# Patient Record
Sex: MALE | URBAN - NONMETROPOLITAN AREA
[De-identification: names, ages, dates, MRNs, and addresses within clinical notes are randomized per-mention and may not be internally consistent; named-entity substitution may affect disease eponyms.]

---

## 2023-06-02 LAB
ALBUMIN SERPL-MCNC: 3.9 G/DL
ALP BLD-CCNC: 98 U/L
ALT SERPL-CCNC: 9 U/L
ANION GAP SERPL CALCULATED.3IONS-SCNC: 15 MMOL/L
AST SERPL-CCNC: 10 U/L
BASOPHILS ABSOLUTE: ABNORMAL
BASOPHILS RELATIVE PERCENT: ABNORMAL
BILIRUB SERPL-MCNC: 0.3 MG/DL (ref 0.1–1.4)
BUN BLDV-MCNC: 15 MG/DL
CALCIUM SERPL-MCNC: 8.9 MG/DL
CHLORIDE BLD-SCNC: 101 MMOL/L
CHOLESTEROL, TOTAL: 221 MG/DL
CHOLESTEROL/HDL RATIO: 6.7
CO2: 25 MMOL/L
CREAT SERPL-MCNC: 1.61 MG/DL
EGFR: 42.7
EOSINOPHILS ABSOLUTE: ABNORMAL
EOSINOPHILS RELATIVE PERCENT: ABNORMAL
GLUCOSE BLD-MCNC: 273 MG/DL
HCT VFR BLD CALC: 40.6 % (ref 41–53)
HDLC SERPL-MCNC: 33 MG/DL (ref 35–70)
HEMOGLOBIN: 12.9 G/DL (ref 13.5–17.5)
LDL CHOLESTEROL CALCULATED: 122 MG/DL (ref 0–160)
LYMPHOCYTES ABSOLUTE: ABNORMAL
LYMPHOCYTES RELATIVE PERCENT: ABNORMAL
MCH RBC QN AUTO: ABNORMAL PG
MCHC RBC AUTO-ENTMCNC: ABNORMAL G/DL
MCV RBC AUTO: ABNORMAL FL
MONOCYTES ABSOLUTE: ABNORMAL
MONOCYTES RELATIVE PERCENT: ABNORMAL
NEUTROPHILS ABSOLUTE: ABNORMAL
NEUTROPHILS RELATIVE PERCENT: ABNORMAL
NONHDLC SERPL-MCNC: ABNORMAL MG/DL
PLATELET # BLD: 187 K/ΜL
PMV BLD AUTO: ABNORMAL FL
POTASSIUM SERPL-SCNC: 4.3 MMOL/L
RBC # BLD: 4.41 10^6/ΜL
SODIUM BLD-SCNC: 137 MMOL/L
TOTAL PROTEIN: 6.8
TRIGL SERPL-MCNC: 329 MG/DL
VLDLC SERPL CALC-MCNC: 66 MG/DL
WBC # BLD: 7.96 10^3/ML

## 2023-06-26 PROBLEM — R60.9 EDEMA: Status: ACTIVE | Noted: 2023-05-12

## 2023-06-26 PROBLEM — N18.9 CHRONIC KIDNEY DISEASE: Status: ACTIVE | Noted: 2023-06-21

## 2023-06-26 PROBLEM — E55.9 VITAMIN D DEFICIENCY: Status: ACTIVE | Noted: 2023-06-21

## 2023-06-26 PROBLEM — F03.90 DEMENTIA (HCC): Status: ACTIVE | Noted: 2023-05-10

## 2023-06-26 PROBLEM — F33.9 RECURRENT MAJOR DEPRESSIVE DISORDER (HCC): Status: ACTIVE | Noted: 2023-05-12

## 2023-06-26 PROBLEM — R32 URINARY INCONTINENCE: Status: ACTIVE | Noted: 2023-04-24

## 2023-06-26 PROBLEM — E03.9 HYPOTHYROIDISM: Status: ACTIVE | Noted: 2023-05-12

## 2023-06-26 PROBLEM — F41.9 ANXIETY DISORDER: Status: ACTIVE | Noted: 2023-05-10

## 2023-06-26 PROBLEM — R35.0 FREQUENCY OF URINATION: Status: ACTIVE | Noted: 2023-04-24

## 2023-06-26 PROBLEM — E11.9 TYPE 2 DIABETES MELLITUS (HCC): Status: ACTIVE | Noted: 2023-05-12

## 2023-06-26 PROBLEM — E78.2 MIXED HYPERLIPIDEMIA: Status: ACTIVE | Noted: 2023-06-21

## 2023-06-26 PROBLEM — J18.9 PNEUMONIA: Status: ACTIVE | Noted: 2023-06-21

## 2023-06-26 RX ORDER — OXYBUTYNIN CHLORIDE 5 MG/1
5 TABLET ORAL DAILY
COMMUNITY

## 2023-06-26 RX ORDER — EZETIMIBE 10 MG/1
10 TABLET ORAL DAILY
COMMUNITY
Start: 2023-03-03

## 2023-06-26 RX ORDER — TORSEMIDE 20 MG/1
20 TABLET ORAL 2 TIMES DAILY
COMMUNITY

## 2023-06-26 RX ORDER — PRAVASTATIN SODIUM 40 MG
80 TABLET ORAL NIGHTLY
COMMUNITY

## 2023-06-26 RX ORDER — ERGOCALCIFEROL 1.25 MG/1
50000 CAPSULE ORAL WEEKLY
COMMUNITY

## 2023-06-26 RX ORDER — DONEPEZIL HYDROCHLORIDE 10 MG/1
10 TABLET, FILM COATED ORAL NIGHTLY
COMMUNITY

## 2023-06-26 RX ORDER — CITALOPRAM 40 MG/1
40 TABLET ORAL DAILY
COMMUNITY

## 2023-06-26 RX ORDER — GLIPIZIDE 10 MG/1
10 TABLET, FILM COATED, EXTENDED RELEASE ORAL DAILY
COMMUNITY

## 2023-06-26 RX ORDER — INSULIN GLARGINE 100 [IU]/ML
30 INJECTION, SOLUTION SUBCUTANEOUS NIGHTLY
COMMUNITY

## 2023-06-26 RX ORDER — LISINOPRIL 10 MG/1
10 TABLET ORAL DAILY
COMMUNITY

## 2023-06-27 ENCOUNTER — OFFICE VISIT (OUTPATIENT)
Dept: NEPHROLOGY | Age: 83
End: 2023-06-27
Payer: MEDICARE

## 2023-06-27 VITALS
HEART RATE: 74 BPM | DIASTOLIC BLOOD PRESSURE: 60 MMHG | SYSTOLIC BLOOD PRESSURE: 130 MMHG | WEIGHT: 247 LBS | OXYGEN SATURATION: 95 %

## 2023-06-27 DIAGNOSIS — I10 ESSENTIAL HYPERTENSION: ICD-10-CM

## 2023-06-27 DIAGNOSIS — N18.32 STAGE 3B CHRONIC KIDNEY DISEASE (HCC): Primary | ICD-10-CM

## 2023-06-27 PROCEDURE — 1123F ACP DISCUSS/DSCN MKR DOCD: CPT | Performed by: INTERNAL MEDICINE

## 2023-06-27 PROCEDURE — 99204 OFFICE O/P NEW MOD 45 MIN: CPT | Performed by: INTERNAL MEDICINE

## 2023-06-27 PROCEDURE — 3075F SYST BP GE 130 - 139MM HG: CPT | Performed by: INTERNAL MEDICINE

## 2023-06-27 PROCEDURE — 3078F DIAST BP <80 MM HG: CPT | Performed by: INTERNAL MEDICINE

## 2023-07-03 ENCOUNTER — HOSPITAL ENCOUNTER (OUTPATIENT)
Dept: ULTRASOUND IMAGING | Age: 83
Discharge: HOME | End: 2023-07-03
Attending: INTERNAL MEDICINE
Payer: MEDICARE

## 2023-07-03 DIAGNOSIS — N18.32 STAGE 3B CHRONIC KIDNEY DISEASE (HCC): ICD-10-CM

## 2023-07-03 DIAGNOSIS — I10 ESSENTIAL HYPERTENSION: ICD-10-CM

## 2023-07-03 PROCEDURE — 76770 US EXAM ABDO BACK WALL COMP: CPT

## 2023-08-01 ENCOUNTER — TELEPHONE (OUTPATIENT)
Dept: NEPHROLOGY | Age: 83
End: 2023-08-01

## 2023-08-01 ENCOUNTER — OFFICE VISIT (OUTPATIENT)
Dept: NEPHROLOGY | Age: 83
End: 2023-08-01
Payer: MEDICARE

## 2023-08-01 ENCOUNTER — NURSE ONLY (OUTPATIENT)
Dept: LAB | Age: 83
End: 2023-08-01

## 2023-08-01 VITALS
DIASTOLIC BLOOD PRESSURE: 71 MMHG | SYSTOLIC BLOOD PRESSURE: 137 MMHG | WEIGHT: 245 LBS | HEART RATE: 97 BPM | OXYGEN SATURATION: 94 %

## 2023-08-01 DIAGNOSIS — I10 ESSENTIAL HYPERTENSION: ICD-10-CM

## 2023-08-01 DIAGNOSIS — N18.32 STAGE 3B CHRONIC KIDNEY DISEASE (HCC): Primary | ICD-10-CM

## 2023-08-01 DIAGNOSIS — N18.32 STAGE 3B CHRONIC KIDNEY DISEASE (HCC): ICD-10-CM

## 2023-08-01 LAB
ALBUMIN SERPL BCG-MCNC: 3.6 G/DL (ref 3.5–5.1)
ALP SERPL-CCNC: 112 U/L (ref 38–126)
ALT SERPL W/O P-5'-P-CCNC: 13 U/L (ref 11–66)
ANION GAP SERPL CALC-SCNC: 15 MEQ/L (ref 8–16)
AST SERPL-CCNC: 14 U/L (ref 5–40)
BACTERIA: ABNORMAL
BILIRUB SERPL-MCNC: 0.7 MG/DL (ref 0.3–1.2)
BILIRUB UR QL STRIP: NEGATIVE
BUN SERPL-MCNC: 18 MG/DL (ref 7–22)
CALCIUM SERPL-MCNC: 8.9 MG/DL (ref 8.5–10.5)
CASTS #/AREA URNS LPF: ABNORMAL /LPF
CASTS #/AREA URNS LPF: ABNORMAL /LPF
CHARACTER UR: CLEAR
CHARCOAL URNS QL MICRO: ABNORMAL
CHLORIDE SERPL-SCNC: 103 MEQ/L (ref 98–111)
CO2 SERPL-SCNC: 24 MEQ/L (ref 23–33)
COLOR UR: YELLOW
CREAT SERPL-MCNC: 0.9 MG/DL (ref 0.4–1.2)
CREAT UR-MCNC: 125.8 MG/DL
CREAT UR-MCNC: 125.8 MG/DL
CRYSTALS URNS QL MICRO: ABNORMAL
DEPRECATED RDW RBC AUTO: 50.6 FL (ref 35–45)
EPITHELIAL CELLS, UA: ABNORMAL /HPF
ERYTHROCYTE [DISTWIDTH] IN BLOOD BY AUTOMATED COUNT: 14.3 % (ref 11.5–14.5)
GFR SERPL CREATININE-BSD FRML MDRD: > 60 ML/MIN/1.73M2
GLUCOSE SERPL-MCNC: 204 MG/DL (ref 70–108)
GLUCOSE UR QL STRIP.AUTO: NEGATIVE MG/DL
HCT VFR BLD AUTO: 44 % (ref 42–52)
HGB BLD-MCNC: 13.6 GM/DL (ref 14–18)
HGB UR QL STRIP.AUTO: NEGATIVE
KETONES UR QL STRIP.AUTO: ABNORMAL
LEUKOCYTE ESTERASE UR QL STRIP.AUTO: NEGATIVE
MCH RBC QN AUTO: 29.8 PG (ref 26–33)
MCHC RBC AUTO-ENTMCNC: 30.9 GM/DL (ref 32.2–35.5)
MCV RBC AUTO: 96.5 FL (ref 80–94)
MICROALBUMIN UR-MCNC: 1.37 MG/DL
MICROALBUMIN/CREAT RATIO PNL UR: 11 MG/G (ref 0–30)
NITRITE UR QL STRIP.AUTO: NEGATIVE
PH UR STRIP.AUTO: 5.5 [PH] (ref 5–9)
PLATELET # BLD AUTO: 202 THOU/MM3 (ref 130–400)
PMV BLD AUTO: 13 FL (ref 9.4–12.4)
POTASSIUM SERPL-SCNC: 4.8 MEQ/L (ref 3.5–5.2)
PROT SERPL-MCNC: 7 G/DL (ref 6.1–8)
PROT UR STRIP.AUTO-MCNC: NEGATIVE MG/DL
PROT UR-MCNC: 10.3 MG/DL
PROT/CREAT 24H UR: 0.08 MG/G{CREAT}
RBC # BLD AUTO: 4.56 MILL/MM3 (ref 4.7–6.1)
RBC #/AREA URNS HPF: ABNORMAL /HPF
RENAL EPI CELLS #/AREA URNS HPF: ABNORMAL /[HPF]
SODIUM SERPL-SCNC: 142 MEQ/L (ref 135–145)
SPECIFIC GRAVITY UA: 1.02 (ref 1–1.03)
UROBILINOGEN, URINE: 0.2 EU/DL (ref 0–1)
WBC # BLD AUTO: 8.1 THOU/MM3 (ref 4.8–10.8)
WBC #/AREA URNS HPF: ABNORMAL /HPF
YEAST LIKE FUNGI URNS QL MICRO: ABNORMAL

## 2023-08-01 PROCEDURE — 99213 OFFICE O/P EST LOW 20 MIN: CPT | Performed by: INTERNAL MEDICINE

## 2023-08-01 PROCEDURE — 3078F DIAST BP <80 MM HG: CPT | Performed by: INTERNAL MEDICINE

## 2023-08-01 PROCEDURE — 3075F SYST BP GE 130 - 139MM HG: CPT | Performed by: INTERNAL MEDICINE

## 2023-08-01 PROCEDURE — 1123F ACP DISCUSS/DSCN MKR DOCD: CPT | Performed by: INTERNAL MEDICINE

## 2023-08-01 RX ORDER — ATORVASTATIN CALCIUM 20 MG/1
20 TABLET, FILM COATED ORAL DAILY
COMMUNITY

## 2023-08-01 NOTE — PROGRESS NOTES
[facility]        Outpatient Follow-Up note         8/1/2023 10:29 AM    Patient Name:   Corie Hannah  Birthdate:    [birth year]  Primary Care Physician:  RELL Lopez - CNP   [facility] [place] [address] AND HYPERTENSION  [address] [facility]  Dept: [phone]  Loc: [phone]     Chief Complaint / Reason for follow-up : Follow Up of CKD     Interval History :  Patient seen and examined by me. Feels well. No cp or SOB.   No hospitalizations and no med changes      Past History :  Past Medical History:   Diagnosis Date    Chronic kidney disease     Diabetes mellitus ([address])     Hyperlipidemia     Hypertension      Past Surgical History:   Procedure Laterality Date    SHOULDER ARTHROPLASTY Left         Medications :     Outpatient Medications Marked as Taking for the 8/1/23 encounter (Office Visit) with Mario Romero MD   Medication Sig Dispense Refill    atorvastatin (LIPITOR) 20 MG tablet Take 1 tablet by mouth daily      citalopram (CELEXA) 40 MG tablet Take 1 tablet by mouth daily      donepezil (ARICEPT) 10 MG tablet Take 1 tablet by mouth nightly      ezetimibe (ZETIA) 10 MG tablet Take 1 tablet by mouth daily      glipiZIDE (GLUCOTROL XL) 10 MG extended release tablet Take 1 tablet by mouth daily      SITagliptin (JANUVIA) 25 MG tablet Take 1 tablet by mouth daily      insulin glargine (LANTUS) 100 UNIT/ML injection vial Inject 30 Units into the skin nightly      lisinopril (PRINIVIL;ZESTRIL) 10 MG tablet Take 1 tablet by mouth daily      metFORMIN (GLUCOPHAGE) 500 MG tablet Take 2 tablets by mouth 2 times daily (with meals)      oxybutynin (DITROPAN) 5 MG tablet Take 1 tablet by mouth daily      pravastatin (PRAVACHOL) 40 MG tablet Take 2 tablets by mouth at bedtime      torsemide (DEMADEX) 20 MG tablet Take 1 tablet by mouth 2 times daily      ergocalciferol (ERGOCALCIFEROL) 1.25 MG (96098 UT)

## 2023-08-01 NOTE — TELEPHONE ENCOUNTER
----- Message from Loura Lundborg, MD sent at 8/1/2023  4:15 PM EDT -----  Let the patient know that the kidney numbers look much better kidney function is almost up to 60% may be due to decreasing the water pills

## 2023-09-28 ENCOUNTER — HOSPITAL ENCOUNTER (EMERGENCY)
Age: 83
Discharge: HOME | End: 2023-09-28
Payer: MEDICARE

## 2023-09-28 VITALS
WEIGHT: 240 LBS | DIASTOLIC BLOOD PRESSURE: 77 MMHG | BODY MASS INDEX: 38.57 KG/M2 | RESPIRATION RATE: 14 BRPM | HEART RATE: 88 BPM | SYSTOLIC BLOOD PRESSURE: 132 MMHG | HEIGHT: 66 IN | OXYGEN SATURATION: 96 % | TEMPERATURE: 96.6 F

## 2023-09-28 DIAGNOSIS — R05.1 ACUTE COUGH: ICD-10-CM

## 2023-09-28 DIAGNOSIS — J18.9 ATYPICAL PNEUMONIA: Primary | ICD-10-CM

## 2023-09-28 PROCEDURE — 99203 OFFICE O/P NEW LOW 30 MIN: CPT | Performed by: NURSE PRACTITIONER

## 2023-09-28 PROCEDURE — 99214 OFFICE O/P EST MOD 30 MIN: CPT

## 2023-09-28 RX ORDER — GUAIFENESIN AND DEXTROMETHORPHAN HYDROBROMIDE 1200; 60 MG/1; MG/1
1 TABLET, EXTENDED RELEASE ORAL EVERY 12 HOURS PRN
Qty: 20 TABLET | Refills: 0 | Status: SHIPPED | OUTPATIENT
Start: 2023-09-28

## 2023-09-28 RX ORDER — DOXYCYCLINE HYCLATE 100 MG
100 TABLET ORAL 2 TIMES DAILY
Qty: 14 TABLET | Refills: 0 | Status: SHIPPED | OUTPATIENT
Start: 2023-09-28 | End: 2023-10-05

## 2023-09-28 ASSESSMENT — ENCOUNTER SYMPTOMS
COUGH: 1
SINUS PRESSURE: 0
SORE THROAT: 0
NAUSEA: 0

## 2023-09-28 ASSESSMENT — PAIN DESCRIPTION - LOCATION: LOCATION: KNEE

## 2023-09-28 ASSESSMENT — PAIN SCALES - GENERAL: PAINLEVEL_OUTOF10: 5

## 2023-09-28 ASSESSMENT — PAIN DESCRIPTION - ORIENTATION: ORIENTATION: LEFT;RIGHT

## 2023-09-28 ASSESSMENT — PAIN - FUNCTIONAL ASSESSMENT: PAIN_FUNCTIONAL_ASSESSMENT: 0-10

## 2023-09-28 NOTE — ED PROVIDER NOTES
[address]  Urgent Care Encounter       CHIEF COMPLAINT       Chief Complaint   Patient presents with    Cough     Congestion decreased appetiete. Seen [facility] last wekfor same \" worse\"       Nurses Notes reviewed and I agree except as noted in the HPI. HISTORY OF PRESENT ILLNESS   Francie Brewer is a 81 y.o. male who presents with his wife and daughter for concerns of persistent cough, congestion, and decreased appetite. Daughter reports they were seen in [facility] last week for the same problem. He has tried intermittent uses of over-the-counter cough medication. The treatment has been ineffective. No reported fevers. Denies any shortness of breath. No known exposure to illness. REVIEW OF SYSTEMS     Review of Systems   Constitutional:  Positive for appetite change and fatigue. Negative for fever. HENT:  Positive for congestion. Negative for sinus pressure and sore throat. Respiratory:  Positive for cough. Cardiovascular:  Negative for chest pain. Gastrointestinal:  Negative for nausea. Musculoskeletal:  Negative for myalgias. Neurological:  Negative for headaches. PAST MEDICAL HISTORY         Diagnosis Date    Chronic kidney disease     Diabetes mellitus ([address])     Hyperlipidemia     Hypertension        SURGICALHISTORY     Patient  has a past surgical history that includes Shoulder Arthroplasty (Left).     CURRENT MEDICATIONS       Previous Medications    ATORVASTATIN (LIPITOR) 20 MG TABLET    Take 1 tablet by mouth daily    CITALOPRAM (CELEXA) 40 MG TABLET    Take 1 tablet by mouth daily    DONEPEZIL (ARICEPT) 10 MG TABLET    Take 1 tablet by mouth nightly    ERGOCALCIFEROL (ERGOCALCIFEROL) 1.25 MG (52915 UT) CAPSULE    Take 1 capsule by mouth once a week    EZETIMIBE (ZETIA) 10 MG TABLET    Take 1 tablet by mouth daily    GLIPIZIDE (GLUCOTROL XL) 10 MG EXTENDED RELEASE TABLET    Take 1 tablet by mouth daily    INSULIN GLARGINE (LANTUS) 100 UNIT/ML

## 2023-09-28 NOTE — ED TRIAGE NOTES
To room 5 with family c/o cough congestion decreased appetite \" we had to make him get up today\"  Pt was seen in [facility] care last week for same   negative covid there per daughter

## 2023-11-10 ENCOUNTER — HOSPITAL ENCOUNTER (INPATIENT)
Age: 83
LOS: 11 days | Discharge: HOME | DRG: 286 | End: 2023-11-21
Attending: STUDENT IN AN ORGANIZED HEALTH CARE EDUCATION/TRAINING PROGRAM | Admitting: STUDENT IN AN ORGANIZED HEALTH CARE EDUCATION/TRAINING PROGRAM
Payer: MEDICARE

## 2023-11-10 ENCOUNTER — APPOINTMENT (OUTPATIENT)
Dept: GENERAL RADIOLOGY | Age: 83
DRG: 286 | End: 2023-11-10
Payer: MEDICARE

## 2023-11-10 DIAGNOSIS — R07.89 ATYPICAL CHEST PAIN: ICD-10-CM

## 2023-11-10 DIAGNOSIS — R06.02 SHORTNESS OF BREATH: ICD-10-CM

## 2023-11-10 DIAGNOSIS — J81.0 ACUTE PULMONARY EDEMA (HCC): ICD-10-CM

## 2023-11-10 DIAGNOSIS — R94.39 ABNORMAL STRESS TEST: ICD-10-CM

## 2023-11-10 DIAGNOSIS — R07.9 CHEST PAIN, UNSPECIFIED TYPE: Primary | ICD-10-CM

## 2023-11-10 DIAGNOSIS — I20.89 ANGINA OF EFFORT: ICD-10-CM

## 2023-11-10 PROBLEM — R10.9 ABDOMINAL PAIN: Status: ACTIVE | Noted: 2023-11-10

## 2023-11-10 LAB
ANION GAP SERPL CALC-SCNC: 10 MEQ/L (ref 8–16)
BASOPHILS ABSOLUTE: 0 THOU/MM3 (ref 0–0.1)
BASOPHILS NFR BLD AUTO: 0.6 %
BUN SERPL-MCNC: 10 MG/DL (ref 7–22)
CALCIUM SERPL-MCNC: 8.3 MG/DL (ref 8.5–10.5)
CHLORIDE SERPL-SCNC: 96 MEQ/L (ref 98–111)
CO2 SERPL-SCNC: 26 MEQ/L (ref 23–33)
CREAT SERPL-MCNC: 0.9 MG/DL (ref 0.4–1.2)
DEPRECATED RDW RBC AUTO: 46.8 FL (ref 35–45)
EOSINOPHIL NFR BLD AUTO: 7.1 %
EOSINOPHILS ABSOLUTE: 0.4 THOU/MM3 (ref 0–0.4)
ERYTHROCYTE [DISTWIDTH] IN BLOOD BY AUTOMATED COUNT: 13.8 % (ref 11.5–14.5)
FLUAV RNA RESP QL NAA+PROBE: NOT DETECTED
FLUBV RNA RESP QL NAA+PROBE: NOT DETECTED
GFR SERPL CREATININE-BSD FRML MDRD: > 60 ML/MIN/1.73M2
GLUCOSE SERPL-MCNC: 220 MG/DL (ref 70–108)
HCT VFR BLD AUTO: 39.5 % (ref 42–52)
HGB BLD-MCNC: 12.4 GM/DL (ref 14–18)
IMM GRANULOCYTES # BLD AUTO: 0.02 THOU/MM3 (ref 0–0.07)
IMM GRANULOCYTES NFR BLD AUTO: 0.4 %
LYMPHOCYTES ABSOLUTE: 1.4 THOU/MM3 (ref 1–4.8)
LYMPHOCYTES NFR BLD AUTO: 27.6 %
MCH RBC QN AUTO: 29.5 PG (ref 26–33)
MCHC RBC AUTO-ENTMCNC: 31.4 GM/DL (ref 32.2–35.5)
MCV RBC AUTO: 94 FL (ref 80–94)
MONOCYTES ABSOLUTE: 0.5 THOU/MM3 (ref 0.4–1.3)
MONOCYTES NFR BLD AUTO: 9.8 %
NEUTROPHILS NFR BLD AUTO: 54.5 %
NRBC BLD AUTO-RTO: 0 /100 WBC
NT-PROBNP SERPL IA-MCNC: 1406 PG/ML (ref 0–449)
OSMOLALITY SERPL CALC.SUM OF ELEC: 270.3 MOSMOL/KG (ref 275–300)
PLATELET # BLD AUTO: 165 THOU/MM3 (ref 130–400)
PMV BLD AUTO: 11.9 FL (ref 9.4–12.4)
POTASSIUM SERPL-SCNC: 4.3 MEQ/L (ref 3.5–5.2)
RBC # BLD AUTO: 4.2 MILL/MM3 (ref 4.7–6.1)
SARS-COV-2 RNA RESP QL NAA+PROBE: NOT DETECTED
SEGMENTED NEUTROPHILS ABSOLUTE COUNT: 2.8 THOU/MM3 (ref 1.8–7.7)
SODIUM SERPL-SCNC: 132 MEQ/L (ref 135–145)
TROPONIN, HIGH SENSITIVITY: 37 NG/L (ref 0–12)
TROPONIN, HIGH SENSITIVITY: 40 NG/L (ref 0–12)
WBC # BLD AUTO: 5.2 THOU/MM3 (ref 4.8–10.8)

## 2023-11-10 PROCEDURE — 85025 COMPLETE CBC W/AUTO DIFF WBC: CPT

## 2023-11-10 PROCEDURE — 2140000000 HC CCU INTERMEDIATE R&B

## 2023-11-10 PROCEDURE — 93005 ELECTROCARDIOGRAM TRACING: CPT | Performed by: STUDENT IN AN ORGANIZED HEALTH CARE EDUCATION/TRAINING PROGRAM

## 2023-11-10 PROCEDURE — 87636 SARSCOV2 & INF A&B AMP PRB: CPT

## 2023-11-10 PROCEDURE — 99223 1ST HOSP IP/OBS HIGH 75: CPT | Performed by: STUDENT IN AN ORGANIZED HEALTH CARE EDUCATION/TRAINING PROGRAM

## 2023-11-10 PROCEDURE — 99285 EMERGENCY DEPT VISIT HI MDM: CPT

## 2023-11-10 PROCEDURE — 96374 THER/PROPH/DIAG INJ IV PUSH: CPT

## 2023-11-10 PROCEDURE — 36415 COLL VENOUS BLD VENIPUNCTURE: CPT

## 2023-11-10 PROCEDURE — 80048 BASIC METABOLIC PNL TOTAL CA: CPT

## 2023-11-10 PROCEDURE — 84484 ASSAY OF TROPONIN QUANT: CPT

## 2023-11-10 PROCEDURE — 71045 X-RAY EXAM CHEST 1 VIEW: CPT

## 2023-11-10 PROCEDURE — 83880 ASSAY OF NATRIURETIC PEPTIDE: CPT

## 2023-11-10 PROCEDURE — 6360000002 HC RX W HCPCS

## 2023-11-10 PROCEDURE — 6370000000 HC RX 637 (ALT 250 FOR IP)

## 2023-11-10 RX ORDER — ASPIRIN 81 MG/1
324 TABLET, CHEWABLE ORAL ONCE
Status: COMPLETED | OUTPATIENT
Start: 2023-11-10 | End: 2023-11-10

## 2023-11-10 RX ORDER — ENOXAPARIN SODIUM 100 MG/ML
30 INJECTION SUBCUTANEOUS 2 TIMES DAILY
Status: CANCELLED | OUTPATIENT
Start: 2023-11-11

## 2023-11-10 RX ORDER — FUROSEMIDE 10 MG/ML
40 INJECTION INTRAMUSCULAR; INTRAVENOUS ONCE
Status: COMPLETED | OUTPATIENT
Start: 2023-11-10 | End: 2023-11-10

## 2023-11-10 RX ADMIN — ASPIRIN 81 MG CHEWABLE TABLET 324 MG: 81 TABLET CHEWABLE at 20:38

## 2023-11-10 RX ADMIN — FUROSEMIDE 40 MG: 10 INJECTION, SOLUTION INTRAMUSCULAR; INTRAVENOUS at 20:39

## 2023-11-10 ASSESSMENT — HEART SCORE: ECG: 1

## 2023-11-10 ASSESSMENT — PAIN - FUNCTIONAL ASSESSMENT
PAIN_FUNCTIONAL_ASSESSMENT: 0-10
PAIN_FUNCTIONAL_ASSESSMENT: 0-10

## 2023-11-10 ASSESSMENT — PAIN SCALES - GENERAL
PAINLEVEL_OUTOF10: 10
PAINLEVEL_OUTOF10: 10

## 2023-11-10 ASSESSMENT — PAIN DESCRIPTION - LOCATION: LOCATION: CHEST

## 2023-11-11 ENCOUNTER — APPOINTMENT (OUTPATIENT)
Age: 83
DRG: 286 | End: 2023-11-11
Payer: MEDICARE

## 2023-11-11 ENCOUNTER — APPOINTMENT (OUTPATIENT)
Dept: GENERAL RADIOLOGY | Age: 83
DRG: 286 | End: 2023-11-11
Payer: MEDICARE

## 2023-11-11 ENCOUNTER — APPOINTMENT (OUTPATIENT)
Dept: ULTRASOUND IMAGING | Age: 83
DRG: 286 | End: 2023-11-11
Payer: MEDICARE

## 2023-11-11 ENCOUNTER — APPOINTMENT (OUTPATIENT)
Dept: CT IMAGING | Age: 83
DRG: 286 | End: 2023-11-11
Payer: MEDICARE

## 2023-11-11 PROBLEM — R79.89 ELEVATED TROPONIN: Status: ACTIVE | Noted: 2023-11-11

## 2023-11-11 PROBLEM — E78.5 HYPERLIPIDEMIA: Status: ACTIVE | Noted: 2023-06-21

## 2023-11-11 PROBLEM — D64.9 CHRONIC ANEMIA: Status: ACTIVE | Noted: 2023-11-11

## 2023-11-11 PROBLEM — R79.89 ELEVATED BRAIN NATRIURETIC PEPTIDE (BNP) LEVEL: Status: ACTIVE | Noted: 2023-11-11

## 2023-11-11 PROBLEM — R07.89 ATYPICAL CHEST PAIN: Status: ACTIVE | Noted: 2023-11-11

## 2023-11-11 PROBLEM — R10.10 UPPER ABDOMINAL PAIN: Status: ACTIVE | Noted: 2023-11-10

## 2023-11-11 PROBLEM — Z79.4 TYPE 2 DIABETES MELLITUS WITH INSULIN THERAPY (HCC): Status: ACTIVE | Noted: 2023-05-12

## 2023-11-11 PROBLEM — E87.1 HYPONATREMIA: Status: ACTIVE | Noted: 2023-11-11

## 2023-11-11 PROBLEM — I10 ESSENTIAL HYPERTENSION: Status: ACTIVE | Noted: 2023-11-11

## 2023-11-11 PROBLEM — R07.9 CHEST PAIN: Status: ACTIVE | Noted: 2023-11-11

## 2023-11-11 PROBLEM — R42 LIGHTHEADED: Status: ACTIVE | Noted: 2023-11-11

## 2023-11-11 PROBLEM — E66.01 MORBID OBESITY (HCC): Status: ACTIVE | Noted: 2023-11-11

## 2023-11-11 PROBLEM — J81.0 ACUTE PULMONARY EDEMA (HCC): Status: ACTIVE | Noted: 2023-11-11

## 2023-11-11 PROBLEM — R06.00 DYSPNEA: Status: ACTIVE | Noted: 2023-11-11

## 2023-11-11 LAB
ALBUMIN SERPL BCG-MCNC: 3.3 G/DL (ref 3.5–5.1)
ALP SERPL-CCNC: 104 U/L (ref 38–126)
ALT SERPL W/O P-5'-P-CCNC: 8 U/L (ref 11–66)
ANION GAP SERPL CALC-SCNC: 12 MEQ/L (ref 8–16)
AST SERPL-CCNC: 11 U/L (ref 5–40)
BILIRUB CONJ SERPL-MCNC: < 0.2 MG/DL (ref 0–0.3)
BILIRUB SERPL-MCNC: 0.4 MG/DL (ref 0.3–1.2)
BILIRUB UR QL STRIP.AUTO: NEGATIVE
BUN SERPL-MCNC: 10 MG/DL (ref 7–22)
CA-I BLD ISE-SCNC: 1.07 MMOL/L (ref 1.12–1.32)
CALCIUM SERPL-MCNC: 8.5 MG/DL (ref 8.5–10.5)
CHARACTER UR: CLEAR
CHLORIDE SERPL-SCNC: 99 MEQ/L (ref 98–111)
CO2 SERPL-SCNC: 24 MEQ/L (ref 23–33)
COLOR: YELLOW
CREAT SERPL-MCNC: 0.9 MG/DL (ref 0.4–1.2)
DEPRECATED RDW RBC AUTO: 47.2 FL (ref 35–45)
ECHO AV CUSP MM: 2.2 CM
ECHO AV PEAK GRADIENT: 5 MMHG
ECHO AV PEAK VELOCITY: 1.2 M/S
ECHO AV VELOCITY RATIO: 0.58
ECHO BSA: 2.3 M2
ECHO LA AREA 2C: 15.5 CM2
ECHO LA AREA 4C: 15 CM2
ECHO LA DIAMETER INDEX: 1.59 CM/M2
ECHO LA DIAMETER: 3.5 CM
ECHO LA MAJOR AXIS: 4.9 CM
ECHO LA MINOR AXIS: 5 CM
ECHO LA VOL BP: 38 ML (ref 18–58)
ECHO LA VOL MOD A2C: 38 ML (ref 18–58)
ECHO LA VOL MOD A4C: 37 ML (ref 18–58)
ECHO LA VOL/BSA BIPLANE: 17 ML/M2 (ref 16–34)
ECHO LA VOLUME INDEX MOD A2C: 17 ML/M2 (ref 16–34)
ECHO LA VOLUME INDEX MOD A4C: 17 ML/M2 (ref 16–34)
ECHO LV E' LATERAL VELOCITY: 6 CM/S
ECHO LV E' SEPTAL VELOCITY: 5 CM/S
ECHO LV FRACTIONAL SHORTENING: 28 % (ref 28–44)
ECHO LV INTERNAL DIMENSION DIASTOLE INDEX: 2.09 CM/M2
ECHO LV INTERNAL DIMENSION DIASTOLIC: 4.6 CM (ref 4.2–5.9)
ECHO LV INTERNAL DIMENSION SYSTOLIC INDEX: 1.5 CM/M2
ECHO LV INTERNAL DIMENSION SYSTOLIC: 3.3 CM
ECHO LV IVSD: 1.1 CM (ref 0.6–1)
ECHO LV MASS 2D: 181.2 G (ref 88–224)
ECHO LV MASS INDEX 2D: 82.4 G/M2 (ref 49–115)
ECHO LV POSTERIOR WALL DIASTOLIC: 1.1 CM (ref 0.6–1)
ECHO LV RELATIVE WALL THICKNESS RATIO: 0.48
ECHO LVOT PEAK GRADIENT: 2 MMHG
ECHO LVOT PEAK VELOCITY: 0.7 M/S
ECHO MV A VELOCITY: 0.74 M/S
ECHO MV E DECELERATION TIME (DT): 225 MS
ECHO MV E VELOCITY: 0.58 M/S
ECHO MV E/A RATIO: 0.78
ECHO MV E/E' LATERAL: 9.67
ECHO PV MAX VELOCITY: 0.8 M/S
ECHO PV PEAK GRADIENT: 3 MMHG
ECHO RV INTERNAL DIMENSION: 2.7 CM
ECHO TV E WAVE: 0.4 M/S
ERYTHROCYTE [DISTWIDTH] IN BLOOD BY AUTOMATED COUNT: 13.7 % (ref 11.5–14.5)
FERRITIN SERPL IA-MCNC: 85 NG/ML (ref 22–322)
FOLATE SERPL-MCNC: 7.1 NG/ML (ref 4.8–24.2)
GFR SERPL CREATININE-BSD FRML MDRD: > 60 ML/MIN/1.73M2
GLUCOSE BLD STRIP.AUTO-MCNC: 200 MG/DL (ref 70–108)
GLUCOSE BLD STRIP.AUTO-MCNC: 241 MG/DL (ref 70–108)
GLUCOSE BLD STRIP.AUTO-MCNC: 259 MG/DL (ref 70–108)
GLUCOSE BLD STRIP.AUTO-MCNC: 264 MG/DL (ref 70–108)
GLUCOSE BLD STRIP.AUTO-MCNC: 329 MG/DL (ref 70–108)
GLUCOSE SERPL-MCNC: 242 MG/DL (ref 70–108)
GLUCOSE UR QL STRIP.AUTO: NEGATIVE MG/DL
HCT VFR BLD AUTO: 39 % (ref 42–52)
HGB BLD-MCNC: 12.2 GM/DL (ref 14–18)
HGB UR QL STRIP.AUTO: NEGATIVE
IRON SATN MFR SERPL: 12 % (ref 20–50)
IRON SERPL-MCNC: 30 UG/DL (ref 65–195)
KETONES UR QL STRIP.AUTO: NEGATIVE
LACTATE SERPL-SCNC: 1.1 MMOL/L (ref 0.5–2)
LIPASE SERPL-CCNC: 42.1 U/L (ref 5.6–51.3)
MAGNESIUM SERPL-MCNC: 1.6 MG/DL (ref 1.6–2.4)
MCH RBC QN AUTO: 29.5 PG (ref 26–33)
MCHC RBC AUTO-ENTMCNC: 31.3 GM/DL (ref 32.2–35.5)
MCV RBC AUTO: 94.2 FL (ref 80–94)
NITRITE UR QL STRIP: NEGATIVE
PH UR STRIP.AUTO: 5 [PH] (ref 5–9)
PLATELET # BLD AUTO: 160 THOU/MM3 (ref 130–400)
PMV BLD AUTO: 12.6 FL (ref 9.4–12.4)
POTASSIUM SERPL-SCNC: 4.5 MEQ/L (ref 3.5–5.2)
PROT SERPL-MCNC: 6.4 G/DL (ref 6.1–8)
PROT UR STRIP.AUTO-MCNC: NEGATIVE MG/DL
RBC # BLD AUTO: 4.14 MILL/MM3 (ref 4.7–6.1)
SODIUM SERPL-SCNC: 135 MEQ/L (ref 135–145)
SP GR UR REFRACT.AUTO: 1.01 (ref 1–1.03)
T4 FREE SERPL-MCNC: 1.28 NG/DL (ref 0.93–1.76)
TIBC SERPL-MCNC: 244 UG/DL (ref 171–450)
TROPONIN, HIGH SENSITIVITY: 41 NG/L (ref 0–12)
TSH SERPL DL<=0.005 MIU/L-ACNC: 2.77 UIU/ML (ref 0.4–4.2)
UROBILINOGEN, URINE: 0.2 EU/DL (ref 0–1)
VIT B12 SERPL-MCNC: 389 PG/ML (ref 211–911)
WBC # BLD AUTO: 4.8 THOU/MM3 (ref 4.8–10.8)
WBC #/AREA URNS HPF: NEGATIVE /[HPF]

## 2023-11-11 PROCEDURE — 76775 US EXAM ABDO BACK WALL LIM: CPT

## 2023-11-11 PROCEDURE — 74018 RADEX ABDOMEN 1 VIEW: CPT

## 2023-11-11 PROCEDURE — 83540 ASSAY OF IRON: CPT

## 2023-11-11 PROCEDURE — 6360000002 HC RX W HCPCS

## 2023-11-11 PROCEDURE — 82746 ASSAY OF FOLIC ACID SERUM: CPT

## 2023-11-11 PROCEDURE — 83550 IRON BINDING TEST: CPT

## 2023-11-11 PROCEDURE — 83690 ASSAY OF LIPASE: CPT

## 2023-11-11 PROCEDURE — 74177 CT ABD & PELVIS W/CONTRAST: CPT

## 2023-11-11 PROCEDURE — 83605 ASSAY OF LACTIC ACID: CPT

## 2023-11-11 PROCEDURE — 81003 URINALYSIS AUTO W/O SCOPE: CPT

## 2023-11-11 PROCEDURE — 87040 BLOOD CULTURE FOR BACTERIA: CPT

## 2023-11-11 PROCEDURE — 84443 ASSAY THYROID STIM HORMONE: CPT

## 2023-11-11 PROCEDURE — 6370000000 HC RX 637 (ALT 250 FOR IP)

## 2023-11-11 PROCEDURE — 99233 SBSQ HOSP IP/OBS HIGH 50: CPT | Performed by: FAMILY MEDICINE

## 2023-11-11 PROCEDURE — 2580000003 HC RX 258

## 2023-11-11 PROCEDURE — 93306 TTE W/DOPPLER COMPLETE: CPT | Performed by: NUCLEAR MEDICINE

## 2023-11-11 PROCEDURE — 82248 BILIRUBIN DIRECT: CPT

## 2023-11-11 PROCEDURE — C9113 INJ PANTOPRAZOLE SODIUM, VIA: HCPCS

## 2023-11-11 PROCEDURE — 6360000004 HC RX CONTRAST MEDICATION: Performed by: FAMILY MEDICINE

## 2023-11-11 PROCEDURE — 80053 COMPREHEN METABOLIC PANEL: CPT

## 2023-11-11 PROCEDURE — 82607 VITAMIN B-12: CPT

## 2023-11-11 PROCEDURE — 93010 ELECTROCARDIOGRAM REPORT: CPT | Performed by: INTERNAL MEDICINE

## 2023-11-11 PROCEDURE — 85027 COMPLETE CBC AUTOMATED: CPT

## 2023-11-11 PROCEDURE — 51798 US URINE CAPACITY MEASURE: CPT

## 2023-11-11 PROCEDURE — 93306 TTE W/DOPPLER COMPLETE: CPT

## 2023-11-11 PROCEDURE — 2140000000 HC CCU INTERMEDIATE R&B

## 2023-11-11 PROCEDURE — 84484 ASSAY OF TROPONIN QUANT: CPT

## 2023-11-11 PROCEDURE — 94669 MECHANICAL CHEST WALL OSCILL: CPT

## 2023-11-11 PROCEDURE — 82948 REAGENT STRIP/BLOOD GLUCOSE: CPT

## 2023-11-11 PROCEDURE — 83735 ASSAY OF MAGNESIUM: CPT

## 2023-11-11 PROCEDURE — 84439 ASSAY OF FREE THYROXINE: CPT

## 2023-11-11 PROCEDURE — 82728 ASSAY OF FERRITIN: CPT

## 2023-11-11 PROCEDURE — 36415 COLL VENOUS BLD VENIPUNCTURE: CPT

## 2023-11-11 PROCEDURE — 82330 ASSAY OF CALCIUM: CPT

## 2023-11-11 RX ORDER — MAGNESIUM SULFATE IN WATER 40 MG/ML
2000 INJECTION, SOLUTION INTRAVENOUS PRN
Status: DISCONTINUED | OUTPATIENT
Start: 2023-11-11 | End: 2023-11-21 | Stop reason: HOSPADM

## 2023-11-11 RX ORDER — INSULIN GLARGINE 100 [IU]/ML
30 INJECTION, SOLUTION SUBCUTANEOUS NIGHTLY
Status: DISCONTINUED | OUTPATIENT
Start: 2023-11-11 | End: 2023-11-21 | Stop reason: HOSPADM

## 2023-11-11 RX ORDER — OXYBUTYNIN CHLORIDE 5 MG/1
5 TABLET, EXTENDED RELEASE ORAL DAILY
Status: DISCONTINUED | OUTPATIENT
Start: 2023-11-11 | End: 2023-11-21 | Stop reason: HOSPADM

## 2023-11-11 RX ORDER — ALBUTEROL SULFATE 2.5 MG/3ML
2.5 SOLUTION RESPIRATORY (INHALATION) EVERY 4 HOURS PRN
Status: DISCONTINUED | OUTPATIENT
Start: 2023-11-11 | End: 2023-11-12

## 2023-11-11 RX ORDER — ALBUTEROL SULFATE 2.5 MG/3ML
2.5 SOLUTION RESPIRATORY (INHALATION) EVERY 6 HOURS PRN
Status: DISCONTINUED | OUTPATIENT
Start: 2023-11-11 | End: 2023-11-11

## 2023-11-11 RX ORDER — TORSEMIDE 20 MG/1
20 TABLET ORAL 2 TIMES DAILY
Status: DISCONTINUED | OUTPATIENT
Start: 2023-11-11 | End: 2023-11-16

## 2023-11-11 RX ORDER — SODIUM CHLORIDE 0.9 % (FLUSH) 0.9 %
5-40 SYRINGE (ML) INJECTION PRN
Status: DISCONTINUED | OUTPATIENT
Start: 2023-11-11 | End: 2023-11-21 | Stop reason: HOSPADM

## 2023-11-11 RX ORDER — DONEPEZIL HYDROCHLORIDE 10 MG/1
10 TABLET, FILM COATED ORAL NIGHTLY
Status: DISCONTINUED | OUTPATIENT
Start: 2023-11-11 | End: 2023-11-21 | Stop reason: HOSPADM

## 2023-11-11 RX ORDER — ONDANSETRON 4 MG/1
4 TABLET, ORALLY DISINTEGRATING ORAL EVERY 8 HOURS PRN
Status: DISCONTINUED | OUTPATIENT
Start: 2023-11-11 | End: 2023-11-21 | Stop reason: HOSPADM

## 2023-11-11 RX ORDER — INSULIN LISPRO 100 [IU]/ML
0-16 INJECTION, SOLUTION INTRAVENOUS; SUBCUTANEOUS
Status: DISCONTINUED | OUTPATIENT
Start: 2023-11-11 | End: 2023-11-21 | Stop reason: HOSPADM

## 2023-11-11 RX ORDER — LEVOTHYROXINE SODIUM 0.15 MG/1
150 TABLET ORAL DAILY
COMMUNITY
Start: 2023-10-18

## 2023-11-11 RX ORDER — PANTOPRAZOLE SODIUM 40 MG/10ML
40 INJECTION, POWDER, LYOPHILIZED, FOR SOLUTION INTRAVENOUS DAILY
Status: DISCONTINUED | OUTPATIENT
Start: 2023-11-11 | End: 2023-11-14

## 2023-11-11 RX ORDER — METOCLOPRAMIDE 10 MG/1
10 TABLET ORAL
Status: DISCONTINUED | OUTPATIENT
Start: 2023-11-11 | End: 2023-11-14

## 2023-11-11 RX ORDER — POTASSIUM CHLORIDE 20 MEQ/1
40 TABLET, EXTENDED RELEASE ORAL PRN
Status: DISCONTINUED | OUTPATIENT
Start: 2023-11-11 | End: 2023-11-21 | Stop reason: HOSPADM

## 2023-11-11 RX ORDER — PRAVASTATIN SODIUM 80 MG/1
80 TABLET ORAL NIGHTLY
COMMUNITY
Start: 2023-10-18

## 2023-11-11 RX ORDER — SODIUM CHLORIDE 9 MG/ML
INJECTION, SOLUTION INTRAVENOUS PRN
Status: DISCONTINUED | OUTPATIENT
Start: 2023-11-11 | End: 2023-11-21 | Stop reason: HOSPADM

## 2023-11-11 RX ORDER — CITALOPRAM 40 MG/1
40 TABLET ORAL DAILY
Status: DISCONTINUED | OUTPATIENT
Start: 2023-11-11 | End: 2023-11-21 | Stop reason: HOSPADM

## 2023-11-11 RX ORDER — SODIUM CHLORIDE 0.9 % (FLUSH) 0.9 %
5-40 SYRINGE (ML) INJECTION EVERY 12 HOURS SCHEDULED
Status: DISCONTINUED | OUTPATIENT
Start: 2023-11-11 | End: 2023-11-21 | Stop reason: HOSPADM

## 2023-11-11 RX ORDER — IBUPROFEN 600 MG/1
1 TABLET ORAL PRN
Status: DISCONTINUED | OUTPATIENT
Start: 2023-11-11 | End: 2023-11-21 | Stop reason: HOSPADM

## 2023-11-11 RX ORDER — BUPROPION HYDROCHLORIDE 150 MG/1
150 TABLET ORAL DAILY
Status: DISCONTINUED | OUTPATIENT
Start: 2023-11-11 | End: 2023-11-21 | Stop reason: HOSPADM

## 2023-11-11 RX ORDER — FUROSEMIDE 10 MG/ML
40 INJECTION INTRAMUSCULAR; INTRAVENOUS DAILY
Status: DISCONTINUED | OUTPATIENT
Start: 2023-11-11 | End: 2023-11-12

## 2023-11-11 RX ORDER — BUPROPION HYDROCHLORIDE 150 MG/1
150 TABLET ORAL DAILY
COMMUNITY
Start: 2023-10-24

## 2023-11-11 RX ORDER — INSULIN LISPRO 100 [IU]/ML
0-4 INJECTION, SOLUTION INTRAVENOUS; SUBCUTANEOUS NIGHTLY
Status: DISCONTINUED | OUTPATIENT
Start: 2023-11-11 | End: 2023-11-11

## 2023-11-11 RX ORDER — MAGNESIUM SULFATE IN WATER 40 MG/ML
2000 INJECTION, SOLUTION INTRAVENOUS ONCE
Status: COMPLETED | OUTPATIENT
Start: 2023-11-11 | End: 2023-11-11

## 2023-11-11 RX ORDER — ACETAMINOPHEN 650 MG/1
650 SUPPOSITORY RECTAL EVERY 6 HOURS PRN
Status: DISCONTINUED | OUTPATIENT
Start: 2023-11-11 | End: 2023-11-21 | Stop reason: HOSPADM

## 2023-11-11 RX ORDER — ONDANSETRON 2 MG/ML
4 INJECTION INTRAMUSCULAR; INTRAVENOUS EVERY 6 HOURS PRN
Status: DISCONTINUED | OUTPATIENT
Start: 2023-11-11 | End: 2023-11-21 | Stop reason: HOSPADM

## 2023-11-11 RX ORDER — ENOXAPARIN SODIUM 100 MG/ML
30 INJECTION SUBCUTANEOUS 2 TIMES DAILY
Status: DISCONTINUED | OUTPATIENT
Start: 2023-11-11 | End: 2023-11-21 | Stop reason: HOSPADM

## 2023-11-11 RX ORDER — IPRATROPIUM BROMIDE AND ALBUTEROL SULFATE 2.5; .5 MG/3ML; MG/3ML
1 SOLUTION RESPIRATORY (INHALATION)
Status: DISCONTINUED | OUTPATIENT
Start: 2023-11-11 | End: 2023-11-11

## 2023-11-11 RX ORDER — DEXTROSE MONOHYDRATE 100 MG/ML
INJECTION, SOLUTION INTRAVENOUS CONTINUOUS PRN
Status: DISCONTINUED | OUTPATIENT
Start: 2023-11-11 | End: 2023-11-21 | Stop reason: HOSPADM

## 2023-11-11 RX ORDER — POTASSIUM CHLORIDE 7.45 MG/ML
10 INJECTION INTRAVENOUS PRN
Status: DISCONTINUED | OUTPATIENT
Start: 2023-11-11 | End: 2023-11-21 | Stop reason: HOSPADM

## 2023-11-11 RX ORDER — CALCIUM GLUCONATE 20 MG/ML
2000 INJECTION, SOLUTION INTRAVENOUS ONCE
Status: COMPLETED | OUTPATIENT
Start: 2023-11-11 | End: 2023-11-11

## 2023-11-11 RX ORDER — LISINOPRIL 10 MG/1
10 TABLET ORAL DAILY
Status: DISCONTINUED | OUTPATIENT
Start: 2023-11-11 | End: 2023-11-21 | Stop reason: HOSPADM

## 2023-11-11 RX ORDER — OXYBUTYNIN CHLORIDE 5 MG/1
5 TABLET, EXTENDED RELEASE ORAL DAILY
COMMUNITY
Start: 2023-09-22

## 2023-11-11 RX ORDER — LEVOTHYROXINE SODIUM 0.15 MG/1
150 TABLET ORAL DAILY
Status: DISCONTINUED | OUTPATIENT
Start: 2023-11-11 | End: 2023-11-21 | Stop reason: HOSPADM

## 2023-11-11 RX ORDER — ACETAMINOPHEN 325 MG/1
650 TABLET ORAL EVERY 6 HOURS PRN
Status: DISCONTINUED | OUTPATIENT
Start: 2023-11-11 | End: 2023-11-21 | Stop reason: HOSPADM

## 2023-11-11 RX ORDER — INSULIN LISPRO 100 [IU]/ML
0-4 INJECTION, SOLUTION INTRAVENOUS; SUBCUTANEOUS NIGHTLY
Status: DISCONTINUED | OUTPATIENT
Start: 2023-11-11 | End: 2023-11-21 | Stop reason: HOSPADM

## 2023-11-11 RX ORDER — PRAVASTATIN SODIUM 80 MG/1
80 TABLET ORAL NIGHTLY
Status: DISCONTINUED | OUTPATIENT
Start: 2023-11-11 | End: 2023-11-21 | Stop reason: HOSPADM

## 2023-11-11 RX ORDER — INSULIN LISPRO 100 [IU]/ML
0-4 INJECTION, SOLUTION INTRAVENOUS; SUBCUTANEOUS
Status: DISCONTINUED | OUTPATIENT
Start: 2023-11-11 | End: 2023-11-11

## 2023-11-11 RX ORDER — POLYETHYLENE GLYCOL 3350 17 G/17G
17 POWDER, FOR SOLUTION ORAL DAILY PRN
Status: DISCONTINUED | OUTPATIENT
Start: 2023-11-11 | End: 2023-11-21 | Stop reason: HOSPADM

## 2023-11-11 RX ADMIN — TORSEMIDE 20 MG: 20 TABLET ORAL at 03:56

## 2023-11-11 RX ADMIN — SODIUM CHLORIDE, PRESERVATIVE FREE 10 ML: 5 INJECTION INTRAVENOUS at 08:40

## 2023-11-11 RX ADMIN — INSULIN LISPRO 3 UNITS: 100 INJECTION, SOLUTION INTRAVENOUS; SUBCUTANEOUS at 12:35

## 2023-11-11 RX ADMIN — CITALOPRAM 40 MG: 40 TABLET, FILM COATED ORAL at 08:37

## 2023-11-11 RX ADMIN — METOPROLOL TARTRATE 25 MG: 25 TABLET, FILM COATED ORAL at 03:55

## 2023-11-11 RX ADMIN — ENOXAPARIN SODIUM 30 MG: 100 INJECTION SUBCUTANEOUS at 08:37

## 2023-11-11 RX ADMIN — IOPAMIDOL 80 ML: 755 INJECTION, SOLUTION INTRAVENOUS at 17:42

## 2023-11-11 RX ADMIN — METOPROLOL TARTRATE 25 MG: 25 TABLET, FILM COATED ORAL at 20:48

## 2023-11-11 RX ADMIN — INSULIN GLARGINE 30 UNITS: 100 INJECTION, SOLUTION SUBCUTANEOUS at 20:49

## 2023-11-11 RX ADMIN — DONEPEZIL HYDROCHLORIDE 10 MG: 10 TABLET, FILM COATED ORAL at 20:48

## 2023-11-11 RX ADMIN — ENOXAPARIN SODIUM 30 MG: 100 INJECTION SUBCUTANEOUS at 20:49

## 2023-11-11 RX ADMIN — DONEPEZIL HYDROCHLORIDE 10 MG: 10 TABLET, FILM COATED ORAL at 03:56

## 2023-11-11 RX ADMIN — PRAVASTATIN SODIUM 80 MG: 80 TABLET ORAL at 03:56

## 2023-11-11 RX ADMIN — MAGNESIUM SULFATE HEPTAHYDRATE 2000 MG: 40 INJECTION, SOLUTION INTRAVENOUS at 04:16

## 2023-11-11 RX ADMIN — METOPROLOL TARTRATE 25 MG: 25 TABLET, FILM COATED ORAL at 08:37

## 2023-11-11 RX ADMIN — PANTOPRAZOLE SODIUM 40 MG: 40 INJECTION, POWDER, FOR SOLUTION INTRAVENOUS at 08:37

## 2023-11-11 RX ADMIN — FUROSEMIDE 40 MG: 10 INJECTION, SOLUTION INTRAMUSCULAR; INTRAVENOUS at 08:37

## 2023-11-11 RX ADMIN — PRAVASTATIN SODIUM 80 MG: 80 TABLET ORAL at 20:48

## 2023-11-11 RX ADMIN — METOCLOPRAMIDE 10 MG: 10 TABLET ORAL at 20:48

## 2023-11-11 RX ADMIN — LISINOPRIL 10 MG: 10 TABLET ORAL at 08:37

## 2023-11-11 RX ADMIN — BUPROPION HYDROCHLORIDE 150 MG: 150 TABLET, FILM COATED, EXTENDED RELEASE ORAL at 08:37

## 2023-11-11 RX ADMIN — ACETAMINOPHEN 650 MG: 325 TABLET ORAL at 15:21

## 2023-11-11 RX ADMIN — CALCIUM GLUCONATE 2000 MG: 20 INJECTION, SOLUTION INTRAVENOUS at 06:57

## 2023-11-11 RX ADMIN — LEVOTHYROXINE SODIUM 150 MCG: 0.15 TABLET ORAL at 08:37

## 2023-11-11 RX ADMIN — SODIUM CHLORIDE, PRESERVATIVE FREE 10 ML: 5 INJECTION INTRAVENOUS at 20:48

## 2023-11-11 RX ADMIN — INSULIN LISPRO 2 UNITS: 100 INJECTION, SOLUTION INTRAVENOUS; SUBCUTANEOUS at 08:37

## 2023-11-11 ASSESSMENT — PAIN DESCRIPTION - LOCATION: LOCATION: HEAD

## 2023-11-11 ASSESSMENT — PAIN DESCRIPTION - DESCRIPTORS: DESCRIPTORS: ACHING

## 2023-11-11 ASSESSMENT — PAIN SCALES - GENERAL: PAINLEVEL_OUTOF10: 3

## 2023-11-11 ASSESSMENT — PAIN DESCRIPTION - ORIENTATION: ORIENTATION: RIGHT

## 2023-11-11 NOTE — H&P
Internal Medicine Resident History and Physical          Name: Nilda Bagley, male, : 1940, MRN: [id number]    PCP: Alpa Hobbs, RELL - CNP    Date of Admission: 11/10/2023  Date of Service: Pt seen/examined on 23  and Admitted to Inpatient with expected LOS greater than two midnights due to medical therapy. Assessment and Plan:  #Abdominal pain: Unclear etiology. Query whether there is some component of GERD vs nephrolithiasis vs UTI. Patient reports acute 1 day history of diffuse abdominal pain wrapping around. Accompanied by nausea. Some intermittent pressure mid-epigastric region. Reports soft BM on day of presentation. Denies recent constipation or diarrhea. Denies GERD sx. Does has mild TTP diffusely on exam. Denies recent NSAID use. Reports intermittent Tylenol use and pepto-bismol. Associated with 2-3 episodes lightheadedness. KUB for further evaluation  UA w/ reflex to evaluate for UTI as patient reporting symptoms of urinary retention. Denies dysuria. Noted to be on oxybutynin. Check Lipase   CLD  Anti-emetics PRN    #Dyspnea: Unclear etiology, COPD vs heart failure. Does not have previously diagnosed COPD or HF. Has history of smoking for 40-50 years, quit around age 73. Denies significant cardiac history, no cardiac stents. CXR read as no acute findings but does appear to have some pulmonary vascular congestion. Does have crackles and wheezing on exam. Reported improvement in mid-epigastric/substernal pressure with lasix dose in the ED. BNP 1406, no known baseline. Echo ordered for further evaluation. Daily weights. I&Os. Will also initiate duoneb treatments. #Substernal/Mid-epigastric pressure: Questionable whether this is cardiac in origin. Reports diffuse abdominal pain wrapping around upper quadrants of abdomen, has some associated mid-epigastric pressure. Denies any radiation of pain or pressure to left arm or jaw.  Denies heartburn symptoms but does describe

## 2023-11-11 NOTE — ED NOTES
Patient medicated per MAR at this time. Patient/family also provided an update on the plan of care at this time. Patient denies further questions. Patient VSS. Respirs are easy and no acute distress noted. Call light ramy steele.        Caryn Cunningham RN  11/10/23 2043
Pt resting in bed, respirations even and unlabored. Updated on POC. No needs expressed at this time. Call light within reach.  [place], [place]  11/10/23 1986
Report given to Rosa STANLEY, pt in stable condition at this time.       [facility], [address] [place] [address]  11/10/23 8018
Spoke to 3B staff to approve pt transport to  in stable condition.      Tameka Allen LPN  75/81/32 [id number]
Chronic kidney disease     Diabetes mellitus ([address] [facility])     Hyperlipidemia     Hypertension            Electronically signed by Lorena Espinal RN on 11/10/2023 at 10:55 PM      Lorena Espinal, [address] [place] [address]  11/10/23 7514

## 2023-11-11 NOTE — ED TRIAGE NOTES
Pt presents to the ER with c/o CP. Pt states he has had \"flu-like symptoms\" for the last week and the CP started toady. Pt denies cardiac hx. Pt is alert and oriented, respirations even and unlabored.  VSS

## 2023-11-11 NOTE — ED PROVIDER NOTES
113.4 kg (250 lb)       General: Lying in bed, no obvious distress  HEENT:  normocephalic and atraumatic. No scleral icterus. PERR. EOMI  Lungs:  Diffuse expiratory rales and faint wheezing appreciated, no retractions, no evidence of respiratory distress  Cardiac: RRR without MGR, Bilateral Radial and DP pulses 2+ no JVD  Abdomen: soft. Nontender. Nondistended, Bowel Sounds Present  Extremities:  No clubbing, cyanosis, or edema x 4. Cap Refill < 2 Seconds        FORMAL DIAGNOSTIC RESULTS     RADIOLOGY: Interpretation per the Radiologist below, if available at the time of this note (none if blank):    XR CHEST PORTABLE   Final Result   1. No acute findings.       This document has been electronically signed by: Yojana Estrada MD on    11/10/2023 09:54 PM          LABS: (none if blank)  Labs Reviewed   CBC WITH AUTO DIFFERENTIAL - Abnormal; Notable for the following components:       Result Value    RBC 4.20 (*)     Hemoglobin 12.4 (*)     Hematocrit 39.5 (*)     MCHC 31.4 (*)     RDW-SD 46.8 (*)     All other components within normal limits   BASIC METABOLIC PANEL - Abnormal; Notable for the following components:    Sodium 132 (*)     Chloride 96 (*)     Glucose 220 (*)     Calcium 8.3 (*)     All other components within normal limits   BRAIN NATRIURETIC PEPTIDE - Abnormal; Notable for the following components:    Pro-BNP 1406.0 (*)     All other components within normal limits   TROPONIN - Abnormal; Notable for the following components:    Troponin, High Sensitivity 37 (*)     All other components within normal limits   OSMOLALITY - Abnormal; Notable for the following components:    Osmolality Calc 270.3 (*)     All other components within normal limits   TROPONIN - Abnormal; Notable for the following components:    Troponin, High Sensitivity 40 (*)     All other components within normal limits   COVID-19 & INFLUENZA COMBO   ANION GAP   GLOMERULAR FILTRATION RATE, ESTIMATED       (Any cultures that may have been

## 2023-11-11 NOTE — PROCEDURES
Bladder scan completed and results given to [facility]     238 ml of urine in bladder at this time.

## 2023-11-12 ENCOUNTER — APPOINTMENT (OUTPATIENT)
Dept: GENERAL RADIOLOGY | Age: 83
DRG: 286 | End: 2023-11-12
Payer: MEDICARE

## 2023-11-12 PROBLEM — N17.9 AKI (ACUTE KIDNEY INJURY) (HCC): Status: ACTIVE | Noted: 2023-11-12

## 2023-11-12 PROBLEM — R50.9 FEVER: Status: ACTIVE | Noted: 2023-11-12

## 2023-11-12 PROBLEM — R06.2 WHEEZING: Status: ACTIVE | Noted: 2023-11-12

## 2023-11-12 PROBLEM — I95.9 HYPOTENSION: Status: ACTIVE | Noted: 2023-11-12

## 2023-11-12 LAB
ANION GAP SERPL CALC-SCNC: 10 MEQ/L (ref 8–16)
BACTERIA: ABNORMAL
BILIRUB UR QL STRIP: NEGATIVE
BUN SERPL-MCNC: 16 MG/DL (ref 7–22)
CALCIUM SERPL-MCNC: 8.5 MG/DL (ref 8.5–10.5)
CASTS #/AREA URNS LPF: ABNORMAL /LPF
CASTS #/AREA URNS LPF: ABNORMAL /LPF
CHARACTER UR: ABNORMAL
CHARCOAL URNS QL MICRO: ABNORMAL
CHLORIDE SERPL-SCNC: 96 MEQ/L (ref 98–111)
CO2 SERPL-SCNC: 27 MEQ/L (ref 23–33)
COLOR UR: YELLOW
CREAT SERPL-MCNC: 1.2 MG/DL (ref 0.4–1.2)
CRYSTALS URNS QL MICRO: ABNORMAL
DEPRECATED RDW RBC AUTO: 46.5 FL (ref 35–45)
EPITHELIAL CELLS, UA: ABNORMAL /HPF
ERYTHROCYTE [DISTWIDTH] IN BLOOD BY AUTOMATED COUNT: 13.7 % (ref 11.5–14.5)
FLUAV RNA RESP QL NAA+PROBE: NOT DETECTED
FLUBV RNA RESP QL NAA+PROBE: NOT DETECTED
GFR SERPL CREATININE-BSD FRML MDRD: 60 ML/MIN/1.73M2
GLUCOSE BLD STRIP.AUTO-MCNC: 164 MG/DL (ref 70–108)
GLUCOSE BLD STRIP.AUTO-MCNC: 195 MG/DL (ref 70–108)
GLUCOSE BLD STRIP.AUTO-MCNC: 209 MG/DL (ref 70–108)
GLUCOSE BLD STRIP.AUTO-MCNC: 245 MG/DL (ref 70–108)
GLUCOSE SERPL-MCNC: 211 MG/DL (ref 70–108)
GLUCOSE UR QL STRIP.AUTO: NEGATIVE MG/DL
HCT VFR BLD AUTO: 39.4 % (ref 42–52)
HGB BLD-MCNC: 12.4 GM/DL (ref 14–18)
HGB UR QL STRIP.AUTO: ABNORMAL
KETONES UR QL STRIP.AUTO: ABNORMAL
LACTATE SERPL-SCNC: 1.5 MMOL/L (ref 0.5–2)
LEUKOCYTE ESTERASE UR QL STRIP.AUTO: NEGATIVE
MCH RBC QN AUTO: 29.4 PG (ref 26–33)
MCHC RBC AUTO-ENTMCNC: 31.5 GM/DL (ref 32.2–35.5)
MCV RBC AUTO: 93.4 FL (ref 80–94)
NITRITE UR QL STRIP.AUTO: NEGATIVE
PH UR STRIP.AUTO: 5 [PH] (ref 5–9)
PLATELET # BLD AUTO: 155 THOU/MM3 (ref 130–400)
PMV BLD AUTO: 12.8 FL (ref 9.4–12.4)
POTASSIUM SERPL-SCNC: 4.6 MEQ/L (ref 3.5–5.2)
PROCALCITONIN SERPL IA-MCNC: 0.08 NG/ML (ref 0.01–0.09)
PROT UR STRIP.AUTO-MCNC: NEGATIVE MG/DL
RBC # BLD AUTO: 4.22 MILL/MM3 (ref 4.7–6.1)
RBC #/AREA URNS HPF: > 100 /HPF
RENAL EPI CELLS #/AREA URNS HPF: ABNORMAL /[HPF]
SARS-COV-2 RNA RESP QL NAA+PROBE: NOT DETECTED
SODIUM SERPL-SCNC: 133 MEQ/L (ref 135–145)
SPECIFIC GRAVITY UA: 1.03 (ref 1–1.03)
UROBILINOGEN, URINE: 1 EU/DL (ref 0–1)
WBC # BLD AUTO: 4.8 THOU/MM3 (ref 4.8–10.8)
WBC #/AREA URNS HPF: ABNORMAL /HPF
YEAST LIKE FUNGI URNS QL MICRO: ABNORMAL

## 2023-11-12 PROCEDURE — 36415 COLL VENOUS BLD VENIPUNCTURE: CPT

## 2023-11-12 PROCEDURE — 87086 URINE CULTURE/COLONY COUNT: CPT

## 2023-11-12 PROCEDURE — 87636 SARSCOV2 & INF A&B AMP PRB: CPT

## 2023-11-12 PROCEDURE — 2580000003 HC RX 258

## 2023-11-12 PROCEDURE — C9113 INJ PANTOPRAZOLE SODIUM, VIA: HCPCS

## 2023-11-12 PROCEDURE — 82948 REAGENT STRIP/BLOOD GLUCOSE: CPT

## 2023-11-12 PROCEDURE — 83605 ASSAY OF LACTIC ACID: CPT

## 2023-11-12 PROCEDURE — 2140000000 HC CCU INTERMEDIATE R&B

## 2023-11-12 PROCEDURE — 87040 BLOOD CULTURE FOR BACTERIA: CPT

## 2023-11-12 PROCEDURE — 6370000000 HC RX 637 (ALT 250 FOR IP)

## 2023-11-12 PROCEDURE — 84145 PROCALCITONIN (PCT): CPT

## 2023-11-12 PROCEDURE — 99233 SBSQ HOSP IP/OBS HIGH 50: CPT | Performed by: FAMILY MEDICINE

## 2023-11-12 PROCEDURE — 85027 COMPLETE CBC AUTOMATED: CPT

## 2023-11-12 PROCEDURE — 80048 BASIC METABOLIC PNL TOTAL CA: CPT

## 2023-11-12 PROCEDURE — 51701 INSERT BLADDER CATHETER: CPT

## 2023-11-12 PROCEDURE — 81001 URINALYSIS AUTO W/SCOPE: CPT

## 2023-11-12 PROCEDURE — 6360000002 HC RX W HCPCS

## 2023-11-12 PROCEDURE — 2700000000 HC OXYGEN THERAPY PER DAY

## 2023-11-12 PROCEDURE — 94640 AIRWAY INHALATION TREATMENT: CPT

## 2023-11-12 PROCEDURE — 94761 N-INVAS EAR/PLS OXIMETRY MLT: CPT

## 2023-11-12 PROCEDURE — 71046 X-RAY EXAM CHEST 2 VIEWS: CPT

## 2023-11-12 RX ORDER — ALBUTEROL SULFATE 2.5 MG/3ML
2.5 SOLUTION RESPIRATORY (INHALATION)
Status: DISCONTINUED | OUTPATIENT
Start: 2023-11-13 | End: 2023-11-13

## 2023-11-12 RX ORDER — 0.9 % SODIUM CHLORIDE 0.9 %
500 INTRAVENOUS SOLUTION INTRAVENOUS ONCE
Status: COMPLETED | OUTPATIENT
Start: 2023-11-12 | End: 2023-11-12

## 2023-11-12 RX ORDER — ALBUTEROL SULFATE 2.5 MG/3ML
2.5 SOLUTION RESPIRATORY (INHALATION) 4 TIMES DAILY
Status: DISCONTINUED | OUTPATIENT
Start: 2023-11-12 | End: 2023-11-12

## 2023-11-12 RX ORDER — 0.9 % SODIUM CHLORIDE 0.9 %
500 INTRAVENOUS SOLUTION INTRAVENOUS ONCE
Status: DISCONTINUED | OUTPATIENT
Start: 2023-11-12 | End: 2023-11-12

## 2023-11-12 RX ORDER — SENNOSIDES A AND B 8.6 MG/1
1 TABLET, FILM COATED ORAL NIGHTLY
Status: DISCONTINUED | OUTPATIENT
Start: 2023-11-12 | End: 2023-11-21 | Stop reason: HOSPADM

## 2023-11-12 RX ORDER — SODIUM CHLORIDE 9 MG/ML
INJECTION, SOLUTION INTRAVENOUS CONTINUOUS
Status: DISCONTINUED | OUTPATIENT
Start: 2023-11-12 | End: 2023-11-13

## 2023-11-12 RX ORDER — BISACODYL 10 MG
10 SUPPOSITORY, RECTAL RECTAL DAILY PRN
Status: DISCONTINUED | OUTPATIENT
Start: 2023-11-12 | End: 2023-11-21 | Stop reason: HOSPADM

## 2023-11-12 RX ORDER — ALBUTEROL SULFATE 2.5 MG/3ML
2.5 SOLUTION RESPIRATORY (INHALATION) EVERY 4 HOURS PRN
Status: DISCONTINUED | OUTPATIENT
Start: 2023-11-12 | End: 2023-11-21 | Stop reason: HOSPADM

## 2023-11-12 RX ADMIN — METOCLOPRAMIDE 10 MG: 10 TABLET ORAL at 20:12

## 2023-11-12 RX ADMIN — ALBUTEROL SULFATE 2.5 MG: 2.5 SOLUTION RESPIRATORY (INHALATION) at 13:47

## 2023-11-12 RX ADMIN — SODIUM CHLORIDE, PRESERVATIVE FREE 10 ML: 5 INJECTION INTRAVENOUS at 09:04

## 2023-11-12 RX ADMIN — LEVOTHYROXINE SODIUM 150 MCG: 0.15 TABLET ORAL at 09:05

## 2023-11-12 RX ADMIN — METOCLOPRAMIDE 10 MG: 10 TABLET ORAL at 11:04

## 2023-11-12 RX ADMIN — FUROSEMIDE 40 MG: 10 INJECTION, SOLUTION INTRAMUSCULAR; INTRAVENOUS at 09:05

## 2023-11-12 RX ADMIN — SODIUM CHLORIDE, PRESERVATIVE FREE 10 ML: 5 INJECTION INTRAVENOUS at 20:12

## 2023-11-12 RX ADMIN — LISINOPRIL 10 MG: 10 TABLET ORAL at 09:05

## 2023-11-12 RX ADMIN — CITALOPRAM 40 MG: 40 TABLET, FILM COATED ORAL at 09:05

## 2023-11-12 RX ADMIN — DONEPEZIL HYDROCHLORIDE 10 MG: 10 TABLET, FILM COATED ORAL at 20:12

## 2023-11-12 RX ADMIN — SODIUM CHLORIDE: 9 INJECTION, SOLUTION INTRAVENOUS at 18:19

## 2023-11-12 RX ADMIN — BISACODYL 10 MG: 10 SUPPOSITORY RECTAL at 12:23

## 2023-11-12 RX ADMIN — SENNOSIDES 8.6 MG: 8.6 TABLET, FILM COATED ORAL at 20:12

## 2023-11-12 RX ADMIN — METOPROLOL TARTRATE 25 MG: 25 TABLET, FILM COATED ORAL at 09:05

## 2023-11-12 RX ADMIN — ALBUTEROL SULFATE 2.5 MG: 2.5 SOLUTION RESPIRATORY (INHALATION) at 22:53

## 2023-11-12 RX ADMIN — ACETAMINOPHEN 650 MG: 325 TABLET ORAL at 12:23

## 2023-11-12 RX ADMIN — PANTOPRAZOLE SODIUM 40 MG: 40 INJECTION, POWDER, FOR SOLUTION INTRAVENOUS at 09:04

## 2023-11-12 RX ADMIN — INSULIN LISPRO 2 UNITS: 100 INJECTION, SOLUTION INTRAVENOUS; SUBCUTANEOUS at 09:04

## 2023-11-12 RX ADMIN — METOCLOPRAMIDE 10 MG: 10 TABLET ORAL at 05:43

## 2023-11-12 RX ADMIN — ENOXAPARIN SODIUM 30 MG: 100 INJECTION SUBCUTANEOUS at 09:04

## 2023-11-12 RX ADMIN — ENOXAPARIN SODIUM 30 MG: 100 INJECTION SUBCUTANEOUS at 20:59

## 2023-11-12 RX ADMIN — INSULIN GLARGINE 30 UNITS: 100 INJECTION, SOLUTION SUBCUTANEOUS at 20:59

## 2023-11-12 RX ADMIN — PRAVASTATIN SODIUM 80 MG: 80 TABLET ORAL at 20:12

## 2023-11-12 RX ADMIN — SODIUM CHLORIDE 500 ML: 9 INJECTION, SOLUTION INTRAVENOUS at 17:15

## 2023-11-12 RX ADMIN — SODIUM CHLORIDE 500 ML: 9 INJECTION, SOLUTION INTRAVENOUS at 16:13

## 2023-11-12 RX ADMIN — BUPROPION HYDROCHLORIDE 150 MG: 150 TABLET, FILM COATED, EXTENDED RELEASE ORAL at 09:05

## 2023-11-12 NOTE — PROCEDURES
Bladder scan completed and results given to [address] [facility] RN     255 ml of urine in bladder at this time.

## 2023-11-13 ENCOUNTER — APPOINTMENT (OUTPATIENT)
Dept: GENERAL RADIOLOGY | Age: 83
DRG: 286 | End: 2023-11-13
Payer: MEDICARE

## 2023-11-13 LAB
ALBUMIN SERPL BCG-MCNC: 2.9 G/DL (ref 3.5–5.1)
ALP SERPL-CCNC: 81 U/L (ref 38–126)
ALT SERPL W/O P-5'-P-CCNC: 6 U/L (ref 11–66)
ANION GAP SERPL CALC-SCNC: 10 MEQ/L (ref 8–16)
ANION GAP SERPL CALC-SCNC: 11 MEQ/L (ref 8–16)
ANION GAP SERPL CALC-SCNC: 6 MEQ/L (ref 8–16)
AST SERPL-CCNC: 11 U/L (ref 5–40)
BACTERIA UR CULT: ABNORMAL
BILIRUB SERPL-MCNC: 0.2 MG/DL (ref 0.3–1.2)
BUN SERPL-MCNC: 22 MG/DL (ref 7–22)
BUN SERPL-MCNC: 23 MG/DL (ref 7–22)
BUN SERPL-MCNC: 24 MG/DL (ref 7–22)
CA-I BLD ISE-SCNC: 0.92 MMOL/L (ref 1.12–1.32)
CALCIUM SERPL-MCNC: 7.7 MG/DL (ref 8.5–10.5)
CALCIUM SERPL-MCNC: 7.9 MG/DL (ref 8.5–10.5)
CALCIUM SERPL-MCNC: 8 MG/DL (ref 8.5–10.5)
CHLORIDE SERPL-SCNC: 96 MEQ/L (ref 98–111)
CHLORIDE SERPL-SCNC: 97 MEQ/L (ref 98–111)
CHLORIDE SERPL-SCNC: 97 MEQ/L (ref 98–111)
CO2 SERPL-SCNC: 25 MEQ/L (ref 23–33)
CO2 SERPL-SCNC: 26 MEQ/L (ref 23–33)
CO2 SERPL-SCNC: 30 MEQ/L (ref 23–33)
CREAT SERPL-MCNC: 1.4 MG/DL (ref 0.4–1.2)
CREAT SERPL-MCNC: 1.4 MG/DL (ref 0.4–1.2)
CREAT SERPL-MCNC: 1.5 MG/DL (ref 0.4–1.2)
CRP SERPL-MCNC: 8.89 MG/DL (ref 0–1)
D DIMER PPP IA.FEU-MCNC: 398 NG/ML FEU (ref 0–500)
DEPRECATED RDW RBC AUTO: 48.9 FL (ref 35–45)
ERYTHROCYTE [DISTWIDTH] IN BLOOD BY AUTOMATED COUNT: 13.7 % (ref 11.5–14.5)
GFR SERPL CREATININE-BSD FRML MDRD: 46 ML/MIN/1.73M2
GFR SERPL CREATININE-BSD FRML MDRD: 50 ML/MIN/1.73M2
GFR SERPL CREATININE-BSD FRML MDRD: 50 ML/MIN/1.73M2
GLUCOSE BLD STRIP.AUTO-MCNC: 134 MG/DL (ref 70–108)
GLUCOSE BLD STRIP.AUTO-MCNC: 142 MG/DL (ref 70–108)
GLUCOSE BLD STRIP.AUTO-MCNC: 205 MG/DL (ref 70–108)
GLUCOSE BLD STRIP.AUTO-MCNC: 274 MG/DL (ref 70–108)
GLUCOSE SERPL-MCNC: 129 MG/DL (ref 70–108)
GLUCOSE SERPL-MCNC: 132 MG/DL (ref 70–108)
GLUCOSE SERPL-MCNC: 192 MG/DL (ref 70–108)
HCT VFR BLD AUTO: 36.9 % (ref 42–52)
HGB BLD-MCNC: 11.4 GM/DL (ref 14–18)
LACTATE SERPL-SCNC: 0.8 MMOL/L (ref 0.5–2)
MAGNESIUM SERPL-MCNC: 1.8 MG/DL (ref 1.6–2.4)
MCH RBC QN AUTO: 29.8 PG (ref 26–33)
MCHC RBC AUTO-ENTMCNC: 30.9 GM/DL (ref 32.2–35.5)
MCV RBC AUTO: 96.3 FL (ref 80–94)
NT-PROBNP SERPL IA-MCNC: 1007 PG/ML (ref 0–449)
ORGANISM: ABNORMAL
OSMOLALITY SERPL: 285 MOSMOL/KG (ref 275–295)
PH BLDV: 7.4 [PH] (ref 7.31–7.41)
PLATELET # BLD AUTO: 141 THOU/MM3 (ref 130–400)
PMV BLD AUTO: 12.4 FL (ref 9.4–12.4)
POTASSIUM SERPL-SCNC: 4.2 MEQ/L (ref 3.5–5.2)
POTASSIUM SERPL-SCNC: 4.2 MEQ/L (ref 3.5–5.2)
POTASSIUM SERPL-SCNC: 4.7 MEQ/L (ref 3.5–5.2)
PROCALCITONIN SERPL IA-MCNC: 0.1 NG/ML (ref 0.01–0.09)
PROT SERPL-MCNC: 6 G/DL (ref 6.1–8)
RBC # BLD AUTO: 3.83 MILL/MM3 (ref 4.7–6.1)
SODIUM SERPL-SCNC: 132 MEQ/L (ref 135–145)
SODIUM SERPL-SCNC: 133 MEQ/L (ref 135–145)
SODIUM SERPL-SCNC: 133 MEQ/L (ref 135–145)
URATE SERPL-MCNC: 7.6 MG/DL (ref 3.7–7)
WBC # BLD AUTO: 7.3 THOU/MM3 (ref 4.8–10.8)

## 2023-11-13 PROCEDURE — 6370000000 HC RX 637 (ALT 250 FOR IP): Performed by: FAMILY MEDICINE

## 2023-11-13 PROCEDURE — 99233 SBSQ HOSP IP/OBS HIGH 50: CPT | Performed by: FAMILY MEDICINE

## 2023-11-13 PROCEDURE — 83880 ASSAY OF NATRIURETIC PEPTIDE: CPT

## 2023-11-13 PROCEDURE — 83735 ASSAY OF MAGNESIUM: CPT

## 2023-11-13 PROCEDURE — 83930 ASSAY OF BLOOD OSMOLALITY: CPT

## 2023-11-13 PROCEDURE — 84145 PROCALCITONIN (PCT): CPT

## 2023-11-13 PROCEDURE — 82800 BLOOD PH: CPT

## 2023-11-13 PROCEDURE — 51702 INSERT TEMP BLADDER CATH: CPT

## 2023-11-13 PROCEDURE — 85379 FIBRIN DEGRADATION QUANT: CPT

## 2023-11-13 PROCEDURE — 83605 ASSAY OF LACTIC ACID: CPT

## 2023-11-13 PROCEDURE — 2700000000 HC OXYGEN THERAPY PER DAY

## 2023-11-13 PROCEDURE — 2580000003 HC RX 258

## 2023-11-13 PROCEDURE — 2140000000 HC CCU INTERMEDIATE R&B

## 2023-11-13 PROCEDURE — 80053 COMPREHEN METABOLIC PANEL: CPT

## 2023-11-13 PROCEDURE — 85027 COMPLETE CBC AUTOMATED: CPT

## 2023-11-13 PROCEDURE — 93010 ELECTROCARDIOGRAM REPORT: CPT | Performed by: NUCLEAR MEDICINE

## 2023-11-13 PROCEDURE — 6370000000 HC RX 637 (ALT 250 FOR IP)

## 2023-11-13 PROCEDURE — 6360000002 HC RX W HCPCS

## 2023-11-13 PROCEDURE — 6360000002 HC RX W HCPCS: Performed by: NURSE PRACTITIONER

## 2023-11-13 PROCEDURE — 71046 X-RAY EXAM CHEST 2 VIEWS: CPT

## 2023-11-13 PROCEDURE — 36415 COLL VENOUS BLD VENIPUNCTURE: CPT

## 2023-11-13 PROCEDURE — C9113 INJ PANTOPRAZOLE SODIUM, VIA: HCPCS

## 2023-11-13 PROCEDURE — 51798 US URINE CAPACITY MEASURE: CPT

## 2023-11-13 PROCEDURE — 82948 REAGENT STRIP/BLOOD GLUCOSE: CPT

## 2023-11-13 PROCEDURE — 94640 AIRWAY INHALATION TREATMENT: CPT

## 2023-11-13 PROCEDURE — 82330 ASSAY OF CALCIUM: CPT

## 2023-11-13 PROCEDURE — 93005 ELECTROCARDIOGRAM TRACING: CPT | Performed by: FAMILY MEDICINE

## 2023-11-13 PROCEDURE — 86140 C-REACTIVE PROTEIN: CPT

## 2023-11-13 PROCEDURE — 84550 ASSAY OF BLOOD/URIC ACID: CPT

## 2023-11-13 RX ORDER — ALBUTEROL SULFATE 2.5 MG/3ML
2.5 SOLUTION RESPIRATORY (INHALATION) EVERY 4 HOURS PRN
Status: DISCONTINUED | OUTPATIENT
Start: 2023-11-13 | End: 2023-11-21 | Stop reason: HOSPADM

## 2023-11-13 RX ORDER — FUROSEMIDE 10 MG/ML
60 INJECTION INTRAMUSCULAR; INTRAVENOUS ONCE
Status: DISCONTINUED | OUTPATIENT
Start: 2023-11-13 | End: 2023-11-13

## 2023-11-13 RX ORDER — DOXYCYCLINE HYCLATE 100 MG
100 TABLET ORAL EVERY 12 HOURS SCHEDULED
Status: DISCONTINUED | OUTPATIENT
Start: 2023-11-13 | End: 2023-11-16

## 2023-11-13 RX ORDER — SODIUM CHLORIDE, SODIUM LACTATE, POTASSIUM CHLORIDE, AND CALCIUM CHLORIDE .6; .31; .03; .02 G/100ML; G/100ML; G/100ML; G/100ML
500 INJECTION, SOLUTION INTRAVENOUS ONCE
Status: DISCONTINUED | OUTPATIENT
Start: 2023-11-13 | End: 2023-11-13

## 2023-11-13 RX ORDER — FUROSEMIDE 10 MG/ML
40 INJECTION INTRAMUSCULAR; INTRAVENOUS ONCE
Status: COMPLETED | OUTPATIENT
Start: 2023-11-13 | End: 2023-11-13

## 2023-11-13 RX ORDER — POTASSIUM CHLORIDE 20 MEQ/1
40 TABLET, EXTENDED RELEASE ORAL ONCE
Status: COMPLETED | OUTPATIENT
Start: 2023-11-13 | End: 2023-11-13

## 2023-11-13 RX ADMIN — SENNOSIDES 8.6 MG: 8.6 TABLET, FILM COATED ORAL at 20:30

## 2023-11-13 RX ADMIN — SODIUM CHLORIDE, PRESERVATIVE FREE 10 ML: 5 INJECTION INTRAVENOUS at 09:36

## 2023-11-13 RX ADMIN — CALCIUM GLUCONATE 4000 MG: 98 INJECTION, SOLUTION INTRAVENOUS at 07:21

## 2023-11-13 RX ADMIN — POTASSIUM CHLORIDE 40 MEQ: 1500 TABLET, EXTENDED RELEASE ORAL at 09:36

## 2023-11-13 RX ADMIN — SODIUM CHLORIDE, PRESERVATIVE FREE 10 ML: 5 INJECTION INTRAVENOUS at 20:34

## 2023-11-13 RX ADMIN — PRAVASTATIN SODIUM 80 MG: 80 TABLET ORAL at 20:30

## 2023-11-13 RX ADMIN — METOCLOPRAMIDE 10 MG: 10 TABLET ORAL at 11:49

## 2023-11-13 RX ADMIN — BUPROPION HYDROCHLORIDE 150 MG: 150 TABLET, FILM COATED, EXTENDED RELEASE ORAL at 09:36

## 2023-11-13 RX ADMIN — FUROSEMIDE 40 MG: 10 INJECTION, SOLUTION INTRAMUSCULAR; INTRAVENOUS at 04:27

## 2023-11-13 RX ADMIN — ENOXAPARIN SODIUM 30 MG: 100 INJECTION SUBCUTANEOUS at 20:31

## 2023-11-13 RX ADMIN — ALBUTEROL SULFATE 2.5 MG: 2.5 SOLUTION RESPIRATORY (INHALATION) at 13:09

## 2023-11-13 RX ADMIN — PANTOPRAZOLE SODIUM 40 MG: 40 INJECTION, POWDER, FOR SOLUTION INTRAVENOUS at 09:36

## 2023-11-13 RX ADMIN — INSULIN LISPRO 4 UNITS: 100 INJECTION, SOLUTION INTRAVENOUS; SUBCUTANEOUS at 11:49

## 2023-11-13 RX ADMIN — INSULIN GLARGINE 30 UNITS: 100 INJECTION, SOLUTION SUBCUTANEOUS at 20:31

## 2023-11-13 RX ADMIN — ENOXAPARIN SODIUM 30 MG: 100 INJECTION SUBCUTANEOUS at 09:36

## 2023-11-13 RX ADMIN — DOXYCYCLINE HYCLATE 100 MG: 100 TABLET, COATED ORAL at 20:30

## 2023-11-13 RX ADMIN — CITALOPRAM 40 MG: 40 TABLET, FILM COATED ORAL at 09:36

## 2023-11-13 RX ADMIN — LEVOTHYROXINE SODIUM 150 MCG: 0.15 TABLET ORAL at 09:36

## 2023-11-13 RX ADMIN — METOCLOPRAMIDE 10 MG: 10 TABLET ORAL at 06:39

## 2023-11-13 RX ADMIN — METOPROLOL TARTRATE 25 MG: 25 TABLET, FILM COATED ORAL at 09:36

## 2023-11-13 RX ADMIN — ACETAMINOPHEN 650 MG: 325 TABLET ORAL at 12:20

## 2023-11-13 RX ADMIN — DONEPEZIL HYDROCHLORIDE 10 MG: 10 TABLET, FILM COATED ORAL at 20:30

## 2023-11-13 ASSESSMENT — PAIN SCALES - GENERAL: PAINLEVEL_OUTOF10: 0

## 2023-11-13 NOTE — SIGNIFICANT EVENT
Víctor Orellana is a 67-year-old gentleman admitted 11/10/2023 with chief complaint of abdominal pain and shortness of breath. Did receive IVP loop diuretic on 11/10 and 11/11/2023 however this caused drop in blood pressure which fluid bolusing did improve. He does take Demadex she is currently on hold. Echo 11/11/2023-LVEF 50 to 55%. Normal diastolic function. Nursing called with concerns of acute decompensated pulmonary edema with audible crackles and wheezing. Patient was warm and dry. Positive for neck vein distention. Lung auscultation revealed inspiratory crackles and wheezing. POCUS exam did note B-lines in bilateral lung fields with LVEF fairly normal with no tamponade physiology. Has a very poor cough. +2.2L IVP loop was given. Patient denied any chest pain pressure heaviness or tightness  Did complain of shortness of breath.

## 2023-11-13 NOTE — PROCEDURES
Bladder scan completed at 1413 and results told to [facility]. Scan showed 333 mL in the patients bladder. Last void was unknown.   [facility]

## 2023-11-14 LAB
ALBUMIN SERPL BCG-MCNC: 3.2 G/DL (ref 3.5–5.1)
ALP SERPL-CCNC: 73 U/L (ref 38–126)
ALT SERPL W/O P-5'-P-CCNC: < 5 U/L (ref 11–66)
ANION GAP SERPL CALC-SCNC: 8 MEQ/L (ref 8–16)
AST SERPL-CCNC: 10 U/L (ref 5–40)
BASOPHILS ABSOLUTE: 0 THOU/MM3 (ref 0–0.1)
BASOPHILS NFR BLD AUTO: 0.3 %
BILIRUB SERPL-MCNC: 0.3 MG/DL (ref 0.3–1.2)
BUN SERPL-MCNC: 20 MG/DL (ref 7–22)
CA-I BLD ISE-SCNC: 1.14 MMOL/L (ref 1.12–1.32)
CALCIUM SERPL-MCNC: 8.1 MG/DL (ref 8.5–10.5)
CHLORIDE SERPL-SCNC: 96 MEQ/L (ref 98–111)
CO2 SERPL-SCNC: 29 MEQ/L (ref 23–33)
CREAT SERPL-MCNC: 1.1 MG/DL (ref 0.4–1.2)
DEPRECATED MEAN GLUCOSE BLD GHB EST-ACNC: 222 MG/DL (ref 70–126)
DEPRECATED RDW RBC AUTO: 45.5 FL (ref 35–45)
EOSINOPHIL NFR BLD AUTO: 1.5 %
EOSINOPHILS ABSOLUTE: 0.1 THOU/MM3 (ref 0–0.4)
ERYTHROCYTE [DISTWIDTH] IN BLOOD BY AUTOMATED COUNT: 13.3 % (ref 11.5–14.5)
GFR SERPL CREATININE-BSD FRML MDRD: > 60 ML/MIN/1.73M2
GLUCOSE BLD STRIP.AUTO-MCNC: 124 MG/DL (ref 70–108)
GLUCOSE BLD STRIP.AUTO-MCNC: 154 MG/DL (ref 70–108)
GLUCOSE BLD STRIP.AUTO-MCNC: 232 MG/DL (ref 70–108)
GLUCOSE BLD STRIP.AUTO-MCNC: 245 MG/DL (ref 70–108)
GLUCOSE SERPL-MCNC: 144 MG/DL (ref 70–108)
HBA1C MFR BLD HPLC: 9.4 % (ref 4.4–6.4)
HCT VFR BLD AUTO: 33.6 % (ref 42–52)
HGB BLD-MCNC: 10.6 GM/DL (ref 14–18)
IMM GRANULOCYTES # BLD AUTO: 0.02 THOU/MM3 (ref 0–0.07)
IMM GRANULOCYTES NFR BLD AUTO: 0.3 %
LYMPHOCYTES ABSOLUTE: 1.9 THOU/MM3 (ref 1–4.8)
LYMPHOCYTES NFR BLD AUTO: 30.9 %
MCH RBC QN AUTO: 29.4 PG (ref 26–33)
MCHC RBC AUTO-ENTMCNC: 31.5 GM/DL (ref 32.2–35.5)
MCV RBC AUTO: 93.3 FL (ref 80–94)
MONOCYTES ABSOLUTE: 0.5 THOU/MM3 (ref 0.4–1.3)
MONOCYTES NFR BLD AUTO: 8.7 %
NEUTROPHILS NFR BLD AUTO: 58.3 %
NRBC BLD AUTO-RTO: 0 /100 WBC
PLATELET # BLD AUTO: 143 THOU/MM3 (ref 130–400)
PMV BLD AUTO: 12.1 FL (ref 9.4–12.4)
POTASSIUM SERPL-SCNC: 4.6 MEQ/L (ref 3.5–5.2)
PROT SERPL-MCNC: 5.9 G/DL (ref 6.1–8)
RBC # BLD AUTO: 3.6 MILL/MM3 (ref 4.7–6.1)
SEGMENTED NEUTROPHILS ABSOLUTE COUNT: 3.6 THOU/MM3 (ref 1.8–7.7)
SODIUM SERPL-SCNC: 133 MEQ/L (ref 135–145)
WBC # BLD AUTO: 6.1 THOU/MM3 (ref 4.8–10.8)

## 2023-11-14 PROCEDURE — 87077 CULTURE AEROBIC IDENTIFY: CPT

## 2023-11-14 PROCEDURE — 92610 EVALUATE SWALLOWING FUNCTION: CPT

## 2023-11-14 PROCEDURE — 83036 HEMOGLOBIN GLYCOSYLATED A1C: CPT

## 2023-11-14 PROCEDURE — 6370000000 HC RX 637 (ALT 250 FOR IP): Performed by: FAMILY MEDICINE

## 2023-11-14 PROCEDURE — 51798 US URINE CAPACITY MEASURE: CPT

## 2023-11-14 PROCEDURE — 6370000000 HC RX 637 (ALT 250 FOR IP)

## 2023-11-14 PROCEDURE — 1200000000 HC SEMI PRIVATE

## 2023-11-14 PROCEDURE — 80053 COMPREHEN METABOLIC PANEL: CPT

## 2023-11-14 PROCEDURE — 82330 ASSAY OF CALCIUM: CPT

## 2023-11-14 PROCEDURE — 2580000003 HC RX 258

## 2023-11-14 PROCEDURE — 6360000002 HC RX W HCPCS

## 2023-11-14 PROCEDURE — 97116 GAIT TRAINING THERAPY: CPT

## 2023-11-14 PROCEDURE — 87186 SC STD MICRODIL/AGAR DIL: CPT

## 2023-11-14 PROCEDURE — 82948 REAGENT STRIP/BLOOD GLUCOSE: CPT

## 2023-11-14 PROCEDURE — 85025 COMPLETE CBC W/AUTO DIFF WBC: CPT

## 2023-11-14 PROCEDURE — 99232 SBSQ HOSP IP/OBS MODERATE 35: CPT | Performed by: FAMILY MEDICINE

## 2023-11-14 PROCEDURE — 97162 PT EVAL MOD COMPLEX 30 MIN: CPT

## 2023-11-14 PROCEDURE — 87086 URINE CULTURE/COLONY COUNT: CPT

## 2023-11-14 PROCEDURE — 36415 COLL VENOUS BLD VENIPUNCTURE: CPT

## 2023-11-14 RX ORDER — PANTOPRAZOLE SODIUM 40 MG/1
40 TABLET, DELAYED RELEASE ORAL
Status: DISCONTINUED | OUTPATIENT
Start: 2023-11-14 | End: 2023-11-21 | Stop reason: HOSPADM

## 2023-11-14 RX ORDER — BENZONATATE 100 MG/1
100 CAPSULE ORAL 3 TIMES DAILY PRN
Status: DISCONTINUED | OUTPATIENT
Start: 2023-11-14 | End: 2023-11-21 | Stop reason: HOSPADM

## 2023-11-14 RX ADMIN — METOPROLOL TARTRATE 25 MG: 25 TABLET, FILM COATED ORAL at 20:15

## 2023-11-14 RX ADMIN — DOXYCYCLINE HYCLATE 100 MG: 100 TABLET, COATED ORAL at 20:17

## 2023-11-14 RX ADMIN — INSULIN LISPRO 4 UNITS: 100 INJECTION, SOLUTION INTRAVENOUS; SUBCUTANEOUS at 12:04

## 2023-11-14 RX ADMIN — SENNOSIDES 8.6 MG: 8.6 TABLET, FILM COATED ORAL at 20:15

## 2023-11-14 RX ADMIN — ENOXAPARIN SODIUM 30 MG: 100 INJECTION SUBCUTANEOUS at 08:57

## 2023-11-14 RX ADMIN — INSULIN GLARGINE 30 UNITS: 100 INJECTION, SOLUTION SUBCUTANEOUS at 20:14

## 2023-11-14 RX ADMIN — SODIUM CHLORIDE, PRESERVATIVE FREE 10 ML: 5 INJECTION INTRAVENOUS at 08:58

## 2023-11-14 RX ADMIN — BUPROPION HYDROCHLORIDE 150 MG: 150 TABLET, FILM COATED, EXTENDED RELEASE ORAL at 08:57

## 2023-11-14 RX ADMIN — ENOXAPARIN SODIUM 30 MG: 100 INJECTION SUBCUTANEOUS at 20:15

## 2023-11-14 RX ADMIN — METOPROLOL TARTRATE 25 MG: 25 TABLET, FILM COATED ORAL at 08:57

## 2023-11-14 RX ADMIN — LEVOTHYROXINE SODIUM 150 MCG: 0.15 TABLET ORAL at 08:57

## 2023-11-14 RX ADMIN — BENZONATATE 100 MG: 100 CAPSULE ORAL at 08:57

## 2023-11-14 RX ADMIN — CITALOPRAM 40 MG: 40 TABLET, FILM COATED ORAL at 08:57

## 2023-11-14 RX ADMIN — DONEPEZIL HYDROCHLORIDE 10 MG: 10 TABLET, FILM COATED ORAL at 20:15

## 2023-11-14 RX ADMIN — DOXYCYCLINE HYCLATE 100 MG: 100 TABLET, COATED ORAL at 08:57

## 2023-11-14 RX ADMIN — SODIUM CHLORIDE, PRESERVATIVE FREE 10 ML: 5 INJECTION INTRAVENOUS at 20:15

## 2023-11-14 RX ADMIN — PANTOPRAZOLE SODIUM 40 MG: 40 TABLET, DELAYED RELEASE ORAL at 11:54

## 2023-11-14 RX ADMIN — PRAVASTATIN SODIUM 80 MG: 80 TABLET ORAL at 20:15

## 2023-11-14 NOTE — PROCEDURES
Bladder scan completed at 1215 and results told to [facility]. - [facility] RN. Scan showed 0 mL in the patients bladder. Last void was 1210.   Hammad Snyder

## 2023-11-15 ENCOUNTER — HOSPITAL ENCOUNTER (OUTPATIENT)
Age: 83
Discharge: HOME | End: 2023-11-15
Attending: STUDENT IN AN ORGANIZED HEALTH CARE EDUCATION/TRAINING PROGRAM

## 2023-11-15 LAB
25(OH)D3 SERPL-MCNC: 37 NG/ML (ref 30–100)
ANION GAP SERPL CALC-SCNC: 9 MEQ/L (ref 8–16)
BASOPHILS ABSOLUTE: 0 THOU/MM3 (ref 0–0.1)
BASOPHILS NFR BLD AUTO: 0.2 %
BUN SERPL-MCNC: 15 MG/DL (ref 7–22)
CA-I BLD ISE-SCNC: 1.17 MMOL/L (ref 1.12–1.32)
CALCIUM SERPL-MCNC: 8.3 MG/DL (ref 8.5–10.5)
CHLORIDE SERPL-SCNC: 100 MEQ/L (ref 98–111)
CO2 SERPL-SCNC: 25 MEQ/L (ref 23–33)
CREAT SERPL-MCNC: 0.9 MG/DL (ref 0.4–1.2)
DEPRECATED RDW RBC AUTO: 44.4 FL (ref 35–45)
EOSINOPHIL NFR BLD AUTO: 1.3 %
EOSINOPHILS ABSOLUTE: 0.1 THOU/MM3 (ref 0–0.4)
ERYTHROCYTE [DISTWIDTH] IN BLOOD BY AUTOMATED COUNT: 13.1 % (ref 11.5–14.5)
GFR SERPL CREATININE-BSD FRML MDRD: > 60 ML/MIN/1.73M2
GLUCOSE BLD STRIP.AUTO-MCNC: 147 MG/DL (ref 70–108)
GLUCOSE BLD STRIP.AUTO-MCNC: 189 MG/DL (ref 70–108)
GLUCOSE BLD STRIP.AUTO-MCNC: 211 MG/DL (ref 70–108)
GLUCOSE BLD STRIP.AUTO-MCNC: 242 MG/DL (ref 70–108)
GLUCOSE SERPL-MCNC: 157 MG/DL (ref 70–108)
HCT VFR BLD AUTO: 33.8 % (ref 42–52)
HGB BLD-MCNC: 10.8 GM/DL (ref 14–18)
IMM GRANULOCYTES # BLD AUTO: 0.02 THOU/MM3 (ref 0–0.07)
IMM GRANULOCYTES NFR BLD AUTO: 0.4 %
LACTATE SERPL-SCNC: 0.9 MMOL/L (ref 0.5–2)
LYMPHOCYTES ABSOLUTE: 1.6 THOU/MM3 (ref 1–4.8)
LYMPHOCYTES NFR BLD AUTO: 28.8 %
MCH RBC QN AUTO: 29.5 PG (ref 26–33)
MCHC RBC AUTO-ENTMCNC: 32 GM/DL (ref 32.2–35.5)
MCV RBC AUTO: 92.3 FL (ref 80–94)
MONOCYTES ABSOLUTE: 0.5 THOU/MM3 (ref 0.4–1.3)
MONOCYTES NFR BLD AUTO: 9.8 %
NEUTROPHILS NFR BLD AUTO: 59.5 %
NRBC BLD AUTO-RTO: 0 /100 WBC
PLATELET # BLD AUTO: 153 THOU/MM3 (ref 130–400)
PMV BLD AUTO: 12.1 FL (ref 9.4–12.4)
POTASSIUM SERPL-SCNC: 4.8 MEQ/L (ref 3.5–5.2)
RBC # BLD AUTO: 3.66 MILL/MM3 (ref 4.7–6.1)
SEGMENTED NEUTROPHILS ABSOLUTE COUNT: 3.3 THOU/MM3 (ref 1.8–7.7)
SODIUM SERPL-SCNC: 134 MEQ/L (ref 135–145)
WBC # BLD AUTO: 5.5 THOU/MM3 (ref 4.8–10.8)

## 2023-11-15 PROCEDURE — 85025 COMPLETE CBC W/AUTO DIFF WBC: CPT

## 2023-11-15 PROCEDURE — 6370000000 HC RX 637 (ALT 250 FOR IP)

## 2023-11-15 PROCEDURE — 94669 MECHANICAL CHEST WALL OSCILL: CPT

## 2023-11-15 PROCEDURE — 6370000000 HC RX 637 (ALT 250 FOR IP): Performed by: FAMILY MEDICINE

## 2023-11-15 PROCEDURE — 6360000002 HC RX W HCPCS

## 2023-11-15 PROCEDURE — 97535 SELF CARE MNGMENT TRAINING: CPT

## 2023-11-15 PROCEDURE — 6360000002 HC RX W HCPCS: Performed by: STUDENT IN AN ORGANIZED HEALTH CARE EDUCATION/TRAINING PROGRAM

## 2023-11-15 PROCEDURE — 82948 REAGENT STRIP/BLOOD GLUCOSE: CPT

## 2023-11-15 PROCEDURE — 6370000000 HC RX 637 (ALT 250 FOR IP): Performed by: STUDENT IN AN ORGANIZED HEALTH CARE EDUCATION/TRAINING PROGRAM

## 2023-11-15 PROCEDURE — 2580000003 HC RX 258: Performed by: STUDENT IN AN ORGANIZED HEALTH CARE EDUCATION/TRAINING PROGRAM

## 2023-11-15 PROCEDURE — 2580000003 HC RX 258

## 2023-11-15 PROCEDURE — 97166 OT EVAL MOD COMPLEX 45 MIN: CPT

## 2023-11-15 PROCEDURE — 99233 SBSQ HOSP IP/OBS HIGH 50: CPT | Performed by: STUDENT IN AN ORGANIZED HEALTH CARE EDUCATION/TRAINING PROGRAM

## 2023-11-15 PROCEDURE — 80048 BASIC METABOLIC PNL TOTAL CA: CPT

## 2023-11-15 PROCEDURE — 82306 VITAMIN D 25 HYDROXY: CPT

## 2023-11-15 PROCEDURE — 36415 COLL VENOUS BLD VENIPUNCTURE: CPT

## 2023-11-15 PROCEDURE — 83605 ASSAY OF LACTIC ACID: CPT

## 2023-11-15 PROCEDURE — 82330 ASSAY OF CALCIUM: CPT

## 2023-11-15 PROCEDURE — 1200000000 HC SEMI PRIVATE

## 2023-11-15 RX ORDER — GUAIFENESIN 600 MG/1
600 TABLET, EXTENDED RELEASE ORAL 2 TIMES DAILY
Status: DISCONTINUED | OUTPATIENT
Start: 2023-11-15 | End: 2023-11-21 | Stop reason: HOSPADM

## 2023-11-15 RX ORDER — CALCIUM GLUCONATE 20 MG/ML
2000 INJECTION, SOLUTION INTRAVENOUS PRN
Status: DISCONTINUED | OUTPATIENT
Start: 2023-11-15 | End: 2023-11-21 | Stop reason: HOSPADM

## 2023-11-15 RX ADMIN — INSULIN LISPRO 4 UNITS: 100 INJECTION, SOLUTION INTRAVENOUS; SUBCUTANEOUS at 13:42

## 2023-11-15 RX ADMIN — GUAIFENESIN 600 MG: 600 TABLET, EXTENDED RELEASE ORAL at 11:42

## 2023-11-15 RX ADMIN — VANCOMYCIN HYDROCHLORIDE 2000 MG: 1 INJECTION, POWDER, LYOPHILIZED, FOR SOLUTION INTRAVENOUS at 16:48

## 2023-11-15 RX ADMIN — SENNOSIDES 8.6 MG: 8.6 TABLET, FILM COATED ORAL at 20:13

## 2023-11-15 RX ADMIN — LEVOTHYROXINE SODIUM 150 MCG: 0.15 TABLET ORAL at 09:45

## 2023-11-15 RX ADMIN — PANTOPRAZOLE SODIUM 40 MG: 40 TABLET, DELAYED RELEASE ORAL at 05:25

## 2023-11-15 RX ADMIN — PRAVASTATIN SODIUM 80 MG: 80 TABLET ORAL at 20:13

## 2023-11-15 RX ADMIN — SODIUM CHLORIDE, PRESERVATIVE FREE 10 ML: 5 INJECTION INTRAVENOUS at 09:46

## 2023-11-15 RX ADMIN — INSULIN GLARGINE 30 UNITS: 100 INJECTION, SOLUTION SUBCUTANEOUS at 20:13

## 2023-11-15 RX ADMIN — DOXYCYCLINE HYCLATE 100 MG: 100 TABLET, COATED ORAL at 09:47

## 2023-11-15 RX ADMIN — DONEPEZIL HYDROCHLORIDE 10 MG: 10 TABLET, FILM COATED ORAL at 20:14

## 2023-11-15 RX ADMIN — ENOXAPARIN SODIUM 30 MG: 100 INJECTION SUBCUTANEOUS at 09:46

## 2023-11-15 RX ADMIN — METOPROLOL TARTRATE 25 MG: 25 TABLET, FILM COATED ORAL at 09:45

## 2023-11-15 RX ADMIN — CITALOPRAM 40 MG: 40 TABLET, FILM COATED ORAL at 09:46

## 2023-11-15 RX ADMIN — ENOXAPARIN SODIUM 30 MG: 100 INJECTION SUBCUTANEOUS at 20:13

## 2023-11-15 RX ADMIN — BUPROPION HYDROCHLORIDE 150 MG: 150 TABLET, FILM COATED, EXTENDED RELEASE ORAL at 09:45

## 2023-11-15 RX ADMIN — DOXYCYCLINE HYCLATE 100 MG: 100 TABLET, COATED ORAL at 20:14

## 2023-11-15 RX ADMIN — GUAIFENESIN 600 MG: 600 TABLET, EXTENDED RELEASE ORAL at 20:14

## 2023-11-15 RX ADMIN — SODIUM CHLORIDE, PRESERVATIVE FREE 10 ML: 5 INJECTION INTRAVENOUS at 20:11

## 2023-11-15 RX ADMIN — METOPROLOL TARTRATE 25 MG: 25 TABLET, FILM COATED ORAL at 20:14

## 2023-11-16 ENCOUNTER — APPOINTMENT (OUTPATIENT)
Dept: NUCLEAR MEDICINE | Age: 83
DRG: 286 | End: 2023-11-16
Attending: STUDENT IN AN ORGANIZED HEALTH CARE EDUCATION/TRAINING PROGRAM
Payer: MEDICARE

## 2023-11-16 ENCOUNTER — HOSPITAL ENCOUNTER (OUTPATIENT)
Age: 83
Discharge: HOME | End: 2023-11-18
Attending: STUDENT IN AN ORGANIZED HEALTH CARE EDUCATION/TRAINING PROGRAM
Payer: MEDICARE

## 2023-11-16 ENCOUNTER — APPOINTMENT (OUTPATIENT)
Dept: CT IMAGING | Age: 83
DRG: 286 | End: 2023-11-16
Payer: MEDICARE

## 2023-11-16 LAB
ANION GAP SERPL CALC-SCNC: 8 MEQ/L (ref 8–16)
BACTERIA BLD AEROBE CULT: NORMAL
BACTERIA BLD AEROBE CULT: NORMAL
BACTERIA UR CULT: ABNORMAL
BASOPHILS ABSOLUTE: 0 THOU/MM3 (ref 0–0.1)
BASOPHILS NFR BLD AUTO: 0.4 %
BUN SERPL-MCNC: 14 MG/DL (ref 7–22)
CALCIUM SERPL-MCNC: 8.4 MG/DL (ref 8.5–10.5)
CHLORIDE SERPL-SCNC: 103 MEQ/L (ref 98–111)
CO2 SERPL-SCNC: 27 MEQ/L (ref 23–33)
CREAT SERPL-MCNC: 0.9 MG/DL (ref 0.4–1.2)
DEPRECATED RDW RBC AUTO: 44.5 FL (ref 35–45)
EOSINOPHIL NFR BLD AUTO: 2 %
EOSINOPHILS ABSOLUTE: 0.1 THOU/MM3 (ref 0–0.4)
ERYTHROCYTE [DISTWIDTH] IN BLOOD BY AUTOMATED COUNT: 13.2 % (ref 11.5–14.5)
GFR SERPL CREATININE-BSD FRML MDRD: > 60 ML/MIN/1.73M2
GLUCOSE BLD STRIP.AUTO-MCNC: 177 MG/DL (ref 70–108)
GLUCOSE BLD STRIP.AUTO-MCNC: 182 MG/DL (ref 70–108)
GLUCOSE BLD STRIP.AUTO-MCNC: 279 MG/DL (ref 70–108)
GLUCOSE SERPL-MCNC: 93 MG/DL (ref 70–108)
HCT VFR BLD AUTO: 33.7 % (ref 42–52)
HGB BLD-MCNC: 10.8 GM/DL (ref 14–18)
IMM GRANULOCYTES # BLD AUTO: 0 THOU/MM3 (ref 0–0.07)
IMM GRANULOCYTES NFR BLD AUTO: 0 %
LYMPHOCYTES ABSOLUTE: 2 THOU/MM3 (ref 1–4.8)
LYMPHOCYTES NFR BLD AUTO: 36.3 %
MCH RBC QN AUTO: 29.3 PG (ref 26–33)
MCHC RBC AUTO-ENTMCNC: 32 GM/DL (ref 32.2–35.5)
MCV RBC AUTO: 91.6 FL (ref 80–94)
MONOCYTES ABSOLUTE: 0.4 THOU/MM3 (ref 0.4–1.3)
MONOCYTES NFR BLD AUTO: 7.9 %
NEUTROPHILS NFR BLD AUTO: 53.4 %
NRBC BLD AUTO-RTO: 0 /100 WBC
ORGANISM: ABNORMAL
PLATELET # BLD AUTO: 174 THOU/MM3 (ref 130–400)
PMV BLD AUTO: 11.6 FL (ref 9.4–12.4)
POTASSIUM SERPL-SCNC: 4.4 MEQ/L (ref 3.5–5.2)
RBC # BLD AUTO: 3.68 MILL/MM3 (ref 4.7–6.1)
SEGMENTED NEUTROPHILS ABSOLUTE COUNT: 2.9 THOU/MM3 (ref 1.8–7.7)
SODIUM SERPL-SCNC: 138 MEQ/L (ref 135–145)
WBC # BLD AUTO: 5.5 THOU/MM3 (ref 4.8–10.8)

## 2023-11-16 PROCEDURE — 99233 SBSQ HOSP IP/OBS HIGH 50: CPT | Performed by: STUDENT IN AN ORGANIZED HEALTH CARE EDUCATION/TRAINING PROGRAM

## 2023-11-16 PROCEDURE — 6360000002 HC RX W HCPCS: Performed by: STUDENT IN AN ORGANIZED HEALTH CARE EDUCATION/TRAINING PROGRAM

## 2023-11-16 PROCEDURE — 70450 CT HEAD/BRAIN W/O DYE: CPT

## 2023-11-16 PROCEDURE — 6370000000 HC RX 637 (ALT 250 FOR IP): Performed by: FAMILY MEDICINE

## 2023-11-16 PROCEDURE — 2580000003 HC RX 258

## 2023-11-16 PROCEDURE — 82948 REAGENT STRIP/BLOOD GLUCOSE: CPT

## 2023-11-16 PROCEDURE — 36415 COLL VENOUS BLD VENIPUNCTURE: CPT

## 2023-11-16 PROCEDURE — 94669 MECHANICAL CHEST WALL OSCILL: CPT

## 2023-11-16 PROCEDURE — 6370000000 HC RX 637 (ALT 250 FOR IP)

## 2023-11-16 PROCEDURE — A9500 TC99M SESTAMIBI: HCPCS | Performed by: INTERNAL MEDICINE

## 2023-11-16 PROCEDURE — 92523 SPEECH SOUND LANG COMPREHEN: CPT

## 2023-11-16 PROCEDURE — 80048 BASIC METABOLIC PNL TOTAL CA: CPT

## 2023-11-16 PROCEDURE — 97129 THER IVNTJ 1ST 15 MIN: CPT

## 2023-11-16 PROCEDURE — 6360000002 HC RX W HCPCS

## 2023-11-16 PROCEDURE — 2580000003 HC RX 258: Performed by: STUDENT IN AN ORGANIZED HEALTH CARE EDUCATION/TRAINING PROGRAM

## 2023-11-16 PROCEDURE — 3430000000 HC RX DIAGNOSTIC RADIOPHARMACEUTICAL: Performed by: INTERNAL MEDICINE

## 2023-11-16 PROCEDURE — 1200000000 HC SEMI PRIVATE

## 2023-11-16 PROCEDURE — 85025 COMPLETE CBC W/AUTO DIFF WBC: CPT

## 2023-11-16 PROCEDURE — 92526 ORAL FUNCTION THERAPY: CPT

## 2023-11-16 PROCEDURE — 6370000000 HC RX 637 (ALT 250 FOR IP): Performed by: STUDENT IN AN ORGANIZED HEALTH CARE EDUCATION/TRAINING PROGRAM

## 2023-11-16 RX ORDER — ALBUTEROL SULFATE 90 UG/1
2 AEROSOL, METERED RESPIRATORY (INHALATION) PRN
Status: ACTIVE | OUTPATIENT
Start: 2023-11-16 | End: 2023-11-16

## 2023-11-16 RX ORDER — LANOLIN ALCOHOL/MO/W.PET/CERES
5 CREAM (GRAM) TOPICAL NIGHTLY PRN
Status: DISCONTINUED | OUTPATIENT
Start: 2023-11-16 | End: 2023-11-21 | Stop reason: HOSPADM

## 2023-11-16 RX ORDER — AMOXICILLIN 500 MG/1
500 CAPSULE ORAL EVERY 12 HOURS SCHEDULED
Status: COMPLETED | OUTPATIENT
Start: 2023-11-16 | End: 2023-11-21

## 2023-11-16 RX ORDER — SODIUM CHLORIDE 9 MG/ML
500 INJECTION, SOLUTION INTRAVENOUS CONTINUOUS PRN
Status: ACTIVE | OUTPATIENT
Start: 2023-11-16 | End: 2023-11-16

## 2023-11-16 RX ORDER — NITROGLYCERIN 0.4 MG/1
0.4 TABLET SUBLINGUAL EVERY 5 MIN PRN
Status: ACTIVE | OUTPATIENT
Start: 2023-11-16 | End: 2023-11-16

## 2023-11-16 RX ORDER — SODIUM CHLORIDE 0.9 % (FLUSH) 0.9 %
5-40 SYRINGE (ML) INJECTION PRN
Status: ACTIVE | OUTPATIENT
Start: 2023-11-16 | End: 2023-11-16

## 2023-11-16 RX ORDER — REGADENOSON 0.08 MG/ML
0.4 INJECTION, SOLUTION INTRAVENOUS
Status: DISCONTINUED | OUTPATIENT
Start: 2023-11-16 | End: 2023-11-21 | Stop reason: HOSPADM

## 2023-11-16 RX ORDER — TORSEMIDE 20 MG/1
20 TABLET ORAL DAILY
Status: DISCONTINUED | OUTPATIENT
Start: 2023-11-17 | End: 2023-11-21 | Stop reason: HOSPADM

## 2023-11-16 RX ORDER — ATROPINE SULFATE 0.1 MG/ML
0.5 INJECTION INTRAVENOUS EVERY 5 MIN PRN
Status: DISCONTINUED | OUTPATIENT
Start: 2023-11-16 | End: 2023-11-16

## 2023-11-16 RX ORDER — SODIUM CHLORIDE, SODIUM LACTATE, POTASSIUM CHLORIDE, CALCIUM CHLORIDE 600; 310; 30; 20 MG/100ML; MG/100ML; MG/100ML; MG/100ML
INJECTION, SOLUTION INTRAVENOUS CONTINUOUS
Status: DISCONTINUED | OUTPATIENT
Start: 2023-11-16 | End: 2023-11-17

## 2023-11-16 RX ORDER — TETRAKIS(2-METHOXYISOBUTYLISOCYANIDE)COPPER(I) TETRAFLUOROBORATE 1 MG/ML
33.8 INJECTION, POWDER, LYOPHILIZED, FOR SOLUTION INTRAVENOUS
Status: COMPLETED | OUTPATIENT
Start: 2023-11-16 | End: 2023-11-16

## 2023-11-16 RX ORDER — METOPROLOL TARTRATE 1 MG/ML
5 INJECTION, SOLUTION INTRAVENOUS EVERY 5 MIN PRN
Status: ACTIVE | OUTPATIENT
Start: 2023-11-16 | End: 2023-11-16

## 2023-11-16 RX ORDER — REGADENOSON 0.08 MG/ML
0.4 INJECTION, SOLUTION INTRAVENOUS
Status: COMPLETED | OUTPATIENT
Start: 2023-11-16 | End: 2023-11-16

## 2023-11-16 RX ORDER — TETRAKIS(2-METHOXYISOBUTYLISOCYANIDE)COPPER(I) TETRAFLUOROBORATE 1 MG/ML
9.7 INJECTION, POWDER, LYOPHILIZED, FOR SOLUTION INTRAVENOUS
Status: COMPLETED | OUTPATIENT
Start: 2023-11-16 | End: 2023-11-16

## 2023-11-16 RX ORDER — AMINOPHYLLINE 25 MG/ML
50 INJECTION, SOLUTION INTRAVENOUS PRN
Status: DISCONTINUED | OUTPATIENT
Start: 2023-11-16 | End: 2023-11-16

## 2023-11-16 RX ADMIN — GUAIFENESIN 600 MG: 600 TABLET, EXTENDED RELEASE ORAL at 11:56

## 2023-11-16 RX ADMIN — SODIUM CHLORIDE, POTASSIUM CHLORIDE, SODIUM LACTATE AND CALCIUM CHLORIDE: 600; 310; 30; 20 INJECTION, SOLUTION INTRAVENOUS at 12:28

## 2023-11-16 RX ADMIN — Medication 1250 MG: at 10:10

## 2023-11-16 RX ADMIN — AMPICILLIN SODIUM 1000 MG: 1 INJECTION, POWDER, FOR SOLUTION INTRAMUSCULAR; INTRAVENOUS at 15:12

## 2023-11-16 RX ADMIN — Medication 33.8 MILLICURIE: at 08:50

## 2023-11-16 RX ADMIN — ENOXAPARIN SODIUM 30 MG: 100 INJECTION SUBCUTANEOUS at 20:28

## 2023-11-16 RX ADMIN — CITALOPRAM 40 MG: 40 TABLET, FILM COATED ORAL at 11:56

## 2023-11-16 RX ADMIN — AMOXICILLIN 500 MG: 500 CAPSULE ORAL at 20:27

## 2023-11-16 RX ADMIN — METOPROLOL TARTRATE 25 MG: 25 TABLET, FILM COATED ORAL at 10:07

## 2023-11-16 RX ADMIN — BENZONATATE 100 MG: 100 CAPSULE ORAL at 11:55

## 2023-11-16 RX ADMIN — REGADENOSON 0.4 MG: 0.08 INJECTION, SOLUTION INTRAVENOUS at 08:50

## 2023-11-16 RX ADMIN — POLYETHYLENE GLYCOL 3350 17 G: 17 POWDER, FOR SOLUTION ORAL at 11:55

## 2023-11-16 RX ADMIN — Medication 4.5 MG: at 20:27

## 2023-11-16 RX ADMIN — ACETAMINOPHEN 650 MG: 325 TABLET ORAL at 03:53

## 2023-11-16 RX ADMIN — PANTOPRAZOLE SODIUM 40 MG: 40 TABLET, DELAYED RELEASE ORAL at 05:03

## 2023-11-16 RX ADMIN — INSULIN GLARGINE 30 UNITS: 100 INJECTION, SOLUTION SUBCUTANEOUS at 20:28

## 2023-11-16 RX ADMIN — SENNOSIDES 8.6 MG: 8.6 TABLET, FILM COATED ORAL at 20:27

## 2023-11-16 RX ADMIN — LEVOTHYROXINE SODIUM 150 MCG: 0.15 TABLET ORAL at 11:56

## 2023-11-16 RX ADMIN — Medication 9.7 MILLICURIE: at 07:35

## 2023-11-16 RX ADMIN — DONEPEZIL HYDROCHLORIDE 10 MG: 10 TABLET, FILM COATED ORAL at 20:31

## 2023-11-16 RX ADMIN — ENOXAPARIN SODIUM 30 MG: 100 INJECTION SUBCUTANEOUS at 11:56

## 2023-11-16 RX ADMIN — SODIUM CHLORIDE, PRESERVATIVE FREE 10 ML: 5 INJECTION INTRAVENOUS at 10:07

## 2023-11-16 RX ADMIN — BUPROPION HYDROCHLORIDE 150 MG: 150 TABLET, FILM COATED, EXTENDED RELEASE ORAL at 11:55

## 2023-11-16 RX ADMIN — PRAVASTATIN SODIUM 80 MG: 80 TABLET ORAL at 20:31

## 2023-11-16 RX ADMIN — BENZONATATE 100 MG: 100 CAPSULE ORAL at 20:27

## 2023-11-16 RX ADMIN — ACETAMINOPHEN 650 MG: 325 TABLET ORAL at 20:27

## 2023-11-16 RX ADMIN — GUAIFENESIN 600 MG: 600 TABLET, EXTENDED RELEASE ORAL at 20:27

## 2023-11-16 RX ADMIN — BENZONATATE 100 MG: 100 CAPSULE ORAL at 03:53

## 2023-11-16 RX ADMIN — METOPROLOL TARTRATE 25 MG: 25 TABLET, FILM COATED ORAL at 20:27

## 2023-11-16 ASSESSMENT — PAIN DESCRIPTION - DESCRIPTORS
DESCRIPTORS: ACHING
DESCRIPTORS: ACHING

## 2023-11-16 ASSESSMENT — PAIN SCALES - GENERAL
PAINLEVEL_OUTOF10: 4
PAINLEVEL_OUTOF10: 0
PAINLEVEL_OUTOF10: 4
PAINLEVEL_OUTOF10: 0

## 2023-11-16 ASSESSMENT — PAIN DESCRIPTION - LOCATION
LOCATION: HEAD
LOCATION: GENERALIZED

## 2023-11-16 ASSESSMENT — PAIN DESCRIPTION - ORIENTATION: ORIENTATION: MID

## 2023-11-17 LAB
ANION GAP SERPL CALC-SCNC: 2 MEQ/L (ref 8–16)
ARTERIAL PATENCY WRIST A: POSITIVE
BACTERIA BLD AEROBE CULT: NORMAL
BACTERIA BLD AEROBE CULT: NORMAL
BASE EXCESS BLDA CALC-SCNC: 3 MMOL/L (ref -2.5–2.5)
BDY SITE: ABNORMAL
BUN SERPL-MCNC: 12 MG/DL (ref 7–22)
CALCIUM SERPL-MCNC: 8.5 MG/DL (ref 8.5–10.5)
CHLORIDE SERPL-SCNC: 105 MEQ/L (ref 98–111)
CO2 SERPL-SCNC: 35 MEQ/L (ref 23–33)
COLLECTED BY:: ABNORMAL
CREAT SERPL-MCNC: 1 MG/DL (ref 0.4–1.2)
DEVICE: ABNORMAL
ECHO BSA: 2.3 M2
FIO2 ON VENT O2 ANALYZER: 21 %
GFR SERPL CREATININE-BSD FRML MDRD: > 60 ML/MIN/1.73M2
GLUCOSE BLD STRIP.AUTO-MCNC: 139 MG/DL (ref 70–108)
GLUCOSE BLD STRIP.AUTO-MCNC: 143 MG/DL (ref 70–108)
GLUCOSE BLD STRIP.AUTO-MCNC: 179 MG/DL (ref 70–108)
GLUCOSE BLD STRIP.AUTO-MCNC: 84 MG/DL (ref 70–108)
GLUCOSE SERPL-MCNC: 81 MG/DL (ref 70–108)
HCO3 BLDA-SCNC: 28 MMOL/L (ref 23–28)
NUC STRESS EJECTION FRACTION: 52 %
PCO2 BLDA: 44 MMHG (ref 35–45)
PH BLDA: 7.41 [PH] (ref 7.35–7.45)
PO2 BLDA: 58 MMHG (ref 71–104)
POTASSIUM SERPL-SCNC: 4.7 MEQ/L (ref 3.5–5.2)
SAO2 % BLDA: 90 %
SODIUM SERPL-SCNC: 142 MEQ/L (ref 135–145)
STRESS BASELINE DIAS BP: 56 MMHG
STRESS BASELINE HR: 64 BPM
STRESS BASELINE SYS BP: 113 MMHG
STRESS ST DEPRESSION: 0 MM
STRESS STAGE 1 BP: NORMAL MMHG
STRESS STAGE 1 DURATION: 1 MIN:SEC
STRESS STAGE 1 HR: 75 BPM
STRESS STAGE 2 BP: NORMAL MMHG
STRESS STAGE 2 DURATION: 1 MIN:SEC
STRESS STAGE 2 HR: 76 BPM
STRESS STAGE 3 BP: NORMAL MMHG
STRESS STAGE 3 DURATION: 1 MIN:SEC
STRESS STAGE 3 HR: 78 BPM
STRESS STAGE RECOVERY 1 BP: NORMAL MMHG
STRESS STAGE RECOVERY 1 DURATION: 1 MIN:SEC
STRESS STAGE RECOVERY 1 HR: 77 BPM
STRESS STAGE RECOVERY 2 BP: NORMAL MMHG
STRESS STAGE RECOVERY 2 DURATION: 1 MIN:SEC
STRESS STAGE RECOVERY 2 HR: 78 BPM
STRESS STAGE RECOVERY 3 BP: NORMAL MMHG
STRESS STAGE RECOVERY 3 DURATION: 1 MIN:SEC
STRESS STAGE RECOVERY 3 HR: 74 BPM
STRESS STAGE RECOVERY 4 BP: NORMAL MMHG
STRESS STAGE RECOVERY 4 DURATION: 2 MIN:SEC
STRESS STAGE RECOVERY 4 HR: 76 BPM
STRESS TARGET HR: 138 BPM
TID: 1.26
TROPONIN, HIGH SENSITIVITY: 34 NG/L (ref 0–12)

## 2023-11-17 PROCEDURE — 82803 BLOOD GASES ANY COMBINATION: CPT

## 2023-11-17 PROCEDURE — 97530 THERAPEUTIC ACTIVITIES: CPT

## 2023-11-17 PROCEDURE — 6370000000 HC RX 637 (ALT 250 FOR IP): Performed by: FAMILY MEDICINE

## 2023-11-17 PROCEDURE — 6370000000 HC RX 637 (ALT 250 FOR IP)

## 2023-11-17 PROCEDURE — 80048 BASIC METABOLIC PNL TOTAL CA: CPT

## 2023-11-17 PROCEDURE — 97535 SELF CARE MNGMENT TRAINING: CPT

## 2023-11-17 PROCEDURE — 36415 COLL VENOUS BLD VENIPUNCTURE: CPT

## 2023-11-17 PROCEDURE — 6360000002 HC RX W HCPCS

## 2023-11-17 PROCEDURE — 82948 REAGENT STRIP/BLOOD GLUCOSE: CPT

## 2023-11-17 PROCEDURE — 1200000000 HC SEMI PRIVATE

## 2023-11-17 PROCEDURE — 6370000000 HC RX 637 (ALT 250 FOR IP): Performed by: STUDENT IN AN ORGANIZED HEALTH CARE EDUCATION/TRAINING PROGRAM

## 2023-11-17 PROCEDURE — 84484 ASSAY OF TROPONIN QUANT: CPT

## 2023-11-17 PROCEDURE — 99239 HOSP IP/OBS DSCHRG MGMT >30: CPT | Performed by: STUDENT IN AN ORGANIZED HEALTH CARE EDUCATION/TRAINING PROGRAM

## 2023-11-17 PROCEDURE — 36600 WITHDRAWAL OF ARTERIAL BLOOD: CPT

## 2023-11-17 RX ORDER — AMOXICILLIN 500 MG/1
500 CAPSULE ORAL EVERY 12 HOURS SCHEDULED
Qty: 8 CAPSULE | Refills: 0 | Status: SHIPPED | OUTPATIENT
Start: 2023-11-17 | End: 2023-11-21

## 2023-11-17 RX ORDER — LANOLIN ALCOHOL/MO/W.PET/CERES
5 CREAM (GRAM) TOPICAL NIGHTLY PRN
Qty: 30 TABLET | Refills: 1 | Status: SHIPPED | OUTPATIENT
Start: 2023-11-17

## 2023-11-17 RX ORDER — ASPIRIN 81 MG/1
81 TABLET ORAL DAILY
Qty: 90 TABLET | Refills: 1 | Status: SHIPPED | OUTPATIENT
Start: 2023-11-17

## 2023-11-17 RX ORDER — PANTOPRAZOLE SODIUM 40 MG/1
40 TABLET, DELAYED RELEASE ORAL
Qty: 30 TABLET | Refills: 3 | Status: SHIPPED | OUTPATIENT
Start: 2023-11-18

## 2023-11-17 RX ORDER — TORSEMIDE 20 MG/1
20 TABLET ORAL DAILY
Qty: 30 TABLET | Refills: 3 | Status: SHIPPED | OUTPATIENT
Start: 2023-11-17

## 2023-11-17 RX ADMIN — DONEPEZIL HYDROCHLORIDE 10 MG: 10 TABLET, FILM COATED ORAL at 21:24

## 2023-11-17 RX ADMIN — CITALOPRAM 40 MG: 40 TABLET, FILM COATED ORAL at 11:00

## 2023-11-17 RX ADMIN — OXYBUTYNIN CHLORIDE 5 MG: 5 TABLET, EXTENDED RELEASE ORAL at 11:00

## 2023-11-17 RX ADMIN — AMOXICILLIN 500 MG: 500 CAPSULE ORAL at 10:59

## 2023-11-17 RX ADMIN — PRAVASTATIN SODIUM 80 MG: 80 TABLET ORAL at 21:23

## 2023-11-17 RX ADMIN — ENOXAPARIN SODIUM 30 MG: 100 INJECTION SUBCUTANEOUS at 11:00

## 2023-11-17 RX ADMIN — ENOXAPARIN SODIUM 30 MG: 100 INJECTION SUBCUTANEOUS at 21:23

## 2023-11-17 RX ADMIN — GUAIFENESIN 600 MG: 600 TABLET, EXTENDED RELEASE ORAL at 21:24

## 2023-11-17 RX ADMIN — LEVOTHYROXINE SODIUM 150 MCG: 0.15 TABLET ORAL at 11:00

## 2023-11-17 RX ADMIN — GUAIFENESIN 600 MG: 600 TABLET, EXTENDED RELEASE ORAL at 11:00

## 2023-11-17 RX ADMIN — METOPROLOL TARTRATE 25 MG: 25 TABLET, FILM COATED ORAL at 21:24

## 2023-11-17 RX ADMIN — PANTOPRAZOLE SODIUM 40 MG: 40 TABLET, DELAYED RELEASE ORAL at 11:00

## 2023-11-17 RX ADMIN — AMOXICILLIN 500 MG: 500 CAPSULE ORAL at 21:24

## 2023-11-17 RX ADMIN — SENNOSIDES 8.6 MG: 8.6 TABLET, FILM COATED ORAL at 21:22

## 2023-11-17 RX ADMIN — BUPROPION HYDROCHLORIDE 150 MG: 150 TABLET, FILM COATED, EXTENDED RELEASE ORAL at 11:00

## 2023-11-17 RX ADMIN — INSULIN GLARGINE 30 UNITS: 100 INJECTION, SOLUTION SUBCUTANEOUS at 21:23

## 2023-11-17 ASSESSMENT — PAIN SCALES - GENERAL
PAINLEVEL_OUTOF10: 0
PAINLEVEL_OUTOF10: 0

## 2023-11-17 NOTE — DISCHARGE SUMMARY
DO Filippo    on 11/11/2023 08:30 AM      XR CHEST PORTABLE   Final Result   1. No acute findings. This document has been electronically signed by: Cristobal Espinoza MD on    11/10/2023 09:54 PM             Consults:   IP CONSULT TO SPIRITUAL SERVICES      Disposition: Home  Condition at Discharge: Stable    Code Status:  Full Code     Patient Instructions:    Discharge lab work: none  Activity: activity as tolerated  Diet: ADULT DIET; Regular; 3 carb choices (45 gm/meal)      Follow-up visits:   RELL Kong - CNP  [address] [facility]  [phone]    Follow up on 11/21/2023  Follow up appointment November 21, 2023 at [address] [facility]  [address] [facility] [address]  [address] [facility]  Follow up in 2 week(s)  Office will contact patient for a 1-2 week follow up appointment.          Discharge Medications:        Medication List        START taking these medications      amoxicillin 500 MG capsule  Commonly known as: AMOXIL  Take 1 capsule by mouth every 12 hours for 8 doses     aspirin 81 MG EC tablet  Take 1 tablet by mouth daily     melatonin 3 MG Tabs tablet  Take 1.5 tablets by mouth nightly as needed (Sleep)     metoprolol tartrate 25 MG tablet  Commonly known as: LOPRESSOR  Take 1 tablet by mouth 2 times daily     pantoprazole 40 MG tablet  Commonly known as: PROTONIX  Take 1 tablet by mouth every morning (before breakfast)  Start taking on: November 18, 2023            CHANGE how you take these medications      torsemide 20 MG tablet  Commonly known as: DEMADEX  Take 1 tablet by mouth daily  What changed: when to take this            CONTINUE taking these medications      buPROPion 150 MG extended release tablet  Commonly known as: WELLBUTRIN XL     citalopram 40 MG tablet  Commonly known as: CELEXA     Dextromethorphan-guaiFENesin  MG Tb12  Take 1 tablet by mouth every 12 hours as needed (cough/congestion)     donepezil 10 MG tablet  Commonly

## 2023-11-17 NOTE — DISCHARGE INSTRUCTIONS
Stop taking lisinopril, take torsemide 20 mg tablet instead of 40 mg. Take amoxil 500 mg antibiotic for 4 more days for UTI. Follow up on stress test results and follow up with PCP. If you get chest pain, come to ER. I have started you on Aspirin. If any bleeding then please stop and come to ER. Start taking Protonix. Follow up with Urology for esqueda catheter management. Please follow up with PCP for sleep apnea. Wear CPAP machine.

## 2023-11-18 LAB
GLUCOSE BLD STRIP.AUTO-MCNC: 243 MG/DL (ref 70–108)
GLUCOSE BLD STRIP.AUTO-MCNC: 330 MG/DL (ref 70–108)
GLUCOSE BLD STRIP.AUTO-MCNC: 57 MG/DL (ref 70–108)
GLUCOSE BLD STRIP.AUTO-MCNC: 65 MG/DL (ref 70–108)

## 2023-11-18 PROCEDURE — 1200000000 HC SEMI PRIVATE

## 2023-11-18 PROCEDURE — 6370000000 HC RX 637 (ALT 250 FOR IP)

## 2023-11-18 PROCEDURE — 6370000000 HC RX 637 (ALT 250 FOR IP): Performed by: STUDENT IN AN ORGANIZED HEALTH CARE EDUCATION/TRAINING PROGRAM

## 2023-11-18 PROCEDURE — 99223 1ST HOSP IP/OBS HIGH 75: CPT | Performed by: INTERNAL MEDICINE

## 2023-11-18 PROCEDURE — 99231 SBSQ HOSP IP/OBS SF/LOW 25: CPT | Performed by: OPHTHALMOLOGY

## 2023-11-18 PROCEDURE — 6360000002 HC RX W HCPCS

## 2023-11-18 PROCEDURE — 82948 REAGENT STRIP/BLOOD GLUCOSE: CPT

## 2023-11-18 PROCEDURE — 6370000000 HC RX 637 (ALT 250 FOR IP): Performed by: FAMILY MEDICINE

## 2023-11-18 RX ADMIN — METOPROLOL TARTRATE 25 MG: 25 TABLET, FILM COATED ORAL at 22:11

## 2023-11-18 RX ADMIN — INSULIN LISPRO 4 UNITS: 100 INJECTION, SOLUTION INTRAVENOUS; SUBCUTANEOUS at 22:12

## 2023-11-18 RX ADMIN — OXYBUTYNIN CHLORIDE 5 MG: 5 TABLET, EXTENDED RELEASE ORAL at 09:15

## 2023-11-18 RX ADMIN — INSULIN GLARGINE 30 UNITS: 100 INJECTION, SOLUTION SUBCUTANEOUS at 22:12

## 2023-11-18 RX ADMIN — PRAVASTATIN SODIUM 80 MG: 80 TABLET ORAL at 22:11

## 2023-11-18 RX ADMIN — PANTOPRAZOLE SODIUM 40 MG: 40 TABLET, DELAYED RELEASE ORAL at 06:01

## 2023-11-18 RX ADMIN — CITALOPRAM 40 MG: 40 TABLET, FILM COATED ORAL at 09:15

## 2023-11-18 RX ADMIN — AMOXICILLIN 500 MG: 500 CAPSULE ORAL at 22:11

## 2023-11-18 RX ADMIN — LEVOTHYROXINE SODIUM 150 MCG: 0.15 TABLET ORAL at 09:15

## 2023-11-18 RX ADMIN — GUAIFENESIN 600 MG: 600 TABLET, EXTENDED RELEASE ORAL at 22:11

## 2023-11-18 RX ADMIN — AMOXICILLIN 500 MG: 500 CAPSULE ORAL at 09:15

## 2023-11-18 RX ADMIN — BUPROPION HYDROCHLORIDE 150 MG: 150 TABLET, FILM COATED, EXTENDED RELEASE ORAL at 09:15

## 2023-11-18 RX ADMIN — DONEPEZIL HYDROCHLORIDE 10 MG: 10 TABLET, FILM COATED ORAL at 22:11

## 2023-11-18 RX ADMIN — GUAIFENESIN 600 MG: 600 TABLET, EXTENDED RELEASE ORAL at 09:15

## 2023-11-18 RX ADMIN — ENOXAPARIN SODIUM 30 MG: 100 INJECTION SUBCUTANEOUS at 22:11

## 2023-11-18 NOTE — CONSULTS
[facility] [address] [place] [address]  Cardiology Consult        Patient:  Arianna Chen  YOB: 1940  MRN: [id number]     Acct: [de-identified]    PCP: RELL Beltran CNP    Date of Admission: 11/10/2023      Reason for Consultation:  chest pains      History Of Present Illness:    81 y.o. pleasant male c hx of HTN, HLD, Depression, DM, former smoker who presented to the hospital with complaints of abdominal pain and overall not feeling weel for 1 week. Had productive cough, with subjective fevers. He had diffuse abdominal pain along with epigastric pain. Vitals on admission were within normal limits. CXR showed pul congestion. ProBNP is 1406 and minimally elevated troponins. Imaging of the abdomen showed possible dilated ileus. His symptoms resolved. Echo showed EF 50-55%. Stress test was abnormal for possible ischemia in LAD /RCA territory. Cardiology was consulted for further evaluation. All labs, EKG's, diagnostic testing and images as well as cardiac cath, stress testing were reviewed during this encounter. Past Medical History:          Diagnosis Date    Chronic kidney disease     Diabetes mellitus ([address])     Hyperlipidemia     Hypertension        Past Surgical History:          Procedure Laterality Date    BLADDER SCAN  11/11/2023    BLADDER SCAN  11/13/2023    [facility]. BLADDER SCAN  11/11/2023    SHOULDER ARTHROPLASTY Left        Medications Prior to Admission:      Prior to Admission medications    Medication Sig Start Date End Date Taking?  Authorizing Provider   metoprolol tartrate (LOPRESSOR) 25 MG tablet Take 1 tablet by mouth 2 times daily 11/17/23  Yes Loli Latif MD   torsemide [facility]) 20 MG tablet Take 1 tablet by mouth daily 11/17/23  Yes Loli Latif MD   melatonin 3 MG TABS tablet Take 1.5 tablets by mouth nightly as needed (Sleep) 11/17/23  Yes Loli Latif MD   amoxicillin (AMOXIL) 500 MG capsule Take 1 capsule by mouth every 12 hours for 8 doses 11/17/23

## 2023-11-19 LAB
EKG ATRIAL RATE: 76 BPM
EKG ATRIAL RATE: 89 BPM
EKG P AXIS: 43 DEGREES
EKG P AXIS: 63 DEGREES
EKG P-R INTERVAL: 208 MS
EKG P-R INTERVAL: 228 MS
EKG Q-T INTERVAL: 380 MS
EKG Q-T INTERVAL: 418 MS
EKG QRS DURATION: 130 MS
EKG QRS DURATION: 136 MS
EKG QTC CALCULATION (BAZETT): 462 MS
EKG QTC CALCULATION (BAZETT): 470 MS
EKG R AXIS: 96 DEGREES
EKG R AXIS: 96 DEGREES
EKG T AXIS: 30 DEGREES
EKG T AXIS: 41 DEGREES
EKG VENTRICULAR RATE: 76 BPM
EKG VENTRICULAR RATE: 89 BPM
GLUCOSE BLD STRIP.AUTO-MCNC: 109 MG/DL (ref 70–108)
GLUCOSE BLD STRIP.AUTO-MCNC: 210 MG/DL (ref 70–108)
GLUCOSE BLD STRIP.AUTO-MCNC: 283 MG/DL (ref 70–108)
GLUCOSE BLD STRIP.AUTO-MCNC: 284 MG/DL (ref 70–108)

## 2023-11-19 PROCEDURE — 6370000000 HC RX 637 (ALT 250 FOR IP): Performed by: FAMILY MEDICINE

## 2023-11-19 PROCEDURE — 82948 REAGENT STRIP/BLOOD GLUCOSE: CPT

## 2023-11-19 PROCEDURE — 99232 SBSQ HOSP IP/OBS MODERATE 35: CPT | Performed by: NURSE PRACTITIONER

## 2023-11-19 PROCEDURE — 1200000000 HC SEMI PRIVATE

## 2023-11-19 PROCEDURE — 97110 THERAPEUTIC EXERCISES: CPT

## 2023-11-19 PROCEDURE — 99231 SBSQ HOSP IP/OBS SF/LOW 25: CPT | Performed by: OPHTHALMOLOGY

## 2023-11-19 PROCEDURE — 6360000002 HC RX W HCPCS

## 2023-11-19 PROCEDURE — 6370000000 HC RX 637 (ALT 250 FOR IP)

## 2023-11-19 PROCEDURE — 2580000003 HC RX 258: Performed by: OPHTHALMOLOGY

## 2023-11-19 PROCEDURE — 6370000000 HC RX 637 (ALT 250 FOR IP): Performed by: STUDENT IN AN ORGANIZED HEALTH CARE EDUCATION/TRAINING PROGRAM

## 2023-11-19 PROCEDURE — 2580000003 HC RX 258

## 2023-11-19 PROCEDURE — 97116 GAIT TRAINING THERAPY: CPT

## 2023-11-19 RX ORDER — SODIUM CHLORIDE 9 MG/ML
INJECTION, SOLUTION INTRAVENOUS CONTINUOUS
Status: DISCONTINUED | OUTPATIENT
Start: 2023-11-19 | End: 2023-11-21 | Stop reason: HOSPADM

## 2023-11-19 RX ADMIN — PANTOPRAZOLE SODIUM 40 MG: 40 TABLET, DELAYED RELEASE ORAL at 05:44

## 2023-11-19 RX ADMIN — AMOXICILLIN 500 MG: 500 CAPSULE ORAL at 09:35

## 2023-11-19 RX ADMIN — CITALOPRAM 40 MG: 40 TABLET, FILM COATED ORAL at 09:35

## 2023-11-19 RX ADMIN — GUAIFENESIN 600 MG: 600 TABLET, EXTENDED RELEASE ORAL at 21:29

## 2023-11-19 RX ADMIN — METOPROLOL TARTRATE 25 MG: 25 TABLET, FILM COATED ORAL at 21:29

## 2023-11-19 RX ADMIN — ACETAMINOPHEN 650 MG: 325 TABLET ORAL at 21:30

## 2023-11-19 RX ADMIN — METOPROLOL TARTRATE 25 MG: 25 TABLET, FILM COATED ORAL at 09:35

## 2023-11-19 RX ADMIN — OXYBUTYNIN CHLORIDE 5 MG: 5 TABLET, EXTENDED RELEASE ORAL at 09:35

## 2023-11-19 RX ADMIN — Medication 4.5 MG: at 21:29

## 2023-11-19 RX ADMIN — GUAIFENESIN 600 MG: 600 TABLET, EXTENDED RELEASE ORAL at 09:35

## 2023-11-19 RX ADMIN — INSULIN LISPRO 8 UNITS: 100 INJECTION, SOLUTION INTRAVENOUS; SUBCUTANEOUS at 18:00

## 2023-11-19 RX ADMIN — BENZONATATE 100 MG: 100 CAPSULE ORAL at 21:29

## 2023-11-19 RX ADMIN — AMOXICILLIN 500 MG: 500 CAPSULE ORAL at 21:29

## 2023-11-19 RX ADMIN — SODIUM CHLORIDE: 9 INJECTION, SOLUTION INTRAVENOUS at 21:54

## 2023-11-19 RX ADMIN — ENOXAPARIN SODIUM 30 MG: 100 INJECTION SUBCUTANEOUS at 21:30

## 2023-11-19 RX ADMIN — LEVOTHYROXINE SODIUM 150 MCG: 0.15 TABLET ORAL at 09:35

## 2023-11-19 RX ADMIN — BUPROPION HYDROCHLORIDE 150 MG: 150 TABLET, FILM COATED, EXTENDED RELEASE ORAL at 09:35

## 2023-11-19 RX ADMIN — SODIUM CHLORIDE, PRESERVATIVE FREE 10 ML: 5 INJECTION INTRAVENOUS at 21:54

## 2023-11-19 RX ADMIN — DONEPEZIL HYDROCHLORIDE 10 MG: 10 TABLET, FILM COATED ORAL at 21:29

## 2023-11-19 RX ADMIN — INSULIN GLARGINE 30 UNITS: 100 INJECTION, SOLUTION SUBCUTANEOUS at 21:31

## 2023-11-19 RX ADMIN — PRAVASTATIN SODIUM 80 MG: 80 TABLET ORAL at 21:29

## 2023-11-19 ASSESSMENT — PAIN DESCRIPTION - LOCATION: LOCATION: HEAD

## 2023-11-19 ASSESSMENT — PAIN SCALES - GENERAL
PAINLEVEL_OUTOF10: 0
PAINLEVEL_OUTOF10: 4

## 2023-11-19 ASSESSMENT — PAIN DESCRIPTION - DESCRIPTORS: DESCRIPTORS: ACHING

## 2023-11-19 ASSESSMENT — PAIN DESCRIPTION - ORIENTATION: ORIENTATION: MID

## 2023-11-20 PROBLEM — R94.39 ABNORMAL STRESS TEST: Status: ACTIVE | Noted: 2023-11-10

## 2023-11-20 PROBLEM — I20.89 ANGINA OF EFFORT: Status: ACTIVE | Noted: 2023-11-10

## 2023-11-20 LAB
ABO: NORMAL
ANION GAP SERPL CALC-SCNC: 7 MEQ/L (ref 8–16)
ANTIBODY SCREEN: NORMAL
BUN SERPL-MCNC: 15 MG/DL (ref 7–22)
CALCIUM SERPL-MCNC: 8.1 MG/DL (ref 8.5–10.5)
CHLORIDE SERPL-SCNC: 104 MEQ/L (ref 98–111)
CO2 SERPL-SCNC: 28 MEQ/L (ref 23–33)
CREAT SERPL-MCNC: 0.8 MG/DL (ref 0.4–1.2)
DEPRECATED RDW RBC AUTO: 47.2 FL (ref 35–45)
ECHO BSA: 2.3 M2
EKG ATRIAL RATE: 58 BPM
EKG P AXIS: 20 DEGREES
EKG P-R INTERVAL: 224 MS
EKG Q-T INTERVAL: 484 MS
EKG QRS DURATION: 116 MS
EKG QTC CALCULATION (BAZETT): 475 MS
EKG R AXIS: 93 DEGREES
EKG T AXIS: 41 DEGREES
EKG VENTRICULAR RATE: 58 BPM
ERYTHROCYTE [DISTWIDTH] IN BLOOD BY AUTOMATED COUNT: 13.6 % (ref 11.5–14.5)
GFR SERPL CREATININE-BSD FRML MDRD: > 60 ML/MIN/1.73M2
GLUCOSE BLD STRIP.AUTO-MCNC: 109 MG/DL (ref 70–108)
GLUCOSE BLD STRIP.AUTO-MCNC: 118 MG/DL (ref 70–108)
GLUCOSE BLD STRIP.AUTO-MCNC: 335 MG/DL (ref 70–108)
GLUCOSE BLD STRIP.AUTO-MCNC: 70 MG/DL (ref 70–108)
GLUCOSE SERPL-MCNC: 133 MG/DL (ref 70–108)
HCT VFR BLD AUTO: 32.6 % (ref 42–52)
HGB BLD-MCNC: 10.1 GM/DL (ref 14–18)
INR PPP: 1.16 (ref 0.85–1.13)
MCH RBC QN AUTO: 29.4 PG (ref 26–33)
MCHC RBC AUTO-ENTMCNC: 31 GM/DL (ref 32.2–35.5)
MCV RBC AUTO: 95 FL (ref 80–94)
PLATELET # BLD AUTO: 231 THOU/MM3 (ref 130–400)
PMV BLD AUTO: 11.3 FL (ref 9.4–12.4)
POTASSIUM SERPL-SCNC: 4.7 MEQ/L (ref 3.5–5.2)
RBC # BLD AUTO: 3.43 MILL/MM3 (ref 4.7–6.1)
RH FACTOR: NORMAL
SODIUM SERPL-SCNC: 139 MEQ/L (ref 135–145)
WBC # BLD AUTO: 7.2 THOU/MM3 (ref 4.8–10.8)

## 2023-11-20 PROCEDURE — C1894 INTRO/SHEATH, NON-LASER: HCPCS | Performed by: INTERNAL MEDICINE

## 2023-11-20 PROCEDURE — 85027 COMPLETE CBC AUTOMATED: CPT

## 2023-11-20 PROCEDURE — C1769 GUIDE WIRE: HCPCS | Performed by: INTERNAL MEDICINE

## 2023-11-20 PROCEDURE — 6370000000 HC RX 637 (ALT 250 FOR IP)

## 2023-11-20 PROCEDURE — 97110 THERAPEUTIC EXERCISES: CPT

## 2023-11-20 PROCEDURE — 86850 RBC ANTIBODY SCREEN: CPT

## 2023-11-20 PROCEDURE — 36415 COLL VENOUS BLD VENIPUNCTURE: CPT

## 2023-11-20 PROCEDURE — 2709999900 HC NON-CHARGEABLE SUPPLY: Performed by: INTERNAL MEDICINE

## 2023-11-20 PROCEDURE — 6360000002 HC RX W HCPCS: Performed by: INTERNAL MEDICINE

## 2023-11-20 PROCEDURE — 6360000002 HC RX W HCPCS

## 2023-11-20 PROCEDURE — 6370000000 HC RX 637 (ALT 250 FOR IP): Performed by: FAMILY MEDICINE

## 2023-11-20 PROCEDURE — 93005 ELECTROCARDIOGRAM TRACING: CPT | Performed by: NURSE PRACTITIONER

## 2023-11-20 PROCEDURE — 4A023N7 MEASUREMENT OF CARDIAC SAMPLING AND PRESSURE, LEFT HEART, PERCUTANEOUS APPROACH: ICD-10-PCS | Performed by: INTERNAL MEDICINE

## 2023-11-20 PROCEDURE — 6370000000 HC RX 637 (ALT 250 FOR IP): Performed by: STUDENT IN AN ORGANIZED HEALTH CARE EDUCATION/TRAINING PROGRAM

## 2023-11-20 PROCEDURE — B2111ZZ FLUOROSCOPY OF MULTIPLE CORONARY ARTERIES USING LOW OSMOLAR CONTRAST: ICD-10-PCS | Performed by: INTERNAL MEDICINE

## 2023-11-20 PROCEDURE — 97530 THERAPEUTIC ACTIVITIES: CPT

## 2023-11-20 PROCEDURE — 82948 REAGENT STRIP/BLOOD GLUCOSE: CPT

## 2023-11-20 PROCEDURE — 93458 L HRT ARTERY/VENTRICLE ANGIO: CPT | Performed by: INTERNAL MEDICINE

## 2023-11-20 PROCEDURE — B2151ZZ FLUOROSCOPY OF LEFT HEART USING LOW OSMOLAR CONTRAST: ICD-10-PCS | Performed by: INTERNAL MEDICINE

## 2023-11-20 PROCEDURE — C1887 CATHETER, GUIDING: HCPCS | Performed by: INTERNAL MEDICINE

## 2023-11-20 PROCEDURE — 1200000000 HC SEMI PRIVATE

## 2023-11-20 PROCEDURE — 93010 ELECTROCARDIOGRAM REPORT: CPT | Performed by: INTERNAL MEDICINE

## 2023-11-20 PROCEDURE — 6370000000 HC RX 637 (ALT 250 FOR IP): Performed by: NURSE PRACTITIONER

## 2023-11-20 PROCEDURE — 6360000004 HC RX CONTRAST MEDICATION: Performed by: INTERNAL MEDICINE

## 2023-11-20 PROCEDURE — 2500000003 HC RX 250 WO HCPCS: Performed by: INTERNAL MEDICINE

## 2023-11-20 PROCEDURE — 2580000003 HC RX 258: Performed by: INTERNAL MEDICINE

## 2023-11-20 PROCEDURE — 86900 BLOOD TYPING SEROLOGIC ABO: CPT

## 2023-11-20 PROCEDURE — 97535 SELF CARE MNGMENT TRAINING: CPT

## 2023-11-20 PROCEDURE — 85610 PROTHROMBIN TIME: CPT

## 2023-11-20 PROCEDURE — 80048 BASIC METABOLIC PNL TOTAL CA: CPT

## 2023-11-20 PROCEDURE — 99232 SBSQ HOSP IP/OBS MODERATE 35: CPT | Performed by: PHYSICIAN ASSISTANT

## 2023-11-20 PROCEDURE — 97116 GAIT TRAINING THERAPY: CPT

## 2023-11-20 PROCEDURE — 86901 BLOOD TYPING SEROLOGIC RH(D): CPT

## 2023-11-20 PROCEDURE — 99152 MOD SED SAME PHYS/QHP 5/>YRS: CPT | Performed by: INTERNAL MEDICINE

## 2023-11-20 RX ORDER — ATROPINE SULFATE 0.4 MG/ML
0.5 INJECTION, SOLUTION INTRAVENOUS
Status: DISCONTINUED | OUTPATIENT
Start: 2023-11-20 | End: 2023-11-21 | Stop reason: HOSPADM

## 2023-11-20 RX ORDER — SODIUM CHLORIDE 9 MG/ML
INJECTION, SOLUTION INTRAVENOUS CONTINUOUS
Status: DISCONTINUED | OUTPATIENT
Start: 2023-11-20 | End: 2023-11-21 | Stop reason: SDUPTHER

## 2023-11-20 RX ORDER — ASPIRIN 325 MG
325 TABLET ORAL ONCE
Status: COMPLETED | OUTPATIENT
Start: 2023-11-20 | End: 2023-11-20

## 2023-11-20 RX ORDER — SODIUM CHLORIDE 0.9 % (FLUSH) 0.9 %
5-40 SYRINGE (ML) INJECTION EVERY 12 HOURS SCHEDULED
Status: CANCELLED | OUTPATIENT
Start: 2023-11-20

## 2023-11-20 RX ORDER — NITROGLYCERIN 0.4 MG/1
0.4 TABLET SUBLINGUAL EVERY 5 MIN PRN
Status: CANCELLED | OUTPATIENT
Start: 2023-11-20

## 2023-11-20 RX ORDER — SODIUM CHLORIDE 0.9 % (FLUSH) 0.9 %
5-40 SYRINGE (ML) INJECTION PRN
Status: DISCONTINUED | OUTPATIENT
Start: 2023-11-20 | End: 2023-11-21 | Stop reason: SDUPTHER

## 2023-11-20 RX ORDER — SODIUM CHLORIDE 0.9 % (FLUSH) 0.9 %
5-40 SYRINGE (ML) INJECTION EVERY 12 HOURS SCHEDULED
Status: DISCONTINUED | OUTPATIENT
Start: 2023-11-20 | End: 2023-11-21 | Stop reason: SDUPTHER

## 2023-11-20 RX ORDER — SODIUM CHLORIDE 0.9 % (FLUSH) 0.9 %
5-40 SYRINGE (ML) INJECTION PRN
Status: CANCELLED | OUTPATIENT
Start: 2023-11-20

## 2023-11-20 RX ORDER — SODIUM CHLORIDE 9 MG/ML
INJECTION, SOLUTION INTRAVENOUS PRN
Status: CANCELLED | OUTPATIENT
Start: 2023-11-20

## 2023-11-20 RX ORDER — MIDAZOLAM HYDROCHLORIDE 1 MG/ML
INJECTION INTRAMUSCULAR; INTRAVENOUS PRN
Status: DISCONTINUED | OUTPATIENT
Start: 2023-11-20 | End: 2023-11-20 | Stop reason: HOSPADM

## 2023-11-20 RX ORDER — SODIUM CHLORIDE 9 MG/ML
INJECTION, SOLUTION INTRAVENOUS PRN
Status: DISCONTINUED | OUTPATIENT
Start: 2023-11-20 | End: 2023-11-21 | Stop reason: SDUPTHER

## 2023-11-20 RX ORDER — FENTANYL CITRATE 50 UG/ML
INJECTION, SOLUTION INTRAMUSCULAR; INTRAVENOUS PRN
Status: DISCONTINUED | OUTPATIENT
Start: 2023-11-20 | End: 2023-11-20 | Stop reason: HOSPADM

## 2023-11-20 RX ORDER — NITROGLYCERIN 0.4 MG/1
0.4 TABLET SUBLINGUAL EVERY 5 MIN PRN
Status: DISCONTINUED | OUTPATIENT
Start: 2023-11-20 | End: 2023-11-21 | Stop reason: HOSPADM

## 2023-11-20 RX ORDER — ACETAMINOPHEN 325 MG/1
650 TABLET ORAL EVERY 4 HOURS PRN
Status: DISCONTINUED | OUTPATIENT
Start: 2023-11-20 | End: 2023-11-21 | Stop reason: SDUPTHER

## 2023-11-20 RX ORDER — DIPHENHYDRAMINE HYDROCHLORIDE 50 MG/ML
50 INJECTION INTRAMUSCULAR; INTRAVENOUS ONCE
Status: CANCELLED | OUTPATIENT
Start: 2023-11-20 | End: 2023-11-20

## 2023-11-20 RX ORDER — ASPIRIN 325 MG
325 TABLET ORAL ONCE
Status: CANCELLED | OUTPATIENT
Start: 2023-11-20 | End: 2023-11-20

## 2023-11-20 RX ADMIN — METOPROLOL TARTRATE 25 MG: 25 TABLET, FILM COATED ORAL at 07:38

## 2023-11-20 RX ADMIN — BUPROPION HYDROCHLORIDE 150 MG: 150 TABLET, FILM COATED, EXTENDED RELEASE ORAL at 07:38

## 2023-11-20 RX ADMIN — METOPROLOL TARTRATE 25 MG: 25 TABLET, FILM COATED ORAL at 21:30

## 2023-11-20 RX ADMIN — LEVOTHYROXINE SODIUM 150 MCG: 0.15 TABLET ORAL at 07:38

## 2023-11-20 RX ADMIN — AMOXICILLIN 500 MG: 500 CAPSULE ORAL at 07:37

## 2023-11-20 RX ADMIN — AMOXICILLIN 500 MG: 500 CAPSULE ORAL at 21:30

## 2023-11-20 RX ADMIN — GUAIFENESIN 600 MG: 600 TABLET, EXTENDED RELEASE ORAL at 07:38

## 2023-11-20 RX ADMIN — Medication 4.5 MG: at 21:32

## 2023-11-20 RX ADMIN — ENOXAPARIN SODIUM 30 MG: 100 INJECTION SUBCUTANEOUS at 21:30

## 2023-11-20 RX ADMIN — OXYBUTYNIN CHLORIDE 5 MG: 5 TABLET, EXTENDED RELEASE ORAL at 07:37

## 2023-11-20 RX ADMIN — ASPIRIN 325 MG: 325 TABLET ORAL at 07:37

## 2023-11-20 RX ADMIN — BENZONATATE 100 MG: 100 CAPSULE ORAL at 21:32

## 2023-11-20 RX ADMIN — GUAIFENESIN 600 MG: 600 TABLET, EXTENDED RELEASE ORAL at 21:30

## 2023-11-20 RX ADMIN — PANTOPRAZOLE SODIUM 40 MG: 40 TABLET, DELAYED RELEASE ORAL at 05:00

## 2023-11-20 RX ADMIN — SODIUM CHLORIDE, PRESERVATIVE FREE 10 ML: 5 INJECTION INTRAVENOUS at 21:31

## 2023-11-20 RX ADMIN — INSULIN LISPRO 4 UNITS: 100 INJECTION, SOLUTION INTRAVENOUS; SUBCUTANEOUS at 21:30

## 2023-11-20 RX ADMIN — ACETAMINOPHEN 650 MG: 325 TABLET ORAL at 21:32

## 2023-11-20 RX ADMIN — PRAVASTATIN SODIUM 80 MG: 80 TABLET ORAL at 21:30

## 2023-11-20 RX ADMIN — DONEPEZIL HYDROCHLORIDE 10 MG: 10 TABLET, FILM COATED ORAL at 21:30

## 2023-11-20 RX ADMIN — INSULIN GLARGINE 30 UNITS: 100 INJECTION, SOLUTION SUBCUTANEOUS at 21:31

## 2023-11-20 RX ADMIN — CITALOPRAM 40 MG: 40 TABLET, FILM COATED ORAL at 07:38

## 2023-11-20 ASSESSMENT — PAIN SCALES - GENERAL
PAINLEVEL_OUTOF10: 0
PAINLEVEL_OUTOF10: 3
PAINLEVEL_OUTOF10: 0

## 2023-11-20 ASSESSMENT — PAIN DESCRIPTION - LOCATION: LOCATION: HEAD

## 2023-11-20 NOTE — POST SEDATION
[facility]  Sedation/Analgesia Post Sedation Record    Pt Name: Víctor Orellana  Account number: [de-identified]  MRN: [id number]  YOB: 1940  Procedure Performed By: Shira Dolan MD MD   Primary Care Physician: RELL Kirk - JAXON  Date: 11/20/2023    POST-PROCEDURE    Physicians/Assistants: Shira Dolan MD MD     Procedure Performed:Cath      Sedation/Anesthesia: Versed/ Fentanyl and 2% xylocaine local anesthesia. Estimated Blood Loss: < 50 ml. Specimens Removed: None         Disposition of Specimen: N/A        Complications: No Immediate Complications. Post-procedure Diagnosis/Findings:        of the RCA with L>R collateralization. Otherwise, nonobstructive CAD    Above findings and plan of care were discussed with patient and his family, questions were answered, agreeable with plan.        Shira Dolan MD, Danuta Cary   Electronically signed 11/20/2023 at 3:23 PM  Interventional Cardiology

## 2023-11-20 NOTE — H&P
[facility]  Sedation/Analgesia History & Physical    Pt Name: Nilo Vargas  Account number: [de-identified]  MRN: [id number]  YOB: 1940  Provider Performing Procedure: Ene Castellanos MD MD  Referring Provider: OLENA Lee   Primary Care Physician: Polo Reinoso, APRN - CNP  Date: 11/20/2023    PRE-PROCEDURE    Code Status: FULL CODE  Brief History/Pre-Procedure Diagnosis:   Abnormal stress test, angina   Consent: : I have discussed with the patient risks, benefits, and alternatives (and relevant risks, benefits, and side effects related to alternatives or not receiving care), and likelihood of the success. The patient and/or representative appear to understand and agree to proceed. The discussion encompasses risks, benefits, and side effects related to the alternatives and the risks related to not receiving the proposed care, treatment, and services. The indication, risks and benefits of the procedure and possible therapeutic consequences and alternatives were discussed with the patient. The patient was given the opportunity to ask questions and to have them answered to his/her satisfaction. Risks of the procedure include but are not limited to the following: Bleeding, hematoma including retroperitoneal hemmorhage, infection, pain and discomfort, injury to the aorta and other blood vessels, rhythm disturbance, low blood pressure, myocardial infarction, stroke, kidney damage/failure, myocardial perforation, allergic reactions to sedatives/contrast material, loss of pulse/vascular compromise requiring surgery, aneurysm/pseudoaneurysm formation, possible loss of a limb/hand/leg, needing blood transfusion, requiring emergent open heart surgery or emergent coronary intervention, the need for intubation/respiratory support, the requirement for defibrillation/cardioversion, and death. Alternatives to and omission of the suggested procedure were discussed.  The patient had no

## 2023-11-21 ENCOUNTER — APPOINTMENT (OUTPATIENT)
Dept: GENERAL RADIOLOGY | Age: 83
DRG: 286 | End: 2023-11-21
Payer: MEDICARE

## 2023-11-21 VITALS
OXYGEN SATURATION: 93 % | RESPIRATION RATE: 16 BRPM | HEART RATE: 65 BPM | TEMPERATURE: 97.3 F | WEIGHT: 235.89 LBS | HEIGHT: 66 IN | DIASTOLIC BLOOD PRESSURE: 54 MMHG | BODY MASS INDEX: 37.91 KG/M2 | SYSTOLIC BLOOD PRESSURE: 131 MMHG

## 2023-11-21 LAB
GLUCOSE BLD STRIP.AUTO-MCNC: 155 MG/DL (ref 70–108)
GLUCOSE BLD STRIP.AUTO-MCNC: 236 MG/DL (ref 70–108)

## 2023-11-21 PROCEDURE — 2580000003 HC RX 258: Performed by: INTERNAL MEDICINE

## 2023-11-21 PROCEDURE — 6370000000 HC RX 637 (ALT 250 FOR IP)

## 2023-11-21 PROCEDURE — 82948 REAGENT STRIP/BLOOD GLUCOSE: CPT

## 2023-11-21 PROCEDURE — 6370000000 HC RX 637 (ALT 250 FOR IP): Performed by: FAMILY MEDICINE

## 2023-11-21 PROCEDURE — 71045 X-RAY EXAM CHEST 1 VIEW: CPT

## 2023-11-21 PROCEDURE — 6360000002 HC RX W HCPCS

## 2023-11-21 PROCEDURE — 99232 SBSQ HOSP IP/OBS MODERATE 35: CPT | Performed by: NURSE PRACTITIONER

## 2023-11-21 PROCEDURE — 6370000000 HC RX 637 (ALT 250 FOR IP): Performed by: STUDENT IN AN ORGANIZED HEALTH CARE EDUCATION/TRAINING PROGRAM

## 2023-11-21 RX ADMIN — LEVOTHYROXINE SODIUM 150 MCG: 0.15 TABLET ORAL at 08:41

## 2023-11-21 RX ADMIN — BUPROPION HYDROCHLORIDE 150 MG: 150 TABLET, FILM COATED, EXTENDED RELEASE ORAL at 08:41

## 2023-11-21 RX ADMIN — ENOXAPARIN SODIUM 30 MG: 100 INJECTION SUBCUTANEOUS at 08:41

## 2023-11-21 RX ADMIN — PANTOPRAZOLE SODIUM 40 MG: 40 TABLET, DELAYED RELEASE ORAL at 05:03

## 2023-11-21 RX ADMIN — AMOXICILLIN 500 MG: 500 CAPSULE ORAL at 08:41

## 2023-11-21 RX ADMIN — SODIUM CHLORIDE, PRESERVATIVE FREE 10 ML: 5 INJECTION INTRAVENOUS at 07:47

## 2023-11-21 RX ADMIN — INSULIN LISPRO 4 UNITS: 100 INJECTION, SOLUTION INTRAVENOUS; SUBCUTANEOUS at 12:44

## 2023-11-21 RX ADMIN — OXYBUTYNIN CHLORIDE 5 MG: 5 TABLET, EXTENDED RELEASE ORAL at 08:40

## 2023-11-21 RX ADMIN — CITALOPRAM 40 MG: 40 TABLET, FILM COATED ORAL at 08:41

## 2023-11-21 RX ADMIN — GUAIFENESIN 600 MG: 600 TABLET, EXTENDED RELEASE ORAL at 08:40

## 2023-11-21 RX ADMIN — METOPROLOL TARTRATE 25 MG: 25 TABLET, FILM COATED ORAL at 08:41

## 2023-11-21 RX ADMIN — BENZONATATE 100 MG: 100 CAPSULE ORAL at 05:03

## 2023-11-21 ASSESSMENT — PAIN SCALES - GENERAL
PAINLEVEL_OUTOF10: 0
PAINLEVEL_OUTOF10: 0

## 2023-11-21 NOTE — PLAN OF CARE
CT A/P results came back and showed \"paralytic ileus\" and \"cannot definitely exclude a bladder mass or invasion of the bladder. If further evaluation is desired, ultrasound of the bladder could be obtained\". At this time, will order Reglan and continue with clear liquid diet. No need for NG tube at this time as patient is not having nausea or vomiting. Will also order bladder US.      Electronically signed by Luis Sousa DO on 11/11/2023 at 6:35 PM
Problem: ABCDS Injury Assessment  Goal: Absence of physical injury  11/15/2023 0933 by Claudy Barkley RN  Outcome: Progressing  Flowsheets (Taken 11/15/2023 0018 by Charley Locke RN)  Absence of Physical Injury: Implement safety measures based on patient assessment  11/15/2023 0018 by Charley Locke RN  Outcome: Progressing  Flowsheets  Taken 11/15/2023 0018  Absence of Physical Injury: Implement safety measures based on patient assessment  Taken 11/14/2023 2221  Absence of Physical Injury: Implement safety measures based on patient assessment
Problem: Discharge Planning  Goal: Discharge to home or other facility with appropriate resources  Outcome: Progressing     Problem: Pain  Goal: Verbalizes/displays adequate comfort level or baseline comfort level  Outcome: Progressing     Problem: Safety - Adult  Goal: Free from fall injury  Outcome: Progressing     Problem: ABCDS Injury Assessment  Goal: Absence of physical injury  Outcome: Progressing     Problem: Respiratory - Adult  Goal: Achieves optimal ventilation and oxygenation  Outcome: Progressing     Problem: Infection - Adult  Goal: Absence of infection during hospitalization  Outcome: Progressing     Problem: Metabolic/Fluid and Electrolytes - Adult  Goal: Electrolytes maintained within normal limits  Outcome: Progressing     Problem: Chronic Conditions and Co-morbidities  Goal: Patient's chronic conditions and co-morbidity symptoms are monitored and maintained or improved  Outcome: Progressing     Problem: Neurosensory - Adult  Goal: Achieves stable or improved neurological status  Outcome: Progressing  Goal: Achieves maximal functionality and self care  Outcome: Progressing     Problem: Skin/Tissue Integrity - Adult  Goal: Skin integrity remains intact  Outcome: Progressing  Flowsheets (Taken 11/16/2023 2020)  Skin Integrity Remains Intact: Monitor for areas of redness and/or skin breakdown  Goal: Incisions, wounds, or drain sites healing without S/S of infection  Outcome: Progressing  Goal: Oral mucous membranes remain intact  Outcome: Progressing     Problem: Gastrointestinal - Adult  Goal: Minimal or absence of nausea and vomiting  Outcome: Progressing  Goal: Maintains or returns to baseline bowel function  Outcome: Progressing  Goal: Maintains adequate nutritional intake  Outcome: Progressing     Problem: Confusion  Goal: Confusion, delirium, dementia, or psychosis is improved or at baseline  Description: INTERVENTIONS:  1.  Assess for possible contributors to thought disturbance, including
Problem: Discharge Planning  Goal: Discharge to home or other facility with appropriate resources  Outcome: Progressing     Problem: Pain  Goal: Verbalizes/displays adequate comfort level or baseline comfort level  Outcome: Progressing     Problem: Safety - Adult  Goal: Free from fall injury  Outcome: Progressing     Problem: ABCDS Injury Assessment  Goal: Absence of physical injury  Outcome: Progressing     Problem: Respiratory - Adult  Goal: Achieves optimal ventilation and oxygenation  Outcome: Progressing     Problem: Infection - Adult  Goal: Absence of infection during hospitalization  Outcome: Progressing     Problem: Metabolic/Fluid and Electrolytes - Adult  Goal: Electrolytes maintained within normal limits  Outcome: Progressing     Problem: Neurosensory - Adult  Goal: Achieves stable or improved neurological status  Outcome: Progressing  Goal: Achieves maximal functionality and self care  Outcome: Progressing     Problem: Skin/Tissue Integrity - Adult  Goal: Skin integrity remains intact  Outcome: Progressing  Flowsheets (Taken 11/19/2023 2100)  Skin Integrity Remains Intact: Monitor for areas of redness and/or skin breakdown  Goal: Incisions, wounds, or drain sites healing without S/S of infection  Outcome: Progressing  Goal: Oral mucous membranes remain intact  Outcome: Progressing     Problem: Gastrointestinal - Adult  Goal: Minimal or absence of nausea and vomiting  Outcome: Progressing  Goal: Maintains or returns to baseline bowel function  Outcome: Progressing  Goal: Maintains adequate nutritional intake  Outcome: Progressing     Problem: Cardiovascular - Adult  Goal: Maintains optimal cardiac output and hemodynamic stability  Outcome: Progressing  Goal: Absence of cardiac dysrhythmias or at baseline  Outcome: Progressing     Problem: Chronic Conditions and Co-morbidities  Goal: Patient's chronic conditions and co-morbidity symptoms are monitored and maintained or improved  Outcome: Progressing
Problem: Discharge Planning  Goal: Discharge to home or other facility with appropriate resources  Outcome: Progressing     Problem: Pain  Goal: Verbalizes/displays adequate comfort level or baseline comfort level  Outcome: Progressing     Problem: Safety - Adult  Goal: Free from fall injury  Outcome: Progressing     Problem: ABCDS Injury Assessment  Goal: Absence of physical injury  Outcome: Progressing     Problem: Respiratory - Adult  Goal: Achieves optimal ventilation and oxygenation  Outcome: Progressing     Problem: Infection - Adult  Goal: Absence of infection during hospitalization  Outcome: Progressing     Problem: Metabolic/Fluid and Electrolytes - Adult  Goal: Electrolytes maintained within normal limits  Outcome: Progressing     Problem: Neurosensory - Adult  Goal: Achieves stable or improved neurological status  Outcome: Progressing  Goal: Achieves maximal functionality and self care  Outcome: Progressing     Problem: Skin/Tissue Integrity - Adult  Goal: Skin integrity remains intact  Outcome: Progressing  Goal: Incisions, wounds, or drain sites healing without S/S of infection  Outcome: Progressing  Goal: Oral mucous membranes remain intact  Outcome: Progressing     Problem: Gastrointestinal - Adult  Goal: Minimal or absence of nausea and vomiting  Outcome: Progressing  Goal: Maintains or returns to baseline bowel function  Outcome: Progressing  Goal: Maintains adequate nutritional intake  Outcome: Progressing     Problem: Cardiovascular - Adult  Goal: Maintains optimal cardiac output and hemodynamic stability  Outcome: Progressing  Goal: Absence of cardiac dysrhythmias or at baseline  Outcome: Progressing     Problem: Musculoskeletal - Adult  Goal: Return ADL status to a safe level of function  Outcome: Progressing     Problem: Chronic Conditions and Co-morbidities  Goal: Patient's chronic conditions and co-morbidity symptoms are monitored and maintained or improved  Outcome: Progressing
Problem: Discharge Planning  Goal: Discharge to home or other facility with appropriate resources  Outcome: Progressing  Flowsheets  Taken 11/11/2023 0200  Discharge to home or other facility with appropriate resources:   Identify barriers to discharge with patient and caregiver   Arrange for needed discharge resources and transportation as appropriate   Refer to discharge planning if patient needs post-hospital services based on physician order or complex needs related to functional status, cognitive ability or social support system  Taken 11/11/2023 0037  Discharge to home or other facility with appropriate resources:   Identify barriers to discharge with patient and caregiver   Identify discharge learning needs (meds, wound care, etc)   Refer to discharge planning if patient needs post-hospital services based on physician order or complex needs related to functional status, cognitive ability or social support system   Arrange for needed discharge resources and transportation as appropriate     Problem: Pain  Goal: Verbalizes/displays adequate comfort level or baseline comfort level  Outcome: Progressing  Flowsheets (Taken 11/11/2023 0635)  Verbalizes/displays adequate comfort level or baseline comfort level: Encourage patient to monitor pain and request assistance     Problem: Safety - Adult  Goal: Free from fall injury  Outcome: Progressing  Flowsheets (Taken 11/11/2023 0200)  Free From Fall Injury: Based on caregiver fall risk screen, instruct family/caregiver to ask for assistance with transferring infant if caregiver noted to have fall risk factors     Problem: ABCDS Injury Assessment  Goal: Absence of physical injury  Outcome: Progressing  Flowsheets (Taken 11/11/2023 0200)  Absence of Physical Injury: Implement safety measures based on patient assessment     Problem: Infection - Adult  Goal: Absence of infection during hospitalization  Outcome: Progressing  Flowsheets (Taken 11/11/2023 4067)  Absence of
Problem: Discharge Planning  Goal: Discharge to home or other facility with appropriate resources  Outcome: Progressing  Flowsheets  Taken 11/13/2023 0233  Discharge to home or other facility with appropriate resources:   Identify barriers to discharge with patient and caregiver   Arrange for needed discharge resources and transportation as appropriate  Taken 11/12/2023 2000  Discharge to home or other facility with appropriate resources: Identify barriers to discharge with patient and caregiver     Problem: Pain  Goal: Verbalizes/displays adequate comfort level or baseline comfort level  Outcome: Progressing  Flowsheets (Taken 11/13/2023 0233)  Verbalizes/displays adequate comfort level or baseline comfort level:   Encourage patient to monitor pain and request assistance   Assess pain using appropriate pain scale     Problem: Safety - Adult  Goal: Free from fall injury  Outcome: Progressing  Flowsheets (Taken 11/13/2023 0233)  Free From Fall Injury:   Instruct family/caregiver on patient safety   Based on caregiver fall risk screen, instruct family/caregiver to ask for assistance with transferring infant if caregiver noted to have fall risk factors     Problem: ABCDS Injury Assessment  Goal: Absence of physical injury  Outcome: Progressing  Flowsheets (Taken 11/13/2023 0233)  Absence of Physical Injury: Implement safety measures based on patient assessment     Problem: Infection - Adult  Goal: Absence of infection during hospitalization  Outcome: Progressing  Flowsheets  Taken 11/13/2023 0233  Absence of infection during hospitalization:   Assess and monitor for signs and symptoms of infection   Monitor lab/diagnostic results  Taken 11/12/2023 2000  Absence of infection during hospitalization: Assess and monitor for signs and symptoms of infection     Problem: Chronic Conditions and Co-morbidities  Goal: Patient's chronic conditions and co-morbidity symptoms are monitored and maintained or improved  Outcome:
Problem: Discharge Planning  Goal: Discharge to home or other facility with appropriate resources  Outcome: Progressing  Flowsheets (Taken 11/12/2023 0209)  Discharge to home or other facility with appropriate resources:   Identify barriers to discharge with patient and caregiver   Arrange for needed discharge resources and transportation as appropriate   Identify discharge learning needs (meds, wound care, etc)   Refer to discharge planning if patient needs post-hospital services based on physician order or complex needs related to functional status, cognitive ability or social support system     Problem: Pain  Goal: Verbalizes/displays adequate comfort level or baseline comfort level  Outcome: Progressing  Flowsheets (Taken 11/12/2023 0209)  Verbalizes/displays adequate comfort level or baseline comfort level:   Encourage patient to monitor pain and request assistance   Assess pain using appropriate pain scale     Problem: Safety - Adult  Goal: Free from fall injury  Outcome: Progressing  Flowsheets  Taken 11/12/2023 0209  Free From Fall Injury: Based on caregiver fall risk screen, instruct family/caregiver to ask for assistance with transferring infant if caregiver noted to have fall risk factors  Taken 11/11/2023 2226  Free From Fall Injury: Based on caregiver fall risk screen, instruct family/caregiver to ask for assistance with transferring infant if caregiver noted to have fall risk factors     Problem: ABCDS Injury Assessment  Goal: Absence of physical injury  Outcome: Progressing  Flowsheets  Taken 11/12/2023 0209  Absence of Physical Injury: Implement safety measures based on patient assessment  Taken 11/11/2023 2226  Absence of Physical Injury: Implement safety measures based on patient assessment     Problem: Respiratory - Adult  Goal: Achieves optimal ventilation and oxygenation  Outcome: Progressing  Flowsheets (Taken 11/12/2023 0209)  Achieves optimal ventilation and oxygenation:   Assess for
Problem: Discharge Planning  Goal: Discharge to home or other facility with appropriate resources  Outcome: Progressing  Flowsheets (Taken 11/14/2023 0256)  Discharge to home or other facility with appropriate resources:   Identify barriers to discharge with patient and caregiver   Identify discharge learning needs (meds, wound care, etc)     Problem: Pain  Goal: Verbalizes/displays adequate comfort level or baseline comfort level  Outcome: Progressing  Flowsheets (Taken 11/14/2023 0256)  Verbalizes/displays adequate comfort level or baseline comfort level:   Encourage patient to monitor pain and request assistance   Assess pain using appropriate pain scale     Problem: Safety - Adult  Goal: Free from fall injury  Outcome: Progressing  Note: Bed locked & in low position, call light in reach, side-rails up x2, bed/chair alarm utilized, non-slip socks on when ambulating, reminded patient to use call light to call for assistance.       Problem: ABCDS Injury Assessment  Goal: Absence of physical injury  Outcome: Progressing  Flowsheets (Taken 11/14/2023 0256)  Absence of Physical Injury: Implement safety measures based on patient assessment     Problem: Respiratory - Adult  Goal: Achieves optimal ventilation and oxygenation  11/14/2023 0256 by Kaylan Walter RN  Outcome: Progressing  Flowsheets (Taken 11/14/2023 0256)  Achieves optimal ventilation and oxygenation:   Assess for changes in respiratory status   Assess for changes in mentation and behavior   Position to facilitate oxygenation and minimize respiratory effort   Assess and instruct to report shortness of breath or any respiratory difficulty     Problem: Infection - Adult  Goal: Absence of infection during hospitalization  Outcome: Progressing  Flowsheets (Taken 11/14/2023 0256)  Absence of infection during hospitalization:   Assess and monitor for signs and symptoms of infection   Monitor lab/diagnostic results   Monitor all insertion sites i.e., indwelling
Problem: Discharge Planning  Goal: Discharge to home or other facility with appropriate resources  Outcome: Progressing  Flowsheets (Taken 11/15/2023 0018)  Discharge to home or other facility with appropriate resources:   Identify barriers to discharge with patient and caregiver   Arrange for needed discharge resources and transportation as appropriate   Identify discharge learning needs (meds, wound care, etc)   Refer to discharge planning if patient needs post-hospital services based on physician order or complex needs related to functional status, cognitive ability or social support system     Problem: Pain  Goal: Verbalizes/displays adequate comfort level or baseline comfort level  Outcome: Progressing  Flowsheets (Taken 11/15/2023 0018)  Verbalizes/displays adequate comfort level or baseline comfort level: Encourage patient to monitor pain and request assistance     Problem: Safety - Adult  Goal: Free from fall injury  Outcome: Progressing  Flowsheets  Taken 11/15/2023 0018  Free From Fall Injury: Based on caregiver fall risk screen, instruct family/caregiver to ask for assistance with transferring infant if caregiver noted to have fall risk factors  Taken 11/14/2023 2221  Free From Fall Injury: Based on caregiver fall risk screen, instruct family/caregiver to ask for assistance with transferring infant if caregiver noted to have fall risk factors     Problem: ABCDS Injury Assessment  Goal: Absence of physical injury  Outcome: Progressing  Flowsheets  Taken 11/15/2023 0018  Absence of Physical Injury: Implement safety measures based on patient assessment  Taken 11/14/2023 2221  Absence of Physical Injury: Implement safety measures based on patient assessment     Problem: Respiratory - Adult  Goal: Achieves optimal ventilation and oxygenation  Outcome: Progressing  Flowsheets (Taken 11/15/2023 0018)  Achieves optimal ventilation and oxygenation:   Assess for changes in respiratory status   Assess for changes
Problem: Discharge Planning  Goal: Discharge to home or other facility with appropriate resources  Outcome: Progressing  Flowsheets (Taken 11/16/2023 0943)  Discharge to home or other facility with appropriate resources:   Identify barriers to discharge with patient and caregiver   Arrange for needed discharge resources and transportation as appropriate   Identify discharge learning needs (meds, wound care, etc)   Arrange for interpreters to assist at discharge as needed   Refer to discharge planning if patient needs post-hospital services based on physician order or complex needs related to functional status, cognitive ability or social support system     Problem: Pain  Goal: Verbalizes/displays adequate comfort level or baseline comfort level  Outcome: Progressing  Flowsheets (Taken 11/16/2023 0943)  Verbalizes/displays adequate comfort level or baseline comfort level:   Encourage patient to monitor pain and request assistance   Assess pain using appropriate pain scale   Administer analgesics based on type and severity of pain and evaluate response   Implement non-pharmacological measures as appropriate and evaluate response   Consider cultural and social influences on pain and pain management   Notify Licensed Independent Practitioner if interventions unsuccessful or patient reports new pain     Problem: Safety - Adult  Goal: Free from fall injury  Outcome: Progressing  Flowsheets  Taken 11/16/2023 0943 by Roman Johnson RN  Free From Fall Injury:   Instruct family/caregiver on patient safety   Based on caregiver fall risk screen, instruct family/caregiver to ask for assistance with transferring infant if caregiver noted to have fall risk factors  Taken 11/15/2023 2242 by Asher Fletcher RN  Free From Fall Injury: Based on caregiver fall risk screen, instruct family/caregiver to ask for assistance with transferring infant if caregiver noted to have fall risk factors     Problem: ABCDS Injury
Problem: Respiratory - Adult  Goal: Achieves optimal ventilation and oxygenation  11/13/2023 1312 by Stevie Lombardi RCP  Note: Patient receiving breathing treatments to maintain and manage respiratory status. Will be continued as ordered to improve aeration.
RN notified me that patient's BP was hypotensive at 76/38, repeat was 76/60. Patient was placed in Trendelenburg and repeat blood pressure was 80/40 which was a manual check. Patient was seen and evaluated at this time and patient was able to awaken to command and answered my questions. He was denied any chest pain, shortness of breath, nausea, vomiting, dizziness, lightheadedness. Patient stated that he wanted to rest.  500 cc IV bolus normal saline was ordered to be administered over 1 hour.     Electronically signed by Oz Pang DO on 11/12/2023 at 4:51 PM
Discharge     Problem: Chronic Conditions and Co-morbidities  Goal: Patient's chronic conditions and co-morbidity symptoms are monitored and maintained or improved  Outcome: Adequate for Discharge     Problem: Confusion  Goal: Confusion, delirium, dementia, or psychosis is improved or at baseline  Description: INTERVENTIONS:  1. Assess for possible contributors to thought disturbance, including medications, impaired vision or hearing, underlying metabolic abnormalities, dehydration, psychiatric diagnoses, and notify attending LIP  2. [place] high risk fall precautions, as indicated  3. Provide frequent short contacts to provide reality reorientation, refocusing and direction  4. Decrease environmental stimuli, including noise as appropriate  5. Monitor and intervene to maintain adequate nutrition, hydration, elimination, sleep and activity  6. If unable to ensure safety without constant attention obtain sitter and review sitter guidelines with assigned personnel  7. Initiate Psychosocial CNS and Spiritual Care consult, as indicated  Outcome: Adequate for Discharge     Problem: Behavior  Goal: Pt/Family maintain appropriate behavior and adhere to behavioral management agreement, if implemented  Description: INTERVENTIONS:  1. Assess patient/family's coping skills and  non-compliant behavior (including use of illegal substances)  2. Notify security of behavior or suspected illegal substances which indicate the need for search of the family and/or belongings  3. Encourage verbalization of thoughts and concerns in a socially appropriate manner  4. Utilize positive, consistent limit setting strategies supporting safety of patient, staff and others  5. Encourage participation in the decision making process about the behavioral management agreement  6. If a visitor's behavior poses a threat to safety call refer to organization policy.   7. Initiate consult with , Psychosocial CNS, Spiritual Care as
John Montgomery RN  Outcome: Completed  11/21/2023 0141 by Lupis Proctor RN  Outcome: Progressing     Problem: Skin/Tissue Integrity  Goal: Absence of new skin breakdown  Description: 1. Monitor for areas of redness and/or skin breakdown  2. Assess vascular access sites hourly  3. Every 4-6 hours minimum:  Change oxygen saturation probe site  4. Every 4-6 hours:  If on nasal continuous positive airway pressure, respiratory therapy assess nares and determine need for appliance change or resting period.   11/21/2023 1523 by John Montgomery RN  Outcome: Completed  11/21/2023 0141 by Lupis Proctor RN  Outcome: Progressing

## 2023-11-29 ENCOUNTER — HOSPITAL ENCOUNTER (EMERGENCY)
Age: 83
Discharge: HOME | End: 2023-11-29
Payer: MEDICARE

## 2023-11-29 VITALS
DIASTOLIC BLOOD PRESSURE: 65 MMHG | SYSTOLIC BLOOD PRESSURE: 139 MMHG | RESPIRATION RATE: 16 BRPM | OXYGEN SATURATION: 94 % | HEART RATE: 70 BPM | TEMPERATURE: 98 F | BODY MASS INDEX: 37.93 KG/M2 | WEIGHT: 235 LBS

## 2023-11-29 DIAGNOSIS — T83.9XXA PROBLEM WITH FOLEY CATHETER, INITIAL ENCOUNTER (HCC): Primary | ICD-10-CM

## 2023-11-29 PROCEDURE — 99282 EMERGENCY DEPT VISIT SF MDM: CPT

## 2023-11-29 ASSESSMENT — PAIN - FUNCTIONAL ASSESSMENT: PAIN_FUNCTIONAL_ASSESSMENT: NONE - DENIES PAIN

## 2023-11-29 NOTE — ED NOTES
New esqueda bag placed. Draining appropriately. Slight hematuria noted. Pt startes that when the bag was pulled off this morning it did tug on his penis. Pt denies any pain.       Renita Smith RN  11/29/23 [id number]

## 2023-11-29 NOTE — ED PROVIDER NOTES
[facility] EMERGENCY DEPT      EMERGENCY MEDICINE     Pt Name: Yared Recinos  MRN: [id number]  [address] [facility] 1940  Date of evaluation: 11/29/2023  Provider: Griselda Hint, PA    CHIEF COMPLAINT       Chief Complaint   Patient presents with    needs esqueda bag     HISTORY OF PRESENT ILLNESS   Yared Recinos is a pleasant 81 y.o. male who presents to the emergency department from from home, by private vehicle for evaluation of leaking from his Esqueda catheter. On inspection of the Esqueda catheter the patient has no bag attached. The catheter is in because he is having incontinence and they put it in when he was in the hospital and he is due to see the urologist in the next week or so for follow-up. He has no other issues today other than the catheter leaking. Diamond Hamman         PASTMEDICAL HISTORY     Past Medical History:   Diagnosis Date    Chronic kidney disease     Diabetes mellitus ([address])     Hyperlipidemia     Hypertension        Patient Active Problem List   Diagnosis Code    Anxiety disorder F41.9    Chronic kidney disease N18.9    Dementia ([address]) F03.90    Edema R60.9    Frequency of urination R35.0    Hypothyroidism E03.9    Hyperlipidemia E78.5    Pneumonia J18.9    Recurrent major depressive disorder ([address]) F33.9    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 ([address]) E11.9, Z79.4    Urinary incontinence R32    Vitamin D deficiency E55.9    Upper abdominal pain R10.10    Chest pain R07.9    Acute pulmonary edema (HCC) J81.0    Dyspnea R06.00    Elevated troponin R79.89    Lightheaded R42    Elevated brain natriuretic peptide (BNP) level R79.89    Chronic anemia D64.9    Hyponatremia E87.1    Essential hypertension I10    Morbid obesity (HCC) E66.01    NAVNEET (acute kidney injury) ([address]) N17.9    Fever R50.9    Wheezing R06.2    Hypotension I95.9    Angina of effort I20.89    Abnormal stress test R94.39     SURGICAL HISTORY       Past Surgical History:   Procedure Laterality Date    BLADDER SCAN  11/11/2023    BLADDER

## 2023-11-29 NOTE — ED TRIAGE NOTES
Pt to ED missing the esqueda catheter bag. Esqueda inserted into patient urethra remains in tact and draining onto patient.

## 2023-12-08 ENCOUNTER — TELEPHONE (OUTPATIENT)
Dept: NEPHROLOGY | Age: 83
End: 2023-12-08

## 2023-12-08 NOTE — TELEPHONE ENCOUNTER
Rodríguez Almazan stopped in with questions about her dad. She says her dad recently went to ER and was admitted and a esqueda cath was placed. She says they told her to FU with Dr. Gretchen Caldwell regarding removing it or leaving long term. I explained that his PCP or a urologist would need to address that. The ER notes were routed to Dr. Oz Matute.

## 2023-12-11 ENCOUNTER — HOSPITAL ENCOUNTER (EMERGENCY)
Age: 83
Discharge: HOME | End: 2023-12-11
Attending: STUDENT IN AN ORGANIZED HEALTH CARE EDUCATION/TRAINING PROGRAM
Payer: MEDICARE

## 2023-12-11 ENCOUNTER — OFFICE VISIT (OUTPATIENT)
Dept: UROLOGY | Age: 83
End: 2023-12-11
Payer: MEDICARE

## 2023-12-11 VITALS
HEART RATE: 86 BPM | RESPIRATION RATE: 16 BRPM | DIASTOLIC BLOOD PRESSURE: 59 MMHG | TEMPERATURE: 97.7 F | SYSTOLIC BLOOD PRESSURE: 132 MMHG | WEIGHT: 235 LBS | OXYGEN SATURATION: 93 % | BODY MASS INDEX: 35.61 KG/M2 | HEIGHT: 68 IN

## 2023-12-11 VITALS
DIASTOLIC BLOOD PRESSURE: 70 MMHG | SYSTOLIC BLOOD PRESSURE: 122 MMHG | HEIGHT: 68 IN | BODY MASS INDEX: 35.61 KG/M2 | WEIGHT: 235 LBS

## 2023-12-11 DIAGNOSIS — N39.41 URGE INCONTINENCE OF URINE: ICD-10-CM

## 2023-12-11 DIAGNOSIS — R33.9 URINARY RETENTION: Primary | ICD-10-CM

## 2023-12-11 DIAGNOSIS — R31.0 GROSS HEMATURIA: ICD-10-CM

## 2023-12-11 DIAGNOSIS — Z80.42 FAMILY HISTORY OF PROSTATE CANCER: ICD-10-CM

## 2023-12-11 DIAGNOSIS — Z97.8 FOLEY CATHETER IN PLACE: Primary | ICD-10-CM

## 2023-12-11 DIAGNOSIS — N30.00 ACUTE CYSTITIS WITHOUT HEMATURIA: ICD-10-CM

## 2023-12-11 DIAGNOSIS — N40.1 BENIGN PROSTATIC HYPERPLASIA WITH LOWER URINARY TRACT SYMPTOMS, SYMPTOM DETAILS UNSPECIFIED: ICD-10-CM

## 2023-12-11 PROBLEM — R79.89 ELEVATED TROPONIN: Status: RESOLVED | Noted: 2023-11-11 | Resolved: 2023-12-11

## 2023-12-11 PROCEDURE — G8484 FLU IMMUNIZE NO ADMIN: HCPCS | Performed by: NURSE PRACTITIONER

## 2023-12-11 PROCEDURE — 3074F SYST BP LT 130 MM HG: CPT | Performed by: NURSE PRACTITIONER

## 2023-12-11 PROCEDURE — G8417 CALC BMI ABV UP PARAM F/U: HCPCS | Performed by: NURSE PRACTITIONER

## 2023-12-11 PROCEDURE — 3078F DIAST BP <80 MM HG: CPT | Performed by: NURSE PRACTITIONER

## 2023-12-11 PROCEDURE — G8427 DOCREV CUR MEDS BY ELIG CLIN: HCPCS | Performed by: NURSE PRACTITIONER

## 2023-12-11 PROCEDURE — 99204 OFFICE O/P NEW MOD 45 MIN: CPT | Performed by: NURSE PRACTITIONER

## 2023-12-11 PROCEDURE — 1123F ACP DISCUSS/DSCN MKR DOCD: CPT | Performed by: NURSE PRACTITIONER

## 2023-12-11 PROCEDURE — 1111F DSCHRG MED/CURRENT MED MERGE: CPT | Performed by: NURSE PRACTITIONER

## 2023-12-11 PROCEDURE — 1036F TOBACCO NON-USER: CPT | Performed by: NURSE PRACTITIONER

## 2023-12-11 PROCEDURE — 99282 EMERGENCY DEPT VISIT SF MDM: CPT

## 2023-12-11 RX ORDER — TAMSULOSIN HYDROCHLORIDE 0.4 MG/1
0.4 CAPSULE ORAL DAILY
Qty: 30 CAPSULE | Refills: 0 | Status: ON HOLD | OUTPATIENT
Start: 2023-12-11

## 2023-12-11 ASSESSMENT — PAIN - FUNCTIONAL ASSESSMENT: PAIN_FUNCTIONAL_ASSESSMENT: NONE - DENIES PAIN

## 2023-12-11 NOTE — ED TRIAGE NOTES
Pt arrives to ED from home for Fung catheter removal. Pt states he sees Dr. Fox Martinez and was told by Dr. Fox Martinez that he no longer needs the catheter and it can be removed.  Pt states he is unsure where he was supposed to go for removal.

## 2023-12-11 NOTE — ED PROVIDER NOTES
[facility] Emergency Department    Pt Name: Yared Recinos  MRN: [id number]  [address] [facility] [place] 1940  Date of evaluation: 12/11/2023  Resident Physician: Laurita Boone MD  Attending Physician: Dada Diaz, [address] [place] [address]       Chief Complaint   Patient presents with    Fung Removal     HISTORY OF PRESENT ILLNESS   Yared Recinos is a 81 y.o. male  who presents to the emergency department for evaluation of Fung removal.  Patient had Fung placed by urology 4 days ago for urinary obstruction. Patient states that he is unsure when he supposed to take this out. Per chart review, he spoke with his nephrologist who said that this needs to be cleared by his urologist or PCP. The patient has no other acute complaints at this time.   PASTMEDICAL HISTORY     Past Medical History:   Diagnosis Date    Chronic kidney disease     Diabetes mellitus ([facility])     Hyperlipidemia     Hypertension        Patient Active Problem List   Diagnosis Code    Anxiety disorder F41.9    Chronic kidney disease N18.9    Dementia ([facility]) F03.90    Edema R60.9    Frequency of urination R35.0    Hypothyroidism E03.9    Hyperlipidemia E78.5    Pneumonia J18.9    Recurrent major depressive disorder ([facility]) F33.9    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 ([facility]) E11.9, Z79.4    Urinary incontinence R32    Vitamin D deficiency E55.9    Upper abdominal pain R10.10    Chest pain R07.9    Acute pulmonary edema (HCC) J81.0    Dyspnea R06.00    Elevated troponin R79.89    Lightheaded R42    Elevated brain natriuretic peptide (BNP) level R79.89    Chronic anemia D64.9    Hyponatremia E87.1    Essential hypertension I10    Morbid obesity (HCC) E66.01    NAVNEET (acute kidney injury) ([facility]) N17.9    Fever R50.9    Wheezing R06.2    Hypotension I95.9    Angina of effort I20.89    Abnormal stress test R94.39     SURGICAL HISTORY       Past Surgical History:   Procedure Laterality Date    BLADDER SCAN  11/11/2023    BLADDER SCAN  11/13/2023    CARDIAC PROCEDURE

## 2023-12-11 NOTE — PATIENT INSTRUCTIONS
Cystoscopy to evaluate hematuria, urinary retention, abnormal ct that could not rule out bladder mass.   Start flomax  When esqueda catheter removed will need to stop oxybutynin  Bacitracern or neosporin to meatus/esqueda cath tip insertion area

## 2023-12-11 NOTE — DISCHARGE INSTR - COC
Continuity of Care Form    Patient Name: Sherren Baar   :  1940  MRN:  [id number]    Admit date:  2023  Discharge date:  ***    Code Status Order: Prior   Advance Directives:     Admitting Physician:  No admitting provider for patient encounter. PCP: RELL Moreno CNP    Discharging Nurse: [facility] Unit/Room#: 15015A  Discharging Unit Phone Number: ***    Emergency Contact:   Extended Emergency Contact Information  Primary Emergency Contact: jose raygoza  Mobile Phone: [phone]  Relation: Child  Preferred language: English   needed? No    Past Surgical History:  Past Surgical History:   Procedure Laterality Date    BLADDER SCAN  2023    BLADDER SCAN  2023    CARDIAC PROCEDURE Left 2023    Left heart cath / coronary angiography performed by Matthieu Rodriguez MD at [facility] CATH LAB    [facility] BLADDER [address]  2023    SHOULDER ARTHROPLASTY Left        Immunization History: There is no immunization history on file for this patient.     Active Problems:  Patient Active Problem List   Diagnosis Code    Anxiety disorder F41.9    Chronic kidney disease N18.9    Dementia ([address]) F03.90    Edema R60.9    Frequency of urination R35.0    Hypothyroidism E03.9    Hyperlipidemia E78.5    Pneumonia J18.9    Recurrent major depressive disorder ([address]) F33.9    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 ([address]) E11.9, Z79.4    Urinary incontinence R32    Vitamin D deficiency E55.9    Upper abdominal pain R10.10    Chest pain R07.9    Acute pulmonary edema (HCC) J81.0    Dyspnea R06.00    Elevated troponin R79.89    Lightheaded R42    Elevated brain natriuretic peptide (BNP) level R79.89    Chronic anemia D64.9    Hyponatremia E87.1    Essential hypertension I10    Morbid obesity (HCC) E66.01    NAVNEET (acute kidney injury) ([address]) N17.9    Fever R50.9    Wheezing R06.2    Hypotension I95.9    Angina of effort I20.89    Abnormal stress test R94.39

## 2023-12-11 NOTE — PROGRESS NOTES
[address] [place] [address]  Dept: [phone]  Loc: [phone]       Patient:  Nilo Vargas  YOB: 1940  Date: 12/12/2023    HISTORY OF PRESENT ILLNESS:   The patient is a 81 y.o. male who presents today for evaluation of the following problem(s): ED follow up, urinary retention. 1. Urinary retention    2. Acute cystitis without hematuria    3. Gross hematuria    4. Urge incontinence of urine    5. Benign prostatic hyperplasia with lower urinary tract symptoms, symptom details unspecified       Manual is a 81year old male who is being seen in consult for urinary retention and ED follow up. Daughter present and helps answers questions. Pt speaks Castro Petrin and Greenlandic but daughter does translate some as pt has some memory issues per daughter. Esqueda was placed at [facility] 11-13-23 due to persistent urinary retention while inpatient for bronchitis, chest pain, SIRS from UTI?, ?ileus, NAVNEET on CKD lllb. Heart cath 11-20-23 with nonobstructive CAD and treated with medical management. Urine culture 11-14--23 enterococcus faecalis group D. Sensitive to penicillin, ampicillin, and nitrofurantoin  Urine culture 11-12-23 Growth contaminates    Urinary retention documented with chart review  Bladder scan 11-14-23 0ml. Bladder scan 333 ml 11-13-23  Bladder scan 250 ml 11-13-23  Bladder scan 255ml 11-11-23  Bladder scan 238ml 11-11-23    Nurse placed esqueda catheter 11-13-23 and had some difficulty but was able to place. Had traumatic tugging on esqueda few weeks ago per daughter. Overall the problem(s) : are improving. Associated Symptoms: No dysuria, +gross hematuria after esqueda insertion and after tugging episode on catheter, none since. Pain Severity:  no pain in abd or flank area    Penile discomfort mild. Irritated looking area noted at catheter insertion site. Will use neosporin/bacitracin.   No fevers,

## 2023-12-11 NOTE — ED NOTES
Daughter in room at this time and explained pt has an appt at urology at 11am. Pt and daughter understand. Voiced no needs. Call light in reach.      Laura Hays RN  12/11/23 2987

## 2023-12-12 ASSESSMENT — ENCOUNTER SYMPTOMS
SHORTNESS OF BREATH: 0
ABDOMINAL PAIN: 0
COUGH: 0

## 2023-12-13 ENCOUNTER — TELEPHONE (OUTPATIENT)
Dept: UROLOGY | Age: 83
End: 2023-12-13

## 2023-12-13 ENCOUNTER — OFFICE VISIT (OUTPATIENT)
Dept: CARDIOLOGY CLINIC | Age: 83
End: 2023-12-13
Payer: MEDICARE

## 2023-12-13 VITALS
SYSTOLIC BLOOD PRESSURE: 135 MMHG | DIASTOLIC BLOOD PRESSURE: 68 MMHG | BODY MASS INDEX: 35.73 KG/M2 | HEART RATE: 70 BPM | HEIGHT: 68 IN

## 2023-12-13 DIAGNOSIS — I10 ESSENTIAL HYPERTENSION: ICD-10-CM

## 2023-12-13 DIAGNOSIS — I25.10 CORONARY ARTERY DISEASE INVOLVING NATIVE CORONARY ARTERY OF NATIVE HEART WITHOUT ANGINA PECTORIS: Primary | ICD-10-CM

## 2023-12-13 PROCEDURE — G8427 DOCREV CUR MEDS BY ELIG CLIN: HCPCS | Performed by: STUDENT IN AN ORGANIZED HEALTH CARE EDUCATION/TRAINING PROGRAM

## 2023-12-13 PROCEDURE — 3075F SYST BP GE 130 - 139MM HG: CPT | Performed by: STUDENT IN AN ORGANIZED HEALTH CARE EDUCATION/TRAINING PROGRAM

## 2023-12-13 PROCEDURE — 1036F TOBACCO NON-USER: CPT | Performed by: STUDENT IN AN ORGANIZED HEALTH CARE EDUCATION/TRAINING PROGRAM

## 2023-12-13 PROCEDURE — 3078F DIAST BP <80 MM HG: CPT | Performed by: STUDENT IN AN ORGANIZED HEALTH CARE EDUCATION/TRAINING PROGRAM

## 2023-12-13 PROCEDURE — 1111F DSCHRG MED/CURRENT MED MERGE: CPT | Performed by: STUDENT IN AN ORGANIZED HEALTH CARE EDUCATION/TRAINING PROGRAM

## 2023-12-13 PROCEDURE — G8417 CALC BMI ABV UP PARAM F/U: HCPCS | Performed by: STUDENT IN AN ORGANIZED HEALTH CARE EDUCATION/TRAINING PROGRAM

## 2023-12-13 PROCEDURE — G8484 FLU IMMUNIZE NO ADMIN: HCPCS | Performed by: STUDENT IN AN ORGANIZED HEALTH CARE EDUCATION/TRAINING PROGRAM

## 2023-12-13 PROCEDURE — 1123F ACP DISCUSS/DSCN MKR DOCD: CPT | Performed by: STUDENT IN AN ORGANIZED HEALTH CARE EDUCATION/TRAINING PROGRAM

## 2023-12-13 PROCEDURE — 99214 OFFICE O/P EST MOD 30 MIN: CPT | Performed by: STUDENT IN AN ORGANIZED HEALTH CARE EDUCATION/TRAINING PROGRAM

## 2023-12-13 NOTE — PROGRESS NOTES
Pt here for a hospital f/u    EKG done 11-10-23    Pt denies all symptoms
metFORMIN (GLUCOPHAGE) 1000 MG tablet Take 1 tablet by mouth 2 times daily      levothyroxine (SYNTHROID) 150 MCG tablet Take 1 tablet by mouth daily      citalopram (CELEXA) 40 MG tablet Take 1 tablet by mouth daily      donepezil (ARICEPT) 10 MG tablet Take 1 tablet by mouth nightly      glipiZIDE (GLUCOTROL XL) 10 MG extended release tablet Take 1 tablet by mouth daily      SITagliptin (JANUVIA) 25 MG tablet Take 1 tablet by mouth daily      insulin glargine (LANTUS) 100 UNIT/ML injection vial Inject 30 Units into the skin nightly      ergocalciferol (ERGOCALCIFEROL) 1.25 MG (68835 UT) capsule Take 1 capsule by mouth once a week       No current facility-administered medications for this visit. Social History     Socioeconomic History    Marital status:    Tobacco Use    Smoking status: Former     Types: Cigarettes    Smokeless tobacco: Never   Vaping Use    Vaping Use: Never used   Substance and Sexual Activity    Alcohol use: Not Currently    Drug use: Not Currently     Social Determinants of Health      Transportation Problems [facility])   Housing Stability: Low Risk  (11/11/2023)    Housing Stability Vital Sign     Unable to Pay for Housing in the Last Year: No     Number of Places Lived in the Last Year: 1     Unstable Housing in the Last Year: No       Family History   Problem Relation Age of Onset    Hypertension Mother     Kidney Disease Father     Kidney Cancer Sister     Prostate Cancer Brother        Blood pressure 135/68, pulse 70, height 1.727 m (5' 8\"). General:   Well developed, well nourished  Lungs:    Clear to auscultation, no rales  Heart:    RRR, Normal S1 S2, No murmur, rubs, or gallops  Abdomen:   Soft, non tender, no organomegalies, positive bowel sounds  Extremities:   trace edema, no cyanosis, good peripheral pulses  Neurological:   Awake, alert, oriented. No obvious focal deficits  Musculoskeletal:  No obvious deformities    EKG:          Diagnosis Orders   1.  Coronary

## 2023-12-15 PROBLEM — E87.5 HYPERKALEMIA: Status: ACTIVE | Noted: 2023-12-15

## 2023-12-15 PROBLEM — R33.8: Status: ACTIVE | Noted: 2023-12-15

## 2023-12-15 PROBLEM — T83.511A URINARY TRACT INFECTION ASSOCIATED WITH INDWELLING URETHRAL CATHETER (HCC): Status: ACTIVE | Noted: 2023-12-15

## 2023-12-15 PROBLEM — N39.0 COMPLICATED UTI (URINARY TRACT INFECTION): Status: ACTIVE | Noted: 2023-12-15

## 2023-12-15 PROBLEM — T83.198A: Status: ACTIVE | Noted: 2023-12-15

## 2023-12-19 PROBLEM — N39.0 URINARY TRACT INFECTION: Status: ACTIVE | Noted: 2023-12-19

## 2023-12-24 ENCOUNTER — HOSPITAL ENCOUNTER (EMERGENCY)
Age: 83
Discharge: LEFT BEFORE DISCHARGE | End: 2023-12-24
Attending: EMERGENCY MEDICINE
Payer: MEDICARE

## 2023-12-24 VITALS
SYSTOLIC BLOOD PRESSURE: 140 MMHG | TEMPERATURE: 97.6 F | OXYGEN SATURATION: 96 % | HEIGHT: 68 IN | BODY MASS INDEX: 37.44 KG/M2 | DIASTOLIC BLOOD PRESSURE: 83 MMHG | WEIGHT: 247 LBS | RESPIRATION RATE: 23 BRPM | HEART RATE: 97 BPM

## 2023-12-24 DIAGNOSIS — R50.9 FEVER, UNSPECIFIED FEVER CAUSE: Primary | ICD-10-CM

## 2023-12-24 LAB
ALBUMIN SERPL BCG-MCNC: 3 G/DL (ref 3.5–5.1)
ALP SERPL-CCNC: 137 U/L (ref 38–126)
ALT SERPL W/O P-5'-P-CCNC: 14 U/L (ref 11–66)
ANION GAP SERPL CALC-SCNC: 13 MEQ/L (ref 8–16)
AST SERPL-CCNC: 12 U/L (ref 5–40)
BASOPHILS ABSOLUTE: 0 THOU/MM3 (ref 0–0.1)
BASOPHILS NFR BLD AUTO: 0.3 %
BILIRUB SERPL-MCNC: 0.2 MG/DL (ref 0.3–1.2)
BUN SERPL-MCNC: 15 MG/DL (ref 7–22)
CALCIUM SERPL-MCNC: 8.9 MG/DL (ref 8.5–10.5)
CHLORIDE SERPL-SCNC: 95 MEQ/L (ref 98–111)
CO2 SERPL-SCNC: 25 MEQ/L (ref 23–33)
CREAT SERPL-MCNC: 1.1 MG/DL (ref 0.4–1.2)
DEPRECATED RDW RBC AUTO: 47.1 FL (ref 35–45)
EKG Q-T INTERVAL: 370 MS
EKG QRS DURATION: 118 MS
EKG QTC CALCULATION (BAZETT): 493 MS
EKG R AXIS: 110 DEGREES
EKG T AXIS: 26 DEGREES
EKG VENTRICULAR RATE: 107 BPM
EOSINOPHIL NFR BLD AUTO: 1 %
EOSINOPHILS ABSOLUTE: 0.1 THOU/MM3 (ref 0–0.4)
ERYTHROCYTE [DISTWIDTH] IN BLOOD BY AUTOMATED COUNT: 13.8 % (ref 11.5–14.5)
GFR SERPL CREATININE-BSD FRML MDRD: > 60 ML/MIN/1.73M2
GLUCOSE SERPL-MCNC: 367 MG/DL (ref 70–108)
HCT VFR BLD AUTO: 36.4 % (ref 42–52)
HGB BLD-MCNC: 11.3 GM/DL (ref 14–18)
IMM GRANULOCYTES # BLD AUTO: 0.09 THOU/MM3 (ref 0–0.07)
IMM GRANULOCYTES NFR BLD AUTO: 0.8 %
LACTIC ACID, SEPSIS: 1.9 MMOL/L (ref 0.5–1.9)
LYMPHOCYTES ABSOLUTE: 1.8 THOU/MM3 (ref 1–4.8)
LYMPHOCYTES NFR BLD AUTO: 15.8 %
MCH RBC QN AUTO: 28.8 PG (ref 26–33)
MCHC RBC AUTO-ENTMCNC: 31 GM/DL (ref 32.2–35.5)
MCV RBC AUTO: 92.6 FL (ref 80–94)
MONOCYTES ABSOLUTE: 1 THOU/MM3 (ref 0.4–1.3)
MONOCYTES NFR BLD AUTO: 8.4 %
NEUTROPHILS NFR BLD AUTO: 73.7 %
NRBC BLD AUTO-RTO: 0 /100 WBC
OSMOLALITY SERPL CALC.SUM OF ELEC: 282.1 MOSMOL/KG (ref 275–300)
PLATELET # BLD AUTO: 390 THOU/MM3 (ref 130–400)
PMV BLD AUTO: 10.8 FL (ref 9.4–12.4)
POTASSIUM SERPL-SCNC: 4.9 MEQ/L (ref 3.5–5.2)
PROCALCITONIN SERPL IA-MCNC: 0.32 NG/ML (ref 0.01–0.09)
PROT SERPL-MCNC: 7.2 G/DL (ref 6.1–8)
RBC # BLD AUTO: 3.93 MILL/MM3 (ref 4.7–6.1)
SEGMENTED NEUTROPHILS ABSOLUTE COUNT: 8.5 THOU/MM3 (ref 1.8–7.7)
SODIUM SERPL-SCNC: 133 MEQ/L (ref 135–145)
TROPONIN, HIGH SENSITIVITY: 35 NG/L (ref 0–12)
WBC # BLD AUTO: 11.6 THOU/MM3 (ref 4.8–10.8)

## 2023-12-24 PROCEDURE — 85025 COMPLETE CBC W/AUTO DIFF WBC: CPT

## 2023-12-24 PROCEDURE — 2580000003 HC RX 258: Performed by: EMERGENCY MEDICINE

## 2023-12-24 PROCEDURE — 84145 PROCALCITONIN (PCT): CPT

## 2023-12-24 PROCEDURE — 83605 ASSAY OF LACTIC ACID: CPT

## 2023-12-24 PROCEDURE — 36415 COLL VENOUS BLD VENIPUNCTURE: CPT

## 2023-12-24 PROCEDURE — 84484 ASSAY OF TROPONIN QUANT: CPT

## 2023-12-24 PROCEDURE — 93005 ELECTROCARDIOGRAM TRACING: CPT | Performed by: EMERGENCY MEDICINE

## 2023-12-24 PROCEDURE — 99284 EMERGENCY DEPT VISIT MOD MDM: CPT

## 2023-12-24 PROCEDURE — 80053 COMPREHEN METABOLIC PANEL: CPT

## 2023-12-24 PROCEDURE — 93010 ELECTROCARDIOGRAM REPORT: CPT | Performed by: NUCLEAR MEDICINE

## 2023-12-24 RX ORDER — 0.9 % SODIUM CHLORIDE 0.9 %
1000 INTRAVENOUS SOLUTION INTRAVENOUS ONCE
Status: COMPLETED | OUTPATIENT
Start: 2023-12-24 | End: 2023-12-24

## 2023-12-24 RX ADMIN — SODIUM CHLORIDE 1000 ML: 9 INJECTION, SOLUTION INTRAVENOUS at 02:08

## 2023-12-24 NOTE — ED NOTES
Pt to ED c/o UTI. Pt daughter states that they were seen at [facility] 2 hours ago and were sent here to be seen. Pt daughter states that they dutch blood cultures, urine culture, blood samples, and administered rocephin. Pt daughter reports pt was febrile and shaky earlier. Pt shows no signs of distress. Pt afebrile. Pt denies any pain. Monitor applied. EKG complete. IV inserted and bloodwork obtained and sent.

## 2023-12-24 NOTE — ED PROVIDER NOTES
[facility] EMERGENCY DEPT      EMERGENCY MEDICINE     Pt Name: Alexis Libman  MRN: [id number]  [address] [facility] 1940  Date of evaluation: 12/24/2023  Provider: Dain Nair MD    CHIEF COMPLAINT       Chief Complaint   Patient presents with    Frequent/Recurrent UTI     HISTORY OF PRESENT ILLNESS   Alexis Libman is a pleasant 81 y.o. male who presents to the emergency department from from home, by private vehicle for evaluation of fever and rigors. Daughter states that they were at a [employer] party in [place] approximately 2 hours away. He began shaking and having rigors. He went to an ER over there in which they told him that he had a urinary tract infection. Patient has a history of indwelling Fung and recurrent urinary tract infections. He is currently on Keflex, day 3, for urinary tract infection. At the outside hospital he was given a dose of Rocephin and discharged home. His vital signs at that time were within normal limits and his lab work was unremarkable. Daughter was concerned because his urologist is here and she wanted us to evaluate again. Patient himself denies any complaints. He denies any nausea, chest pain, shortness of breath, abdominal pain, or flank pain.     PASTMEDICAL HISTORY     Past Medical History:   Diagnosis Date    Chronic kidney disease     Diabetes mellitus ([facility])     Hyperlipidemia     Hypertension        Patient Active Problem List   Diagnosis Code    Anxiety disorder F41.9    Chronic kidney disease N18.9    Dementia ([facility]) F03.90    Edema R60.9    Frequency of urination R35.0    Hypothyroidism E03.9    Hyperlipidemia E78.5    Pneumonia J18.9    Recurrent major depressive disorder ([facility]) F33.9    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 ([address] [place] [address]) E11.9, Z79.4    Urinary incontinence R32    Vitamin D deficiency E55.9    Upper abdominal pain R10.10    Chest pain R07.9    Acute pulmonary edema (HCC) J81.0    Dyspnea R06.00    Lightheaded R42    Elevated brain natriuretic

## 2023-12-24 NOTE — ED NOTES
Pt family refusing covid swab at this time. Pt family refusing catheter removal and replacement. Pt family stating she would like the catheter removed indefinitely. Provider informed. Pt family stating she wants urology called in at this time. Provider informed. Pt shows no signs of distress. Pt medicated per MAR.

## 2023-12-31 ENCOUNTER — HOSPITAL ENCOUNTER (EMERGENCY)
Age: 83
Discharge: HOME | End: 2024-01-01
Attending: EMERGENCY MEDICINE
Payer: MEDICARE

## 2023-12-31 DIAGNOSIS — R34 URINE OUTPUT LOW: Primary | ICD-10-CM

## 2023-12-31 PROCEDURE — 80048 BASIC METABOLIC PNL TOTAL CA: CPT

## 2023-12-31 PROCEDURE — 96361 HYDRATE IV INFUSION ADD-ON: CPT

## 2023-12-31 PROCEDURE — 85025 COMPLETE CBC W/AUTO DIFF WBC: CPT

## 2023-12-31 PROCEDURE — 51702 INSERT TEMP BLADDER CATH: CPT

## 2023-12-31 PROCEDURE — 96360 HYDRATION IV INFUSION INIT: CPT

## 2023-12-31 PROCEDURE — 99284 EMERGENCY DEPT VISIT MOD MDM: CPT

## 2023-12-31 PROCEDURE — 36415 COLL VENOUS BLD VENIPUNCTURE: CPT

## 2023-12-31 ASSESSMENT — PAIN - FUNCTIONAL ASSESSMENT: PAIN_FUNCTIONAL_ASSESSMENT: NONE - DENIES PAIN

## 2024-01-01 VITALS
HEART RATE: 80 BPM | SYSTOLIC BLOOD PRESSURE: 122 MMHG | BODY MASS INDEX: 39.53 KG/M2 | DIASTOLIC BLOOD PRESSURE: 62 MMHG | WEIGHT: 260 LBS | TEMPERATURE: 98.7 F | RESPIRATION RATE: 16 BRPM | OXYGEN SATURATION: 95 %

## 2024-01-01 LAB
ANION GAP SERPL CALC-SCNC: 10 MEQ/L (ref 8–16)
BACTERIA URNS QL MICRO: ABNORMAL /HPF
BASOPHILS ABSOLUTE: 0 THOU/MM3 (ref 0–0.1)
BASOPHILS NFR BLD AUTO: 0.4 %
BILIRUB UR QL STRIP.AUTO: ABNORMAL
BUN SERPL-MCNC: 16 MG/DL (ref 7–22)
CALCIUM SERPL-MCNC: 8.5 MG/DL (ref 8.5–10.5)
CASTS #/AREA URNS LPF: ABNORMAL /LPF
CHARACTER UR: ABNORMAL
CHLORIDE SERPL-SCNC: 95 MEQ/L (ref 98–111)
CO2 SERPL-SCNC: 26 MEQ/L (ref 23–33)
COLOR: ABNORMAL
CREAT SERPL-MCNC: 1.1 MG/DL (ref 0.4–1.2)
CRYSTALS URNS MICRO: ABNORMAL
DEPRECATED RDW RBC AUTO: 47.8 FL (ref 35–45)
EOSINOPHIL NFR BLD AUTO: 2.6 %
EOSINOPHILS ABSOLUTE: 0.3 THOU/MM3 (ref 0–0.4)
EPITHELIAL CELLS, UA: ABNORMAL /HPF
ERYTHROCYTE [DISTWIDTH] IN BLOOD BY AUTOMATED COUNT: 14 % (ref 11.5–14.5)
GFR SERPL CREATININE-BSD FRML MDRD: > 60 ML/MIN/1.73M2
GLUCOSE SERPL-MCNC: 257 MG/DL (ref 70–108)
GLUCOSE UR QL STRIP.AUTO: NEGATIVE MG/DL
HCT VFR BLD AUTO: 35.6 % (ref 42–52)
HGB BLD-MCNC: 10.9 GM/DL (ref 14–18)
HGB UR QL STRIP.AUTO: ABNORMAL
ICTOTEST: NEGATIVE
IMM GRANULOCYTES # BLD AUTO: 0.06 THOU/MM3 (ref 0–0.07)
IMM GRANULOCYTES NFR BLD AUTO: 0.5 %
KETONES UR QL STRIP.AUTO: ABNORMAL
LYMPHOCYTES ABSOLUTE: 2.3 THOU/MM3 (ref 1–4.8)
LYMPHOCYTES NFR BLD AUTO: 20.4 %
MCH RBC QN AUTO: 28.7 PG (ref 26–33)
MCHC RBC AUTO-ENTMCNC: 30.6 GM/DL (ref 32.2–35.5)
MCV RBC AUTO: 93.7 FL (ref 80–94)
MONOCYTES ABSOLUTE: 0.7 THOU/MM3 (ref 0.4–1.3)
MONOCYTES NFR BLD AUTO: 6.5 %
NEUTROPHILS NFR BLD AUTO: 69.6 %
NITRITE UR QL STRIP: NEGATIVE
NRBC BLD AUTO-RTO: 0 /100 WBC
OSMOLALITY SERPL CALC.SUM OF ELEC: 272.7 MOSMOL/KG (ref 275–300)
PH UR STRIP.AUTO: 6 [PH] (ref 5–9)
PLATELET # BLD AUTO: 338 THOU/MM3 (ref 130–400)
PMV BLD AUTO: 10.8 FL (ref 9.4–12.4)
POTASSIUM SERPL-SCNC: 4.9 MEQ/L (ref 3.5–5.2)
PROT UR STRIP.AUTO-MCNC: 300 MG/DL
RBC # BLD AUTO: 3.8 MILL/MM3 (ref 4.7–6.1)
RBC URINE: > 200 /HPF
RENAL EPI CELLS #/AREA URNS HPF: ABNORMAL /[HPF]
SEGMENTED NEUTROPHILS ABSOLUTE COUNT: 7.8 THOU/MM3 (ref 1.8–7.7)
SODIUM SERPL-SCNC: 131 MEQ/L (ref 135–145)
SP GR UR REFRACT.AUTO: 1.02 (ref 1–1.03)
UROBILINOGEN, URINE: 0.2 EU/DL (ref 0–1)
WBC # BLD AUTO: 11.2 THOU/MM3 (ref 4.8–10.8)
WBC #/AREA URNS HPF: > 200 /HPF
WBC #/AREA URNS HPF: ABNORMAL /[HPF]
YEAST LIKE FUNGI URNS QL MICRO: ABNORMAL

## 2024-01-01 PROCEDURE — 2580000003 HC RX 258: Performed by: EMERGENCY MEDICINE

## 2024-01-01 PROCEDURE — 87077 CULTURE AEROBIC IDENTIFY: CPT

## 2024-01-01 PROCEDURE — 87086 URINE CULTURE/COLONY COUNT: CPT

## 2024-01-01 PROCEDURE — 81001 URINALYSIS AUTO W/SCOPE: CPT

## 2024-01-01 PROCEDURE — 87186 SC STD MICRODIL/AGAR DIL: CPT

## 2024-01-01 RX ORDER — SODIUM CHLORIDE, SODIUM LACTATE, POTASSIUM CHLORIDE, AND CALCIUM CHLORIDE .6; .31; .03; .02 G/100ML; G/100ML; G/100ML; G/100ML
1000 INJECTION, SOLUTION INTRAVENOUS ONCE
Status: COMPLETED | OUTPATIENT
Start: 2024-01-01 | End: 2024-01-01

## 2024-01-01 RX ADMIN — SODIUM CHLORIDE, POTASSIUM CHLORIDE, SODIUM LACTATE AND CALCIUM CHLORIDE 1000 ML: 600; 310; 30; 20 INJECTION, SOLUTION INTRAVENOUS at 01:29

## 2024-01-01 ASSESSMENT — PAIN - FUNCTIONAL ASSESSMENT: PAIN_FUNCTIONAL_ASSESSMENT: NONE - DENIES PAIN

## 2024-01-01 NOTE — ED PROVIDER NOTES
[facility] EMERGENCY DEPT  EMERGENCY DEPARTMENT ENCOUNTER          Pt Name: Leo Nevarez  MRN: [id number]  Birthdate 1940  Date of evaluation: 12/31/2023  Physician: Nicholas Campbell MD  Supervising Attending Physician: Reza Dobbs MD       CHIEF COMPLAINT       Chief Complaint   Patient presents with    Urinary Catheter         HISTORY OF PRESENT ILLNESS    HPI  Leo Nevarez is a 83 y.o. male who presents to the ED with his daughter for evaluation of decreased urine output and urinary catheter.  It sounds like the patient has been having to use a urinary catheter related to prostate issues, bladder spasms.  Patient was recently admitted to the [facility], per daughter, where he received IV antibiotics for 5 days for his UTI.  Review of chart reveals that this was ESBL E. coli.  Patient was just discharged 2 days before presentation.  Around 6 PM on 12/31/2023, the patient went to empty his Fung catheter bag when he noticed decreased output altogether.  Patient informed daughter who wanted to come to the emergency department as she lowers being told that if he has decreased urine output he should present to emergency department.  Daughter describes that he previously had decreased urine output where a blockage ended up resulting in hydronephrosis.  Here in the ED today, the patient has had urinary catheter exchanged.  No fever or chills.    REVIEW OF SYSTEMS   Review of Systems  As above in HPI.    PAST MEDICAL AND SURGICAL HISTORY     Past Medical History:   Diagnosis Date    Chronic kidney disease     Diabetes mellitus (HCC)     Hyperlipidemia     Hypertension      Past Surgical History:   Procedure Laterality Date    BLADDER SCAN  11/11/2023    BLADDER SCAN  11/13/2023    CARDIAC PROCEDURE Left 11/20/2023    Left heart cath / coronary angiography performed by Javier Brothers MD at [facility] CARDIAC CATH LAB     BLADDER SCAN  11/11/2023    SHOULDER ARTHROPLASTY Left

## 2024-01-01 NOTE — ED PROVIDER NOTES
[facility]  EMERGENCY MEDICINE ATTENDING ATTESTATION      Evaluation of Leo Nevarez.   Case discussed and care plan developed with resident physician.   I agree with the resident physician documentation and plan as documented by him, except if my documentation differs.   Patient seen, interviewed and examined by me.   I reviewed the medical, surgical, family and social history, medications and allergies.   I have reviewed and interpreted all available lab, radiology and ekg results available at the moment.  I have reviewed the nursing documentation.       Brief H&P   Patient brought in by daughter for a legal year today.  Daughter states that since yesterday not as less urine going in the Fung back and she has not seen much urine draining today.    Physical exam is notable for well appearing, nonfocal exam.  Fung catheter replaced, minimal urine volume in the bladder.      Medical Decision Making   MDM:   Possible oliguria  Recently treated UTI that required admission to [facility]  Plan:   IV line, labs  IV fluids  Replace Fung  Observation in the ED while awaiting results    Please see the resident physician completed note for final disposition except as documented on this attestation.   I have reviewed and interpreted all available lab, radiology and ekg results available at the moment.  Diagnosis, treatment and disposition plans were discussed and agreed upon by patient.   This transcription was electronically signed. It was dictated by use of voice recognition software and electronically transcribed. The transcription may contain errors not detected in proofreading.     I performed direct supervision and was present for the critical portion following procedures: None  Critical care time on this case: None    Electronically signed by Reza Dobbs MD on 1/1/24 at 12:29 AM Reza Leroy MD  01/01/24 0139

## 2024-01-01 NOTE — DISCHARGE INSTR - COC
List:[id number]}    Routes of Feeding: {CHP DME Other Feedings:789145529}  Liquids: {Slp liquid thickness:43803}  Daily Fluid Restriction: {CHP DME Yes amt example:852187273}  Last Modified Barium Swallow with Video (Video Swallowing Test): {Done Not Done Date:}    Treatments at the Time of Hospital Discharge:   Respiratory Treatments: ***  Oxygen Therapy:  {Therapy; copd oxygen:93534}  Ventilator:    { CC Vent List:686392764}    Rehab Therapies: {THERAPEUTIC INTERVENTION:6081175426}  Weight Bearing Status/Restrictions: { CC Weight Bearin}  Other Medical Equipment (for information only, NOT a DME order):  {EQUIPMENT:296504505}  Other Treatments: ***    Patient's personal belongings (please select all that are sent with patient):  {CHP DME Belongings:801378797}    RN SIGNATURE:  {Esignature:291928560}    CASE MANAGEMENT/SOCIAL WORK SECTION    Inpatient Status Date: ***    Readmission Risk Assessment Score:  Readmission Risk              Risk of Unplanned Readmission:  0           Discharging to Facility/ Agency   Name:   Address:  Phone:  Fax:    Dialysis Facility (if applicable)   Name:  Address:  Dialysis Schedule:  Phone:  Fax:    / signature: {Esignature:557782886}    PHYSICIAN SECTION    Prognosis: {Prognosis:9010932749}    Condition at Discharge: { Patient Condition:861860035}    Rehab Potential (if transferring to Rehab): {Prognosis:2290358212}    Recommended Labs or Other Treatments After Discharge: ***    Physician Certification: I certify the above information and transfer of Leo Nevarez  is necessary for the continuing treatment of the diagnosis listed and that he requires {Admit to Appropriate Level of Care:32870} for {GREATER/LESS:878525930} 30 days.     Update Admission H&P: {CHP DME Changes in HandP:802084543}    PHYSICIAN SIGNATURE:  {Esignature:563759528}

## 2024-01-01 NOTE — ED TRIAGE NOTES
Patient to the ED through the lobby with chief complaint of urinary catheter pain. Per daughter at bedside, patient began complaining of pain around his penis/bladder at 1800 tonight. Daughter at bedside reports patient was discharged from [facility] two days ago for a UTI. Patient denies pain at the movement, but states it comes and goes. Vital signs stable.

## 2024-01-01 NOTE — DISCHARGE INSTRUCTIONS
Continue to stay hydrated at home.    Follow up with your primary care provider or urology as needed.

## 2024-01-03 LAB
BACTERIA UR CULT: ABNORMAL
ORGANISM: ABNORMAL

## 2024-01-04 ENCOUNTER — TELEPHONE (OUTPATIENT)
Dept: PHARMACY | Age: 84
End: 2024-01-04

## 2024-01-04 ENCOUNTER — HOSPITAL ENCOUNTER (INPATIENT)
Age: 84
LOS: 7 days | Discharge: HOME HEALTH | DRG: 699 | End: 2024-01-12
Attending: EMERGENCY MEDICINE | Admitting: STUDENT IN AN ORGANIZED HEALTH CARE EDUCATION/TRAINING PROGRAM
Payer: MEDICARE

## 2024-01-04 DIAGNOSIS — N45.3 EPIDIDYMOORCHITIS: Primary | ICD-10-CM

## 2024-01-04 DIAGNOSIS — N18.9 ACUTE KIDNEY INJURY SUPERIMPOSED ON CKD (HCC): ICD-10-CM

## 2024-01-04 DIAGNOSIS — N17.9 ACUTE KIDNEY INJURY SUPERIMPOSED ON CKD (HCC): ICD-10-CM

## 2024-01-04 PROCEDURE — 96374 THER/PROPH/DIAG INJ IV PUSH: CPT

## 2024-01-04 PROCEDURE — 99285 EMERGENCY DEPT VISIT HI MDM: CPT

## 2024-01-04 RX ORDER — CIPROFLOXACIN 500 MG/1
500 TABLET, FILM COATED ORAL 2 TIMES DAILY
Qty: 14 TABLET | Refills: 0 | Status: ON HOLD | OUTPATIENT
Start: 2024-01-04 | End: 2024-01-11

## 2024-01-04 NOTE — TELEPHONE ENCOUNTER
Pharmacy Note  ED Culture Follow-up    Leo Nevarez is a 83 y.o. male.     Allergies: Patient has no known allergies.     Labs:  Lab Results   Component Value Date    BUN 16 12/31/2023    CREATININE 1.1 12/31/2023    WBC 11.2 (H) 12/31/2023     Estimated Creatinine Clearance: 65 mL/min (based on SCr of 1.1 mg/dL).    Current antimicrobials:   none    ASSESSMENT:  Micro results:   Urine culture: positive for ESBL E. coli     PLAN:  Need for intervention: Yes  Discussed with: SHANIA Cutler  Chosen treatment:    Start ciprofloxacin 500 mg BID x 7 days    Patient response:   Called and spoke to medical decision maker.  She was made aware of the positive results and need for antibiotics.  She verbalized understanding at this time.    Called/sent in prescription to:  [facility]    Please call with any questions. Ext. [phone]    Tamanna Huynh RPH, PharmD 4:20 PM 1/4/2024

## 2024-01-05 ENCOUNTER — APPOINTMENT (OUTPATIENT)
Dept: ULTRASOUND IMAGING | Age: 84
DRG: 699 | End: 2024-01-05
Payer: MEDICARE

## 2024-01-05 ENCOUNTER — APPOINTMENT (OUTPATIENT)
Dept: CT IMAGING | Age: 84
DRG: 699 | End: 2024-01-05
Payer: MEDICARE

## 2024-01-05 ENCOUNTER — TELEPHONE (OUTPATIENT)
Dept: UROLOGY | Age: 84
End: 2024-01-05

## 2024-01-05 PROBLEM — N39.0 ACUTE UTI: Status: ACTIVE | Noted: 2024-01-05

## 2024-01-05 LAB
ALBUMIN SERPL BCG-MCNC: 2.9 G/DL (ref 3.5–5.1)
ALP SERPL-CCNC: 109 U/L (ref 38–126)
ALT SERPL W/O P-5'-P-CCNC: 11 U/L (ref 11–66)
AMORPH SED URNS QL MICRO: ABNORMAL
ANION GAP SERPL CALC-SCNC: 9 MEQ/L (ref 8–16)
AST SERPL-CCNC: 9 U/L (ref 5–40)
BACTERIA URNS QL MICRO: ABNORMAL /HPF
BASOPHILS ABSOLUTE: 0 THOU/MM3 (ref 0–0.1)
BASOPHILS NFR BLD AUTO: 0.5 %
BILIRUB CONJ SERPL-MCNC: < 0.2 MG/DL (ref 0–0.3)
BILIRUB SERPL-MCNC: 0.2 MG/DL (ref 0.3–1.2)
BILIRUB UR QL STRIP.AUTO: ABNORMAL
BUN SERPL-MCNC: 23 MG/DL (ref 7–22)
CALCIUM SERPL-MCNC: 8.4 MG/DL (ref 8.5–10.5)
CASTS #/AREA URNS LPF: ABNORMAL /LPF
CASTS 2: ABNORMAL /LPF
CHARACTER UR: ABNORMAL
CHLORIDE SERPL-SCNC: 97 MEQ/L (ref 98–111)
CO2 SERPL-SCNC: 27 MEQ/L (ref 23–33)
COLOR: ABNORMAL
CREAT SERPL-MCNC: 1.5 MG/DL (ref 0.4–1.2)
CRYSTALS URNS MICRO: ABNORMAL
DEPRECATED RDW RBC AUTO: 47.8 FL (ref 35–45)
EOSINOPHIL NFR BLD AUTO: 4.5 %
EOSINOPHILS ABSOLUTE: 0.3 THOU/MM3 (ref 0–0.4)
EPITHELIAL CELLS, UA: ABNORMAL /HPF
ERYTHROCYTE [DISTWIDTH] IN BLOOD BY AUTOMATED COUNT: 14.2 % (ref 11.5–14.5)
GFR SERPL CREATININE-BSD FRML MDRD: 46 ML/MIN/1.73M2
GLUCOSE BLD STRIP.AUTO-MCNC: 167 MG/DL (ref 70–108)
GLUCOSE BLD STRIP.AUTO-MCNC: 238 MG/DL (ref 70–108)
GLUCOSE BLD STRIP.AUTO-MCNC: 261 MG/DL (ref 70–108)
GLUCOSE BLD STRIP.AUTO-MCNC: 298 MG/DL (ref 70–108)
GLUCOSE SERPL-MCNC: 359 MG/DL (ref 70–108)
GLUCOSE UR QL STRIP.AUTO: 500 MG/DL
HCT VFR BLD AUTO: 34.6 % (ref 42–52)
HGB BLD-MCNC: 10.7 GM/DL (ref 14–18)
HGB UR QL STRIP.AUTO: ABNORMAL
ICTOTEST: NEGATIVE
IMM GRANULOCYTES # BLD AUTO: 0.05 THOU/MM3 (ref 0–0.07)
IMM GRANULOCYTES NFR BLD AUTO: 0.7 %
KETONES UR QL STRIP.AUTO: 15
LACTIC ACID, SEPSIS: 1 MMOL/L (ref 0.5–1.9)
LACTIC ACID, SEPSIS: 1.2 MMOL/L (ref 0.5–1.9)
LIPASE SERPL-CCNC: 15.1 U/L (ref 5.6–51.3)
LYMPHOCYTES ABSOLUTE: 2.1 THOU/MM3 (ref 1–4.8)
LYMPHOCYTES NFR BLD AUTO: 28.4 %
MCH RBC QN AUTO: 28.7 PG (ref 26–33)
MCHC RBC AUTO-ENTMCNC: 30.9 GM/DL (ref 32.2–35.5)
MCV RBC AUTO: 92.8 FL (ref 80–94)
MISCELLANEOUS 2: ABNORMAL
MONOCYTES ABSOLUTE: 0.6 THOU/MM3 (ref 0.4–1.3)
MONOCYTES NFR BLD AUTO: 8.5 %
NEUTROPHILS NFR BLD AUTO: 57.4 %
NITRITE UR QL STRIP: POSITIVE
NRBC BLD AUTO-RTO: 0 /100 WBC
OSMOLALITY SERPL CALC.SUM OF ELEC: 284.5 MOSMOL/KG (ref 275–300)
PH UR STRIP.AUTO: 5 [PH] (ref 5–9)
PLATELET # BLD AUTO: 268 THOU/MM3 (ref 130–400)
PMV BLD AUTO: 11.4 FL (ref 9.4–12.4)
POTASSIUM SERPL-SCNC: 4.8 MEQ/L (ref 3.5–5.2)
POTASSIUM SERPL-SCNC: 5.7 MEQ/L (ref 3.5–5.2)
PROT SERPL-MCNC: 6.4 G/DL (ref 6.1–8)
PROT UR STRIP.AUTO-MCNC: 300 MG/DL
RBC # BLD AUTO: 3.73 MILL/MM3 (ref 4.7–6.1)
RBC URINE: > 100 /HPF
RENAL EPI CELLS #/AREA URNS HPF: ABNORMAL /[HPF]
SEGMENTED NEUTROPHILS ABSOLUTE COUNT: 4.2 THOU/MM3 (ref 1.8–7.7)
SODIUM SERPL-SCNC: 133 MEQ/L (ref 135–145)
SP GR UR REFRACT.AUTO: 1.03 (ref 1–1.03)
TROPONIN, HIGH SENSITIVITY: 43 NG/L (ref 0–12)
TROPONIN, HIGH SENSITIVITY: 43 NG/L (ref 0–12)
UROBILINOGEN, URINE: 1 EU/DL (ref 0–1)
WBC # BLD AUTO: 7.3 THOU/MM3 (ref 4.8–10.8)
WBC #/AREA URNS HPF: > 200 /HPF
WBC #/AREA URNS HPF: ABNORMAL /[HPF]
YEAST LIKE FUNGI URNS QL MICRO: ABNORMAL

## 2024-01-05 PROCEDURE — 6360000002 HC RX W HCPCS: Performed by: INTERNAL MEDICINE

## 2024-01-05 PROCEDURE — 81001 URINALYSIS AUTO W/SCOPE: CPT

## 2024-01-05 PROCEDURE — 87186 SC STD MICRODIL/AGAR DIL: CPT

## 2024-01-05 PROCEDURE — 84132 ASSAY OF SERUM POTASSIUM: CPT

## 2024-01-05 PROCEDURE — 2580000003 HC RX 258: Performed by: INTERNAL MEDICINE

## 2024-01-05 PROCEDURE — 6370000000 HC RX 637 (ALT 250 FOR IP)

## 2024-01-05 PROCEDURE — 76870 US EXAM SCROTUM: CPT

## 2024-01-05 PROCEDURE — 80053 COMPREHEN METABOLIC PANEL: CPT

## 2024-01-05 PROCEDURE — 51798 US URINE CAPACITY MEASURE: CPT

## 2024-01-05 PROCEDURE — 85025 COMPLETE CBC W/AUTO DIFF WBC: CPT

## 2024-01-05 PROCEDURE — 1200000003 HC TELEMETRY R&B

## 2024-01-05 PROCEDURE — 87040 BLOOD CULTURE FOR BACTERIA: CPT

## 2024-01-05 PROCEDURE — 83605 ASSAY OF LACTIC ACID: CPT

## 2024-01-05 PROCEDURE — 82248 BILIRUBIN DIRECT: CPT

## 2024-01-05 PROCEDURE — 74176 CT ABD & PELVIS W/O CONTRAST: CPT

## 2024-01-05 PROCEDURE — 83690 ASSAY OF LIPASE: CPT

## 2024-01-05 PROCEDURE — 99223 1ST HOSP IP/OBS HIGH 75: CPT

## 2024-01-05 PROCEDURE — 6360000002 HC RX W HCPCS: Performed by: STUDENT IN AN ORGANIZED HEALTH CARE EDUCATION/TRAINING PROGRAM

## 2024-01-05 PROCEDURE — 6370000000 HC RX 637 (ALT 250 FOR IP): Performed by: STUDENT IN AN ORGANIZED HEALTH CARE EDUCATION/TRAINING PROGRAM

## 2024-01-05 PROCEDURE — 1200000000 HC SEMI PRIVATE

## 2024-01-05 PROCEDURE — 36415 COLL VENOUS BLD VENIPUNCTURE: CPT

## 2024-01-05 PROCEDURE — 2580000003 HC RX 258: Performed by: STUDENT IN AN ORGANIZED HEALTH CARE EDUCATION/TRAINING PROGRAM

## 2024-01-05 PROCEDURE — 82948 REAGENT STRIP/BLOOD GLUCOSE: CPT

## 2024-01-05 PROCEDURE — 87086 URINE CULTURE/COLONY COUNT: CPT

## 2024-01-05 PROCEDURE — 87801 DETECT AGNT MULT DNA AMPLI: CPT

## 2024-01-05 PROCEDURE — 2580000003 HC RX 258

## 2024-01-05 PROCEDURE — 87077 CULTURE AEROBIC IDENTIFY: CPT

## 2024-01-05 PROCEDURE — 93005 ELECTROCARDIOGRAM TRACING: CPT | Performed by: STUDENT IN AN ORGANIZED HEALTH CARE EDUCATION/TRAINING PROGRAM

## 2024-01-05 PROCEDURE — 84484 ASSAY OF TROPONIN QUANT: CPT

## 2024-01-05 RX ORDER — TAMSULOSIN HYDROCHLORIDE 0.4 MG/1
0.4 CAPSULE ORAL DAILY
Status: DISCONTINUED | OUTPATIENT
Start: 2024-01-05 | End: 2024-01-07

## 2024-01-05 RX ORDER — POTASSIUM CHLORIDE 7.45 MG/ML
10 INJECTION INTRAVENOUS PRN
Status: DISCONTINUED | OUTPATIENT
Start: 2024-01-05 | End: 2024-01-12 | Stop reason: HOSPADM

## 2024-01-05 RX ORDER — BUPROPION HYDROCHLORIDE 150 MG/1
150 TABLET ORAL DAILY
Status: DISCONTINUED | OUTPATIENT
Start: 2024-01-05 | End: 2024-01-12 | Stop reason: HOSPADM

## 2024-01-05 RX ORDER — POLYETHYLENE GLYCOL 3350 17 G/17G
17 POWDER, FOR SOLUTION ORAL DAILY PRN
Status: DISCONTINUED | OUTPATIENT
Start: 2024-01-05 | End: 2024-01-12 | Stop reason: HOSPADM

## 2024-01-05 RX ORDER — 0.9 % SODIUM CHLORIDE 0.9 %
1000 INTRAVENOUS SOLUTION INTRAVENOUS ONCE
Status: COMPLETED | OUTPATIENT
Start: 2024-01-05 | End: 2024-01-05

## 2024-01-05 RX ORDER — ACETAMINOPHEN 650 MG/1
650 SUPPOSITORY RECTAL EVERY 6 HOURS PRN
Status: DISCONTINUED | OUTPATIENT
Start: 2024-01-05 | End: 2024-01-12 | Stop reason: HOSPADM

## 2024-01-05 RX ORDER — CHOLECALCIFEROL (VITAMIN D3) 125 MCG
5 CAPSULE ORAL NIGHTLY PRN
Status: DISCONTINUED | OUTPATIENT
Start: 2024-01-05 | End: 2024-01-05

## 2024-01-05 RX ORDER — LEVOTHYROXINE SODIUM 0.15 MG/1
150 TABLET ORAL DAILY
Status: DISCONTINUED | OUTPATIENT
Start: 2024-01-05 | End: 2024-01-12 | Stop reason: HOSPADM

## 2024-01-05 RX ORDER — INSULIN GLARGINE 100 [IU]/ML
30 INJECTION, SOLUTION SUBCUTANEOUS NIGHTLY
Status: DISCONTINUED | OUTPATIENT
Start: 2024-01-05 | End: 2024-01-12 | Stop reason: HOSPADM

## 2024-01-05 RX ORDER — ASPIRIN 81 MG/1
81 TABLET ORAL DAILY
Status: DISCONTINUED | OUTPATIENT
Start: 2024-01-05 | End: 2024-01-12 | Stop reason: HOSPADM

## 2024-01-05 RX ORDER — CIPROFLOXACIN 2 MG/ML
400 INJECTION, SOLUTION INTRAVENOUS ONCE
Status: COMPLETED | OUTPATIENT
Start: 2024-01-05 | End: 2024-01-05

## 2024-01-05 RX ORDER — LANOLIN ALCOHOL/MO/W.PET/CERES
4.5 CREAM (GRAM) TOPICAL NIGHTLY PRN
Status: DISCONTINUED | OUTPATIENT
Start: 2024-01-05 | End: 2024-01-12 | Stop reason: HOSPADM

## 2024-01-05 RX ORDER — ACETAMINOPHEN 500 MG
1000 TABLET ORAL ONCE
Status: COMPLETED | OUTPATIENT
Start: 2024-01-05 | End: 2024-01-05

## 2024-01-05 RX ORDER — SODIUM CHLORIDE 0.9 % (FLUSH) 0.9 %
5-40 SYRINGE (ML) INJECTION EVERY 12 HOURS SCHEDULED
Status: DISCONTINUED | OUTPATIENT
Start: 2024-01-05 | End: 2024-01-12 | Stop reason: HOSPADM

## 2024-01-05 RX ORDER — MAGNESIUM SULFATE IN WATER 40 MG/ML
2000 INJECTION, SOLUTION INTRAVENOUS PRN
Status: DISCONTINUED | OUTPATIENT
Start: 2024-01-05 | End: 2024-01-12 | Stop reason: HOSPADM

## 2024-01-05 RX ORDER — SODIUM CHLORIDE 0.9 % (FLUSH) 0.9 %
5-40 SYRINGE (ML) INJECTION PRN
Status: DISCONTINUED | OUTPATIENT
Start: 2024-01-05 | End: 2024-01-12 | Stop reason: HOSPADM

## 2024-01-05 RX ORDER — CITALOPRAM 40 MG/1
40 TABLET ORAL DAILY
Status: DISCONTINUED | OUTPATIENT
Start: 2024-01-05 | End: 2024-01-12 | Stop reason: HOSPADM

## 2024-01-05 RX ORDER — PANTOPRAZOLE SODIUM 40 MG/1
40 TABLET, DELAYED RELEASE ORAL
Status: DISCONTINUED | OUTPATIENT
Start: 2024-01-05 | End: 2024-01-12 | Stop reason: HOSPADM

## 2024-01-05 RX ORDER — ERGOCALCIFEROL 1.25 MG/1
50000 CAPSULE ORAL WEEKLY
Status: DISCONTINUED | OUTPATIENT
Start: 2024-01-11 | End: 2024-01-12 | Stop reason: HOSPADM

## 2024-01-05 RX ORDER — IBUPROFEN 600 MG/1
1 TABLET ORAL PRN
Status: DISCONTINUED | OUTPATIENT
Start: 2024-01-05 | End: 2024-01-12 | Stop reason: HOSPADM

## 2024-01-05 RX ORDER — SODIUM CHLORIDE 9 MG/ML
INJECTION, SOLUTION INTRAVENOUS CONTINUOUS
Status: DISCONTINUED | OUTPATIENT
Start: 2024-01-05 | End: 2024-01-07

## 2024-01-05 RX ORDER — DEXTROSE MONOHYDRATE 100 MG/ML
INJECTION, SOLUTION INTRAVENOUS CONTINUOUS PRN
Status: DISCONTINUED | OUTPATIENT
Start: 2024-01-05 | End: 2024-01-12 | Stop reason: HOSPADM

## 2024-01-05 RX ORDER — ACETAMINOPHEN 325 MG/1
650 TABLET ORAL EVERY 6 HOURS PRN
Status: DISCONTINUED | OUTPATIENT
Start: 2024-01-05 | End: 2024-01-12 | Stop reason: HOSPADM

## 2024-01-05 RX ORDER — DONEPEZIL HYDROCHLORIDE 10 MG/1
10 TABLET, FILM COATED ORAL NIGHTLY
Status: DISCONTINUED | OUTPATIENT
Start: 2024-01-05 | End: 2024-01-12 | Stop reason: HOSPADM

## 2024-01-05 RX ORDER — POTASSIUM CHLORIDE 20 MEQ/1
40 TABLET, EXTENDED RELEASE ORAL PRN
Status: DISCONTINUED | OUTPATIENT
Start: 2024-01-05 | End: 2024-01-12 | Stop reason: HOSPADM

## 2024-01-05 RX ORDER — INSULIN LISPRO 100 [IU]/ML
0-8 INJECTION, SOLUTION INTRAVENOUS; SUBCUTANEOUS EVERY 4 HOURS
Status: DISCONTINUED | OUTPATIENT
Start: 2024-01-05 | End: 2024-01-06

## 2024-01-05 RX ORDER — SODIUM CHLORIDE 9 MG/ML
INJECTION, SOLUTION INTRAVENOUS PRN
Status: DISCONTINUED | OUTPATIENT
Start: 2024-01-05 | End: 2024-01-12 | Stop reason: HOSPADM

## 2024-01-05 RX ORDER — CIPROFLOXACIN 2 MG/ML
400 INJECTION, SOLUTION INTRAVENOUS EVERY 12 HOURS
Status: DISCONTINUED | OUTPATIENT
Start: 2024-01-05 | End: 2024-01-05

## 2024-01-05 RX ORDER — ONDANSETRON 2 MG/ML
4 INJECTION INTRAMUSCULAR; INTRAVENOUS EVERY 6 HOURS PRN
Status: DISCONTINUED | OUTPATIENT
Start: 2024-01-05 | End: 2024-01-12 | Stop reason: HOSPADM

## 2024-01-05 RX ORDER — TORSEMIDE 20 MG/1
20 TABLET ORAL DAILY
Status: DISCONTINUED | OUTPATIENT
Start: 2024-01-05 | End: 2024-01-09

## 2024-01-05 RX ORDER — PRAVASTATIN SODIUM 80 MG/1
80 TABLET ORAL NIGHTLY
Status: DISCONTINUED | OUTPATIENT
Start: 2024-01-05 | End: 2024-01-12 | Stop reason: HOSPADM

## 2024-01-05 RX ORDER — ONDANSETRON 4 MG/1
4 TABLET, ORALLY DISINTEGRATING ORAL EVERY 8 HOURS PRN
Status: DISCONTINUED | OUTPATIENT
Start: 2024-01-05 | End: 2024-01-12 | Stop reason: HOSPADM

## 2024-01-05 RX ADMIN — ACETAMINOPHEN 1000 MG: 500 TABLET ORAL at 02:59

## 2024-01-05 RX ADMIN — SODIUM CHLORIDE: 9 INJECTION, SOLUTION INTRAVENOUS at 12:09

## 2024-01-05 RX ADMIN — METOPROLOL TARTRATE 25 MG: 25 TABLET, FILM COATED ORAL at 20:27

## 2024-01-05 RX ADMIN — INSULIN GLARGINE 30 UNITS: 100 INJECTION, SOLUTION SUBCUTANEOUS at 20:27

## 2024-01-05 RX ADMIN — BUPROPION HYDROCHLORIDE 150 MG: 150 TABLET, EXTENDED RELEASE ORAL at 09:59

## 2024-01-05 RX ADMIN — MEROPENEM 1000 MG: 1 INJECTION, POWDER, FOR SOLUTION INTRAVENOUS at 12:17

## 2024-01-05 RX ADMIN — SODIUM CHLORIDE, PRESERVATIVE FREE 10 ML: 5 INJECTION INTRAVENOUS at 10:13

## 2024-01-05 RX ADMIN — LEVOTHYROXINE SODIUM 150 MCG: 0.15 TABLET ORAL at 09:59

## 2024-01-05 RX ADMIN — PRAVASTATIN SODIUM 80 MG: 80 TABLET ORAL at 20:27

## 2024-01-05 RX ADMIN — METOPROLOL TARTRATE 25 MG: 25 TABLET, FILM COATED ORAL at 09:59

## 2024-01-05 RX ADMIN — SODIUM CHLORIDE, PRESERVATIVE FREE 10 ML: 5 INJECTION INTRAVENOUS at 20:27

## 2024-01-05 RX ADMIN — INSULIN LISPRO 2 UNITS: 100 INJECTION, SOLUTION INTRAVENOUS; SUBCUTANEOUS at 20:27

## 2024-01-05 RX ADMIN — DONEPEZIL HYDROCHLORIDE 10 MG: 10 TABLET, FILM COATED ORAL at 20:27

## 2024-01-05 RX ADMIN — CIPROFLOXACIN 400 MG: 400 INJECTION, SOLUTION INTRAVENOUS at 04:20

## 2024-01-05 RX ADMIN — MEROPENEM 1000 MG: 1 INJECTION, POWDER, FOR SOLUTION INTRAVENOUS at 21:54

## 2024-01-05 RX ADMIN — SODIUM CHLORIDE 1000 ML: 9 INJECTION, SOLUTION INTRAVENOUS at 04:21

## 2024-01-05 RX ADMIN — TAMSULOSIN HYDROCHLORIDE 0.4 MG: 0.4 CAPSULE ORAL at 10:00

## 2024-01-05 RX ADMIN — INSULIN LISPRO 4 UNITS: 100 INJECTION, SOLUTION INTRAVENOUS; SUBCUTANEOUS at 10:08

## 2024-01-05 RX ADMIN — CITALOPRAM 40 MG: 40 TABLET, FILM COATED ORAL at 10:00

## 2024-01-05 RX ADMIN — PANTOPRAZOLE SODIUM 40 MG: 40 TABLET, DELAYED RELEASE ORAL at 10:05

## 2024-01-05 RX ADMIN — INSULIN LISPRO 4 UNITS: 100 INJECTION, SOLUTION INTRAVENOUS; SUBCUTANEOUS at 14:14

## 2024-01-05 ASSESSMENT — PAIN SCALES - GENERAL: PAINLEVEL_OUTOF10: 0

## 2024-01-05 NOTE — PROCEDURES
Bladder scan completed at 1500 and results told to Leo STANLEY. Scan showed 140 mL in the patients bladder. Last void was unknown.

## 2024-01-05 NOTE — ED TRIAGE NOTES
Pt presents to the ED with complaints of groin pain that started yesterday morning. Pt states he was washing up when he noticed that his testicle was inflamed. Pt states that it is currently still bothering him.

## 2024-01-05 NOTE — H&P
Hospitalist History and Physical          Patient Info:   Name: Leo Nevarez, : 1940, PCP: Aileen Valadez APRN - [facility]  Unit/Bed:A      Admitted on: 2024  Date of Service: Pt seen/examined on 24 and Admitted to Inpatient with expected LOS greater than two midnights due to medical therapy.       Assessment and Plan:  Complicated UTI, POA: Cipro started in ED. Will continue for now, tailor as able pending results  ID consult due to frequent UTIs    Scrotal swelling: See US scrotum below  Urology consult    NAVNEET: creatinine 1.5 on arrival which is above baseline of 0.8. Likely 2/2 to decreased PO intake/vomiting    Query Alzheimer's Dementia: continue Aricept    Hyperkalemia: K 5.7  Lokelma x 1 dose, recheck K in 4 hours. EKG without signs of hyperkalemia at this time    Nonobstructive CAD: Patient has history of abnormal stress test a couple of admissions ago. LHC on 2023 showed nonobstructive disease.    NIDDMII: Hold PO medications at this time. Started on SSI  with q4h checks until able to take PO.      Data reviewed  EKG:  I have reviewed the EKG with the following interpretation: Sinus rhythm with 1st degree A-V block. RBBB  Imaging: I have reviewed US scrotum with the following interpretation: Findings consistent with left epididymo-orchitis. Moderate sized complex reactive left hydrocele.       ===================================================================      Chief Complaint:  scrotal swelling    History Of Present Illness:  Leo Nevarez is a 83 y.o. male with PMHx of DMII, Dementia, CAD who presents to [facility] with swollen scrotum.  Patient is not a good historian 2/2 dementia. Patient's daughter/caregiver is able to augment HPI information. She states that the patient vomited twice yesterday and that it was NBNB in nature. She also mentions that the patient expressed to her that his scrotum was swollen and painful, hence admission. He describes the

## 2024-01-05 NOTE — TELEPHONE ENCOUNTER
Has cysto scheduled in office  Daughter thinks it 1/18  Looks like its scheduled 1/22  Please verify and reach out to daughter when cysto is schedule

## 2024-01-05 NOTE — ED NOTES
Spoke to MARYANNE Roach to approve pt transport to [facility] in stable condition.  
HISTORY       Past Medical History:   Diagnosis Date    Chronic kidney disease     Diabetes mellitus (HCC)     Hyperlipidemia     Hypertension            Electronically signed by Neva James RN on 1/5/2024 at 4:34 AM

## 2024-01-05 NOTE — CONSULTS
CONSULTATION NOTE :ID       Patient - Leo Nevarez,  Age - 83 y.o.    - 1940      Room Number - 5K-17/017-A   N -  [id number]   [facility] # - [id number]  Date of Admission -  2024 11:58 PM  Patient's PCP: Aileen Valadez APRN - CNP     Requesting Physician: Ketty Arellano MD    REASON FOR CONSULTATION   Complicated UTI due to E. coli ESBL  CHIEF COMPLAINT    Scrotal pain and swelling    HISTORY OF PRESENT ILLNESS       This is a very pleasant 83 y.o. male who was admitted to the hospital with a chief complaints of scrotal pain and swelling.  He was admitted for complicated UTI and scrotal swelling with ultrasound suggestive.  Epididymoorchitis.  He has history of diabetes dementia coronary artery disease presented with above complaints he had vomiting he and has been complaining of pain and swelling on his scrotum.  His urine culture was positive for E. coli ESBL producing he has been on Cipro.  Infectious disease consultation was requested to assist with antibiotic treatment.  He has also complex hydrocele.  Has acute kidney injury.  He has history of known chronic kidney disease and diabetes. He has been on esqueda catheter    PAST MEDICAL  HISTORY       Past Medical History:   Diagnosis Date    Chronic kidney disease     Diabetes mellitus (HCC)     Hyperlipidemia     Hypertension        PAST SURGICAL HISTORY     Past Surgical History:   Procedure Laterality Date    BLADDER SCAN  2023    BLADDER SCAN  2023    CARDIAC PROCEDURE Left 2023    Left heart cath / coronary angiography performed by Javier Brothers MD at [facility] CARDIAC CATH LAB    HC BLADDER SCAN  2023    SHOULDER ARTHROPLASTY Left          MEDICATIONS:       Scheduled Meds:   [Held by provider] aspirin  81 mg Oral Daily    buPROPion  150 mg Oral Daily    citalopram  40 mg Oral Daily    donepezil  10 mg Oral Nightly    [START ON 2024] vitamin D  50,000 Units Oral Weekly    insulin 
    DATA:  CBC:   Lab Results   Component Value Date/Time    WBC 7.3 01/05/2024 01:53 AM    RBC 3.73 01/05/2024 01:53 AM    HGB 10.7 01/05/2024 01:53 AM    HCT 34.6 01/05/2024 01:53 AM    MCV 92.8 01/05/2024 01:53 AM    MCH 28.7 01/05/2024 01:53 AM    MCHC 30.9 01/05/2024 01:53 AM     01/05/2024 01:53 AM    MPV 11.4 01/05/2024 01:53 AM     BMP:    Lab Results   Component Value Date/Time     01/05/2024 01:53 AM    K 5.7 01/05/2024 01:53 AM    K 4.4 12/19/2023 05:56 AM    CL 97 01/05/2024 01:53 AM    CO2 27 01/05/2024 01:53 AM    BUN 23 01/05/2024 01:53 AM    CREATININE 1.5 01/05/2024 01:53 AM    CALCIUM 8.4 01/05/2024 01:53 AM    LABGLOM 46 01/05/2024 01:53 AM    LABGLOM 42.7 06/02/2023 12:00 AM    GLUCOSE 359 01/05/2024 01:53 AM     BUN/Creatinine:    Lab Results   Component Value Date/Time    BUN 23 01/05/2024 01:53 AM    CREATININE 1.5 01/05/2024 01:53 AM     Magnesium:    Lab Results   Component Value Date/Time    MG 1.8 11/13/2023 03:59 AM     Phosphorus:  No results found for: \"PHOS\"  PT/INR:    Lab Results   Component Value Date/Time    INR 1.16 11/20/2023 06:09 AM     U/A:    Lab Results   Component Value Date/Time    COLORU DK YELLOW 01/05/2024 01:55 AM    PHUR 5.0 01/05/2024 01:55 AM    LABCAST >15 HYALINE 11/12/2023 02:50 PM    LABCAST NONE SEEN 11/12/2023 02:50 PM    WBCUA > 200 01/05/2024 01:55 AM    RBCUA > 100 01/05/2024 01:55 AM    MUCUS NONE SEEN 12/15/2023 04:20 PM    YEAST NONE SEEN 01/05/2024 01:55 AM    BACTERIA MODERATE 01/05/2024 01:55 AM    SPECGRAV 1.029 11/12/2023 02:50 PM    LEUKOCYTESUR MODERATE 01/05/2024 01:55 AM    UROBILINOGEN 1.0 01/05/2024 01:55 AM    BILIRUBINUR MODERATE 01/05/2024 01:55 AM    BLOODU LARGE 01/05/2024 01:55 AM    GLUCOSEU 500 01/05/2024 01:55 AM    AMORPHOUS DEBRIS 01/05/2024 01:55 AM       Imaging:   The patient has had a Scrotal Ultrasound which I have independently reviewed along with its accompanying report.  The study demonstrates   US scrotum with

## 2024-01-05 NOTE — ED PROVIDER NOTES
[facility]  EMERGENCY DEPARTMENT ENCOUNTER          Pt Name: Leo Nevarez  MRN: [id number]  Birthdate 1940  Date of evaluation: 1/4/2024  Emergency Physician: Erick Blue MD    CHIEF COMPLAINT       Chief Complaint   Patient presents with    Groin Pain     History obtained from daughter and the patient.      HISTORY OF PRESENT ILLNESS    HPI  Leo Nevarez is a 83 y.o. male with past medical history of dementia, diabetes mellitus 2, CKD, hyperlipidemia, hypertension who presents to the emergency department for evaluation of left hemiscrotal pain.  Pain began yesterday.  No reported known injury or trauma or falls.  Reports nausea and 1 episode of NBNB emesis.  No abdominal pain, fevers, chills, urinary symptoms, changes in bowel patterns.  The patient has no other acute complaints at this time.          REVIEW OF SYSTEMS   Review of Systems    See HPI. 12 point ROS performed, pertinent positives listed above otherwise negative  PAST MEDICAL AND SURGICAL HISTORY     Past Medical History:   Diagnosis Date    Chronic kidney disease     Diabetes mellitus (HCC)     Hyperlipidemia     Hypertension      Past Surgical History:   Procedure Laterality Date    BLADDER SCAN  11/11/2023    BLADDER SCAN  11/13/2023    CARDIAC PROCEDURE Left 11/20/2023    Left heart cath / coronary angiography performed by Javier Brothers MD at [facility] CARDIAC CATH LAB    HC BLADDER SCAN  11/11/2023    SHOULDER ARTHROPLASTY Left          MEDICATIONS     Current Facility-Administered Medications:     sodium chloride 0.9 % bolus 1,000 mL, 1,000 mL, IntraVENous, Once, Erick Blue MD    ciprofloxacin (CIPRO) IVPB 400 mg, 400 mg, IntraVENous, Once, Erick Blue MD    Current Outpatient Medications:     ciprofloxacin (CIPRO) 500 MG tablet, Take 1 tablet by mouth 2 times daily for 7 days, Disp: 14 tablet, Rfl: 0    tamsulosin (FLOMAX) 0.4 MG capsule, Take 1 capsule by mouth daily, Disp: 90

## 2024-01-06 PROBLEM — N45.3 EPIDIDYMOORCHITIS: Status: ACTIVE | Noted: 2024-01-06

## 2024-01-06 LAB
ACB COMPLEX DNA BLD POS QL NAA+NON-PROBE: NOT DETECTED
ANION GAP SERPL CALC-SCNC: 12 MEQ/L (ref 8–16)
B FRAGILIS DNA BLD POS QL NAA+NON-PROBE: NOT DETECTED
BASOPHILS ABSOLUTE: 0 THOU/MM3 (ref 0–0.1)
BASOPHILS NFR BLD AUTO: 0.5 %
BLACTX-M ISLT/SPM QL: NORMAL
BLAIMP ISLT/SPM QL: NORMAL
BLAKPC ISLT/SPM QL: NORMAL
BLAOXA-48-LIKE ISLT/SPM QL: NORMAL
BLAVIM ISLT/SPM QL: NORMAL
BOTTLE TYPE: NORMAL
BUN SERPL-MCNC: 17 MG/DL (ref 7–22)
C ALBICANS DNA BLD POS QL NAA+NON-PROBE: NOT DETECTED
C AURIS DNA BLD POS QL NAA+NON-PROBE: NOT DETECTED
C GATTII+NEOFOR DNA BLD POS QL NAA+N-PRB: NOT DETECTED
C GLABRATA DNA BLD POS QL NAA+NON-PROBE: NOT DETECTED
C KRUSEI DNA BLD POS QL NAA+NON-PROBE: NOT DETECTED
C PARAP DNA BLD POS QL NAA+NON-PROBE: NOT DETECTED
C TROPICLS DNA BLD POS QL NAA+NON-PROBE: NOT DETECTED
CA-I BLD ISE-SCNC: 1.09 MMOL/L (ref 1.12–1.32)
CALCIUM SERPL-MCNC: 8.6 MG/DL (ref 8.5–10.5)
CHLORIDE SERPL-SCNC: 99 MEQ/L (ref 98–111)
CO2 SERPL-SCNC: 23 MEQ/L (ref 23–33)
COAG NEG STAPH DNA BLD QL NAA+PROBE: NOT DETECTED
COLISTIN RES MCR-1 ISLT/SPM QL: NORMAL
CREAT SERPL-MCNC: 0.9 MG/DL (ref 0.4–1.2)
DEPRECATED RDW RBC AUTO: 47.7 FL (ref 35–45)
E CLOAC COMP DNA BLD POS NAA+NON-PROBE: NOT DETECTED
E COLI DNA BLD POS QL NAA+NON-PROBE: NOT DETECTED
E FAECALIS DNA BLD POS QL NAA+NON-PROBE: NOT DETECTED
E FAECIUM DNA BLD POS QL NAA+NON-PROBE: NOT DETECTED
EKG ATRIAL RATE: 97 BPM
EKG P AXIS: 40 DEGREES
EKG P-R INTERVAL: 234 MS
EKG Q-T INTERVAL: 382 MS
EKG QRS DURATION: 124 MS
EKG QTC CALCULATION (BAZETT): 485 MS
EKG R AXIS: 92 DEGREES
EKG T AXIS: 41 DEGREES
EKG VENTRICULAR RATE: 97 BPM
ENTEROBACTERALES DNA BLD POS NAA+N-PRB: NOT DETECTED
EOSINOPHIL NFR BLD AUTO: 6.3 %
EOSINOPHILS ABSOLUTE: 0.4 THOU/MM3 (ref 0–0.4)
ERYTHROCYTE [DISTWIDTH] IN BLOOD BY AUTOMATED COUNT: 14.1 % (ref 11.5–14.5)
GFR SERPL CREATININE-BSD FRML MDRD: > 60 ML/MIN/1.73M2
GLUCOSE BLD STRIP.AUTO-MCNC: 176 MG/DL (ref 70–108)
GLUCOSE BLD STRIP.AUTO-MCNC: 204 MG/DL (ref 70–108)
GLUCOSE BLD STRIP.AUTO-MCNC: 238 MG/DL (ref 70–108)
GLUCOSE BLD STRIP.AUTO-MCNC: 247 MG/DL (ref 70–108)
GLUCOSE BLD STRIP.AUTO-MCNC: 251 MG/DL (ref 70–108)
GLUCOSE SERPL-MCNC: 270 MG/DL (ref 70–108)
GP B STREP DNA SPEC QL NAA+PROBE: NOT DETECTED
GP B STREP DNA SPEC QL NAA+PROBE: NOT DETECTED
HAEM INFLU DNA BLD POS QL NAA+NON-PROBE: NOT DETECTED
HCT VFR BLD AUTO: 33.4 % (ref 42–52)
HGB BLD-MCNC: 10.3 GM/DL (ref 14–18)
IMM GRANULOCYTES # BLD AUTO: 0.05 THOU/MM3 (ref 0–0.07)
IMM GRANULOCYTES NFR BLD AUTO: 0.8 %
K OXYTOCA DNA BLD POS QL NAA+NON-PROBE: NOT DETECTED
K OXYTOCA DNA BLD POS QL NAA+NON-PROBE: NOT DETECTED
KLEBSIELLA SP DNA BLD POS QL NAA+NON-PRB: NOT DETECTED
L MONOCYTOG DNA BLD POS QL NAA+NON-PROBE: NOT DETECTED
LYMPHOCYTES ABSOLUTE: 1.8 THOU/MM3 (ref 1–4.8)
LYMPHOCYTES NFR BLD AUTO: 27.3 %
MAGNESIUM SERPL-MCNC: 1.7 MG/DL (ref 1.6–2.4)
MCH RBC QN AUTO: 28.6 PG (ref 26–33)
MCHC RBC AUTO-ENTMCNC: 30.8 GM/DL (ref 32.2–35.5)
MCV RBC AUTO: 92.8 FL (ref 80–94)
MECA ISLT/SPM QL: NORMAL
MECA+MECC+MREJ ISLT/SPM QL: NORMAL
MONOCYTES ABSOLUTE: 0.5 THOU/MM3 (ref 0.4–1.3)
MONOCYTES NFR BLD AUTO: 7.7 %
N MEN DNA BLD POS QL NAA+NON-PROBE: NOT DETECTED
NDM: NORMAL
NEUTROPHILS NFR BLD AUTO: 57.4 %
NRBC BLD AUTO-RTO: 0 /100 WBC
P AERUGINOSA DNA BLD POS NAA+NON-PROBE: NOT DETECTED
PHOSPHATE SERPL-MCNC: 2.7 MG/DL (ref 2.4–4.7)
PLATELET # BLD AUTO: 235 THOU/MM3 (ref 130–400)
PMV BLD AUTO: 11.4 FL (ref 9.4–12.4)
POTASSIUM SERPL-SCNC: 4.9 MEQ/L (ref 3.5–5.2)
PROTEUS SPP: NOT DETECTED
RBC # BLD AUTO: 3.6 MILL/MM3 (ref 4.7–6.1)
S AUREUS DNA BLD POS QL NAA+NON-PROBE: NOT DETECTED
S EPIDERMIDIS DNA BLD POS QL NAA+NON-PRB: NOT DETECTED
S LUGDUNENSIS DNA BLD POS QL NAA+NON-PRB: NOT DETECTED
S MALTOPHILIA DNA BLD POS QL NAA+NON-PRB: NOT DETECTED
S MARCESCENS DNA BLD POS NAA+NON-PROBE: NOT DETECTED
S PYO DNA THROAT QL NAA+PROBE: NOT DETECTED
SALMONELLA DNA BLD POS QL NAA+NON-PROBE: NOT DETECTED
SEGMENTED NEUTROPHILS ABSOLUTE COUNT: 3.8 THOU/MM3 (ref 1.8–7.7)
SODIUM SERPL-SCNC: 134 MEQ/L (ref 135–145)
SOURCE OF BLOOD CULTURE: NORMAL
STREPTOCOCCUS DNA BLD QL NAA+PROBE: NOT DETECTED
VANA+VANB ISLT/SPM QL: NORMAL
WBC # BLD AUTO: 6.7 THOU/MM3 (ref 4.8–10.8)

## 2024-01-06 PROCEDURE — 51798 US URINE CAPACITY MEASURE: CPT

## 2024-01-06 PROCEDURE — 84100 ASSAY OF PHOSPHORUS: CPT

## 2024-01-06 PROCEDURE — 80048 BASIC METABOLIC PNL TOTAL CA: CPT

## 2024-01-06 PROCEDURE — 87040 BLOOD CULTURE FOR BACTERIA: CPT

## 2024-01-06 PROCEDURE — 93010 ELECTROCARDIOGRAM REPORT: CPT | Performed by: INTERNAL MEDICINE

## 2024-01-06 PROCEDURE — 85025 COMPLETE CBC W/AUTO DIFF WBC: CPT

## 2024-01-06 PROCEDURE — 99232 SBSQ HOSP IP/OBS MODERATE 35: CPT | Performed by: INTERNAL MEDICINE

## 2024-01-06 PROCEDURE — 51701 INSERT BLADDER CATHETER: CPT

## 2024-01-06 PROCEDURE — 83735 ASSAY OF MAGNESIUM: CPT

## 2024-01-06 PROCEDURE — 2580000003 HC RX 258

## 2024-01-06 PROCEDURE — 2580000003 HC RX 258: Performed by: INTERNAL MEDICINE

## 2024-01-06 PROCEDURE — 6370000000 HC RX 637 (ALT 250 FOR IP): Performed by: INTERNAL MEDICINE

## 2024-01-06 PROCEDURE — 99232 SBSQ HOSP IP/OBS MODERATE 35: CPT

## 2024-01-06 PROCEDURE — 6360000002 HC RX W HCPCS: Performed by: INTERNAL MEDICINE

## 2024-01-06 PROCEDURE — 6370000000 HC RX 637 (ALT 250 FOR IP)

## 2024-01-06 PROCEDURE — 36415 COLL VENOUS BLD VENIPUNCTURE: CPT

## 2024-01-06 PROCEDURE — 82330 ASSAY OF CALCIUM: CPT

## 2024-01-06 PROCEDURE — 82948 REAGENT STRIP/BLOOD GLUCOSE: CPT

## 2024-01-06 PROCEDURE — 1200000003 HC TELEMETRY R&B

## 2024-01-06 PROCEDURE — 1200000000 HC SEMI PRIVATE

## 2024-01-06 RX ORDER — INSULIN LISPRO 100 [IU]/ML
0-4 INJECTION, SOLUTION INTRAVENOUS; SUBCUTANEOUS NIGHTLY
Status: DISCONTINUED | OUTPATIENT
Start: 2024-01-06 | End: 2024-01-08

## 2024-01-06 RX ORDER — INSULIN LISPRO 100 [IU]/ML
0-16 INJECTION, SOLUTION INTRAVENOUS; SUBCUTANEOUS
Status: DISCONTINUED | OUTPATIENT
Start: 2024-01-06 | End: 2024-01-08

## 2024-01-06 RX ADMIN — DONEPEZIL HYDROCHLORIDE 10 MG: 10 TABLET, FILM COATED ORAL at 20:47

## 2024-01-06 RX ADMIN — SODIUM CHLORIDE: 9 INJECTION, SOLUTION INTRAVENOUS at 17:49

## 2024-01-06 RX ADMIN — Medication 4.5 MG: at 20:50

## 2024-01-06 RX ADMIN — MEROPENEM 1000 MG: 1 INJECTION, POWDER, FOR SOLUTION INTRAVENOUS at 17:50

## 2024-01-06 RX ADMIN — PANTOPRAZOLE SODIUM 40 MG: 40 TABLET, DELAYED RELEASE ORAL at 05:00

## 2024-01-06 RX ADMIN — PRAVASTATIN SODIUM 80 MG: 80 TABLET ORAL at 20:47

## 2024-01-06 RX ADMIN — SODIUM CHLORIDE, PRESERVATIVE FREE 10 ML: 5 INJECTION INTRAVENOUS at 08:15

## 2024-01-06 RX ADMIN — LEVOTHYROXINE SODIUM 150 MCG: 0.15 TABLET ORAL at 08:15

## 2024-01-06 RX ADMIN — TAMSULOSIN HYDROCHLORIDE 0.4 MG: 0.4 CAPSULE ORAL at 08:15

## 2024-01-06 RX ADMIN — METOPROLOL TARTRATE 25 MG: 25 TABLET, FILM COATED ORAL at 20:47

## 2024-01-06 RX ADMIN — INSULIN LISPRO 4 UNITS: 100 INJECTION, SOLUTION INTRAVENOUS; SUBCUTANEOUS at 17:45

## 2024-01-06 RX ADMIN — BUPROPION HYDROCHLORIDE 150 MG: 150 TABLET, EXTENDED RELEASE ORAL at 08:15

## 2024-01-06 RX ADMIN — INSULIN LISPRO 4 UNITS: 100 INJECTION, SOLUTION INTRAVENOUS; SUBCUTANEOUS at 12:49

## 2024-01-06 RX ADMIN — METOPROLOL TARTRATE 25 MG: 25 TABLET, FILM COATED ORAL at 08:15

## 2024-01-06 RX ADMIN — INSULIN GLARGINE 30 UNITS: 100 INJECTION, SOLUTION SUBCUTANEOUS at 20:47

## 2024-01-06 RX ADMIN — MEROPENEM 1000 MG: 1 INJECTION, POWDER, FOR SOLUTION INTRAVENOUS at 10:00

## 2024-01-06 RX ADMIN — INSULIN LISPRO 4 UNITS: 100 INJECTION, SOLUTION INTRAVENOUS; SUBCUTANEOUS at 05:00

## 2024-01-06 RX ADMIN — CITALOPRAM 40 MG: 40 TABLET, FILM COATED ORAL at 08:15

## 2024-01-06 ASSESSMENT — PAIN SCALES - GENERAL: PAINLEVEL_OUTOF10: 0

## 2024-01-07 LAB
ANION GAP SERPL CALC-SCNC: 9 MEQ/L (ref 8–16)
BACTERIA BLD AEROBE CULT: ABNORMAL
BACTERIA BLD AEROBE CULT: ABNORMAL
BACTERIA UR CULT: ABNORMAL
BASOPHILS ABSOLUTE: 0 THOU/MM3 (ref 0–0.1)
BASOPHILS NFR BLD AUTO: 0.9 %
BUN SERPL-MCNC: 13 MG/DL (ref 7–22)
CA-I BLD ISE-SCNC: 1.09 MMOL/L (ref 1.12–1.32)
CALCIUM SERPL-MCNC: 8.6 MG/DL (ref 8.5–10.5)
CHLORIDE SERPL-SCNC: 101 MEQ/L (ref 98–111)
CO2 SERPL-SCNC: 27 MEQ/L (ref 23–33)
CREAT SERPL-MCNC: 0.9 MG/DL (ref 0.4–1.2)
DEPRECATED RDW RBC AUTO: 48 FL (ref 35–45)
EOSINOPHIL NFR BLD AUTO: 7.2 %
EOSINOPHILS ABSOLUTE: 0.3 THOU/MM3 (ref 0–0.4)
ERYTHROCYTE [DISTWIDTH] IN BLOOD BY AUTOMATED COUNT: 14.3 % (ref 11.5–14.5)
GFR SERPL CREATININE-BSD FRML MDRD: > 60 ML/MIN/1.73M2
GLUCOSE BLD STRIP.AUTO-MCNC: 130 MG/DL (ref 70–108)
GLUCOSE BLD STRIP.AUTO-MCNC: 153 MG/DL (ref 70–108)
GLUCOSE BLD STRIP.AUTO-MCNC: 156 MG/DL (ref 70–108)
GLUCOSE BLD STRIP.AUTO-MCNC: 170 MG/DL (ref 70–108)
GLUCOSE SERPL-MCNC: 158 MG/DL (ref 70–108)
HCT VFR BLD AUTO: 47.3 % (ref 42–52)
HGB BLD-MCNC: 14.7 GM/DL (ref 14–18)
IMM GRANULOCYTES # BLD AUTO: 0.03 THOU/MM3 (ref 0–0.07)
IMM GRANULOCYTES NFR BLD AUTO: 0.9 %
LYMPHOCYTES ABSOLUTE: 1.5 THOU/MM3 (ref 1–4.8)
LYMPHOCYTES NFR BLD AUTO: 42.5 %
MAGNESIUM SERPL-MCNC: 1.7 MG/DL (ref 1.6–2.4)
MCH RBC QN AUTO: 28.5 PG (ref 26–33)
MCHC RBC AUTO-ENTMCNC: 31.1 GM/DL (ref 32.2–35.5)
MCV RBC AUTO: 91.8 FL (ref 80–94)
MONOCYTES ABSOLUTE: 0.3 THOU/MM3 (ref 0.4–1.3)
MONOCYTES NFR BLD AUTO: 7.2 %
NEUTROPHILS NFR BLD AUTO: 41.3 %
NRBC BLD AUTO-RTO: 0 /100 WBC
ORGANISM: ABNORMAL
ORGANISM: ABNORMAL
PHOSPHATE SERPL-MCNC: 3.6 MG/DL (ref 2.4–4.7)
PLATELET # BLD AUTO: 158 THOU/MM3 (ref 130–400)
PMV BLD AUTO: 11.4 FL (ref 9.4–12.4)
POTASSIUM SERPL-SCNC: 4.5 MEQ/L (ref 3.5–5.2)
RBC # BLD AUTO: 5.15 MILL/MM3 (ref 4.7–6.1)
SEGMENTED NEUTROPHILS ABSOLUTE COUNT: 1.4 THOU/MM3 (ref 1.8–7.7)
SODIUM SERPL-SCNC: 137 MEQ/L (ref 135–145)
WBC # BLD AUTO: 3.5 THOU/MM3 (ref 4.8–10.8)

## 2024-01-07 PROCEDURE — 6360000002 HC RX W HCPCS: Performed by: INTERNAL MEDICINE

## 2024-01-07 PROCEDURE — 36415 COLL VENOUS BLD VENIPUNCTURE: CPT

## 2024-01-07 PROCEDURE — 51798 US URINE CAPACITY MEASURE: CPT

## 2024-01-07 PROCEDURE — 1200000000 HC SEMI PRIVATE

## 2024-01-07 PROCEDURE — 85025 COMPLETE CBC W/AUTO DIFF WBC: CPT

## 2024-01-07 PROCEDURE — 84100 ASSAY OF PHOSPHORUS: CPT

## 2024-01-07 PROCEDURE — 83735 ASSAY OF MAGNESIUM: CPT

## 2024-01-07 PROCEDURE — 2580000003 HC RX 258: Performed by: INTERNAL MEDICINE

## 2024-01-07 PROCEDURE — 82330 ASSAY OF CALCIUM: CPT

## 2024-01-07 PROCEDURE — 1200000003 HC TELEMETRY R&B

## 2024-01-07 PROCEDURE — 82948 REAGENT STRIP/BLOOD GLUCOSE: CPT

## 2024-01-07 PROCEDURE — 6370000000 HC RX 637 (ALT 250 FOR IP)

## 2024-01-07 PROCEDURE — 80048 BASIC METABOLIC PNL TOTAL CA: CPT

## 2024-01-07 PROCEDURE — 99232 SBSQ HOSP IP/OBS MODERATE 35: CPT

## 2024-01-07 PROCEDURE — 99232 SBSQ HOSP IP/OBS MODERATE 35: CPT | Performed by: INTERNAL MEDICINE

## 2024-01-07 RX ORDER — TAMSULOSIN HYDROCHLORIDE 0.4 MG/1
0.8 CAPSULE ORAL DAILY
Status: DISCONTINUED | OUTPATIENT
Start: 2024-01-08 | End: 2024-01-12 | Stop reason: HOSPADM

## 2024-01-07 RX ADMIN — BUPROPION HYDROCHLORIDE 150 MG: 150 TABLET, EXTENDED RELEASE ORAL at 11:48

## 2024-01-07 RX ADMIN — MEROPENEM 1000 MG: 1 INJECTION, POWDER, FOR SOLUTION INTRAVENOUS at 10:01

## 2024-01-07 RX ADMIN — PRAVASTATIN SODIUM 80 MG: 80 TABLET ORAL at 21:32

## 2024-01-07 RX ADMIN — INSULIN GLARGINE 30 UNITS: 100 INJECTION, SOLUTION SUBCUTANEOUS at 21:32

## 2024-01-07 RX ADMIN — LEVOTHYROXINE SODIUM 150 MCG: 0.15 TABLET ORAL at 11:47

## 2024-01-07 RX ADMIN — TAMSULOSIN HYDROCHLORIDE 0.4 MG: 0.4 CAPSULE ORAL at 11:47

## 2024-01-07 RX ADMIN — MEROPENEM 1000 MG: 1 INJECTION, POWDER, FOR SOLUTION INTRAVENOUS at 17:32

## 2024-01-07 RX ADMIN — Medication 4.5 MG: at 21:32

## 2024-01-07 RX ADMIN — PANTOPRAZOLE SODIUM 40 MG: 40 TABLET, DELAYED RELEASE ORAL at 11:47

## 2024-01-07 RX ADMIN — DONEPEZIL HYDROCHLORIDE 10 MG: 10 TABLET, FILM COATED ORAL at 21:32

## 2024-01-07 RX ADMIN — CITALOPRAM 40 MG: 40 TABLET, FILM COATED ORAL at 11:48

## 2024-01-07 RX ADMIN — METOPROLOL TARTRATE 25 MG: 25 TABLET, FILM COATED ORAL at 11:47

## 2024-01-07 RX ADMIN — MEROPENEM 1000 MG: 1 INJECTION, POWDER, FOR SOLUTION INTRAVENOUS at 02:52

## 2024-01-08 ENCOUNTER — TELEPHONE (OUTPATIENT)
Dept: UROLOGY | Age: 84
End: 2024-01-08

## 2024-01-08 LAB
ANION GAP SERPL CALC-SCNC: 9 MEQ/L (ref 8–16)
BASOPHILS ABSOLUTE: 0 THOU/MM3 (ref 0–0.1)
BASOPHILS NFR BLD AUTO: 0.6 %
BUN SERPL-MCNC: 12 MG/DL (ref 7–22)
CA-I BLD ISE-SCNC: 1.17 MMOL/L (ref 1.12–1.32)
CALCIUM SERPL-MCNC: 9 MG/DL (ref 8.5–10.5)
CHLORIDE SERPL-SCNC: 102 MEQ/L (ref 98–111)
CO2 SERPL-SCNC: 28 MEQ/L (ref 23–33)
CREAT SERPL-MCNC: 0.8 MG/DL (ref 0.4–1.2)
DEPRECATED RDW RBC AUTO: 49 FL (ref 35–45)
EOSINOPHIL NFR BLD AUTO: 7.3 %
EOSINOPHILS ABSOLUTE: 0.4 THOU/MM3 (ref 0–0.4)
ERYTHROCYTE [DISTWIDTH] IN BLOOD BY AUTOMATED COUNT: 14.4 % (ref 11.5–14.5)
GFR SERPL CREATININE-BSD FRML MDRD: > 60 ML/MIN/1.73M2
GLUCOSE BLD STRIP.AUTO-MCNC: 154 MG/DL (ref 70–108)
GLUCOSE BLD STRIP.AUTO-MCNC: 172 MG/DL (ref 70–108)
GLUCOSE BLD STRIP.AUTO-MCNC: 199 MG/DL (ref 70–108)
GLUCOSE BLD STRIP.AUTO-MCNC: 203 MG/DL (ref 70–108)
GLUCOSE BLD STRIP.AUTO-MCNC: 82 MG/DL (ref 70–108)
GLUCOSE SERPL-MCNC: 78 MG/DL (ref 70–108)
HCT VFR BLD AUTO: 34.8 % (ref 42–52)
HGB BLD-MCNC: 10.7 GM/DL (ref 14–18)
IMM GRANULOCYTES # BLD AUTO: 0.02 THOU/MM3 (ref 0–0.07)
IMM GRANULOCYTES NFR BLD AUTO: 0.4 %
LYMPHOCYTES ABSOLUTE: 2.1 THOU/MM3 (ref 1–4.8)
LYMPHOCYTES NFR BLD AUTO: 42.3 %
MAGNESIUM SERPL-MCNC: 1.7 MG/DL (ref 1.6–2.4)
MCH RBC QN AUTO: 28.5 PG (ref 26–33)
MCHC RBC AUTO-ENTMCNC: 30.7 GM/DL (ref 32.2–35.5)
MCV RBC AUTO: 92.8 FL (ref 80–94)
MONOCYTES ABSOLUTE: 0.5 THOU/MM3 (ref 0.4–1.3)
MONOCYTES NFR BLD AUTO: 9.9 %
NEUTROPHILS NFR BLD AUTO: 39.5 %
NRBC BLD AUTO-RTO: 0 /100 WBC
PHOSPHATE SERPL-MCNC: 3.8 MG/DL (ref 2.4–4.7)
PLATELET # BLD AUTO: 230 THOU/MM3 (ref 130–400)
PMV BLD AUTO: 11.2 FL (ref 9.4–12.4)
POTASSIUM SERPL-SCNC: 4.3 MEQ/L (ref 3.5–5.2)
RBC # BLD AUTO: 3.75 MILL/MM3 (ref 4.7–6.1)
SEGMENTED NEUTROPHILS ABSOLUTE COUNT: 2 THOU/MM3 (ref 1.8–7.7)
SODIUM SERPL-SCNC: 139 MEQ/L (ref 135–145)
WBC # BLD AUTO: 5 THOU/MM3 (ref 4.8–10.8)

## 2024-01-08 PROCEDURE — 36415 COLL VENOUS BLD VENIPUNCTURE: CPT

## 2024-01-08 PROCEDURE — 6370000000 HC RX 637 (ALT 250 FOR IP): Performed by: PHYSICIAN ASSISTANT

## 2024-01-08 PROCEDURE — 84100 ASSAY OF PHOSPHORUS: CPT

## 2024-01-08 PROCEDURE — 99231 SBSQ HOSP IP/OBS SF/LOW 25: CPT | Performed by: UROLOGY

## 2024-01-08 PROCEDURE — 85025 COMPLETE CBC W/AUTO DIFF WBC: CPT

## 2024-01-08 PROCEDURE — 80048 BASIC METABOLIC PNL TOTAL CA: CPT

## 2024-01-08 PROCEDURE — 99232 SBSQ HOSP IP/OBS MODERATE 35: CPT | Performed by: PHYSICIAN ASSISTANT

## 2024-01-08 PROCEDURE — 1200000000 HC SEMI PRIVATE

## 2024-01-08 PROCEDURE — 82330 ASSAY OF CALCIUM: CPT

## 2024-01-08 PROCEDURE — 51798 US URINE CAPACITY MEASURE: CPT

## 2024-01-08 PROCEDURE — 82948 REAGENT STRIP/BLOOD GLUCOSE: CPT

## 2024-01-08 PROCEDURE — 6370000000 HC RX 637 (ALT 250 FOR IP)

## 2024-01-08 PROCEDURE — 83735 ASSAY OF MAGNESIUM: CPT

## 2024-01-08 PROCEDURE — 1200000003 HC TELEMETRY R&B

## 2024-01-08 PROCEDURE — 2580000003 HC RX 258: Performed by: INTERNAL MEDICINE

## 2024-01-08 PROCEDURE — 6360000002 HC RX W HCPCS: Performed by: INTERNAL MEDICINE

## 2024-01-08 RX ORDER — INSULIN LISPRO 100 [IU]/ML
0-8 INJECTION, SOLUTION INTRAVENOUS; SUBCUTANEOUS
Status: DISCONTINUED | OUTPATIENT
Start: 2024-01-08 | End: 2024-01-12 | Stop reason: HOSPADM

## 2024-01-08 RX ORDER — INSULIN LISPRO 100 [IU]/ML
0-4 INJECTION, SOLUTION INTRAVENOUS; SUBCUTANEOUS NIGHTLY
Status: DISCONTINUED | OUTPATIENT
Start: 2024-01-08 | End: 2024-01-12 | Stop reason: HOSPADM

## 2024-01-08 RX ADMIN — MEROPENEM 1000 MG: 1 INJECTION, POWDER, FOR SOLUTION INTRAVENOUS at 10:13

## 2024-01-08 RX ADMIN — PANTOPRAZOLE SODIUM 40 MG: 40 TABLET, DELAYED RELEASE ORAL at 06:58

## 2024-01-08 RX ADMIN — MEROPENEM 1000 MG: 1 INJECTION, POWDER, FOR SOLUTION INTRAVENOUS at 19:01

## 2024-01-08 RX ADMIN — LEVOTHYROXINE SODIUM 150 MCG: 0.15 TABLET ORAL at 10:12

## 2024-01-08 RX ADMIN — DONEPEZIL HYDROCHLORIDE 10 MG: 10 TABLET, FILM COATED ORAL at 20:36

## 2024-01-08 RX ADMIN — INSULIN LISPRO 2 UNITS: 100 INJECTION, SOLUTION INTRAVENOUS; SUBCUTANEOUS at 18:53

## 2024-01-08 RX ADMIN — BUPROPION HYDROCHLORIDE 150 MG: 150 TABLET, EXTENDED RELEASE ORAL at 10:12

## 2024-01-08 RX ADMIN — INSULIN GLARGINE 30 UNITS: 100 INJECTION, SOLUTION SUBCUTANEOUS at 20:41

## 2024-01-08 RX ADMIN — METOPROLOL TARTRATE 25 MG: 25 TABLET, FILM COATED ORAL at 10:12

## 2024-01-08 RX ADMIN — CITALOPRAM 40 MG: 40 TABLET, FILM COATED ORAL at 10:12

## 2024-01-08 RX ADMIN — MEROPENEM 1000 MG: 1 INJECTION, POWDER, FOR SOLUTION INTRAVENOUS at 02:33

## 2024-01-08 RX ADMIN — PRAVASTATIN SODIUM 80 MG: 80 TABLET ORAL at 20:36

## 2024-01-08 RX ADMIN — METOPROLOL TARTRATE 25 MG: 25 TABLET, FILM COATED ORAL at 20:36

## 2024-01-08 RX ADMIN — TAMSULOSIN HYDROCHLORIDE 0.8 MG: 0.4 CAPSULE ORAL at 10:12

## 2024-01-09 LAB
ANION GAP SERPL CALC-SCNC: 8 MEQ/L (ref 8–16)
BASOPHILS ABSOLUTE: 0 THOU/MM3 (ref 0–0.1)
BASOPHILS NFR BLD AUTO: 0.9 %
BUN SERPL-MCNC: 20 MG/DL (ref 7–22)
CA-I BLD ISE-SCNC: 1.17 MMOL/L (ref 1.12–1.32)
CALCIUM SERPL-MCNC: 8.4 MG/DL (ref 8.5–10.5)
CHLORIDE SERPL-SCNC: 102 MEQ/L (ref 98–111)
CO2 SERPL-SCNC: 27 MEQ/L (ref 23–33)
CREAT SERPL-MCNC: 1.1 MG/DL (ref 0.4–1.2)
DEPRECATED RDW RBC AUTO: 48.2 FL (ref 35–45)
EOSINOPHIL NFR BLD AUTO: 7.5 %
EOSINOPHILS ABSOLUTE: 0.4 THOU/MM3 (ref 0–0.4)
ERYTHROCYTE [DISTWIDTH] IN BLOOD BY AUTOMATED COUNT: 14.5 % (ref 11.5–14.5)
GFR SERPL CREATININE-BSD FRML MDRD: > 60 ML/MIN/1.73M2
GLUCOSE BLD STRIP.AUTO-MCNC: 117 MG/DL (ref 70–108)
GLUCOSE BLD STRIP.AUTO-MCNC: 188 MG/DL (ref 70–108)
GLUCOSE BLD STRIP.AUTO-MCNC: 200 MG/DL (ref 70–108)
GLUCOSE BLD STRIP.AUTO-MCNC: 289 MG/DL (ref 70–108)
GLUCOSE SERPL-MCNC: 120 MG/DL (ref 70–108)
HCT VFR BLD AUTO: 33.5 % (ref 42–52)
HGB BLD-MCNC: 10.3 GM/DL (ref 14–18)
IMM GRANULOCYTES # BLD AUTO: 0.02 THOU/MM3 (ref 0–0.07)
IMM GRANULOCYTES NFR BLD AUTO: 0.4 %
LYMPHOCYTES ABSOLUTE: 2.1 THOU/MM3 (ref 1–4.8)
LYMPHOCYTES NFR BLD AUTO: 44.9 %
MCH RBC QN AUTO: 28.2 PG (ref 26–33)
MCHC RBC AUTO-ENTMCNC: 30.7 GM/DL (ref 32.2–35.5)
MCV RBC AUTO: 91.8 FL (ref 80–94)
MONOCYTES ABSOLUTE: 0.5 THOU/MM3 (ref 0.4–1.3)
MONOCYTES NFR BLD AUTO: 10.3 %
NEUTROPHILS NFR BLD AUTO: 36 %
NRBC BLD AUTO-RTO: 0 /100 WBC
PLATELET # BLD AUTO: 230 THOU/MM3 (ref 130–400)
PMV BLD AUTO: 11.3 FL (ref 9.4–12.4)
POTASSIUM SERPL-SCNC: 4.6 MEQ/L (ref 3.5–5.2)
RBC # BLD AUTO: 3.65 MILL/MM3 (ref 4.7–6.1)
SEGMENTED NEUTROPHILS ABSOLUTE COUNT: 1.7 THOU/MM3 (ref 1.8–7.7)
SODIUM SERPL-SCNC: 137 MEQ/L (ref 135–145)
WBC # BLD AUTO: 4.7 THOU/MM3 (ref 4.8–10.8)

## 2024-01-09 PROCEDURE — 6360000002 HC RX W HCPCS: Performed by: INTERNAL MEDICINE

## 2024-01-09 PROCEDURE — 6370000000 HC RX 637 (ALT 250 FOR IP): Performed by: PHYSICIAN ASSISTANT

## 2024-01-09 PROCEDURE — 1200000000 HC SEMI PRIVATE

## 2024-01-09 PROCEDURE — 1200000003 HC TELEMETRY R&B

## 2024-01-09 PROCEDURE — 82948 REAGENT STRIP/BLOOD GLUCOSE: CPT

## 2024-01-09 PROCEDURE — 85025 COMPLETE CBC W/AUTO DIFF WBC: CPT

## 2024-01-09 PROCEDURE — 80048 BASIC METABOLIC PNL TOTAL CA: CPT

## 2024-01-09 PROCEDURE — 99232 SBSQ HOSP IP/OBS MODERATE 35: CPT | Performed by: PHYSICIAN ASSISTANT

## 2024-01-09 PROCEDURE — 2580000003 HC RX 258: Performed by: INTERNAL MEDICINE

## 2024-01-09 PROCEDURE — 6370000000 HC RX 637 (ALT 250 FOR IP)

## 2024-01-09 PROCEDURE — 82330 ASSAY OF CALCIUM: CPT

## 2024-01-09 PROCEDURE — 36415 COLL VENOUS BLD VENIPUNCTURE: CPT

## 2024-01-09 RX ORDER — TORSEMIDE 20 MG/1
20 TABLET ORAL DAILY
Status: DISCONTINUED | OUTPATIENT
Start: 2024-01-09 | End: 2024-01-12 | Stop reason: HOSPADM

## 2024-01-09 RX ADMIN — METOPROLOL TARTRATE 25 MG: 25 TABLET, FILM COATED ORAL at 08:30

## 2024-01-09 RX ADMIN — INSULIN LISPRO 4 UNITS: 100 INJECTION, SOLUTION INTRAVENOUS; SUBCUTANEOUS at 18:07

## 2024-01-09 RX ADMIN — TORSEMIDE 20 MG: 20 TABLET ORAL at 13:01

## 2024-01-09 RX ADMIN — LEVOTHYROXINE SODIUM 150 MCG: 0.15 TABLET ORAL at 08:30

## 2024-01-09 RX ADMIN — TAMSULOSIN HYDROCHLORIDE 0.8 MG: 0.4 CAPSULE ORAL at 08:29

## 2024-01-09 RX ADMIN — MEROPENEM 1000 MG: 1 INJECTION, POWDER, FOR SOLUTION INTRAVENOUS at 02:51

## 2024-01-09 RX ADMIN — MEROPENEM 1000 MG: 1 INJECTION, POWDER, FOR SOLUTION INTRAVENOUS at 12:58

## 2024-01-09 RX ADMIN — PRAVASTATIN SODIUM 80 MG: 80 TABLET ORAL at 20:08

## 2024-01-09 RX ADMIN — BUPROPION HYDROCHLORIDE 150 MG: 150 TABLET, EXTENDED RELEASE ORAL at 08:30

## 2024-01-09 RX ADMIN — DONEPEZIL HYDROCHLORIDE 10 MG: 10 TABLET, FILM COATED ORAL at 20:08

## 2024-01-09 RX ADMIN — MEROPENEM 1000 MG: 1 INJECTION, POWDER, FOR SOLUTION INTRAVENOUS at 18:09

## 2024-01-09 RX ADMIN — CITALOPRAM 40 MG: 40 TABLET, FILM COATED ORAL at 08:29

## 2024-01-09 RX ADMIN — PANTOPRAZOLE SODIUM 40 MG: 40 TABLET, DELAYED RELEASE ORAL at 05:39

## 2024-01-09 RX ADMIN — INSULIN GLARGINE 30 UNITS: 100 INJECTION, SOLUTION SUBCUTANEOUS at 20:08

## 2024-01-10 LAB
ANION GAP SERPL CALC-SCNC: 12 MEQ/L (ref 8–16)
BACTERIA BLD AEROBE CULT: NORMAL
BASOPHILS ABSOLUTE: 0 THOU/MM3 (ref 0–0.1)
BASOPHILS NFR BLD AUTO: 0.8 %
BUN SERPL-MCNC: 22 MG/DL (ref 7–22)
CALCIUM SERPL-MCNC: 8.7 MG/DL (ref 8.5–10.5)
CHLORIDE SERPL-SCNC: 98 MEQ/L (ref 98–111)
CO2 SERPL-SCNC: 27 MEQ/L (ref 23–33)
CREAT SERPL-MCNC: 1.1 MG/DL (ref 0.4–1.2)
DEPRECATED RDW RBC AUTO: 48.1 FL (ref 35–45)
EOSINOPHIL NFR BLD AUTO: 6.5 %
EOSINOPHILS ABSOLUTE: 0.3 THOU/MM3 (ref 0–0.4)
ERYTHROCYTE [DISTWIDTH] IN BLOOD BY AUTOMATED COUNT: 14.5 % (ref 11.5–14.5)
GFR SERPL CREATININE-BSD FRML MDRD: > 60 ML/MIN/1.73M2
GLUCOSE BLD STRIP.AUTO-MCNC: 123 MG/DL (ref 70–108)
GLUCOSE BLD STRIP.AUTO-MCNC: 196 MG/DL (ref 70–108)
GLUCOSE BLD STRIP.AUTO-MCNC: 264 MG/DL (ref 70–108)
GLUCOSE BLD STRIP.AUTO-MCNC: 285 MG/DL (ref 70–108)
GLUCOSE SERPL-MCNC: 132 MG/DL (ref 70–108)
HCT VFR BLD AUTO: 35.3 % (ref 42–52)
HGB BLD-MCNC: 11.2 GM/DL (ref 14–18)
IMM GRANULOCYTES # BLD AUTO: 0.02 THOU/MM3 (ref 0–0.07)
IMM GRANULOCYTES NFR BLD AUTO: 0.4 %
LYMPHOCYTES ABSOLUTE: 2.3 THOU/MM3 (ref 1–4.8)
LYMPHOCYTES NFR BLD AUTO: 46.7 %
MCH RBC QN AUTO: 28.9 PG (ref 26–33)
MCHC RBC AUTO-ENTMCNC: 31.7 GM/DL (ref 32.2–35.5)
MCV RBC AUTO: 91.2 FL (ref 80–94)
MONOCYTES ABSOLUTE: 0.5 THOU/MM3 (ref 0.4–1.3)
MONOCYTES NFR BLD AUTO: 9.6 %
NEUTROPHILS NFR BLD AUTO: 36 %
NRBC BLD AUTO-RTO: 0 /100 WBC
PLATELET # BLD AUTO: 230 THOU/MM3 (ref 130–400)
PMV BLD AUTO: 11.5 FL (ref 9.4–12.4)
POTASSIUM SERPL-SCNC: 3.9 MEQ/L (ref 3.5–5.2)
RBC # BLD AUTO: 3.87 MILL/MM3 (ref 4.7–6.1)
SEGMENTED NEUTROPHILS ABSOLUTE COUNT: 1.8 THOU/MM3 (ref 1.8–7.7)
SODIUM SERPL-SCNC: 137 MEQ/L (ref 135–145)
WBC # BLD AUTO: 4.9 THOU/MM3 (ref 4.8–10.8)

## 2024-01-10 PROCEDURE — 2580000003 HC RX 258

## 2024-01-10 PROCEDURE — 6370000000 HC RX 637 (ALT 250 FOR IP)

## 2024-01-10 PROCEDURE — 1200000000 HC SEMI PRIVATE

## 2024-01-10 PROCEDURE — 99232 SBSQ HOSP IP/OBS MODERATE 35: CPT | Performed by: PHYSICIAN ASSISTANT

## 2024-01-10 PROCEDURE — 2580000003 HC RX 258: Performed by: INTERNAL MEDICINE

## 2024-01-10 PROCEDURE — 82948 REAGENT STRIP/BLOOD GLUCOSE: CPT

## 2024-01-10 PROCEDURE — 36415 COLL VENOUS BLD VENIPUNCTURE: CPT

## 2024-01-10 PROCEDURE — 6370000000 HC RX 637 (ALT 250 FOR IP): Performed by: PHYSICIAN ASSISTANT

## 2024-01-10 PROCEDURE — 6360000002 HC RX W HCPCS: Performed by: INTERNAL MEDICINE

## 2024-01-10 PROCEDURE — 80048 BASIC METABOLIC PNL TOTAL CA: CPT

## 2024-01-10 PROCEDURE — 85025 COMPLETE CBC W/AUTO DIFF WBC: CPT

## 2024-01-10 RX ADMIN — PRAVASTATIN SODIUM 80 MG: 80 TABLET ORAL at 20:55

## 2024-01-10 RX ADMIN — INSULIN LISPRO 4 UNITS: 100 INJECTION, SOLUTION INTRAVENOUS; SUBCUTANEOUS at 12:03

## 2024-01-10 RX ADMIN — MEROPENEM 1000 MG: 1 INJECTION, POWDER, FOR SOLUTION INTRAVENOUS at 02:25

## 2024-01-10 RX ADMIN — TAMSULOSIN HYDROCHLORIDE 0.8 MG: 0.4 CAPSULE ORAL at 10:22

## 2024-01-10 RX ADMIN — METOPROLOL TARTRATE 25 MG: 25 TABLET, FILM COATED ORAL at 20:55

## 2024-01-10 RX ADMIN — MEROPENEM 1000 MG: 1 INJECTION, POWDER, FOR SOLUTION INTRAVENOUS at 18:35

## 2024-01-10 RX ADMIN — BUPROPION HYDROCHLORIDE 150 MG: 150 TABLET, EXTENDED RELEASE ORAL at 10:21

## 2024-01-10 RX ADMIN — DONEPEZIL HYDROCHLORIDE 10 MG: 10 TABLET, FILM COATED ORAL at 20:55

## 2024-01-10 RX ADMIN — SODIUM CHLORIDE, PRESERVATIVE FREE 10 ML: 5 INJECTION INTRAVENOUS at 10:22

## 2024-01-10 RX ADMIN — PANTOPRAZOLE SODIUM 40 MG: 40 TABLET, DELAYED RELEASE ORAL at 10:22

## 2024-01-10 RX ADMIN — LEVOTHYROXINE SODIUM 150 MCG: 0.15 TABLET ORAL at 10:22

## 2024-01-10 RX ADMIN — INSULIN GLARGINE 30 UNITS: 100 INJECTION, SOLUTION SUBCUTANEOUS at 20:55

## 2024-01-10 RX ADMIN — TORSEMIDE 20 MG: 20 TABLET ORAL at 10:21

## 2024-01-10 RX ADMIN — CITALOPRAM 40 MG: 40 TABLET, FILM COATED ORAL at 10:21

## 2024-01-10 RX ADMIN — MEROPENEM 1000 MG: 1 INJECTION, POWDER, FOR SOLUTION INTRAVENOUS at 10:24

## 2024-01-11 LAB
ANION GAP SERPL CALC-SCNC: 9 MEQ/L (ref 8–16)
BACTERIA BLD AEROBE CULT: NORMAL
BACTERIA BLD AEROBE CULT: NORMAL
BUN SERPL-MCNC: 24 MG/DL (ref 7–22)
CALCIUM SERPL-MCNC: 8.7 MG/DL (ref 8.5–10.5)
CHLORIDE SERPL-SCNC: 97 MEQ/L (ref 98–111)
CO2 SERPL-SCNC: 31 MEQ/L (ref 23–33)
CREAT SERPL-MCNC: 1.1 MG/DL (ref 0.4–1.2)
DEPRECATED RDW RBC AUTO: 47.4 FL (ref 35–45)
ERYTHROCYTE [DISTWIDTH] IN BLOOD BY AUTOMATED COUNT: 14.5 % (ref 11.5–14.5)
GFR SERPL CREATININE-BSD FRML MDRD: > 60 ML/MIN/1.73M2
GLUCOSE BLD STRIP.AUTO-MCNC: 225 MG/DL (ref 70–108)
GLUCOSE BLD STRIP.AUTO-MCNC: 225 MG/DL (ref 70–108)
GLUCOSE BLD STRIP.AUTO-MCNC: 239 MG/DL (ref 70–108)
GLUCOSE BLD STRIP.AUTO-MCNC: 98 MG/DL (ref 70–108)
GLUCOSE SERPL-MCNC: 111 MG/DL (ref 70–108)
HCT VFR BLD AUTO: 35.4 % (ref 42–52)
HGB BLD-MCNC: 11.3 GM/DL (ref 14–18)
MCH RBC QN AUTO: 28.8 PG (ref 26–33)
MCHC RBC AUTO-ENTMCNC: 31.9 GM/DL (ref 32.2–35.5)
MCV RBC AUTO: 90.3 FL (ref 80–94)
PLATELET # BLD AUTO: 234 THOU/MM3 (ref 130–400)
PMV BLD AUTO: 11.3 FL (ref 9.4–12.4)
POTASSIUM SERPL-SCNC: 4 MEQ/L (ref 3.5–5.2)
RBC # BLD AUTO: 3.92 MILL/MM3 (ref 4.7–6.1)
SODIUM SERPL-SCNC: 137 MEQ/L (ref 135–145)
WBC # BLD AUTO: 5.1 THOU/MM3 (ref 4.8–10.8)

## 2024-01-11 PROCEDURE — 99232 SBSQ HOSP IP/OBS MODERATE 35: CPT | Performed by: PHYSICIAN ASSISTANT

## 2024-01-11 PROCEDURE — 6370000000 HC RX 637 (ALT 250 FOR IP)

## 2024-01-11 PROCEDURE — 2580000003 HC RX 258

## 2024-01-11 PROCEDURE — 2580000003 HC RX 258: Performed by: INTERNAL MEDICINE

## 2024-01-11 PROCEDURE — 82948 REAGENT STRIP/BLOOD GLUCOSE: CPT

## 2024-01-11 PROCEDURE — 85027 COMPLETE CBC AUTOMATED: CPT

## 2024-01-11 PROCEDURE — 1200000000 HC SEMI PRIVATE

## 2024-01-11 PROCEDURE — 6360000002 HC RX W HCPCS: Performed by: INTERNAL MEDICINE

## 2024-01-11 PROCEDURE — 6370000000 HC RX 637 (ALT 250 FOR IP): Performed by: PHYSICIAN ASSISTANT

## 2024-01-11 PROCEDURE — 80048 BASIC METABOLIC PNL TOTAL CA: CPT

## 2024-01-11 PROCEDURE — 36415 COLL VENOUS BLD VENIPUNCTURE: CPT

## 2024-01-11 RX ADMIN — INSULIN LISPRO 2 UNITS: 100 INJECTION, SOLUTION INTRAVENOUS; SUBCUTANEOUS at 14:05

## 2024-01-11 RX ADMIN — DONEPEZIL HYDROCHLORIDE 10 MG: 10 TABLET, FILM COATED ORAL at 19:51

## 2024-01-11 RX ADMIN — MEROPENEM 1000 MG: 1 INJECTION, POWDER, FOR SOLUTION INTRAVENOUS at 10:54

## 2024-01-11 RX ADMIN — TORSEMIDE 20 MG: 20 TABLET ORAL at 10:47

## 2024-01-11 RX ADMIN — PANTOPRAZOLE SODIUM 40 MG: 40 TABLET, DELAYED RELEASE ORAL at 06:36

## 2024-01-11 RX ADMIN — MEROPENEM 1000 MG: 1 INJECTION, POWDER, FOR SOLUTION INTRAVENOUS at 01:45

## 2024-01-11 RX ADMIN — LEVOTHYROXINE SODIUM 150 MCG: 0.15 TABLET ORAL at 10:47

## 2024-01-11 RX ADMIN — PRAVASTATIN SODIUM 80 MG: 80 TABLET ORAL at 19:51

## 2024-01-11 RX ADMIN — CITALOPRAM 40 MG: 40 TABLET, FILM COATED ORAL at 10:47

## 2024-01-11 RX ADMIN — TAMSULOSIN HYDROCHLORIDE 0.8 MG: 0.4 CAPSULE ORAL at 10:47

## 2024-01-11 RX ADMIN — INSULIN GLARGINE 30 UNITS: 100 INJECTION, SOLUTION SUBCUTANEOUS at 20:00

## 2024-01-11 RX ADMIN — SODIUM CHLORIDE, PRESERVATIVE FREE 10 ML: 5 INJECTION INTRAVENOUS at 19:50

## 2024-01-11 RX ADMIN — MEROPENEM 1000 MG: 1 INJECTION, POWDER, FOR SOLUTION INTRAVENOUS at 19:50

## 2024-01-11 RX ADMIN — ERGOCALCIFEROL 50000 UNITS: 1.25 CAPSULE ORAL at 10:47

## 2024-01-11 RX ADMIN — BUPROPION HYDROCHLORIDE 150 MG: 150 TABLET, EXTENDED RELEASE ORAL at 10:47

## 2024-01-11 RX ADMIN — SODIUM CHLORIDE, PRESERVATIVE FREE 10 ML: 5 INJECTION INTRAVENOUS at 10:47

## 2024-01-11 RX ADMIN — SODIUM CHLORIDE: 9 INJECTION, SOLUTION INTRAVENOUS at 19:49

## 2024-01-11 RX ADMIN — INSULIN LISPRO 2 UNITS: 100 INJECTION, SOLUTION INTRAVENOUS; SUBCUTANEOUS at 17:16

## 2024-01-11 RX ADMIN — SODIUM CHLORIDE: 9 INJECTION, SOLUTION INTRAVENOUS at 01:45

## 2024-01-11 ASSESSMENT — PAIN SCALES - GENERAL
PAINLEVEL_OUTOF10: 0
PAINLEVEL_OUTOF10: 0

## 2024-01-12 VITALS
DIASTOLIC BLOOD PRESSURE: 68 MMHG | RESPIRATION RATE: 18 BRPM | HEIGHT: 68 IN | WEIGHT: 220.24 LBS | HEART RATE: 84 BPM | TEMPERATURE: 97.3 F | BODY MASS INDEX: 33.38 KG/M2 | OXYGEN SATURATION: 95 % | SYSTOLIC BLOOD PRESSURE: 119 MMHG

## 2024-01-12 LAB
GLUCOSE BLD STRIP.AUTO-MCNC: 153 MG/DL (ref 70–108)
GLUCOSE BLD STRIP.AUTO-MCNC: 213 MG/DL (ref 70–108)

## 2024-01-12 PROCEDURE — 6360000002 HC RX W HCPCS: Performed by: INTERNAL MEDICINE

## 2024-01-12 PROCEDURE — 6370000000 HC RX 637 (ALT 250 FOR IP)

## 2024-01-12 PROCEDURE — 2580000003 HC RX 258: Performed by: INTERNAL MEDICINE

## 2024-01-12 PROCEDURE — 6370000000 HC RX 637 (ALT 250 FOR IP): Performed by: PHYSICIAN ASSISTANT

## 2024-01-12 PROCEDURE — 2580000003 HC RX 258

## 2024-01-12 PROCEDURE — 82948 REAGENT STRIP/BLOOD GLUCOSE: CPT

## 2024-01-12 RX ADMIN — BUPROPION HYDROCHLORIDE 150 MG: 150 TABLET, EXTENDED RELEASE ORAL at 08:04

## 2024-01-12 RX ADMIN — SODIUM CHLORIDE, PRESERVATIVE FREE 10 ML: 5 INJECTION INTRAVENOUS at 08:05

## 2024-01-12 RX ADMIN — PANTOPRAZOLE SODIUM 40 MG: 40 TABLET, DELAYED RELEASE ORAL at 06:40

## 2024-01-12 RX ADMIN — INSULIN LISPRO 2 UNITS: 100 INJECTION, SOLUTION INTRAVENOUS; SUBCUTANEOUS at 12:38

## 2024-01-12 RX ADMIN — TORSEMIDE 20 MG: 20 TABLET ORAL at 08:04

## 2024-01-12 RX ADMIN — TAMSULOSIN HYDROCHLORIDE 0.8 MG: 0.4 CAPSULE ORAL at 08:04

## 2024-01-12 RX ADMIN — LEVOTHYROXINE SODIUM 150 MCG: 0.15 TABLET ORAL at 08:04

## 2024-01-12 RX ADMIN — CITALOPRAM 40 MG: 40 TABLET, FILM COATED ORAL at 08:04

## 2024-01-12 RX ADMIN — MEROPENEM 1000 MG: 1 INJECTION, POWDER, FOR SOLUTION INTRAVENOUS at 11:16

## 2024-01-12 RX ADMIN — METOPROLOL TARTRATE 25 MG: 25 TABLET, FILM COATED ORAL at 08:04

## 2024-01-12 RX ADMIN — MEROPENEM 1000 MG: 1 INJECTION, POWDER, FOR SOLUTION INTRAVENOUS at 02:04

## 2024-01-12 ASSESSMENT — PAIN SCALES - GENERAL: PAINLEVEL_OUTOF10: 0

## 2024-01-12 NOTE — PROGRESS NOTES
Progress note: Infectious diseases    Patient - Leo Nevarez,  Age - 83 y.o.    - 1940      Room Number - 5K-17/017-A   MRN -  [id number]   [facility] # - [id number]  Date of Admission -  2024 11:58 PM    SUBJECTIVE:   No new issues  OBJECTIVE   VITALS    height is 1.727 m (5' 8\") and weight is 99.9 kg (220 lb 3.8 oz). His axillary temperature is 96.9 °F (36.1 °C). His blood pressure is 114/64 and his pulse is 97. His respiration is 16 and oxygen saturation is 95%.       Wt Readings from Last 3 Encounters:   24 99.9 kg (220 lb 3.8 oz)   23 117.9 kg (260 lb)   23 112 kg (247 lb)       I/O (24 Hours)    Intake/Output Summary (Last 24 hours) at 1/10/2024 1244  Last data filed at 1/10/2024 1241  Gross per 24 hour   Intake 1722.82 ml   Output 1885 ml   Net -162.18 ml         General Appearance  Awake, alert, oriented,  not  In acute distress  HEENT - normocephalic, atraumatic, pink conjunctiva,  anicteric sclera  Neck - Supple, no mass  Lungs -  Bilateral  air entry, no rhonchi, no wheeze  Cardiovascular - Heart sounds are normal.     Abdomen - soft, not distended, nontender,   Neurologic -oriented  Skin - No bruising or bleeding  Extremities - No edema, no cyanosis, clubbing   The scrotal swelling has improved  MEDICATIONS:      torsemide  20 mg Oral Daily    insulin lispro  0-8 Units SubCUTAneous TID WC    insulin lispro  0-4 Units SubCUTAneous Nightly    tamsulosin  0.8 mg Oral Daily    meropenem  1,000 mg IntraVENous Q8H    [Held by provider] aspirin  81 mg Oral Daily    buPROPion  150 mg Oral Daily    citalopram  40 mg Oral Daily    donepezil  10 mg Oral Nightly    [START ON 2024] vitamin D  50,000 Units Oral Weekly    levothyroxine  150 mcg Oral Daily    insulin glargine  30 Units SubCUTAneous Nightly    metoprolol tartrate  25 mg Oral BID    pantoprazole  40 mg Oral QAM AC    pravastatin  80 mg 
                                                                                          Progress note: Infectious diseases    Patient - Leo Nevarez,  Age - 83 y.o.    - 1940      Room Number - 5K-17/017-A   MRN -  [id number]   [facility] # - [id number]  Date of Admission -  2024 11:58 PM    SUBJECTIVE:   He is feeling better  OBJECTIVE   VITALS    height is 1.727 m (5' 8\") and weight is 99.9 kg (220 lb 3.8 oz). His oral temperature is 98 °F (36.7 °C). His blood pressure is 123/54 (abnormal) and his pulse is 67. His respiration is 18 and oxygen saturation is 95%.       Wt Readings from Last 3 Encounters:   24 99.9 kg (220 lb 3.8 oz)   23 117.9 kg (260 lb)   23 112 kg (247 lb)       I/O (24 Hours)    Intake/Output Summary (Last 24 hours) at 2024 1344  Last data filed at 2024 1230  Gross per 24 hour   Intake 1700.46 ml   Output 1400 ml   Net 300.46 ml         General Appearance  Awake, alert, oriented,  not  In acute distress  HEENT - normocephalic, atraumatic, pink conjunctiva,  anicteric sclera  Neck - Supple, no mass  Lungs -  Bilateral  air entry, no rhonchi, no wheeze  Cardiovascular - Heart sounds are normal.     Abdomen - soft, not distended, nontender,   Neurologic -oriented  Skin - No bruising or bleeding  Extremities - No edema, no cyanosis, clubbing   The scrotal swelling has improved  MEDICATIONS:      torsemide  20 mg Oral Daily    insulin lispro  0-8 Units SubCUTAneous TID WC    insulin lispro  0-4 Units SubCUTAneous Nightly    tamsulosin  0.8 mg Oral Daily    meropenem  1,000 mg IntraVENous Q8H    [Held by provider] aspirin  81 mg Oral Daily    buPROPion  150 mg Oral Daily    citalopram  40 mg Oral Daily    donepezil  10 mg Oral Nightly    [START ON 2024] vitamin D  50,000 Units Oral Weekly    levothyroxine  150 mcg Oral Daily    insulin glargine  30 Units SubCUTAneous Nightly    metoprolol tartrate  25 mg Oral BID    pantoprazole  40 mg Oral QAM AC    
                                                                                          Progress note: Infectious diseases    Patient - Leo Nevarez,  Age - 83 y.o.    - 1940      Room Number - 5K-17/017-A   MRN -  [id number]   [facility] # - [id number]  Date of Admission -  2024 11:58 PM    SUBJECTIVE:   No new issues  OBJECTIVE   VITALS    height is 1.727 m (5' 8\") and weight is 99.9 kg (220 lb 3.8 oz). His oral temperature is 97.3 °F (36.3 °C). His blood pressure is 119/68 and his pulse is 84. His respiration is 18 and oxygen saturation is 95%.       Wt Readings from Last 3 Encounters:   24 99.9 kg (220 lb 3.8 oz)   23 117.9 kg (260 lb)   23 112 kg (247 lb)       I/O (24 Hours)    Intake/Output Summary (Last 24 hours) at 2024 1425  Last data filed at 2024 1010  Gross per 24 hour   Intake 925.54 ml   Output 1375 ml   Net -449.46 ml         General Appearance  Awake, alert, oriented,  not  In acute distress  HEENT - normocephalic, atraumatic, pink conjunctiva,  anicteric sclera  Neck - Supple, no mass  Lungs -  Bilateral  air entry, no rhonchi, no wheeze  Cardiovascular - Heart sounds are normal.     Abdomen - soft, not distended, nontender,   Neurologic -oriented  Skin - No bruising or bleeding  Extremities - No edema, no cyanosis, clubbing   The scrotal swelling is less, non tender  MEDICATIONS:      torsemide  20 mg Oral Daily    insulin lispro  0-8 Units SubCUTAneous TID WC    insulin lispro  0-4 Units SubCUTAneous Nightly    tamsulosin  0.8 mg Oral Daily    [Held by provider] aspirin  81 mg Oral Daily    buPROPion  150 mg Oral Daily    citalopram  40 mg Oral Daily    donepezil  10 mg Oral Nightly    vitamin D  50,000 Units Oral Weekly    levothyroxine  150 mcg Oral Daily    insulin glargine  30 Units SubCUTAneous Nightly    metoprolol tartrate  25 mg Oral BID    pantoprazole  40 mg Oral QAM AC    pravastatin  80 mg Oral Nightly    sodium chloride flush  5-40 mL 
                                                                                          Progress note: Infectious diseases    Patient - Leo Nevarez,  Age - 83 y.o.    - 1940      Room Number - 5K-17/017-A   MRN -  [id number]   [facility] # - [id number]  Date of Admission -  2024 11:58 PM    SUBJECTIVE:   No new issues  OBJECTIVE   VITALS    height is 1.727 m (5' 8\") and weight is 98.7 kg (217 lb 9.6 oz). His oral temperature is 97.9 °F (36.6 °C). His blood pressure is 130/68 and his pulse is 91. His respiration is 16 and oxygen saturation is 94%.       Wt Readings from Last 3 Encounters:   24 98.7 kg (217 lb 9.6 oz)   23 117.9 kg (260 lb)   23 112 kg (247 lb)       I/O (24 Hours)    Intake/Output Summary (Last 24 hours) at 2024 1411  Last data filed at 2024 1258  Gross per 24 hour   Intake 1507.58 ml   Output 1035 ml   Net 472.58 ml       General Appearance  Awake, alert, oriented,  not  In acute distress  HEENT - normocephalic, atraumatic, pink conjunctiva,  anicteric sclera  Neck - Supple, no mass  Lungs -  Bilateral  air entry, no rhonchi, no wheeze  Cardiovascular - Heart sounds are normal.     Abdomen - soft, not distended, nontender,   Neurologic -oriented  Skin - No bruising or bleeding  Extremities - No edema, no cyanosis, clubbing   Minimal swelling on the scrotal  MEDICATIONS:      tamsulosin  0.8 mg Oral Daily    insulin lispro  0-16 Units SubCUTAneous TID     insulin lispro  0-4 Units SubCUTAneous Nightly    meropenem  1,000 mg IntraVENous Q8H    [Held by provider] aspirin  81 mg Oral Daily    buPROPion  150 mg Oral Daily    citalopram  40 mg Oral Daily    donepezil  10 mg Oral Nightly    [START ON 2024] vitamin D  50,000 Units Oral Weekly    levothyroxine  150 mcg Oral Daily    insulin glargine  30 Units SubCUTAneous Nightly    metoprolol tartrate  25 mg Oral BID    pantoprazole  40 mg Oral QAM AC    pravastatin  80 mg Oral Nightly    [Held by provider] 
    Hospitalist Progress Note    Patient:  Leo Nevarez      Unit/Bed:5K-17/017-A    YOB: 1940    MRN: [id number]       Acct: [id number]     PCP: Aileen Valadez APRN - CNP    Date of Admission: 1/4/2024    Active Hospital Problems    Diagnosis Date Noted    Epididymoorchitis [N45.3] 01/06/2024    Acute UTI [N39.0] 01/05/2024     Assessment/Plan:    # Complicated ESBL UTI Associated with Esqueda Catheter: esqueda catheter placed 11/2023 initially for urinary retention with BPH. Has been seeing Urology. Has been on Cipro for recurrent UTI? Ux growing ESBL.  Esqueda catheter removed on 1/5, has been spontaneously voiding. Will get bladder scans, con Flomax BID. Urology consulted and following. Planning for OP Cystoscopy scheduled for 1/22/23.     # Left Epididymo-Orchitis with Scrotal Swelling: scrotal U/S showing findings consistent with left epididymo-orchitis. ID consulted, started on IV Meropenem given history of ESBL and Ux growing ESBL. Cont scrotal elevation.     # Gram + Cocci 1 out of 2 Positive Blood Culture: growing micrococcus species, likely contaminant. Order repeat blood cultures     # NAVNEET (resolved): baseline Cr 0.9, presented at 1.5. Started on IVF hydration. Cr back to baseline today. Cont to trend daily. Strict I/Os. Daily weights.     # Alzheimer's Dementia: continue Aricept    # NIDDMII: Hold PO medications at this time. Started on SSI. Diabetic diet.         Chief Complaint: scrotal swelling     Hospital Course: Leo Nevarez is a 83 y.o. male with PMHx of DMII, Dementia, CAD who presents to [facility] with swollen scrotum.  Patient is not a good historian 2/2 dementia. Patient's daughter/caregiver is able to augment HPI information. She states that the patient vomited twice yesterday and that it was NBNB in nature. She also mentions that the patient expressed to her that his scrotum was swollen and painful, hence admission. He describes the pain as intermittent and 
    Hospitalist Progress Note    Patient:  Leo Nevarez      Unit/Bed:5K-17/017-A    YOB: 1940    MRN: [id number]       Acct: [id number]     PCP: Aileen Valadez APRN - CNP    Date of Admission: 1/4/2024    Active Hospital Problems    Diagnosis Date Noted    Acute UTI [N39.0] 01/05/2024     Assessment/Plan:    # Complicated UTI Associated with Esqueda Catheter: esqueda catheter placed 11/2023 initially for urinary retention with BPH. Has been seeing Urology. Has been on Cipro for recurrent UTI? Ux growing GNB. Has history of ESBL. Esqueda catheter removed on 1/5, has been spontaneously voiding. Will get bladder scans, con Flomax. Urology consulted and following. Planning for OP Cystoscopy scheduled for 1/22/23.     # Left Epididymo-Orchitis with Scrotal Swelling: scrotal U/S showing findings consistent with left epididymo-orchitis. ID consulted, started on IV Meropenem given history of ESBL. Cont scrotal elevation.     # Gram + Cocci 1 out of 2 Positive Blood Culture: likely contaminant. Order repeat blood cultures     # NAVNEET: baseline Cr 0.9, presented at 1.5. Started on IVF hydration. Cr back to baseline today. Cont to trend daily. Strict I/Os. Daily weights.     # Alzheimer's Dementia: continue Aricept    # NIDDMII: Hold PO medications at this time. Started on SSI. Diabetic diet.         Chief Complaint: scrotal swelling     Hospital Course: Leo Nevarez is a 83 y.o. male with PMHx of DMII, Dementia, CAD who presents to [facility] with swollen scrotum.  Patient is not a good historian 2/2 dementia. Patient's daughter/caregiver is able to augment HPI information. She states that the patient vomited twice yesterday and that it was NBNB in nature. She also mentions that the patient expressed to her that his scrotum was swollen and painful, hence admission. He describes the pain as intermittent and 7/10 when it is hurting. He does have a high pain tolerance and currently not complaining 
    [facility]  [facility]  [address] [address]  Dept: [phone]  Loc: [phone]  Visit Date: 1/4/2024  Urology Progress Note    Chief Complaint: Scrotal Swelling  Reason for Consult: Left Epididymo-Orchitis, UTI    Impression/Plan:  BPH w/ Urinary Retention  - Esqueda catheter in place. Patient spontaneously voiding following removal of esqueda catheter yesterday. Planning for OP Cystoscopy scheduled for 1/22/23.   - Continue Flomax 0.4 mg QD.  - Bladder Scan not yet done. Plan for today to confirm that patient is voiding effectively.    Left Epididymo-Orchitis and UTI  Scrotal Swelling  - ESBL Producing E. Coli. On Ciprofloxacin. ID managing.   - Scrotal swelling improving. Scrotal elevation.    NAVNEET  - Esqueda in place. No obstruction or hydronephrosis noted on CT Abdomen Pelvis. Creatinine largely improved this AM to 0.9 from 1.5, back at baseline.    Subjective:  Patient comfortably seated upright in bed. Tolerating PO intake. Voiding spontaneously without difficulty per patient account. Denies significant suprapubic or scrotal pain.    Activity: Up with Assistance  Diet: Adult Regular, 3 Card Choices, Low Sodium      CT ABDOMEN PELVIS WO CONTRAST    CLINICAL INFORMATION: Concern for prostate into bladder on previous imaging, recurrent infection, hematuria.    COMPARISON: Ultrasound 1/5/2024. CT abdomen and pelvis 11/11/2023.    TECHNIQUE:  5 mm axial imaging through the abdomen and pelvis without IV contrast.  Coronal and sagittal reconstructions were performed.    All CT scans at this facility use dose modulation, iterative reconstruction, and/or weight based dosing when appropriate to reduce the radiation dose to as low as reasonably achievable.      FINDINGS:  LIMITATIONS: The evaluation of the solid organs is limited without IV contrast.    Lung bases: Dependent atelectasis is present.    Liver/gallbladder/bilary tree: Hepatomegaly and hepatic steatosis 
   01/12/24 1206   Encounter Summary   Encounter Overview/Reason  Attempted Encounter   Service Provided For: Patient   Referral/Consult From: [facility]   Support System Family members   Last Encounter  01/12/24   Complexity of Encounter Low   Begin Time 1201   End Time  1206   Total Time Calculated 5 min   Assessment/Intervention/Outcome   Assessment Unable to assess   Intervention Prayer (assurance of)/[place]     During my encounter with the 83 yr old patient, I attempted to visit with the pt on 5K. The patient appears to be resting (not responsive) now and I didn't want to disturb the patient. I or another  will attempt to visit the patient or the family at another time. The pt was admitted due to UTI.   
  Pharmacy Note - Extended Infusion Beta-Lactam Adjustment    Meropenem 1g Q8h for treatment of Urinary tract infection. Per [facility] Extended Infusion Beta-Lactam Policy, meropenem will be changed to   1000mg Q12h extended infusion    Estimated Creatinine Clearance: Estimated Creatinine Clearance: 44 mL/min (A) (based on SCr of 1.5 mg/dL (H)).    BMI: Body mass index is 34.24 kg/m².    Please call with any questions.    Thank you,    Michelle Dillon, [facility]    
  Pharmacy Note - Extended Infusion Beta-Lactam Dose Adjustment    Meropenem 1000 mg q12h extended infusion for treatment of Urinary tract infection. Per [facility] Extended Infusion Beta-Lactam Policy, meropenem will be changed to   1000 mg q8h extended infusion     Estimated Creatinine Clearance: Estimated Creatinine Clearance: 73 mL/min (based on SCr of 0.9 mg/dL).    Dialysis Status, NAVNEET, CKD: NAVNEET - improving    BMI: Body mass index is 34.24 kg/m².    Rationale for Adjustment: Dose adjusted per [facility] Extended Infusion Policy based on renal function and indication. The above medication is renally eliminated and demonstrates time-dependent effects on bacterial eradication. Extended-infusion dosing strategy aims to enhance microbiologic and clinical efficacy.     Pharmacy will monitor renal function daily and adjust dose as necessary.      Please call with any questions.    Thank you,  Vika Montoya PharmD 1/6/2024 3:59 PM      
Discharge instructions explained to patient and daughter with verbalized understanding. Patient returning home with all personal belongings.    
Patient denies any pain or bladder fullness. Patient voided 310 mL of dark yellow, clear urine spontaneously. Denies pain or difficulty with urination.   
Patient educated on how to use incentive spirometer. Patient verbalized understanding and demonstrated proper use. Emphasized importance and usage of device, with coughing and deep breathing every 2 hours while awake.        
Patient's brother . Patient would like to be discharged by Friday to attend .   
Pt had episode of incontinence- Bladder scan PVR showed 140.    
Pt was in bed and alone at the time of the visit. He was dealing with acute UTI but was encouraged and anointed.    01/05/24 1910   Encounter Summary   Encounter Overview/Reason  Initial Encounter   Service Provided For: Patient   Referral/Consult From: [facility]   Support System Family members   Last Encounter  01/05/24  (Anointed)   Complexity of Encounter Low   Begin Time 1645   End Time  1652   Total Time Calculated 7 min   Spiritual/Emotional needs   Type Spiritual Support   Rituals, Rites and Sacraments   Type Anointing   Assessment/Intervention/Outcome   Assessment Hopeful   Intervention Empowerment       
improved.  He denies any urinary symptoms.  He reports he is urinating well on his own.  His bladder scan this morning showed 597 mL.  The patient has since urinated on his own.  Nephrology increased his Flomax to 0.8 mg daily.  No fevers or chills.     Interval history 1/9/24: The patient reports he is still urinating well. He reports soreness in his left testicle which has approved. No fevers or chills.\"     Subjective (past 24 hours): Pt is doing well. No fever/chills, SOB, CP, abd pain, NVD. Cont meropenem.        ROS: Pertinent positives as noted in HPI. All other systems reviewed and negative.      Past medical history, family history, social history and allergies reviewed again and is unchanged since admission.    Exam:  /66   Pulse 83   Temp 98.1 °F (36.7 °C) (Oral)   Resp 16   Ht 1.727 m (5' 8\")   Wt 99.9 kg (220 lb 3.8 oz)   SpO2 94%   BMI 33.49 kg/m²   General:   Pleasant male. NAD  HEENT:  normocephalic and atraumatic.  No scleral icterus. PERR.  Neck: supple.  No JVD. No thyromegaly.  Lungs: clear to auscultation.  No retractions  Cardiac: RRR without murmur.  Abdomen: soft.  Nontender.  Bowel sounds positive.  Extremities:  No clubbing, cyanosis, or edema x 4.    Vasculature: capillary refill < 3 seconds. Palpable LE pulses bilaterally.  Skin:  warm and dry.  Psych:  Alert and oriented x3.  Affect appropriate  Lymph:  No supraclavicular adenopathy.  Neurologic:  No focal deficit.  No seizures.      Data: (All radiographs, tracings, PFTs, and imaging are personally viewed and interpreted unless otherwise noted)  Labs:   Recent Labs     01/08/24  0457 01/09/24  0736 01/10/24  0608   WBC 5.0 4.7* 4.9   HGB 10.7* 10.3* 11.2*   HCT 34.8* 33.5* 35.3*    230 230     Recent Labs     01/08/24  0457 01/09/24  0736 01/10/24  0608    137 137   K 4.3 4.6 3.9    102 98   CO2 28 27 27   BUN 12 20 22   CREATININE 0.8 1.1 1.1   CALCIUM 9.0 8.4* 8.7   PHOS 3.8  --   --      No results for 
improved.  He denies any urinary symptoms.  He reports he is urinating well on his own.  His bladder scan this morning showed 597 mL.  The patient has since urinated on his own.  Nephrology increased his Flomax to 0.8 mg daily.  No fevers or chills.     Interval history 1/9/24: The patient reports he is still urinating well. He reports soreness in his left testicle which has approved. No fevers or chills.\"     Subjective (past 24 hours): Pt is doing well. No issues urinating. Denies f/c, sob, cp, abd pain. Case discussed with ID. Plan discharge tomorrow.       ROS: Pertinent positives as noted in HPI. All other systems reviewed and negative.      Past medical history, family history, social history and allergies reviewed again and is unchanged since admission.    Exam:  BP (!) 120/52   Pulse 82   Temp 97.5 °F (36.4 °C) (Oral)   Resp 18   Ht 1.727 m (5' 8\")   Wt 99.9 kg (220 lb 3.8 oz)   SpO2 92%   BMI 33.49 kg/m²   General:   Pleasant male. NAD  HEENT:  normocephalic and atraumatic.  No scleral icterus. PERR.  Neck: supple.  No JVD. No thyromegaly.  Lungs: clear to auscultation.  No retractions  Cardiac: RRR without murmur.  Abdomen: soft.  Nontender.  Bowel sounds positive.  Extremities:  No clubbing, cyanosis, or edema x 4.    Vasculature: capillary refill < 3 seconds. Palpable LE pulses bilaterally.  Skin:  warm and dry.  Psych:  Alert and oriented x3.  Affect appropriate  Lymph:  No supraclavicular adenopathy.  Neurologic:  No focal deficit.  No seizures.      Data: (All radiographs, tracings, PFTs, and imaging are personally viewed and interpreted unless otherwise noted)  Labs:   Recent Labs     01/09/24  0736 01/10/24  0608 01/11/24  0513   WBC 4.7* 4.9 5.1   HGB 10.3* 11.2* 11.3*   HCT 33.5* 35.3* 35.4*    230 234       Recent Labs     01/09/24  0736 01/10/24  0608 01/11/24  0513    137 137   K 4.6 3.9 4.0    98 97*   CO2 27 27 31   BUN 20 22 24*   CREATININE 1.1 1.1 1.1   CALCIUM 
Circumferential bladder wall thickening is suspected. The prostate gland remains enlarged measuring 5.0 x 5.5 cm. Distinction between the urinary bladder and the prostate gland is not well assessed due to the under distention of the urinary bladder. Correlate with urinalysis and cystoscopy if clinically indicated.    2. No renal calculus or obstructive uropathy. Chronic findings are discussed.            Vitals:  BP (!) 106/58   Pulse 75   Temp 97.5 °F (36.4 °C) (Oral)   Resp 18   Ht 1.727 m (5' 8\")   Wt 98.7 kg (217 lb 9.6 oz)   SpO2 95%   BMI 33.09 kg/m²   Temp  Av.5 °F (36.4 °C)  Min: 97.2 °F (36.2 °C)  Max: 97.9 °F (36.6 °C)    Intake/Output Summary (Last 24 hours) at 2024 0835  Last data filed at 2024 0659  Gross per 24 hour   Intake 1457.58 ml   Output 975 ml   Net 482.58 ml         Social History     Socioeconomic History    Marital status:      Spouse name: Not on file    Number of children: Not on file    Years of education: Not on file    Highest education level: Not on file   Occupational History    Not on file   Tobacco Use    Smoking status: Former     Types: Cigarettes    Smokeless tobacco: Never   Vaping Use    Vaping Use: Never used   Substance and Sexual Activity    Alcohol use: Not Currently    Drug use: Not Currently    Sexual activity: Not on file   Other Topics Concern    Not on file   Social History Narrative    Not on file     Social Determinants of Health     Financial Resource Strain: Not on file   Food Insecurity: No Food Insecurity (2024)    Hunger Vital Sign     Worried About Running Out of Food in the Last Year: Never true     Ran Out of Food in the Last Year: Never true   Transportation Needs: No Transportation Needs (2024)    PRAPARE - Transportation     Lack of Transportation (Medical): No     Lack of Transportation (Non-Medical): No   Physical Activity: Not on file   Stress: Not on file   Social Connections: Not on file   Intimate Partner Violence: 
D deficiency E55.9    Upper abdominal pain R10.10    Chest pain R07.9    Acute pulmonary edema ([facility]) J81.0    Dyspnea R06.00    Lightheaded R42    Elevated brain natriuretic peptide (BNP) level R79.89    Chronic anemia D64.9    Hyponatremia E87.1    Essential hypertension I10    Morbid obesity ([facility]) E66.01    NAVNEET (acute kidney injury) ([facility]) N17.9    Fever R50.9    Wheezing R06.2    Hypotension I95.9    Angina of effort I20.89    Abnormal stress test R94.39    Urinary tract infection associated with indwelling urethral catheter ([facility]) T83.511A, N39.0    Retention of urine due to occlusion of Esqueda catheter ([facility]) T83.198A, R33.8    Hyperkalemia E87.5    Urinary tract infection N39.0    Acute UTI N39.0    Epididymoorchitis N45.3         ASSESSMENT/PLAN   UTI: complicated likely related to the esqueda  Epididymo-orchitis due to above: improved  Discharge plan if ok with primary tomorrow      Zurdo Conner MD, MD, FACP 1/11/2024 11:19 AM   
tablet by mouth daily      donepezil (ARICEPT) 10 MG tablet Take 1 tablet by mouth nightly      glipiZIDE (GLUCOTROL XL) 10 MG extended release tablet Take 1 tablet by mouth daily      SITagliptin (JANUVIA) 25 MG tablet Take 1 tablet by mouth daily      insulin glargine (LANTUS) 100 UNIT/ML injection vial Inject 30 Units into the skin nightly      ergocalciferol (ERGOCALCIFEROL) 1.25 MG (77223 UT) capsule Take 1 capsule by mouth once a week         Allergies:    Morphine    Social History:    reports that he has quit smoking. His smoking use included cigarettes. He has never used smokeless tobacco. He reports that he does not currently use alcohol. He reports that he does not currently use drugs.    Family History:       Problem Relation Age of Onset    Hypertension Mother     Kidney Disease Father     Kidney Cancer Sister     Prostate Cancer Brother        Diet:  ADULT DIET; Regular; 3 carb choices (45 gm/meal); Low Sodium (2 gm)    Physical Exam:  BP (!) 107/50   Pulse 73   Temp 98 °F (36.7 °C) (Oral)   Resp 18   Ht 1.727 m (5' 8\")   Wt 98.7 kg (217 lb 9.6 oz)   SpO2 93%   BMI 33.09 kg/m²   General: Alert, in no acute distress, cooperative  HEENT:  Normocephalic and atraumatic.  No scleral icterus.  Pupils equal, round, and reactive to light.  External nares without deformity.  External ears normal. Mucous membranes are moist.  Neck: Trachea midline. No jugular venous distension.   No cervical lymphadenopathy.  Lungs: On room air. Respiratory rate and effort normal.  Lungs clear to auscultation bilaterally.  Cardiac: Regular rhythm and rate.  No murmur, rubs, or gallops.  Abdomen: Nondistended, soft, no tenderness to palpation, guarding, rigidity.  Bowel sounds normoactive.  Mild left testicular tenderness, groin swelling.  Extremities:  No clubbing, cyanosis, or lower extremity edema.  Vasculature: capillary refill < 3 seconds.  Lower extremity pulses 2+ bilaterally  Skin:  Warm and dry.  Psych: Mood 
Sister     Prostate Cancer Brother      Allergies   Allergen Reactions    Morphine Hives       Objective:    Constitutional: Alert and oriented times x3, no acute distress, and cooperative to examination with appropriate mood and affect.     Cardiovascular:   Normal rate and regular rhythm.  Pulmonary/Chest:  Normal respiratory rate and rhthym. No use of accessory muscles.  Abdominal:          Soft. No tenderness. Active bowel sounds.  Genitourinary:    Bladder non-tender and non-distended. Phimosis present, non-constricting, foreskin able to be retracted to visualize the urethral meatus. Scrotal swelling present ++, stable. Testicles non-tender to palpation bilaterally.  Neurological:    Alert and oriented.     Labs:  WBC:    Lab Results   Component Value Date/Time    WBC 3.5 01/07/2024 04:55 AM     Hemoglobin/Hematocrit:    Lab Results   Component Value Date/Time    HGB 14.7 01/07/2024 04:55 AM    HCT 47.3 01/07/2024 04:55 AM     BMP:    Lab Results   Component Value Date/Time     01/07/2024 04:55 AM    K 4.5 01/07/2024 04:55 AM    K 4.4 12/19/2023 05:56 AM     01/07/2024 04:55 AM    CO2 27 01/07/2024 04:55 AM    BUN 13 01/07/2024 04:55 AM    LABALBU 2.9 01/05/2024 01:53 AM    CREATININE 0.9 01/07/2024 04:55 AM    CALCIUM 8.6 01/07/2024 04:55 AM    LABGLOM >60 01/07/2024 04:55 AM    LABGLOM 42.7 06/02/2023 12:00 AM         Denilson Albarran PA-C  01/07/24 8:41 AM  Urology    
rate.  No murmur, rubs, or gallops.  Abdomen: Nondistended, soft, no tenderness to palpation, guarding, rigidity.  Bowel sounds normoactive.  Extremities:  No clubbing, cyanosis, or lower extremity edema.  Vasculature: capillary refill < 3 seconds.  Lower extremity pulses 2+ bilaterally  Skin:  Warm and dry.  Psych: Mood normal.  Affect appropriate.  Neurologic: Alert and oriented x4.  No cranial nerve deficits.  No extremity weakness.    Data: (All radiographs, tracings, PFTs, and imaging are personally viewed and interpreted unless otherwise noted)  Labs:   Recent Labs     01/07/24 0455 01/08/24 0457 01/09/24  0736   WBC 3.5* 5.0 4.7*   HGB 14.7 10.7* 10.3*   HCT 47.3 34.8* 33.5*    230 230     Recent Labs     01/07/24 0455 01/08/24 0457 01/09/24  0736    139 137   K 4.5 4.3 4.6    102 102   CO2 27 28 27   BUN 13 12 20   CREATININE 0.9 0.8 1.1   CALCIUM 8.6 9.0 8.4*   PHOS 3.6 3.8  --      No results for input(s): \"AST\", \"ALT\", \"BILIDIR\", \"BILITOT\", \"ALKPHOS\" in the last 72 hours.  No results for input(s): \"INR\" in the last 72 hours.  No results for input(s): \"CKTOTAL\", \"TROPONINI\" in the last 72 hours.  Urinalysis:   Lab Results   Component Value Date/Time    NITRU POSITIVE 01/05/2024 01:55 AM    WBCUA > 200 01/05/2024 01:55 AM    BACTERIA MODERATE 01/05/2024 01:55 AM    RBCUA > 100 01/05/2024 01:55 AM    BLOODU LARGE 01/05/2024 01:55 AM    SPECGRAV 1.029 11/12/2023 02:50 PM    GLUCOSEU 500 01/05/2024 01:55 AM     Radiology:  No results found.     DVT prophylaxis: SCDs    Diet: ADULT DIET; Regular; 3 carb choices (45 gm/meal); Low Sodium (2 gm)    Fluids: by mouth    Code Status: Full Code    Therapies: Not indicated at this time    Tele:   [x] yes             [] no      Thank you Aileen Valadez, [facility] for the opportunity to be involved in this patient's care.    Electronically signed by Angel Mendenhall PA-C on 1/9/2024 at 2:08 PM

## 2024-01-12 NOTE — PLAN OF CARE
Problem: Discharge Planning  Goal: Discharge to home or other facility with appropriate resources  Outcome: Progressing   SW consult received. See SW note 1/5/24.  
  Problem: Safety - Adult  Goal: Free from fall injury  1/10/2024 1851 by Erin Gold RN  Outcome: Progressing  Flowsheets (Taken 1/10/2024 0607 by Marley Merritt RN)  Free From Fall Injury:   Instruct family/caregiver on patient safety   Based on caregiver fall risk screen, instruct family/caregiver to ask for assistance with transferring infant if caregiver noted to have fall risk factors     Problem: ABCDS Injury Assessment  Goal: Absence of physical injury  1/10/2024 1851 by Erin Gold RN  Outcome: Progressing  Flowsheets (Taken 1/10/2024 0607 by Marley Merritt, RN)  Absence of Physical Injury: Implement safety measures based on patient assessment     Problem: Chronic Conditions and Co-morbidities  Goal: Patient's chronic conditions and co-morbidity symptoms are monitored and maintained or improved  1/10/2024 1851 by Erin Gold RN  Outcome: Progressing  Flowsheets (Taken 1/10/2024 0830)  Care Plan - Patient's Chronic Conditions and Co-Morbidity Symptoms are Monitored and Maintained or Improved: Monitor and assess patient's chronic conditions and comorbid symptoms for stability, deterioration, or improvement     Problem: Discharge Planning  Goal: Discharge to home or other facility with appropriate resources  1/10/2024 1851 by Erin Gold RN  Outcome: Progressing  Flowsheets (Taken 1/10/2024 0830)  Discharge to home or other facility with appropriate resources:   Identify barriers to discharge with patient and caregiver   Arrange for needed discharge resources and transportation as appropriate   Identify discharge learning needs (meds, wound care, etc)     Problem: Genitourinary - Adult  Goal: Absence of urinary retention  1/10/2024 1851 by Erin Gold RN  Outcome: Progressing  Flowsheets (Taken 1/10/2024 0830)  Absence of urinary retention:   Assess patient’s ability to void and empty bladder   Monitor intake/output and perform bladder scan as needed     Problem: 
  Problem: Safety - Adult  Goal: Free from fall injury  1/11/2024 0058 by Marley Merritt RN  Outcome: Progressing  Flowsheets (Taken 1/11/2024 0058)  Free From Fall Injury:   Instruct family/caregiver on patient safety   Based on caregiver fall risk screen, instruct family/caregiver to ask for assistance with transferring infant if caregiver noted to have fall risk factors  1/10/2024 1851 by Erin Gold RN  Outcome: Progressing  Flowsheets (Taken 1/10/2024 0607 by Marley Merritt RN)  Free From Fall Injury:   Instruct family/caregiver on patient safety   Based on caregiver fall risk screen, instruct family/caregiver to ask for assistance with transferring infant if caregiver noted to have fall risk factors     Problem: ABCDS Injury Assessment  Goal: Absence of physical injury  1/11/2024 0058 by Marley Merritt RN  Outcome: Progressing  Flowsheets (Taken 1/11/2024 0058)  Absence of Physical Injury: Implement safety measures based on patient assessment  1/10/2024 1851 by Erin Gold RN  Outcome: Progressing  Flowsheets (Taken 1/10/2024 0607 by Marley Merritt RN)  Absence of Physical Injury: Implement safety measures based on patient assessment     Problem: Chronic Conditions and Co-morbidities  Goal: Patient's chronic conditions and co-morbidity symptoms are monitored and maintained or improved  1/11/2024 0058 by Marley Merritt RN  Outcome: Progressing  Flowsheets (Taken 1/11/2024 0058)  Care Plan - Patient's Chronic Conditions and Co-Morbidity Symptoms are Monitored and Maintained or Improved:   Monitor and assess patient's chronic conditions and comorbid symptoms for stability, deterioration, or improvement   Collaborate with multidisciplinary team to address chronic and comorbid conditions and prevent exacerbation or deterioration   Update acute care plan with appropriate goals if chronic or comorbid symptoms are exacerbated and prevent overall improvement and discharge  1/10/2024 
  Problem: Safety - Adult  Goal: Free from fall injury  1/6/2024 1131 by Loraine Benson RN  Outcome: Progressing  Flowsheets (Taken 1/6/2024 1131)  Free From Fall Injury: Instruct family/caregiver on patient safety     Problem: ABCDS Injury Assessment  Goal: Absence of physical injury  1/6/2024 1131 by Loraine Benson RN  Outcome: Progressing  Flowsheets (Taken 1/6/2024 1131)  Absence of Physical Injury: Implement safety measures based on patient assessment     Problem: Chronic Conditions and Co-morbidities  Goal: Patient's chronic conditions and co-morbidity symptoms are monitored and maintained or improved  1/6/2024 1131 by Loraine Benson RN  Outcome: Progressing  Flowsheets (Taken 1/6/2024 1131)  Care Plan - Patient's Chronic Conditions and Co-Morbidity Symptoms are Monitored and Maintained or Improved:   Monitor and assess patient's chronic conditions and comorbid symptoms for stability, deterioration, or improvement   Collaborate with multidisciplinary team to address chronic and comorbid conditions and prevent exacerbation or deterioration     Problem: Discharge Planning  Goal: Discharge to home or other facility with appropriate resources  1/6/2024 1131 by Loraine Benson RN  Outcome: Progressing  Flowsheets (Taken 1/6/2024 1131)  Discharge to home or other facility with appropriate resources:   Identify barriers to discharge with patient and caregiver   Arrange for needed discharge resources and transportation as appropriate   Care plan reviewed with patient.  Patient verbalizes understanding of the plan of care and contributes to goal setting.   
  Problem: Safety - Adult  Goal: Free from fall injury  1/7/2024 0026 by Marley Merritt RN  Outcome: Progressing  Flowsheets (Taken 1/7/2024 0026)  Free From Fall Injury: Instruct family/caregiver on patient safety  1/6/2024 1131 by Loraine Benson RN  Outcome: Progressing  Flowsheets (Taken 1/6/2024 1131)  Free From Fall Injury: Instruct family/caregiver on patient safety     Problem: ABCDS Injury Assessment  Goal: Absence of physical injury  1/7/2024 0026 by Marley Merritt RN  Outcome: Progressing  Flowsheets (Taken 1/7/2024 0026)  Absence of Physical Injury: Implement safety measures based on patient assessment  1/6/2024 1131 by Loraine Bensno RN  Outcome: Progressing  Flowsheets (Taken 1/6/2024 1131)  Absence of Physical Injury: Implement safety measures based on patient assessment     Problem: Chronic Conditions and Co-morbidities  Goal: Patient's chronic conditions and co-morbidity symptoms are monitored and maintained or improved  1/7/2024 0026 by Marley Merritt RN  Outcome: Progressing  Flowsheets (Taken 1/7/2024 0026)  Care Plan - Patient's Chronic Conditions and Co-Morbidity Symptoms are Monitored and Maintained or Improved:   Monitor and assess patient's chronic conditions and comorbid symptoms for stability, deterioration, or improvement   Collaborate with multidisciplinary team to address chronic and comorbid conditions and prevent exacerbation or deterioration   Update acute care plan with appropriate goals if chronic or comorbid symptoms are exacerbated and prevent overall improvement and discharge  1/6/2024 1131 by Loraine Benson RN  Outcome: Progressing  Flowsheets (Taken 1/6/2024 1131)  Care Plan - Patient's Chronic Conditions and Co-Morbidity Symptoms are Monitored and Maintained or Improved:   Monitor and assess patient's chronic conditions and comorbid symptoms for stability, deterioration, or improvement   Collaborate with multidisciplinary team to address chronic and 
  Problem: Safety - Adult  Goal: Free from fall injury  1/7/2024 1253 by Mendy Young RN  Outcome: Progressing  Fall assessment completed. Patient using call light appropriately to call for assistance with ambulation to bathroom.  Personal items within reach. Patient is also compliant with use of non-skid slippers.      Problem: ABCDS Injury Assessment  Goal: Absence of physical injury  1/7/2024 1253 by Mendy Young RN  Outcome: Progressing  No falls noted this shift. Patient ambulates with x1 staff assistance without difficulty.  Bed kept in low position. Safe environment maintained. Bedside table & call light in reach. Uses call light appropriately when needing assistance.      Problem: Chronic Conditions and Co-morbidities  Goal: Patient's chronic conditions and co-morbidity symptoms are monitored and maintained or improved  1/7/2024 1253 by Mendy Young RN  Outcome: Progressing  Patient's chronic conditions and co-morbidity symptoms are monitored and maintained.     Problem: Discharge Planning  Goal: Discharge to home or other facility with appropriate resources  1/7/2024 1253 by Mendy Young RN  Outcome: Progressing  Discharge plan is in process. Plan discharge home with family.     Problem: Genitourinary - Adult  Goal: Absence of urinary retention  Outcome: Progressing  Patient voiding adequate  amounts without difficulty.    Care plan reviewed with patient and family.  Patient and family verbalize understanding of the plan of care and contribute to goal setting.       
  Problem: Safety - Adult  Goal: Free from fall injury  1/8/2024 0212 by Marley Merritt RN  Outcome: Progressing  Flowsheets (Taken 1/8/2024 0212)  Free From Fall Injury:   Instruct family/caregiver on patient safety   Based on caregiver fall risk screen, instruct family/caregiver to ask for assistance with transferring infant if caregiver noted to have fall risk factors  1/7/2024 1253 by Mendy Young RN  Outcome: Progressing     Problem: ABCDS Injury Assessment  Goal: Absence of physical injury  1/8/2024 0212 by Marley Merritt RN  Outcome: Progressing  Flowsheets (Taken 1/8/2024 0212)  Absence of Physical Injury: Implement safety measures based on patient assessment  1/7/2024 1253 by Mendy Young RN  Outcome: Progressing     Problem: Chronic Conditions and Co-morbidities  Goal: Patient's chronic conditions and co-morbidity symptoms are monitored and maintained or improved  1/8/2024 0212 by Marley Merritt RN  Outcome: Progressing  Flowsheets (Taken 1/8/2024 0212)  Care Plan - Patient's Chronic Conditions and Co-Morbidity Symptoms are Monitored and Maintained or Improved:   Monitor and assess patient's chronic conditions and comorbid symptoms for stability, deterioration, or improvement   Collaborate with multidisciplinary team to address chronic and comorbid conditions and prevent exacerbation or deterioration   Update acute care plan with appropriate goals if chronic or comorbid symptoms are exacerbated and prevent overall improvement and discharge  1/7/2024 1253 by Mendy Young RN  Outcome: Progressing     Problem: Discharge Planning  Goal: Discharge to home or other facility with appropriate resources  1/8/2024 0212 by Marley Merritt RN  Outcome: Progressing  Flowsheets (Taken 1/8/2024 0212)  Discharge to home or other facility with appropriate resources:   Identify barriers to discharge with patient and caregiver   Arrange for needed discharge resources and 
  Problem: Safety - Adult  Goal: Free from fall injury  1/8/2024 1036 by Berna Rice RN  Outcome: Progressing  Flowsheets (Taken 1/8/2024 1036)  Free From Fall Injury:   Based on caregiver fall risk screen, instruct family/caregiver to ask for assistance with transferring infant if caregiver noted to have fall risk factors   Instruct family/caregiver on patient safety     Problem: ABCDS Injury Assessment  Goal: Absence of physical injury  1/8/2024 1036 by Berna Rice RN  Outcome: Progressing  Flowsheets (Taken 1/8/2024 1036)  Absence of Physical Injury: Implement safety measures based on patient assessment  Note: Tele sitter due to intermittent confusion at night.      Problem: Chronic Conditions and Co-morbidities  Goal: Patient's chronic conditions and co-morbidity symptoms are monitored and maintained or improved  1/8/2024 1036 by Berna Rice RN  Outcome: Progressing  Flowsheets (Taken 1/8/2024 1036)  Care Plan - Patient's Chronic Conditions and Co-Morbidity Symptoms are Monitored and Maintained or Improved:   Monitor and assess patient's chronic conditions and comorbid symptoms for stability, deterioration, or improvement   Collaborate with multidisciplinary team to address chronic and comorbid conditions and prevent exacerbation or deterioration   Update acute care plan with appropriate goals if chronic or comorbid symptoms are exacerbated and prevent overall improvement and discharge  Note: Monitoring BS. Sliding scale insulin.      Problem: Discharge Planning  Goal: Discharge to home or other facility with appropriate resources  1/8/2024 1036 by Berna Rice, RN  Outcome: Progressing  Flowsheets (Taken 1/8/2024 1036)  Discharge to home or other facility with appropriate resources:   Identify barriers to discharge with patient and caregiver   Arrange for needed discharge resources and transportation as appropriate   Identify discharge learning needs (meds, wound care, etc)  Note: Home 
  Problem: Safety - Adult  Goal: Free from fall injury  1/8/2024 2316 by Kalyani Mead RN  Outcome: Progressing  Flowsheets (Taken 1/8/2024 2316)  Free From Fall Injury:   Instruct family/caregiver on patient safety   Based on caregiver fall risk screen, instruct family/caregiver to ask for assistance with transferring infant if caregiver noted to have fall risk factors     Problem: ABCDS Injury Assessment  Goal: Absence of physical injury  1/8/2024 2316 by Kalyani Mead RN  Outcome: Progressing  Flowsheets  Taken 1/8/2024 2316  Absence of Physical Injury: Implement safety measures based on patient assessment  Taken 1/8/2024 2157  Absence of Physical Injury: Implement safety measures based on patient assessment     Problem: Chronic Conditions and Co-morbidities  Goal: Patient's chronic conditions and co-morbidity symptoms are monitored and maintained or improved  1/8/2024 2316 by Kalyani Mead RN  Outcome: Progressing  Flowsheets (Taken 1/8/2024 2316)  Care Plan - Patient's Chronic Conditions and Co-Morbidity Symptoms are Monitored and Maintained or Improved:   Monitor and assess patient's chronic conditions and comorbid symptoms for stability, deterioration, or improvement   Collaborate with multidisciplinary team to address chronic and comorbid conditions and prevent exacerbation or deterioration     Problem: Discharge Planning  Goal: Discharge to home or other facility with appropriate resources  1/8/2024 2316 by Kalyani Mead RN  Outcome: Progressing  Flowsheets (Taken 1/8/2024 2316)  Discharge to home or other facility with appropriate resources:   Identify discharge learning needs (meds, wound care, etc)   Identify barriers to discharge with patient and caregiver     Problem: Genitourinary - Adult  Goal: Absence of urinary retention  1/8/2024 2316 by Kalyani Mead RN  Outcome: Progressing  Flowsheets (Taken 1/8/2024 2316)  Absence of urinary retention:   Assess patient’s ability 
  Problem: Safety - Adult  Goal: Free from fall injury  Outcome: Progressing     Problem: ABCDS Injury Assessment  Goal: Absence of physical injury  Outcome: Progressing     Problem: Chronic Conditions and Co-morbidities  Goal: Patient's chronic conditions and co-morbidity symptoms are monitored and maintained or improved  Outcome: Progressing     
  Problem: Safety - Adult  Goal: Free from fall injury  Outcome: Progressing  Flowsheets (Taken 1/10/2024 0607)  Free From Fall Injury:   Instruct family/caregiver on patient safety   Based on caregiver fall risk screen, instruct family/caregiver to ask for assistance with transferring infant if caregiver noted to have fall risk factors     Problem: ABCDS Injury Assessment  Goal: Absence of physical injury  Outcome: Progressing  Flowsheets (Taken 1/10/2024 0607)  Absence of Physical Injury: Implement safety measures based on patient assessment     Problem: Chronic Conditions and Co-morbidities  Goal: Patient's chronic conditions and co-morbidity symptoms are monitored and maintained or improved  Outcome: Progressing  Flowsheets (Taken 1/10/2024 0607)  Care Plan - Patient's Chronic Conditions and Co-Morbidity Symptoms are Monitored and Maintained or Improved:   Monitor and assess patient's chronic conditions and comorbid symptoms for stability, deterioration, or improvement   Collaborate with multidisciplinary team to address chronic and comorbid conditions and prevent exacerbation or deterioration   Update acute care plan with appropriate goals if chronic or comorbid symptoms are exacerbated and prevent overall improvement and discharge     Problem: Discharge Planning  Goal: Discharge to home or other facility with appropriate resources  Outcome: Progressing  Flowsheets (Taken 1/10/2024 0607)  Discharge to home or other facility with appropriate resources:   Identify barriers to discharge with patient and caregiver   Identify discharge learning needs (meds, wound care, etc)     Problem: Genitourinary - Adult  Goal: Absence of urinary retention  Outcome: Progressing  Flowsheets (Taken 1/10/2024 0607)  Absence of urinary retention:   Assess patient’s ability to void and empty bladder   Monitor intake/output and perform bladder scan as needed     Problem: Skin/Tissue Integrity - Adult  Goal: Skin integrity remains 
  Problem: Safety - Adult  Goal: Free from fall injury  Outcome: Progressing  Flowsheets (Taken 1/6/2024 0328)  Free From Fall Injury:   Instruct family/caregiver on patient safety   Based on caregiver fall risk screen, instruct family/caregiver to ask for assistance with transferring infant if caregiver noted to have fall risk factors     Problem: ABCDS Injury Assessment  Goal: Absence of physical injury  Outcome: Progressing  Flowsheets (Taken 1/6/2024 0328)  Absence of Physical Injury: Implement safety measures based on patient assessment     Problem: Chronic Conditions and Co-morbidities  Goal: Patient's chronic conditions and co-morbidity symptoms are monitored and maintained or improved  Outcome: Progressing  Flowsheets (Taken 1/6/2024 0328)  Care Plan - Patient's Chronic Conditions and Co-Morbidity Symptoms are Monitored and Maintained or Improved:   Monitor and assess patient's chronic conditions and comorbid symptoms for stability, deterioration, or improvement   Collaborate with multidisciplinary team to address chronic and comorbid conditions and prevent exacerbation or deterioration   Update acute care plan with appropriate goals if chronic or comorbid symptoms are exacerbated and prevent overall improvement and discharge     Problem: Discharge Planning  Goal: Discharge to home or other facility with appropriate resources  Outcome: Progressing  Flowsheets (Taken 1/6/2024 0328)  Discharge to home or other facility with appropriate resources:   Identify barriers to discharge with patient and caregiver   Arrange for needed discharge resources and transportation as appropriate   Identify discharge learning needs (meds, wound care, etc)   Arrange for interpreters to assist at discharge as needed   Refer to discharge planning if patient needs post-hospital services based on physician order or complex needs related to functional status, cognitive ability or social support system   Care plan reviewed with 
Problem: Safety - Adult  Goal: Free from fall injury  Outcome: Progressing  Free From Fall Injury:   Based on caregiver fall risk screen, instruct family/caregiver to ask for assistance with transferring infant if caregiver noted to have fall risk factors   Instruct family/caregiver on patient safety     Problem: ABCDS Injury Assessment  Goal: Absence of physical injury  Outcome: Progressing  Flowsheets (Taken 1/8/2024 1036)  Absence of Physical Injury: Implement safety measures based on patient assessment  Note: Tele sitter removed due to improved LOC.      Problem: Chronic Conditions and Co-morbidities  Goal: Patient's chronic conditions and co-morbidity symptoms are monitored and maintained or improved  Outcome: Progressing  Care Plan - Patient's Chronic Conditions and Co-Morbidity Symptoms are Monitored and Maintained or Improved:   Monitor and assess patient's chronic conditions and comorbid symptoms for stability, deterioration, or improvement   Collaborate with multidisciplinary team to address chronic and comorbid conditions and prevent exacerbation or deterioration   Update acute care plan with appropriate goals if chronic or comorbid symptoms are exacerbated and prevent overall improvement and discharge  Note: Monitoring BS. Sliding scale insulin.      Problem: Discharge Planning  Goal: Discharge to home or other facility with appropriate resources  Outcome: Progressing  Discharge to home or other facility with appropriate resources:   Identify barriers to discharge with patient and caregiver   Arrange for needed discharge resources and transportation as appropriate   Identify discharge learning needs (meds, wound care, etc)  Note: Home with family. New HH on D/C. SW on. ID note says discharge end of week after Iv antibiotics.      Problem: Genitourinary - Adult  Goal: Absence of urinary retention  Outcome: Progressing  Absence of urinary retention:   Assess patient’s ability to void and empty bladder   
Beegle, Heidi, RN  Outcome: Progressing  Flowsheets (Taken 1/11/2024 1041)  Skin Integrity Remains Intact: Monitor for areas of redness and/or skin breakdown   No skin breakdown this shift. Patient being assisted with turning. Patients states understanding of repositioning every two hours.    Problem: Musculoskeletal - Adult  Goal: Return mobility to safest level of function  1/11/2024 1238 by Heidi Akbar RN  Outcome: Progressing  Flowsheets (Taken 1/11/2024 1041)  Return Mobility to Safest Level of Function: Assess patient stability and activity tolerance for standing, transferring and ambulating with or without assistive devices   Patient up with standby assist.    Problem: Infection - Adult  Goal: Absence of infection at discharge  1/11/2024 1238 by Heidi Akbar RN  Outcome: Progressing  Flowsheets (Taken 1/11/2024 1041)  Absence of infection at discharge:   Monitor lab/diagnostic results   Assess and monitor for signs and symptoms of infection   Patient remains afebrile. IV antibiotics administered per order.    Problem: Skin/Tissue Integrity  Goal: Absence of new skin breakdown  Description: 1.  Monitor for areas of redness and/or skin breakdown  2.  Assess vascular access sites hourly  3.  Every 4-6 hours minimum:  Change oxygen saturation probe site  4.  Every 4-6 hours:  If on nasal continuous positive airway pressure, respiratory therapy assess nares and determine need for appliance change or resting period.  1/11/2024 1238 by Heidi Akbar RN  Outcome: Progressing     Problem: Respiratory - Adult  Goal: Achieves optimal ventilation and oxygenation  1/11/2024 1238 by Heidi Akbar RN  Outcome: Progressing  Flowsheets (Taken 1/11/2024 1041)  Achieves optimal ventilation and oxygenation:   Assess for changes in respiratory status   Assess for changes in mentation and behavior   Position to facilitate oxygenation and minimize respiratory effort   Patient remains on room air and WDL.    Problem: 
tolerance for standing, transferring and ambulating with or without assistive devices  1/11/2024 1238 by Heidi Akbar RN  Outcome: Progressing  Flowsheets (Taken 1/11/2024 1041)  Return Mobility to Safest Level of Function: Assess patient stability and activity tolerance for standing, transferring and ambulating with or without assistive devices     Problem: Infection - Adult  Goal: Absence of infection at discharge  1/12/2024 0031 by Marley Merritt RN  Outcome: Progressing  Flowsheets (Taken 1/12/2024 0031)  Absence of infection at discharge:   Assess and monitor for signs and symptoms of infection   Monitor lab/diagnostic results   Administer medications as ordered   Instruct and encourage patient and family to use good hand hygiene technique   Identify and instruct in appropriate isolation precautions for identified infection/condition  1/11/2024 1238 by Heidi Akbar RN  Outcome: Progressing  Flowsheets (Taken 1/11/2024 1041)  Absence of infection at discharge:   Monitor lab/diagnostic results   Assess and monitor for signs and symptoms of infection     Problem: Respiratory - Adult  Goal: Achieves optimal ventilation and oxygenation  1/12/2024 0031 by Marley Merritt RN  Outcome: Progressing  Flowsheets (Taken 1/12/2024 0031)  Achieves optimal ventilation and oxygenation:   Assess for changes in respiratory status   Assess for changes in mentation and behavior   Position to facilitate oxygenation and minimize respiratory effort  1/11/2024 1238 by Heidi Akbar RN  Outcome: Progressing  Flowsheets (Taken 1/11/2024 1041)  Achieves optimal ventilation and oxygenation:   Assess for changes in respiratory status   Assess for changes in mentation and behavior   Position to facilitate oxygenation and minimize respiratory effort     Problem: Metabolic/Fluid and Electrolytes - Adult  Goal: Electrolytes maintained within normal limits  1/12/2024 0031 by Marley Merritt RN  Outcome: Progressing  Flowsheets

## 2024-01-12 NOTE — CARE COORDINATION
01/05/24 1038   Readmission Assessment   Number of Days since last admission? 8-30 days   Previous Disposition Home with Home Health   Who is being Interviewed Caregiver   What was the patient's/caregiver's perception as to why they think they needed to return back to the hospital? Other (Comment)  (Scrotal pain and edema, positive UTI)   Did you visit your Primary Care Physician after you left the hospital, before you returned this time? Yes   Did you see a specialist, such as Cardiac, Pulmonary, Orthopedic Physician, etc. after you left the hospital? Yes   Who advised the patient to return to the hospital? Self-referral;Caregiver   Does the patient report anything that got in the way of taking their medications? No   In our efforts to provide the best possible care to you and others like you, can you think of anything that we could have done to help you after you left the hospital the first time, so that you might not have needed to return so soon? Other (Comment)  (Patient's daughter feels the esqueda catheter is causing his recurrent UTI's.)       
1/10/24, 2:51 PM EST    DISCHARGE ON GOING EVALUATION    [facility] day: 5  Location: [facility]17/017-A Reason for admit: Epididymoorchitis [N45.3]  Acute UTI [N39.0]  Acute kidney injury superimposed on CKD (HCC) [N17.9, N18.9]   Barriers to Discharge: ID following, Aspirin on hold, DM management, Merrem, prn Tylenol, and Zofran, carb choice diet, SCD's, strict I & O, up with assistance.   PCP: Aileen Valadez, RELL - CNP  Readmission Risk Score: 28%  Patient Goals/Plan/Treatment Preferences: Leo resides at home with his wife in his daughter's home. He is current with [facility] for RN and PT.                 
1/12/24, 10:27 AM EST    Patient goals/plan/ treatment preferences discussed by  and .  Patient goals/plan/ treatment preferences reviewed with patient/ family.  Patient/ family verbalize understanding of discharge plan and are in agreement with goal/plan/treatment preferences.  Understanding was demonstrated using the teach back method.  AVS provided by RN at time of discharge, which includes all necessary medical information pertaining to the patients current course of illness, treatment, post-discharge goals of care, and treatment preferences.     Services At/After Discharge: [facility]       IMM Letter  IMM Letter given to Patient/Family/Significant other/Guardian/POA/by:: Consuelo STANLEY CM  IMM Letter date given:: 01/12/24  IMM Letter time given:: 0900     Call to [facility], spoke with Fani, updated her on pt dismissing to home today.   
1/5/24, 1:07 PM EST    DISCHARGE PLANNING EVALUATION    SW consult received for \"patient current with [facility]\" an would like to continue with [facility].     Call to [facility], pt current with nursing, PT.     Call to pt's daughter KATARINA Zuluaga, confirms plans for pt to return home with [facility].   
1/8/24, 2:41 PM EST    DISCHARGE ON GOING EVALUATION    [facility] day[facility] 3  Location: -17/017-A Reason for admit: Epididymoorchitis [N45.3]  Acute UTI [N39.0]  Acute kidney injury superimposed on CKD (HCC) [N17.9, N18.9]   Barriers to Discharge: Aspirin and Demadex on hold, DM management, Merrem, Magnesium and Potassium replacement protocol, prn Tylenol and Zofran, BMP and CBC in a.m., carb choice diet, SCD's, strict I & O, up with assistance.   PCP: Aileen Valadez, APRN - CNP  Readmission Risk Score: 28.7%  Patient Goals/Plan/Treatment Preferences: Leo is from home with his wife residing with their daughter and family. He is current with [facility] for RN and PT. He plans for the same at discharge.                 
Return to Present Housing: Yes  Other Identified Issues/Barriers to RETURNING to current housing: None  Potential Assistance needed at discharge: Home Care            Potential DME:    Patient expects to discharge to: House  Plan for transportation at discharge: Family    Financial    Payor: [employer] MEDICARE / Plan: [employer] GOLD PLUS HMO / Product Type: *No Product type* /     Does insurance require precert for SNF: Yes    Potential assistance Purchasing Medications: No  Meds-to-Beds request: Yes      [id number] - [place], [place] - [address] - P [phone] - F [phone]  [address]  [place] [address]  Phone: [phone] Fax: [phone]      Notes:    Factors facilitating achievement of predicted outcomes: Family support, Caregiver support, Motivated, Cooperative, Pleasant, and Sense of humor    Barriers to discharge: Limited safety awareness, Depression, Limited insight into deficits, and esqueda catheter.   1/04 Ultrasound of scrotum:  Impression:  1. Findings consistent with left epididymo-orchitis.  2. Moderate sized complex reactive left hydrocele.     Additional Case Management Notes: Patient presents with scrotal pain. Na 133, K 5.7, Creatinine 1.5, urine positive for nitrites. Consults to Urology and ID, Aspirin on hold, IV Cipro, DM management, Lokelma x 1 dose, prn Tylenol and Zofran, Magnesium and Potassium replacement protocol, daily BMP, CBC, Ionized Calcium, Magnesium and Phosphorus, NPO, SCD's, strict I & O, telemetry.     Procedure: N/A    The Plan for Transition of Care is related to the following treatment goals of Epididymoorchitis [N45.3]  Acute UTI [N39.0]  Acute kidney injury superimposed on CKD (HCC) [N17.9, N18.9]    Patient Goals/Plan/Treatment Preferences: Leo resides at home with his wife in a \"mother-in-laws\" suite at their daughter's home. He is able to afford his medications and has the DME needed. Leo is current with [facility] for RN and PT. The plan at discharge

## 2024-01-18 PROBLEM — N39.0 URINARY TRACT INFECTION: Status: RESOLVED | Noted: 2023-12-19 | Resolved: 2024-01-18

## 2024-01-22 ENCOUNTER — HOSPITAL ENCOUNTER (OUTPATIENT)
Dept: UROLOGY | Age: 84
Discharge: HOME | End: 2024-01-22
Payer: MEDICARE

## 2024-01-22 VITALS
DIASTOLIC BLOOD PRESSURE: 64 MMHG | BODY MASS INDEX: 33.68 KG/M2 | HEIGHT: 68 IN | RESPIRATION RATE: 16 BRPM | WEIGHT: 222.2 LBS | TEMPERATURE: 97.2 F | SYSTOLIC BLOOD PRESSURE: 107 MMHG | OXYGEN SATURATION: 100 % | HEART RATE: 97 BPM

## 2024-01-22 PROCEDURE — 52000 CYSTOURETHROSCOPY: CPT

## 2024-01-22 RX ORDER — DOXYCYCLINE HYCLATE 100 MG
100 TABLET ORAL 2 TIMES DAILY
Qty: 6 TABLET | Refills: 0 | Status: SHIPPED | OUTPATIENT
Start: 2024-01-22 | End: 2024-01-25

## 2024-01-22 NOTE — H&P
Julianna Calabrese  Urology H&P Note     Patient:  Leo Nevarez  MRN: [id number]  YOB: 1940    ATTENDING: Abhay Levine Jr, MD     CHIEF COMPLAINT:  BPH urinary retention    HISTORY OF PRESENT ILLNESS:   The patient is a 83 y.o. male who presents with BPH with urinary retention    Patient's old records, notes and chart reviewed and summarized above.     Past Medical History:    Past Medical History:   Diagnosis Date    Chronic kidney disease     Diabetes mellitus (HCC)     Hyperlipidemia     Hypertension        Past Surgical History:    Past Surgical History:   Procedure Laterality Date    BLADDER SCAN  11/11/2023    BLADDER SCAN  11/13/2023    CARDIAC PROCEDURE Left 11/20/2023    Left heart cath / coronary angiography performed by Javier Brothers MD at [facility] CARDIAC CATH LAB    HC BLADDER SCAN  11/11/2023    SHOULDER ARTHROPLASTY Left        Medications Prior to Admission:   Prior to Admission medications    Medication Sig Start Date End Date Taking? Authorizing Provider   tamsulosin (FLOMAX) 0.4 MG capsule Take 1 capsule by mouth daily 12/22/23   Marta Pratt, APRN - CNP   metoprolol tartrate (LOPRESSOR) 25 MG tablet Take 1 tablet by mouth 2 times daily 11/17/23   Shemar Mccarty MD   torsemide (DEMADEX) 20 MG tablet Take 1 tablet by mouth daily 11/17/23   Shemar Mccarty MD   melatonin 3 MG TABS tablet Take 1.5 tablets by mouth nightly as needed (Sleep) 11/17/23   Shemar Mccarty MD   pantoprazole (PROTONIX) 40 MG tablet Take 1 tablet by mouth every morning (before breakfast) 11/18/23   Shemar Mccarty MD   aspirin 81 MG EC tablet Take 1 tablet by mouth daily 11/17/23   Shemar Mccarty MD   buPROPion (WELLBUTRIN XL) 150 MG extended release tablet Take 1 tablet by mouth daily 10/24/23   Nikolay Singleton MD   pravastatin (PRAVACHOL) 80 MG tablet Take 1 tablet by mouth at bedtime 10/18/23   Nikolay Singleton MD   metFORMIN (GLUCOPHAGE) 1000 MG tablet Take 1 tablet by mouth 2 times

## 2024-01-22 NOTE — PROGRESS NOTES
[facility] in [facility] for [facility]  This procedure has been fully reviewed with the patient and written informed consent has been obtained.  0957 Procedure started with Dr. Levine  0958 Procedure completed; patient tolerated well.  Dr. Levine talked to patient in length about procedure findings.  Patient discharged.    PLAN     Begin taking doxycycline    Greenlight (cardiac clearance), pamphlet given to patient.  Office will call to schedule.

## 2024-01-22 NOTE — OP NOTE
Cystoscopy Operative Note  Surgeon: Abhay Levine Jr, MD   Anesthesia: Urethral 2%  Indications:  BPH urinary retention  Position: supine  EBL: 1 cc  Specimen: none  Findings:   The patient was prepped and draped in the usual sterile fashion.  The flexible cystoscope was advanced through the urethra and into the bladder.  The bladder was thoroughly inspected and the following was noted:    Residual Urine:  Moderate   Urethra: No abnormalities of the urethra are noted.  Prostate: Large gland (<80 gm) Complete obstruction by lateral & median lobe of prostate.  Bladder: No tumors or CIS noted.  No bladder diverticulum.    Moderate  trabeculation noted.  Ureters: Clear efflux from both ureters.  Orifices with normal configuration and location.  The cystoscope was removed.  The patient tolerated the procedure well.   Plan: Good candidate for [facility] P[facility]  - circumcision after for phimosis

## 2024-01-22 NOTE — DISCHARGE INSTRUCTIONS
Discharge instructions: Cystoscopy  You May experience painful urination and see blood in the urine after your procedure.  This should resolve over time.      Pt ok to discharge home in good condition  No heavy lifting, >10 lbs for today  Pt should avoid strenuous activity for today  Pt should walk moderately at home  Pt ok to shower   Pt may resume diet as tolerated  Please call attending physician or hospital  with questions  Call or Present to ED if fever (> 101F), intractable nausea vomiting or pain.    Begin taking doxycycline    Greenlight, office will call to schedule.

## 2024-01-23 ENCOUNTER — TELEPHONE (OUTPATIENT)
Dept: UROLOGY | Age: 84
End: 2024-01-23

## 2024-01-23 ENCOUNTER — PREP FOR PROCEDURE (OUTPATIENT)
Dept: UROLOGY | Age: 84
End: 2024-01-23

## 2024-01-23 DIAGNOSIS — Z01.818 PRE-OP TESTING: ICD-10-CM

## 2024-01-23 DIAGNOSIS — R31.0 GROSS HEMATURIA: Primary | ICD-10-CM

## 2024-01-23 DIAGNOSIS — N40.1 BENIGN PROSTATIC HYPERPLASIA WITH LOWER URINARY TRACT SYMPTOMS, SYMPTOM DETAILS UNSPECIFIED: ICD-10-CM

## 2024-01-23 NOTE — TELEPHONE ENCOUNTER
Pt will need an appt with Dr Spears to be cleared by Cardiology.    Spoke with pt's daughter Margareth.  She will call back and schedule an appointment as she needs her calendar.

## 2024-01-23 NOTE — TELEPHONE ENCOUNTER
Patient is scheduled for surgery with  on 2/5/24. Surgery consent to be done on arrival. Dr. Spears to clear. Patient to do pre op urine culture on 1/26/24. Surgery instructions gone over with patient verbally in the office or mailed to the patient.     For Valente # [id number] per Nikita    Patient informed an adult over the age of 18 must be with them at the time of surgery, upon discharge and at home for 24 hours after the procedure.

## 2024-01-23 NOTE — TELEPHONE ENCOUNTER
Patient scheduled for a Cystoscopy with Greenlight Photovaporization of the Prostate with Dr Levine on 2/5/24. We are asking for clearance.

## 2024-01-23 NOTE — TELEPHONE ENCOUNTER
DO NOT TAKE  FISH OIL, MOBIC, IBUPROFEN, MOTRIN-LIKE DRUGS AND ANY MULTIVITAMINS OR OVER THE COUNTER SUPPLEMENTS 14 DAYS PRIOR TO SURGERY.    HOLD ASPIRIN 5 DAYS PRIOR TO SURGERY    HOLD THE GLIPIZIDE, JANUVIA AND INSULIN THE MORNING OF SURGERY    IF YOU TAKE GLUCOPHAGE, METFORMIN OR JANUMET, HOLD 2 DAYS PRIOR TO SURGERY    MUST HAVE AN ADULT OVER THE AGE OF 18 WITH YOU AT THE TIME OF THE DISCHARGE AND WITH YOU AT HOME AFTER THE PROCEDURE FOR 24 HOURS        Leo Nevarez 1940     Surgical Physician: Dr. Levine      You have been scheduled for the procedure marked below:      Surgery: Cystoscopy with Greenlight Photovaporization of the Prostate         Date: 2/5/24     Anesthesia: Anesthesiologist (General/Spinal)     Place of Service: [facility] Second Floor Same Day Surgery         Arrive to same day surgery at:  8:00 am  (Surgery time is subject to change)      INSTRUCTIONS AS MARKED BELOW:    1.  DO NOT eat or drink anything after midnight before surgery.  2.  We prefer you shower or bathe with an antibacterial soap (Dial) the morning of surgery.  3  Please bring a current medication list, photo ID and insurance card(s) with you  4. Okay to take Tylenol  5. Take blood pressure or heart medication as directed, if taken in the morning take with a small sip of water  6.The office will call you in 1-2 days after your procedure to schedule a follow up.            Date: 1/23/2024

## 2024-01-23 NOTE — TELEPHONE ENCOUNTER
SURGERY SCHEDULING FORM   [address] [address]      Phone *[phone]   Surgical Scheduling Direct Line Phone *[phone] Fax *[phone]      Leorocky Nevarez 1940 male    [place] [address]   Marital Status:          Home Phone: [phone]      Cell Phone:    Telephone Information:   Mobile [phone]          Surgeon: Dr Levine   Surgery Date: 2/5/24   Time: 10:00 am    Procedure: Cystoscopy with Greenlight Photovaporization of the Prostate    Diagnosis: BPH     Important Medical History:  In Epic    Special Inst/Equip:                             FOR MARY # [id number] PER ANJEL    CPT Codes:    12844  Latex Allergy: No     Cardiac Device:  No    Anesthesia:  General          Admission Type:  Same Day                        Admit Prior to Day of Surgery: No    Case Location:  Main OR            Preadmission Testing:  Phone Call          PAT Date and Time:______________________________________________________    PAT Confirmation #: ______________________________________________________    Post Op Visit: ___________________________________________________________    Need Preop Cardiac Clearance: Yes    Does Patient have Cardiologist/physician?     Dr Spears    Surgery Confirmation #: __________________________________________________    : ________________________   Date: __________________________     Insurance Company Name: [employer]

## 2024-01-24 ENCOUNTER — TELEPHONE (OUTPATIENT)
Dept: UROLOGY | Age: 84
End: 2024-01-24

## 2024-01-26 ENCOUNTER — HOSPITAL ENCOUNTER (OUTPATIENT)
Age: 84
Discharge: HOME | End: 2024-01-26
Payer: MEDICARE

## 2024-01-26 DIAGNOSIS — Z01.818 PRE-OP TESTING: ICD-10-CM

## 2024-01-26 DIAGNOSIS — R31.0 GROSS HEMATURIA: ICD-10-CM

## 2024-01-26 DIAGNOSIS — N40.1 BENIGN PROSTATIC HYPERPLASIA WITH LOWER URINARY TRACT SYMPTOMS, SYMPTOM DETAILS UNSPECIFIED: ICD-10-CM

## 2024-01-26 PROCEDURE — 87077 CULTURE AEROBIC IDENTIFY: CPT

## 2024-01-26 PROCEDURE — 87086 URINE CULTURE/COLONY COUNT: CPT

## 2024-01-26 PROCEDURE — 87186 SC STD MICRODIL/AGAR DIL: CPT

## 2024-01-26 RX ORDER — SODIUM CHLORIDE 0.9 % (FLUSH) 0.9 %
5-40 SYRINGE (ML) INJECTION PRN
Status: CANCELLED | OUTPATIENT
Start: 2024-01-26

## 2024-01-26 RX ORDER — SODIUM CHLORIDE 9 MG/ML
INJECTION, SOLUTION INTRAVENOUS PRN
Status: CANCELLED | OUTPATIENT
Start: 2024-01-26

## 2024-01-26 RX ORDER — SODIUM CHLORIDE 0.9 % (FLUSH) 0.9 %
5-40 SYRINGE (ML) INJECTION EVERY 12 HOURS SCHEDULED
Status: CANCELLED | OUTPATIENT
Start: 2024-01-26

## 2024-01-28 LAB
BACTERIA UR CULT: ABNORMAL
ORGANISM: ABNORMAL

## 2024-01-29 ENCOUNTER — TELEPHONE (OUTPATIENT)
Dept: UROLOGY | Age: 84
End: 2024-01-29

## 2024-01-29 DIAGNOSIS — N30.00 ACUTE CYSTITIS WITHOUT HEMATURIA: Primary | ICD-10-CM

## 2024-01-29 NOTE — PROGRESS NOTES
PAT call attempted, patient unavailable, left message to please call us back at your earliest convenience; [phone]

## 2024-01-29 NOTE — PROGRESS NOTES
PAT Call Date:  1/29   Surgery Date: 2/5    Surgeon: Julianna   Surgery: cysto, greenlight    Is patient from a nursing home? No   Any Isolation Precautions? +ESBL  Any Pacemaker or ICD? No If YES, has it been checked recently and where?    Has the rep been notified?No     On Snapboard? Yes     Hard Copy on Chart  In EPIC Pending/Notes   Consent -   Within 30 days; signed, dated & timed by patient and physician     [x] On Arrival     [] Blood    Additional Consent Needs:     H&P - Within 30 days    [x] Physician To Do     [] H&P Update - If H&P is older then 24 hours    Clearance -  Medical, Cardiac, Pulmonary, etc.    NALLU 1/30   Orders - Signed and Dated    Copy Sent to Pharm []  1/26  [] Physician To Do  GLUCOSE    Labs - Within 3 months     1/11 1/26  [x] CBC -ok   [x] BMP-ok   [x] GFR-ok   [] INR    [] PTT    [x] Urine +e coli-urology aware   [] Liver Enzymes    [] Kidney Function    [] MRSA Nasal   [] MSSA      Others:    Radiology Studies-   Within 1 year  11/13  [x] Chest X-Ray 2 view-ok   [] MRI    [] CT    EKG -   Within 1 year, unless hx of HTN  1/5 ok   Cardiac Workup -   Stress Test, Echo, Cath within 18 months  11/20/23 11/16/23 11/11/23  [x] Cath                                [x] Stress Test                      [] Echo    [] Holter Monitor    [x] SEEMA 50-55%

## 2024-01-29 NOTE — TELEPHONE ENCOUNTER
Please review urine culture on 1/26/24. Surgery with Julianna on 25/24 for a Greenlight Thanks. 1/296

## 2024-01-30 ENCOUNTER — OFFICE VISIT (OUTPATIENT)
Dept: CARDIOLOGY CLINIC | Age: 84
End: 2024-01-30
Payer: MEDICARE

## 2024-01-30 VITALS
SYSTOLIC BLOOD PRESSURE: 112 MMHG | DIASTOLIC BLOOD PRESSURE: 58 MMHG | HEIGHT: 68 IN | WEIGHT: 218 LBS | HEART RATE: 105 BPM | BODY MASS INDEX: 33.04 KG/M2

## 2024-01-30 DIAGNOSIS — I25.10 CORONARY ARTERY DISEASE INVOLVING NATIVE CORONARY ARTERY OF NATIVE HEART WITHOUT ANGINA PECTORIS: Primary | ICD-10-CM

## 2024-01-30 DIAGNOSIS — Z01.810 PREOP CARDIOVASCULAR EXAM: ICD-10-CM

## 2024-01-30 PROCEDURE — 1123F ACP DISCUSS/DSCN MKR DOCD: CPT | Performed by: INTERNAL MEDICINE

## 2024-01-30 PROCEDURE — 93000 ELECTROCARDIOGRAM COMPLETE: CPT | Performed by: INTERNAL MEDICINE

## 2024-01-30 PROCEDURE — 3074F SYST BP LT 130 MM HG: CPT | Performed by: INTERNAL MEDICINE

## 2024-01-30 PROCEDURE — G8417 CALC BMI ABV UP PARAM F/U: HCPCS | Performed by: INTERNAL MEDICINE

## 2024-01-30 PROCEDURE — 1036F TOBACCO NON-USER: CPT | Performed by: INTERNAL MEDICINE

## 2024-01-30 PROCEDURE — G8428 CUR MEDS NOT DOCUMENT: HCPCS | Performed by: INTERNAL MEDICINE

## 2024-01-30 PROCEDURE — 99214 OFFICE O/P EST MOD 30 MIN: CPT | Performed by: INTERNAL MEDICINE

## 2024-01-30 PROCEDURE — 1111F DSCHRG MED/CURRENT MED MERGE: CPT | Performed by: INTERNAL MEDICINE

## 2024-01-30 PROCEDURE — 3078F DIAST BP <80 MM HG: CPT | Performed by: INTERNAL MEDICINE

## 2024-01-30 PROCEDURE — G8484 FLU IMMUNIZE NO ADMIN: HCPCS | Performed by: INTERNAL MEDICINE

## 2024-01-30 RX ORDER — CIPROFLOXACIN 500 MG/1
500 TABLET, FILM COATED ORAL 2 TIMES DAILY
Qty: 14 TABLET | Refills: 0 | Status: SHIPPED | OUTPATIENT
Start: 2024-01-30 | End: 2024-02-06

## 2024-01-30 NOTE — TELEPHONE ENCOUNTER
Ciprofloxacin sent for prostate penetration given nature of upcoming procedure. Culture with E. Coli UTI sensitive to fluoroquinolones. Take through day of surgery.

## 2024-01-30 NOTE — PATIENT INSTRUCTIONS
Your  Nurses  Today:  Kathrin     Your Provider for Today: Dr. Spears           You may receive a survey regarding the care you received during your visit.  Your input is valuable to us.  We encourage you to complete and return your survey.  We hope you will choose us in the future for your healthcare needs.

## 2024-01-30 NOTE — PROGRESS NOTES
Pt here for pre op clearance  cystoscopy with Dr. Levine 2/5/24      EKG done today pt daughter request     Pt daughter states med  list is correct from 1/22/24     Pt states no c.o     
improve, for Review testing, Regular follow up.       Electronically signed by Alex Spears MD [facility]  1/30/2024 at 6:19 AM EST

## 2024-02-05 ENCOUNTER — HOSPITAL ENCOUNTER (OUTPATIENT)
Age: 84
Setting detail: OUTPATIENT SURGERY
Discharge: HOME | End: 2024-02-05
Attending: UROLOGY | Admitting: UROLOGY
Payer: MEDICARE

## 2024-02-05 ENCOUNTER — ANESTHESIA EVENT (OUTPATIENT)
Dept: OPERATING ROOM | Age: 84
End: 2024-02-05
Payer: MEDICARE

## 2024-02-05 ENCOUNTER — ANESTHESIA (OUTPATIENT)
Dept: OPERATING ROOM | Age: 84
End: 2024-02-05
Payer: MEDICARE

## 2024-02-05 VITALS
TEMPERATURE: 97 F | SYSTOLIC BLOOD PRESSURE: 149 MMHG | HEIGHT: 68 IN | DIASTOLIC BLOOD PRESSURE: 64 MMHG | RESPIRATION RATE: 20 BRPM | BODY MASS INDEX: 33.39 KG/M2 | HEART RATE: 101 BPM | WEIGHT: 220.3 LBS | OXYGEN SATURATION: 96 %

## 2024-02-05 LAB
GLUCOSE BLD STRIP.AUTO-MCNC: 171 MG/DL (ref 70–108)
GLUCOSE BLD STRIP.AUTO-MCNC: 209 MG/DL (ref 70–108)

## 2024-02-05 PROCEDURE — 2709999900 HC NON-CHARGEABLE SUPPLY: Performed by: UROLOGY

## 2024-02-05 PROCEDURE — 2580000003 HC RX 258: Performed by: UROLOGY

## 2024-02-05 PROCEDURE — 6360000002 HC RX W HCPCS: Performed by: UROLOGY

## 2024-02-05 PROCEDURE — 7100000001 HC PACU RECOVERY - ADDTL 15 MIN: Performed by: UROLOGY

## 2024-02-05 PROCEDURE — 7100000000 HC PACU RECOVERY - FIRST 15 MIN: Performed by: UROLOGY

## 2024-02-05 PROCEDURE — 3600000003 HC SURGERY LEVEL 3 BASE: Performed by: UROLOGY

## 2024-02-05 PROCEDURE — 3600000013 HC SURGERY LEVEL 3 ADDTL 15MIN: Performed by: UROLOGY

## 2024-02-05 PROCEDURE — 82948 REAGENT STRIP/BLOOD GLUCOSE: CPT

## 2024-02-05 PROCEDURE — 3700000001 HC ADD 15 MINUTES (ANESTHESIA): Performed by: UROLOGY

## 2024-02-05 PROCEDURE — 7100000010 HC PHASE II RECOVERY - FIRST 15 MIN: Performed by: UROLOGY

## 2024-02-05 PROCEDURE — 3700000000 HC ANESTHESIA ATTENDED CARE: Performed by: UROLOGY

## 2024-02-05 PROCEDURE — 6360000002 HC RX W HCPCS: Performed by: NURSE ANESTHETIST, CERTIFIED REGISTERED

## 2024-02-05 PROCEDURE — 2500000003 HC RX 250 WO HCPCS: Performed by: NURSE ANESTHETIST, CERTIFIED REGISTERED

## 2024-02-05 PROCEDURE — 2720000010 HC SURG SUPPLY STERILE: Performed by: UROLOGY

## 2024-02-05 PROCEDURE — 7100000011 HC PHASE II RECOVERY - ADDTL 15 MIN: Performed by: UROLOGY

## 2024-02-05 RX ORDER — SODIUM CHLORIDE 0.9 % (FLUSH) 0.9 %
5-40 SYRINGE (ML) INJECTION PRN
Status: DISCONTINUED | OUTPATIENT
Start: 2024-02-05 | End: 2024-02-05 | Stop reason: HOSPADM

## 2024-02-05 RX ORDER — SODIUM CHLORIDE 9 MG/ML
INJECTION, SOLUTION INTRAVENOUS PRN
Status: DISCONTINUED | OUTPATIENT
Start: 2024-02-05 | End: 2024-02-05 | Stop reason: HOSPADM

## 2024-02-05 RX ORDER — OXYBUTYNIN CHLORIDE 5 MG/1
5 TABLET, EXTENDED RELEASE ORAL DAILY
Qty: 14 TABLET | Refills: 0 | Status: SHIPPED | OUTPATIENT
Start: 2024-02-05 | End: 2024-02-19

## 2024-02-05 RX ORDER — PHENYLEPHRINE HCL IN 0.9% NACL 1 MG/10 ML
SYRINGE (ML) INTRAVENOUS PRN
Status: DISCONTINUED | OUTPATIENT
Start: 2024-02-05 | End: 2024-02-05 | Stop reason: SDUPTHER

## 2024-02-05 RX ORDER — SODIUM CHLORIDE 0.9 % (FLUSH) 0.9 %
5-40 SYRINGE (ML) INJECTION EVERY 12 HOURS SCHEDULED
Status: DISCONTINUED | OUTPATIENT
Start: 2024-02-05 | End: 2024-02-05 | Stop reason: HOSPADM

## 2024-02-05 RX ORDER — FENTANYL CITRATE 50 UG/ML
25 INJECTION, SOLUTION INTRAMUSCULAR; INTRAVENOUS EVERY 5 MIN PRN
Status: DISCONTINUED | OUTPATIENT
Start: 2024-02-05 | End: 2024-02-05 | Stop reason: HOSPADM

## 2024-02-05 RX ORDER — CIPROFLOXACIN 500 MG/1
500 TABLET, FILM COATED ORAL 2 TIMES DAILY
Qty: 6 TABLET | Refills: 0 | Status: SHIPPED | OUTPATIENT
Start: 2024-02-05 | End: 2024-02-08

## 2024-02-05 RX ORDER — PROPOFOL 10 MG/ML
INJECTION, EMULSION INTRAVENOUS PRN
Status: DISCONTINUED | OUTPATIENT
Start: 2024-02-05 | End: 2024-02-05 | Stop reason: SDUPTHER

## 2024-02-05 RX ORDER — DEXAMETHASONE SODIUM PHOSPHATE 10 MG/ML
INJECTION, EMULSION INTRAMUSCULAR; INTRAVENOUS PRN
Status: DISCONTINUED | OUTPATIENT
Start: 2024-02-05 | End: 2024-02-05 | Stop reason: SDUPTHER

## 2024-02-05 RX ORDER — FENTANYL CITRATE 50 UG/ML
INJECTION, SOLUTION INTRAMUSCULAR; INTRAVENOUS PRN
Status: DISCONTINUED | OUTPATIENT
Start: 2024-02-05 | End: 2024-02-05 | Stop reason: SDUPTHER

## 2024-02-05 RX ORDER — ONDANSETRON 2 MG/ML
INJECTION INTRAMUSCULAR; INTRAVENOUS PRN
Status: DISCONTINUED | OUTPATIENT
Start: 2024-02-05 | End: 2024-02-05 | Stop reason: SDUPTHER

## 2024-02-05 RX ORDER — PHENAZOPYRIDINE HYDROCHLORIDE 100 MG/1
100 TABLET, FILM COATED ORAL 3 TIMES DAILY PRN
Qty: 15 TABLET | Refills: 0 | Status: SHIPPED | OUTPATIENT
Start: 2024-02-05 | End: 2024-02-10

## 2024-02-05 RX ORDER — FENTANYL CITRATE 50 UG/ML
50 INJECTION, SOLUTION INTRAMUSCULAR; INTRAVENOUS EVERY 5 MIN PRN
Status: DISCONTINUED | OUTPATIENT
Start: 2024-02-05 | End: 2024-02-05 | Stop reason: HOSPADM

## 2024-02-05 RX ORDER — LIDOCAINE HCL/PF 100 MG/5ML
SYRINGE (ML) INJECTION PRN
Status: DISCONTINUED | OUTPATIENT
Start: 2024-02-05 | End: 2024-02-05 | Stop reason: SDUPTHER

## 2024-02-05 RX ORDER — CIPROFLOXACIN 2 MG/ML
INJECTION, SOLUTION INTRAVENOUS PRN
Status: DISCONTINUED | OUTPATIENT
Start: 2024-02-05 | End: 2024-02-05 | Stop reason: SDUPTHER

## 2024-02-05 RX ORDER — EPHEDRINE SULFATE/0.9% NACL/PF 50 MG/5 ML
SYRINGE (ML) INTRAVENOUS PRN
Status: DISCONTINUED | OUTPATIENT
Start: 2024-02-05 | End: 2024-02-05 | Stop reason: SDUPTHER

## 2024-02-05 RX ADMIN — Medication 2000 MG: at 14:47

## 2024-02-05 RX ADMIN — ONDANSETRON 4 MG: 2 INJECTION INTRAMUSCULAR; INTRAVENOUS at 15:41

## 2024-02-05 RX ADMIN — SODIUM CHLORIDE: 9 INJECTION, SOLUTION INTRAVENOUS at 08:48

## 2024-02-05 RX ADMIN — Medication 10 MG: at 15:16

## 2024-02-05 RX ADMIN — PROPOFOL 100 MG: 10 INJECTION, EMULSION INTRAVENOUS at 14:48

## 2024-02-05 RX ADMIN — Medication 100 MG: at 14:48

## 2024-02-05 RX ADMIN — CIPROFLOXACIN 400 MG: 2 INJECTION, SOLUTION INTRAVENOUS at 15:07

## 2024-02-05 RX ADMIN — Medication 100 MCG: at 14:59

## 2024-02-05 RX ADMIN — Medication 200 MCG: at 15:14

## 2024-02-05 RX ADMIN — Medication 100 MCG: at 15:01

## 2024-02-05 RX ADMIN — Medication 100 MCG: at 15:03

## 2024-02-05 RX ADMIN — DEXAMETHASONE SODIUM PHOSPHATE 4 MG: 10 INJECTION, EMULSION INTRAMUSCULAR; INTRAVENOUS at 14:54

## 2024-02-05 RX ADMIN — SODIUM CHLORIDE: 9 INJECTION, SOLUTION INTRAVENOUS at 15:19

## 2024-02-05 RX ADMIN — Medication 200 MCG: at 15:08

## 2024-02-05 RX ADMIN — FENTANYL CITRATE 100 MCG: 50 INJECTION, SOLUTION INTRAMUSCULAR; INTRAVENOUS at 14:48

## 2024-02-05 RX ADMIN — Medication 10 MG: at 15:43

## 2024-02-05 RX ADMIN — Medication 10 MG: at 15:18

## 2024-02-05 ASSESSMENT — PAIN - FUNCTIONAL ASSESSMENT
PAIN_FUNCTIONAL_ASSESSMENT: 0-10
PAIN_FUNCTIONAL_ASSESSMENT: NONE - DENIES PAIN
PAIN_FUNCTIONAL_ASSESSMENT: 0-10

## 2024-02-05 ASSESSMENT — PAIN SCALES - GENERAL: PAINLEVEL_OUTOF10: 0

## 2024-02-05 ASSESSMENT — ENCOUNTER SYMPTOMS: SHORTNESS OF BREATH: 1

## 2024-02-05 NOTE — PROGRESS NOTES
1554: pt arrives to pacu. Pt on room air and placed on 2L nasal cannula. Pt responds to verbal stimulation. VSS. Respirations unlabored. Cbi running wide open and slowed down. 850ml emptied from esqueda. Bag one has 1000ml left. Bag two has 1500ml left. Pt denies pain   1600: pt resting in bed    1604: pt sitting up in bed eating ice chips   1614: pt resting in bed   1617: 400ml of cherry colored emptied from esqueda. CBI running wide open   1624: pt meets discharge criteria from pacu. Pt transported to [facility]

## 2024-02-05 NOTE — H&P
Julianna Calabrese  Urology H&P Note     Patient:  Leo Nevarez  MRN: [id number]  YOB: 1940    ATTENDING: Abhay Levine Jr, MD     CHIEF COMPLAINT:  BPH    HISTORY OF PRESENT ILLNESS:   The patient is a 83 y.o. male who presents with BPH    Patient's old records, notes and chart reviewed and summarized above.     Past Medical History:    Past Medical History:   Diagnosis Date    Chronic kidney disease     Diabetes mellitus (HCC)     Hyperlipidemia     Hypertension        Past Surgical History:    Past Surgical History:   Procedure Laterality Date    BLADDER SCAN  11/11/2023    BLADDER SCAN  11/13/2023    CARDIAC PROCEDURE Left 11/20/2023    Left heart cath / coronary angiography performed by Javier Brothers MD at [facility] CARDIAC CATH LAB    HC BLADDER SCAN  11/11/2023    SHOULDER ARTHROPLASTY Left        Medications Prior to Admission:   Prior to Admission medications    Medication Sig Start Date End Date Taking? Authorizing Provider   ciprofloxacin (CIPRO) 500 MG tablet Take 1 tablet by mouth 2 times daily for 7 days 1/30/24 2/6/24  Denilson Albarran PA-C   tamsulosin (FLOMAX) 0.4 MG capsule Take 1 capsule by mouth daily 12/22/23   Marta Pratt, APRN - CNP   metoprolol tartrate (LOPRESSOR) 25 MG tablet Take 1 tablet by mouth 2 times daily 11/17/23   Shemar Mccarty MD   torsemide (DEMADEX) 20 MG tablet Take 1 tablet by mouth daily 11/17/23   Shemar Mccarty MD   melatonin 3 MG TABS tablet Take 1.5 tablets by mouth nightly as needed (Sleep) 11/17/23   Shemar Mccarty MD   pantoprazole (PROTONIX) 40 MG tablet Take 1 tablet by mouth every morning (before breakfast) 11/18/23   Shemar Mccarty MD   aspirin 81 MG EC tablet Take 1 tablet by mouth daily 11/17/23   Shemar Mccarty MD   buPROPion (WELLBUTRIN XL) 150 MG extended release tablet Take 1 tablet by mouth daily 10/24/23   Nikolay Singleton MD   pravastatin (PRAVACHOL) 80 MG tablet Take 1 tablet by mouth at bedtime 10/18/23   Provider

## 2024-02-05 NOTE — OP NOTE
FACILITY:  [place], [place]   DATE:  02/05/24  Leo Nevarez  1940  [id number]     Surgeon: Dr. Abhay Levine Jr, MD MD  Asst.: Dr. Abhay Levine Jr, MD MD  PREOPERATIVE DIAGNOSES:  1. Urinary retention.  2. Benign prostatic hyperplasia with obstruction.  POSTOPERATIVE DIAGNOSIS:  1. Urinary retention.  2. Benign prostatic hyperplasia with obstruction.  PROCEDURES PERFORMED:  1. Cystoscopy.  2. GreenLight photovaporization of the prostate.  ANESTHESIA:  General.  COMPLICATIONS:  None.  SPECIMENS:  Urine for culture.  ESTIMATED BLOOD LOSS:  Minimal.  DRAINS:  A 22 Armenian 3-way Fung catheter.     INDICATIONS: Leo Nevarez is a 83 y.o. male presents today for GreenLight photovaporization of the prostate. After risks, benefits and alternatives of the procedure were discussed with the patient, informed consent was obtained and the patient elected to proceed.     OPERATIVE SUMMARY:  The risks and benefits of the procedure were explained to the patient in the preoperative area. After informed consent was obtained, the patient was taken back to the operating room. The patient was transferred to the operating table and placed in a supine position. General anesthesia was induced and the patient was placed in the dorsal lithotomy position. He was prepped and draped in a sterile fashion and a time-out was performed to confirm patient identity and procedure. Prior to induction of anesthesia the patient was administered preoperative antibiotics and EPC cuffs were on and functioning. Our continuous flow sheath with obturator and lens was inserted through the patient's urethra and into the bladder. Upon entering the bladder we located both ureteral orifices, they were at a safe distance from the vesical neck.  On evaluation of the prostate the patient was noted to have median lobe. The GreenLight fiber was then inserted after we removed our obturator and placed our working bridge through out continous flow

## 2024-02-05 NOTE — PROGRESS NOTES
1625  pt. Awake and oriented on arrival to phase II.  Pt. Denies pain.  CBI intact and continuous.  Esqueda draining pink.  Family at bedside.  Call light in reach.  1700  pt. Eating and drinking without difficulty.  1745  esqueda draining clear.    1800  CBI capped off.  1830  pt. Stable.  Phase II criteria met.  Pt. Discharged per wheelchair

## 2024-02-05 NOTE — ANESTHESIA POSTPROCEDURE EVALUATION
Department of Anesthesiology  Postprocedure Note    Patient: Leo Nevarez  MRN: [id number]  YOB: 1940  Date of evaluation: 2/5/2024    Procedure Summary       Date: 02/05/24 Room / Location: [facility]  / [facility] OR    Anesthesia Start: 1441 Anesthesia Stop: 1557    Procedure: Cystoscopy Greenlight Photovaporization of Prostate Diagnosis:       Benign prostatic hyperplasia, unspecified whether lower urinary tract symptoms present      (Benign prostatic hyperplasia, unspecified whether lower urinary tract symptoms present [N40.0])    Surgeons: Abhay Levine Jr., MD Responsible Provider: Shorty Zimmerman DO    Anesthesia Type: general ASA Status: 2            Anesthesia Type: No value filed.    Susan Phase I: Susan Score: 8    Susan Phase II:      Anesthesia Post Evaluation    Patient location during evaluation: PACU  Patient participation: complete - patient participated  Level of consciousness: awake and alert  Pain score: 3  Airway patency: patent  Nausea & Vomiting: no nausea and no vomiting  Cardiovascular status: hemodynamically stable and blood pressure returned to baseline  Respiratory status: spontaneous ventilation, acceptable and nasal cannula  Hydration status: stable  Pain management: adequate and satisfactory to patient    No notable events documented.

## 2024-02-05 NOTE — PROGRESS NOTES
Patient oriented to Same Day department and admitted to Same Day Surgery room 17.   Patient verbalized approval for first name, last initial with physician name on unit whiteboard.     Plan of care reviewed with patient.   Patient room whiteboard filled out and discussed with patient and responsible adult.   Patient and responsible adult offered Same Day Welcome Packet to review.    Call light in reach.   Bed in lowest position, locked, with one bed rail up.   SCDs and warming blanket in place.  Appropriate arm bands on patient.   Bathroom offered.   All questions and concerns of patient addressed.        Meds to Beds:   Patient informed of [facility] Meds to Beds program during admission. Patient is agreeable to program.   Contact information for the pharmacy and the Meds to Beds program:   Name: Willem- son   Relationship to patient:child   Phone number: [phone]

## 2024-02-05 NOTE — ANESTHESIA PRE PROCEDURE
antiemetics administered.  Anesthetic plan and risks discussed with patient.      Plan discussed with CRNA.                Shorty Zimmerman DO   2/5/2024

## 2024-02-05 NOTE — DISCHARGE INSTRUCTIONS
Discharge instructions: Greenlight Photovaporization of the prostate:   The patient should have CBI weaned off in recovery.  Please call if urine is not clear / pink with CBI.  Traction on the catheter should be released before discharge.  If taking prostate medications, continue for 1 month    You may see blood in the urine after the procedure.  This should resolve over the next couple days.  Please stay hydrated.  You may experience frequency/urgency of urination after the procedure.  We expect these symptoms to improve over the next couple weeks.      Tylenol for pain control  Pt ok to discharge home in good condition  No heavy lifting, >10 lbs for today  Pt should avoid strenuous activity for today  Pt should walk moderately at home  Pt ok to shower   Pt may resume diet as tolerated  Pt should take Rx as directed  No driving while on narcotics  Please call attending physician or hospital  with questions  Call or Present to ED if fever (> 101F), intractable nausea vomiting or pain.    If taking, Please hold blood thinning medications for 3-5 days till hematuria improves.       Pt should follow up with Dr. Abhay Levine Jr, MD, 3 days to have catheter removed, call to confirm appointment    Home with esqueda catheter.  Please teach esqueda education and send home with leg and night bag.  You may see intermittent blood in the urine while the catheter in place.  If the catheter becomes obstructed and needs to be exchanged, please call.

## 2024-02-06 ENCOUNTER — TELEPHONE (OUTPATIENT)
Dept: UROLOGY | Age: 84
End: 2024-02-06

## 2024-02-06 ENCOUNTER — NURSE ONLY (OUTPATIENT)
Dept: UROLOGY | Age: 84
End: 2024-02-06
Payer: MEDICARE

## 2024-02-06 VITALS — WEIGHT: 220 LBS | BODY MASS INDEX: 33.34 KG/M2 | HEIGHT: 68 IN

## 2024-02-06 DIAGNOSIS — R33.9 URINARY RETENTION: Primary | ICD-10-CM

## 2024-02-06 PROCEDURE — 51702 INSERT TEMP BLADDER CATH: CPT

## 2024-02-06 NOTE — PROGRESS NOTES
Patient has given me verbal consent to perform catheter change Yes    Does patient have latex allergy?No  Does patient have shellfish or betadine allergy? No    Following Carlyn's plan of care. 22Fr Catheter changed with some difficulty. Foreskin would not retract all the way making it difficult to find opening of urethra. Heidi Devi LPN was called in for assistance.     Once balloon was deflated, removed catheter without difficulty. Cleansed urethral opening with betadine swab. 22Fr regular esqueda was inserted using sterile water-soluble lubricant without difficulty. Catheter was flushed with 70cc water ensuring return and inflated balloon with 10 ml of water. Esqueda Catheter was hooked up to overnight bag.    Foreskin reduced back down? Yes    Patient instructed on how to drain catheter bags.    If patient was given a leg bag, patient was instructed to keep bag above the knee to prevent pulling on catheter. Pt notified if catheter is pulled on may cause pain and bleeding.     Patient was also instructed on how to switch from leg bag to overnight bag.          Supplies were provided by office

## 2024-02-06 NOTE — TELEPHONE ENCOUNTER
Spoke with Dr. Levine.   Will exchange urinary catheter.     The patient should go to the ED should they develop fever, chills, nausea, vomiting, chest pain, SOB, calf pain, feelings of incomplete emptying, or should they otherwise feel they need evaluated

## 2024-02-06 NOTE — TELEPHONE ENCOUNTER
Patient daughter states the catheter has not drained since 6 pm and only has 200 ml urine in the bag. She denies seeing any clots and the urine red orange. He is taking pyridium. Scheduled appointment to evaluate the catheter.     Scheduled for esqueda removal on 02/06/2024. Underwent  Cystoscopy.  2. GreenLight photovaporization of the prostate yesterday.

## 2024-02-08 ENCOUNTER — NURSE ONLY (OUTPATIENT)
Dept: UROLOGY | Age: 84
End: 2024-02-08

## 2024-02-08 PROCEDURE — NBSRV NON-BILLABLE SERVICE: Performed by: NURSE PRACTITIONER

## 2024-02-08 NOTE — PROGRESS NOTES
Patient has given me verbal consent to perform esqueda removal  Yes    10 cc of water deflated from esqueda balloon. 22 Fr esquead removed without difficulty.    Foreskin reduced back down?  Yes      Pt will drink fluids and report to ER in 6-8 hours if patient unable to urinate.      F/u with provider Dr. kapadia.

## 2024-02-26 ENCOUNTER — OFFICE VISIT (OUTPATIENT)
Dept: UROLOGY | Age: 84
End: 2024-02-26

## 2024-02-26 VITALS — RESPIRATION RATE: 16 BRPM | HEIGHT: 68 IN | BODY MASS INDEX: 33.34 KG/M2 | WEIGHT: 220 LBS

## 2024-02-26 DIAGNOSIS — N39.41 URGE INCONTINENCE OF URINE: ICD-10-CM

## 2024-02-26 DIAGNOSIS — N13.8 BPH WITH OBSTRUCTION/LOWER URINARY TRACT SYMPTOMS: Primary | ICD-10-CM

## 2024-02-26 DIAGNOSIS — N40.1 BPH WITH OBSTRUCTION/LOWER URINARY TRACT SYMPTOMS: Primary | ICD-10-CM

## 2024-02-26 PROCEDURE — 99024 POSTOP FOLLOW-UP VISIT: CPT | Performed by: UROLOGY

## 2024-02-26 RX ORDER — OXYBUTYNIN CHLORIDE 10 MG/1
10 TABLET, EXTENDED RELEASE ORAL DAILY
Qty: 30 TABLET | Refills: 3 | Status: SHIPPED | OUTPATIENT
Start: 2024-02-26

## 2024-02-26 NOTE — PROGRESS NOTES
Dr. Abhay Levine Jr, MD MD  [facility] Consultation / New Patient Visit    Patient:  Leo Nevarez  YOB: 1940  Date: 2/26/2024  Consult requested from Aileen Valadez APRN - CNP     HISTORY OF PRESENT ILLNESS:   The patient is a 83 y.o. male who presents today for follow-up for the following problem(s): BPH with LUTs  Overall the problem(s) : are worsening.  Associated Symptoms: No dysuria, gross hematuria.   Pain Severity:      Today visit:   2/26/24   Presents with BPH and s/p Greenligth PVP (2/2024)    Summary of old records:   (Patient's old records, notes and chart reviewed and summarized above.)    Last several PSA's:  No results found for: \"PSA\"    Last total testosterone:  No results found for: \"TESTOSTERONE\"    Urinalysis today:  No results found for this visit on 02/26/24.      Last BUN and creatinine:  Lab Results   Component Value Date    BUN 24 (H) 01/11/2024     Lab Results   Component Value Date    CREATININE 1.1 01/11/2024       Imaging Reviewed during this Office Visit:   (results were independently reviewed by physician and radiology report verified)    PAST MEDICAL, FAMILY AND SOCIAL HISTORY:  Past Medical History:   Diagnosis Date    Chronic kidney disease     Diabetes mellitus (HCC)     Hyperlipidemia     Hypertension      Past Surgical History:   Procedure Laterality Date    BLADDER SCAN  11/11/2023    BLADDER SCAN  11/13/2023    CARDIAC PROCEDURE Left 11/20/2023    Left heart cath / coronary angiography performed by Javier Brothers MD at [facility] CARDIAC CATH LAB    HC BLADDER SCAN  11/11/2023    SHOULDER ARTHROPLASTY Left     TURP N/A 2/5/2024    Cystoscopy Greenlight Photovaporization of Prostate performed by Abhay Levine Jr., MD at [facility] OR     Family History   Problem Relation Age of Onset    Hypertension Mother     Kidney Disease Father     Kidney Cancer Sister     Prostate Cancer Brother      Outpatient Medications Marked as Taking for the 2/26/24

## 2024-03-05 ENCOUNTER — TELEPHONE (OUTPATIENT)
Age: 84
End: 2024-03-05

## 2024-03-05 ENCOUNTER — NURSE ONLY (OUTPATIENT)
Dept: LAB | Age: 84
End: 2024-03-05

## 2024-03-05 ENCOUNTER — OFFICE VISIT (OUTPATIENT)
Dept: NEPHROLOGY | Age: 84
End: 2024-03-05
Payer: MEDICARE

## 2024-03-05 VITALS
DIASTOLIC BLOOD PRESSURE: 59 MMHG | SYSTOLIC BLOOD PRESSURE: 125 MMHG | WEIGHT: 218 LBS | HEART RATE: 89 BPM | HEIGHT: 68 IN | BODY MASS INDEX: 33.04 KG/M2 | OXYGEN SATURATION: 95 %

## 2024-03-05 DIAGNOSIS — N18.31 STAGE 3A CHRONIC KIDNEY DISEASE (HCC): ICD-10-CM

## 2024-03-05 DIAGNOSIS — N18.32 STAGE 3B CHRONIC KIDNEY DISEASE (HCC): ICD-10-CM

## 2024-03-05 DIAGNOSIS — I10 ESSENTIAL HYPERTENSION: ICD-10-CM

## 2024-03-05 DIAGNOSIS — I10 ESSENTIAL HYPERTENSION: Primary | ICD-10-CM

## 2024-03-05 LAB
ALBUMIN SERPL BCG-MCNC: 3.7 G/DL (ref 3.5–5.1)
ANION GAP SERPL CALC-SCNC: 7 MEQ/L (ref 8–16)
BACTERIA: ABNORMAL
BILIRUB UR QL STRIP: NEGATIVE
BUN SERPL-MCNC: 17 MG/DL (ref 7–22)
CALCIUM SERPL-MCNC: 9.5 MG/DL (ref 8.5–10.5)
CASTS #/AREA URNS LPF: ABNORMAL /LPF
CASTS #/AREA URNS LPF: ABNORMAL /LPF
CHARACTER UR: ABNORMAL
CHARCOAL URNS QL MICRO: ABNORMAL
CHLORIDE SERPL-SCNC: 92 MEQ/L (ref 98–111)
CO2 SERPL-SCNC: 29 MEQ/L (ref 23–33)
COLOR UR: ABNORMAL
CREAT SERPL-MCNC: 1 MG/DL (ref 0.4–1.2)
CREAT UR-MCNC: 103.4 MG/DL
CREAT UR-MCNC: 103.4 MG/DL
CRYSTALS URNS QL MICRO: ABNORMAL
EPITHELIAL CELLS, UA: ABNORMAL /HPF
GFR SERPL CREATININE-BSD FRML MDRD: > 60 ML/MIN/1.73M2
GLUCOSE SERPL-MCNC: 458 MG/DL (ref 70–108)
GLUCOSE UR QL STRIP.AUTO: >= 1000 MG/DL
HGB UR QL STRIP.AUTO: ABNORMAL
KETONES UR QL STRIP.AUTO: NEGATIVE
LEUKOCYTE ESTERASE UR QL STRIP.AUTO: ABNORMAL
MICROALBUMIN UR-MCNC: 131.99 MG/DL
MICROALBUMIN/CREAT RATIO PNL UR: 1276 MG/G (ref 0–30)
NITRITE UR QL STRIP.AUTO: NEGATIVE
PH UR STRIP.AUTO: 5.5 [PH] (ref 5–9)
PHOSPHATE SERPL-MCNC: 3.3 MG/DL (ref 2.4–4.7)
POTASSIUM SERPL-SCNC: 4.9 MEQ/L (ref 3.5–5.2)
PROT UR STRIP.AUTO-MCNC: 300 MG/DL
PROT UR-MCNC: 311.8 MG/DL
PROT/CREAT 24H UR: 3.02 MG/G{CREAT}
RBC #/AREA URNS HPF: > 200 /HPF
RENAL EPI CELLS #/AREA URNS HPF: ABNORMAL /[HPF]
SODIUM SERPL-SCNC: 128 MEQ/L (ref 135–145)
SPECIFIC GRAVITY UA: 1.03 (ref 1–1.03)
UROBILINOGEN, URINE: 0.2 EU/DL (ref 0–1)
WBC #/AREA URNS HPF: > 200 /HPF
YEAST LIKE FUNGI URNS QL MICRO: ABNORMAL

## 2024-03-05 PROCEDURE — 1123F ACP DISCUSS/DSCN MKR DOCD: CPT | Performed by: INTERNAL MEDICINE

## 2024-03-05 PROCEDURE — G8484 FLU IMMUNIZE NO ADMIN: HCPCS | Performed by: INTERNAL MEDICINE

## 2024-03-05 PROCEDURE — 1036F TOBACCO NON-USER: CPT | Performed by: INTERNAL MEDICINE

## 2024-03-05 PROCEDURE — 99213 OFFICE O/P EST LOW 20 MIN: CPT | Performed by: INTERNAL MEDICINE

## 2024-03-05 PROCEDURE — 3078F DIAST BP <80 MM HG: CPT | Performed by: INTERNAL MEDICINE

## 2024-03-05 PROCEDURE — G8427 DOCREV CUR MEDS BY ELIG CLIN: HCPCS | Performed by: INTERNAL MEDICINE

## 2024-03-05 PROCEDURE — 3074F SYST BP LT 130 MM HG: CPT | Performed by: INTERNAL MEDICINE

## 2024-03-05 PROCEDURE — G8417 CALC BMI ABV UP PARAM F/U: HCPCS | Performed by: INTERNAL MEDICINE

## 2024-03-05 RX ORDER — FUROSEMIDE 20 MG/1
20 TABLET ORAL 2 TIMES DAILY
COMMUNITY

## 2024-03-05 NOTE — PROGRESS NOTES
[facility]        Outpatient Follow-Up note         3/5/2024 10:20 AM    Patient Name:   Leo Nevarez  YOB: 1940  Primary Care Physician:  Aileen Valadez, APRN - CNP   [facility]  [facility]  [address]  [address]  [facility] [address]  Dept: [phone]  Loc: [phone]     Chief Complaint / Reason for follow-up : Follow Up of CKD     Interval History :  Patient seen and examined by me.   Feels well.  No cp or SOB.  Recent hospitalization for retention and has greenlight vaporistion done in feb  Oxybutyrin was added     Past History :  Past Medical History:   Diagnosis Date    Chronic kidney disease     Diabetes mellitus (HCC)     Hyperlipidemia     Hypertension      Past Surgical History:   Procedure Laterality Date    BLADDER SCAN  11/11/2023    BLADDER SCAN  11/13/2023    CARDIAC PROCEDURE Left 11/20/2023    Left heart cath / coronary angiography performed by Javier Brothers MD at [facility] CARDIAC CATH LAB    HC BLADDER SCAN  11/11/2023    SHOULDER ARTHROPLASTY Left     TURP N/A 2/5/2024    Cystoscopy Greenlight Photovaporization of Prostate performed by Abhay Levine Jr., MD at [facility] OR        Medications :     Outpatient Medications Marked as Taking for the 3/5/24 encounter (Office Visit) with Morgan Hicks MD   Medication Sig Dispense Refill    furosemide (LASIX) 20 MG tablet Take 1 tablet by mouth 2 times daily      oxyBUTYnin (DITROPAN XL) 10 MG extended release tablet Take 1 tablet by mouth daily 30 tablet 3    tamsulosin (FLOMAX) 0.4 MG capsule Take 1 capsule by mouth daily 90 capsule 1    metoprolol tartrate (LOPRESSOR) 25 MG tablet Take 1 tablet by mouth 2 times daily 60 tablet 3    torsemide (DEMADEX) 20 MG tablet Take 1 tablet by mouth daily 30 tablet 3    pantoprazole (PROTONIX) 40 MG tablet Take 1 tablet by mouth every morning (before breakfast) 30 tablet 3    aspirin 81 MG EC

## 2024-03-05 NOTE — TELEPHONE ENCOUNTER
Morgan Hicks MD  [facility] Clinical Staff  Please let the patient's daughter know I have reviewed the labs sodium is slightly lower advised to limit fluid intake to 50 ounces per day    Patient daughter has been advised and voiced understanding

## 2024-03-05 NOTE — PROGRESS NOTES
Pt had a surgery for prostate enlargement and not emptying his bladder approximately 4 weeks ago and had his catheter removed

## 2024-03-05 NOTE — RESULT ENCOUNTER NOTE
Please let the patient's daughter know I have reviewed the labs sodium is slightly lower advised to limit fluid intake to 50 ounces per day

## 2024-03-13 ENCOUNTER — TELEPHONE (OUTPATIENT)
Dept: UROLOGY | Age: 84
End: 2024-03-13

## 2024-03-13 DIAGNOSIS — R31.9 HEMATURIA, UNSPECIFIED TYPE: Primary | ICD-10-CM

## 2024-03-13 NOTE — TELEPHONE ENCOUNTER
Patient still has hematuria that is pink tinge. The urine never cleared up from surgery.    Denies clots, burning, urgency, or frequency.    He takes baby aspirin for his heart.     He does not drink many fluids.     Patient underwent  Cystoscopy.  2. GreenLight photovaporization of the prostate on 02/05/2024.    Orders placed for urine and advised to push fluids.

## 2024-05-01 ENCOUNTER — HOSPITAL ENCOUNTER (EMERGENCY)
Age: 84
Discharge: HOME | End: 2024-05-01
Attending: STUDENT IN AN ORGANIZED HEALTH CARE EDUCATION/TRAINING PROGRAM
Payer: MEDICARE

## 2024-05-01 ENCOUNTER — APPOINTMENT (OUTPATIENT)
Dept: CT IMAGING | Age: 84
End: 2024-05-01
Payer: MEDICARE

## 2024-05-01 VITALS
TEMPERATURE: 98 F | DIASTOLIC BLOOD PRESSURE: 67 MMHG | HEART RATE: 75 BPM | OXYGEN SATURATION: 99 % | WEIGHT: 240 LBS | BODY MASS INDEX: 36.49 KG/M2 | SYSTOLIC BLOOD PRESSURE: 139 MMHG | RESPIRATION RATE: 22 BRPM
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
## 2024-05-01 DIAGNOSIS — S01.01XA LACERATION OF SCALP, INITIAL ENCOUNTER: Primary | ICD-10-CM

## 2024-05-01 LAB
ALBUMIN SERPL BCG-MCNC: 3.3 G/DL (ref 3.5–5.1)
ALP SERPL-CCNC: 82 U/L (ref 38–126)
ALT SERPL W/O P-5'-P-CCNC: 12 U/L (ref 11–66)
ANION GAP SERPL CALC-SCNC: 11 MEQ/L (ref 8–16)
AST SERPL-CCNC: 15 U/L (ref 5–40)
BACTERIA URNS QL MICRO: ABNORMAL /HPF
BASOPHILS ABSOLUTE: 0 THOU/MM3 (ref 0–0.1)
BASOPHILS NFR BLD AUTO: 0.3 %
BILIRUB SERPL-MCNC: 0.3 MG/DL (ref 0.3–1.2)
BILIRUB UR QL STRIP.AUTO: NEGATIVE
BUN SERPL-MCNC: 14 MG/DL (ref 7–22)
CALCIUM SERPL-MCNC: 8.7 MG/DL (ref 8.5–10.5)
CASTS #/AREA URNS LPF: ABNORMAL /LPF
CASTS 2: ABNORMAL /LPF
CHARACTER UR: CLEAR
CHLORIDE SERPL-SCNC: 100 MEQ/L (ref 98–111)
CO2 SERPL-SCNC: 24 MEQ/L (ref 23–33)
COLOR: YELLOW
CREAT SERPL-MCNC: 0.7 MG/DL (ref 0.4–1.2)
CRYSTALS URNS MICRO: ABNORMAL
DEPRECATED RDW RBC AUTO: 50.6 FL (ref 35–45)
EKG Q-T INTERVAL: 410 MS
EKG QRS DURATION: 130 MS
EKG QTC CALCULATION (BAZETT): 484 MS
EKG R AXIS: 96 DEGREES
EKG T AXIS: 27 DEGREES
EKG VENTRICULAR RATE: 84 BPM
EOSINOPHIL NFR BLD AUTO: 4.5 %
EOSINOPHILS ABSOLUTE: 0.3 THOU/MM3 (ref 0–0.4)
EPITHELIAL CELLS, UA: ABNORMAL /HPF
ERYTHROCYTE [DISTWIDTH] IN BLOOD BY AUTOMATED COUNT: 15.2 % (ref 11.5–14.5)
GFR SERPL CREATININE-BSD FRML MDRD: > 90 ML/MIN/1.73M2
GLUCOSE BLD STRIP.AUTO-MCNC: 100 MG/DL (ref 70–108)
GLUCOSE SERPL-MCNC: 100 MG/DL (ref 70–108)
GLUCOSE UR QL STRIP.AUTO: NEGATIVE MG/DL
HCT VFR BLD AUTO: 32.8 % (ref 42–52)
HGB BLD-MCNC: 10.3 GM/DL (ref 14–18)
HGB UR QL STRIP.AUTO: NEGATIVE
IMM GRANULOCYTES # BLD AUTO: 0.02 THOU/MM3 (ref 0–0.07)
IMM GRANULOCYTES NFR BLD AUTO: 0.3 %
KETONES UR QL STRIP.AUTO: NEGATIVE
LYMPHOCYTES ABSOLUTE: 2.3 THOU/MM3 (ref 1–4.8)
LYMPHOCYTES NFR BLD AUTO: 30.4 %
MCH RBC QN AUTO: 28.6 PG (ref 26–33)
MCHC RBC AUTO-ENTMCNC: 31.4 GM/DL (ref 32.2–35.5)
MCV RBC AUTO: 91.1 FL (ref 80–94)
MISCELLANEOUS 2: ABNORMAL
MONOCYTES ABSOLUTE: 0.5 THOU/MM3 (ref 0.4–1.3)
MONOCYTES NFR BLD AUTO: 6.5 %
NEUTROPHILS ABSOLUTE: 4.4 THOU/MM3 (ref 1.8–7.7)
NEUTROPHILS NFR BLD AUTO: 58 %
NITRITE UR QL STRIP: NEGATIVE
NRBC BLD AUTO-RTO: 0 /100 WBC
OSMOLALITY SERPL CALC.SUM OF ELEC: 270.7 MOSMOL/KG (ref 275–300)
PH UR STRIP.AUTO: 6 [PH] (ref 5–9)
PLATELET # BLD AUTO: 145 THOU/MM3 (ref 130–400)
PMV BLD AUTO: 11.2 FL (ref 9.4–12.4)
POC CREATININE WHOLE BLOOD: 0.9 MG/DL (ref 0.5–1.2)
POTASSIUM SERPL-SCNC: 4.9 MEQ/L (ref 3.5–5.2)
PROT SERPL-MCNC: 6.3 G/DL (ref 6.1–8)
PROT UR STRIP.AUTO-MCNC: ABNORMAL MG/DL
RBC # BLD AUTO: 3.6 MILL/MM3 (ref 4.7–6.1)
RBC URINE: ABNORMAL /HPF
RENAL EPI CELLS #/AREA URNS HPF: ABNORMAL /[HPF]
SODIUM SERPL-SCNC: 135 MEQ/L (ref 135–145)
SP GR UR REFRACT.AUTO: > 1.03 (ref 1–1.03)
TROPONIN, HIGH SENSITIVITY: 33 NG/L (ref 0–12)
UROBILINOGEN, URINE: 0.2 EU/DL (ref 0–1)
WBC # BLD AUTO: 7.6 THOU/MM3 (ref 4.8–10.8)
WBC #/AREA URNS HPF: ABNORMAL /HPF
WBC #/AREA URNS HPF: ABNORMAL /[HPF]
YEAST LIKE FUNGI URNS QL MICRO: ABNORMAL

## 2024-05-01 PROCEDURE — 72125 CT NECK SPINE W/O DYE: CPT

## 2024-05-01 PROCEDURE — 71275 CT ANGIOGRAPHY CHEST: CPT

## 2024-05-01 PROCEDURE — 85025 COMPLETE CBC W/AUTO DIFF WBC: CPT

## 2024-05-01 PROCEDURE — 87086 URINE CULTURE/COLONY COUNT: CPT

## 2024-05-01 PROCEDURE — 82565 ASSAY OF CREATININE: CPT

## 2024-05-01 PROCEDURE — 99285 EMERGENCY DEPT VISIT HI MDM: CPT

## 2024-05-01 PROCEDURE — 70450 CT HEAD/BRAIN W/O DYE: CPT

## 2024-05-01 PROCEDURE — 80053 COMPREHEN METABOLIC PANEL: CPT

## 2024-05-01 PROCEDURE — 36415 COLL VENOUS BLD VENIPUNCTURE: CPT

## 2024-05-01 PROCEDURE — 84484 ASSAY OF TROPONIN QUANT: CPT

## 2024-05-01 PROCEDURE — 6360000004 HC RX CONTRAST MEDICATION: Performed by: STUDENT IN AN ORGANIZED HEALTH CARE EDUCATION/TRAINING PROGRAM

## 2024-05-01 PROCEDURE — 93005 ELECTROCARDIOGRAM TRACING: CPT | Performed by: STUDENT IN AN ORGANIZED HEALTH CARE EDUCATION/TRAINING PROGRAM

## 2024-05-01 PROCEDURE — 82948 REAGENT STRIP/BLOOD GLUCOSE: CPT

## 2024-05-01 PROCEDURE — 93010 ELECTROCARDIOGRAM REPORT: CPT | Performed by: INTERNAL MEDICINE

## 2024-05-01 PROCEDURE — 81001 URINALYSIS AUTO W/SCOPE: CPT

## 2024-05-01 PROCEDURE — 6370000000 HC RX 637 (ALT 250 FOR IP): Performed by: STUDENT IN AN ORGANIZED HEALTH CARE EDUCATION/TRAINING PROGRAM

## 2024-05-01 PROCEDURE — 12001 RPR S/N/AX/GEN/TRNK 2.5CM/<: CPT

## 2024-05-01 RX ORDER — GINSENG 100 MG
CAPSULE ORAL ONCE
Status: COMPLETED | OUTPATIENT
Start: 2024-05-01 | End: 2024-05-01

## 2024-05-01 RX ADMIN — IOPAMIDOL 80 ML: 755 INJECTION, SOLUTION INTRAVENOUS at 12:34

## 2024-05-01 RX ADMIN — BACITRACIN: 500 OINTMENT TOPICAL at 14:31

## 2024-05-01 ASSESSMENT — PAIN - FUNCTIONAL ASSESSMENT: PAIN_FUNCTIONAL_ASSESSMENT: NONE - DENIES PAIN

## 2024-05-01 ASSESSMENT — PAIN SCALES - GENERAL: PAINLEVEL_OUTOF10: 9

## 2024-05-01 NOTE — ED NOTES
Pt presents to the ED with family for concerns of tripping on ledge of shower and falling. Patient states he hit the door leading into the room. He denies LOC. He does not take blood thinners per patient and family. Patient denies pain at this time. Patient does use a walker and/or cane with ambulation. Patient appears to have a small laceration to the back of head. Minimal bleeding. Family states they needed to assist patient to standing position, but he was able to walk to car and from wheelchair.

## 2024-05-01 NOTE — ED NOTES
Pt walked to restroom. Gave sample. VS reassessed. Pt gait steady. RR reg. Will continue to monitor

## 2024-05-01 NOTE — ED PROVIDER NOTES
[facility] EMERGENCY DEPT    EMERGENCY MEDICINE      Pt Name: Leo Nevarez  MRN: [id number]  Birthdate 1940  Date of evaluation: 5/1/2024  Treating Physician: Noel Amanda DO    CHIEF COMPLAINT       Chief Complaint   Patient presents with    Fall    Head Laceration     History obtained from chart review, the patient, and family.    HISTORY OF PRESENT ILLNESS    HPI    Leo Nevarez is a 83 y.o. male presents to the emergency department for evaluation of fall with head strike.  Patient states he was getting ready this afternoon and was traversing the shower.  He caught his foot on the edge fell backwards striking the back left side of his head.  Able to get up with assistance from family and has been ambulatory since.  At baseline with history of dementia.  Diabetic with no recent blood glucose check, he was planning to sit down for lunch and check his blood sugar after getting out of the shower.  Denies any numbness, tingling, weakness.  Does not have any chest pain or shortness of breath.  Notes mild 3 out of 10 pain to the left posterior head in the area of trauma.  Patient is not on any anticoagulants.  Declines any loss of consciousness.    The patient has no other acute complaints at this time.        PAST MEDICAL AND SURGICAL HISTORY     Past Medical History:   Diagnosis Date    Chronic kidney disease     Diabetes mellitus (HCC)     Hyperlipidemia     Hypertension        Past Surgical History:   Procedure Laterality Date    BLADDER SCAN  11/11/2023    BLADDER SCAN  11/13/2023    CARDIAC PROCEDURE Left 11/20/2023    Left heart cath / coronary angiography performed by Javier Brothers MD at [facility] CARDIAC CATH LAB    HC BLADDER SCAN  11/11/2023    SHOULDER ARTHROPLASTY Left     TURP N/A 2/5/2024    Cystoscopy Greenlight Photovaporization of Prostate performed by Abhay Levine Jr., MD at [facility] OR       CURRENT MEDICATIONS     Current Discharge Medication List        CONTINUE these medications

## 2024-05-01 NOTE — DISCHARGE INSTRUCTIONS
Go to your primary care physician or return to the emergency department in 7-10 days to have your staples removed.    For pain use acetaminophen (Tylenol).    You can shower with the laceration, would avoid baths or swimming in lakes / rivers.  Apply bacitracin / triple antibiotic ointment / Neosporin / Vaseline to the wound twice a day.    When you go outside, place sunscreen on the healing wound after the sutures have been removed for the next year to help with scarring.    PLEASE RETURN TO THE EMERGENCY DEPARTMENT IMMEDIATELY for worsening symptoms, redness around the wound or redness streaking up the body part, white drainage from the wound, or if you develop any concerning symptoms such as: high fever not relieved by acetaminophen (Tylenol) and/or ibuprofen (Motrin / Advil), chills, shortness of breath, chest pain, feeling of your heart fluttering or racing, persistent nausea and/or vomiting, vomiting up blood, blood in your stool, numbness, loss of consciousness, weakness or tingling in the arms or legs or change in color of the extremities, changes in mental status, persistent headache, blurry vision, loss of bladder / bowel control, unable to follow up with your physician, or other any other care or concern.

## 2024-05-01 NOTE — ED NOTES
Pt resting in bed, large hematoma notes to left side of head w/ laceration. He reports he was fine prior to tripping over footing of standing shower. Bleeding controlled. Family at bedside. Will monitor

## 2024-05-03 LAB
BACTERIA UR CULT: ABNORMAL
ORGANISM: ABNORMAL

## 2024-05-08 ENCOUNTER — HOSPITAL ENCOUNTER (EMERGENCY)
Age: 84
Discharge: HOME | End: 2024-05-08
Payer: MEDICARE

## 2024-05-08 VITALS
SYSTOLIC BLOOD PRESSURE: 148 MMHG | BODY MASS INDEX: 34.56 KG/M2 | RESPIRATION RATE: 18 BRPM | DIASTOLIC BLOOD PRESSURE: 75 MMHG | HEIGHT: 68 IN | OXYGEN SATURATION: 95 % | WEIGHT: 228 LBS | TEMPERATURE: 97.7 F | HEART RATE: 93 BPM

## 2024-05-08 DIAGNOSIS — J06.9 URI WITH COUGH AND CONGESTION: Primary | ICD-10-CM

## 2024-05-08 DIAGNOSIS — H92.22 EAR BLEEDING, LEFT: ICD-10-CM

## 2024-05-08 DIAGNOSIS — Z48.02 ENCOUNTER FOR STAPLE REMOVAL: ICD-10-CM

## 2024-05-08 PROCEDURE — 99213 OFFICE O/P EST LOW 20 MIN: CPT

## 2024-05-08 PROCEDURE — 99214 OFFICE O/P EST MOD 30 MIN: CPT | Performed by: NURSE PRACTITIONER

## 2024-05-08 RX ORDER — AZITHROMYCIN 250 MG/1
TABLET, FILM COATED ORAL
Qty: 1 PACKET | Refills: 0 | Status: SHIPPED | OUTPATIENT
Start: 2024-05-08 | End: 2024-05-12

## 2024-05-08 ASSESSMENT — ENCOUNTER SYMPTOMS
SORE THROAT: 0
SINUS PAIN: 0
SHORTNESS OF BREATH: 0
COUGH: 1

## 2024-05-08 ASSESSMENT — PAIN - FUNCTIONAL ASSESSMENT: PAIN_FUNCTIONAL_ASSESSMENT: NONE - DENIES PAIN

## 2024-05-08 NOTE — ED PROVIDER NOTES
[facility] URGENT CARE  Urgent Care Encounter       CHIEF COMPLAINT       Chief Complaint   Patient presents with    Ear Drainage     left    Suture / Staple Removal    Cough       Nurses Notes reviewed and I agree except as noted in the HPI.  HISTORY OF PRESENT ILLNESS   Leo Nevarez is a 83 y.o. male who presents with concerns of left ear bloody drainage that started prior to arrival.  Patient reports cleaning his ears with a Q-tip approximately 45 minutes ago.  Believes he may have injured his ear.  Has had bloody drainage.    In addition, patient complains of cough and congestion with ear pressure for the past week.  Has tried occasional [place].  There has been no improvement.  Denies any fever.    Patient is also requesting for staples to be removed from his left occipital region of his scalp.  Staples have been intact for laceration that he suffered 6 days ago.  No problems.    The history is provided by the patient, the spouse and a relative.       REVIEW OF SYSTEMS     Review of Systems   Constitutional:  Negative for fatigue and fever.   HENT:  Positive for congestion and ear discharge (bloody left ear). Negative for hearing loss, sinus pain and sore throat.    Respiratory:  Positive for cough. Negative for shortness of breath.    Cardiovascular:  Negative for chest pain.   Musculoskeletal:  Negative for myalgias.   Skin:  Positive for wound (staples head).   Neurological:  Negative for dizziness and headaches.       PAST MEDICAL HISTORY         Diagnosis Date    Chronic kidney disease     Diabetes mellitus (HCC)     Hyperlipidemia     Hypertension        SURGICALHISTORY     Patient  has a past surgical history that includes Shoulder Arthroplasty (Left); Bladder scan (11/11/2023); Bladder Scan (11/11/2023); Bladder scan (11/13/2023); Cardiac procedure (Left, 11/20/2023); and TURP (N/A, 2/5/2024).    CURRENT MEDICATIONS       Previous Medications    ASPIRIN 81 MG EC TABLET    Take 1 tablet by

## 2024-05-08 NOTE — ED NOTES
Pt with complaints of bloody drainage from left ear that started today, a cough for 1 week and needing staples removed from head. Denies any injury or trauma to ear. Denies any pain to ear. States cough is productive with clear mucus. States they have tried cough medication but it has not helped. States staples are to be removed tomorrow but they wanted to see if they could be removed today.     Rosa Lindo LPN  05/08/24 [id number]

## 2024-05-22 ENCOUNTER — NURSE ONLY (OUTPATIENT)
Dept: LAB | Age: 84
End: 2024-05-22

## 2024-05-22 DIAGNOSIS — N40.1 BPH WITH OBSTRUCTION/LOWER URINARY TRACT SYMPTOMS: Primary | ICD-10-CM

## 2024-05-22 DIAGNOSIS — N13.8 BPH WITH OBSTRUCTION/LOWER URINARY TRACT SYMPTOMS: Primary | ICD-10-CM

## 2024-05-22 DIAGNOSIS — N40.1 BPH WITH OBSTRUCTION/LOWER URINARY TRACT SYMPTOMS: ICD-10-CM

## 2024-05-22 DIAGNOSIS — N13.8 BPH WITH OBSTRUCTION/LOWER URINARY TRACT SYMPTOMS: ICD-10-CM

## 2024-05-22 LAB — PSA SERPL-MCNC: 0.84 NG/ML (ref 0–1)

## 2024-06-24 ENCOUNTER — OFFICE VISIT (OUTPATIENT)
Dept: UROLOGY | Age: 84
End: 2024-06-24
Payer: MEDICARE

## 2024-06-24 VITALS — WEIGHT: 233 LBS | HEIGHT: 68 IN | RESPIRATION RATE: 16 BRPM | BODY MASS INDEX: 35.31 KG/M2

## 2024-06-24 DIAGNOSIS — R30.0 DYSURIA: Primary | ICD-10-CM

## 2024-06-24 DIAGNOSIS — N40.1 BPH WITH OBSTRUCTION/LOWER URINARY TRACT SYMPTOMS: ICD-10-CM

## 2024-06-24 DIAGNOSIS — N13.8 BPH WITH OBSTRUCTION/LOWER URINARY TRACT SYMPTOMS: ICD-10-CM

## 2024-06-24 PROCEDURE — G8417 CALC BMI ABV UP PARAM F/U: HCPCS | Performed by: UROLOGY

## 2024-06-24 PROCEDURE — 1123F ACP DISCUSS/DSCN MKR DOCD: CPT | Performed by: UROLOGY

## 2024-06-24 PROCEDURE — G8427 DOCREV CUR MEDS BY ELIG CLIN: HCPCS | Performed by: UROLOGY

## 2024-06-24 PROCEDURE — 99214 OFFICE O/P EST MOD 30 MIN: CPT | Performed by: UROLOGY

## 2024-06-24 PROCEDURE — 1036F TOBACCO NON-USER: CPT | Performed by: UROLOGY

## 2024-06-24 RX ORDER — TAMSULOSIN HYDROCHLORIDE 0.4 MG/1
0.4 CAPSULE ORAL DAILY
Qty: 90 CAPSULE | Refills: 3 | Status: SHIPPED | OUTPATIENT
Start: 2024-06-24

## 2024-06-24 NOTE — PROGRESS NOTES
Dr. Abhay Levine Jr, MD MD  [facility] / New Patient Visit    Patient:  Leo Nevarez  YOB: 1940  Date: 6/24/2024  Consult requested from Aileen Valadez APRN - CNP     HISTORY OF PRESENT ILLNESS:   The patient is a 84 y.o. male who presents today for follow-up for the following problem(s): BPH with LUTs  Overall the problem(s) : are worsening.  Associated Symptoms: No dysuria, gross hematuria.   Pain Severity:      Today visit:   6/24/24   Presents with BPH and s/p Greenligth PVP (2/2024)    Summary of old records:   (Patient's old records, notes and chart reviewed and summarized above.)    Last several PSA's:  Lab Results   Component Value Date    PSA 0.84 05/22/2024       Last total testosterone:  No results found for: \"TESTOSTERONE\"    Urinalysis today:  No results found for this visit on 06/24/24.      Last BUN and creatinine:  Lab Results   Component Value Date    BUN 14 05/01/2024     Lab Results   Component Value Date    CREATININE 0.7 05/01/2024       Imaging Reviewed during this Office Visit:   (results were independently reviewed by physician and radiology report verified)    PAST MEDICAL, FAMILY AND SOCIAL HISTORY:  Past Medical History:   Diagnosis Date    Chronic kidney disease     Diabetes mellitus (HCC)     Hyperlipidemia     Hypertension      Past Surgical History:   Procedure Laterality Date    BLADDER SCAN  11/11/2023    BLADDER SCAN  11/13/2023    CARDIAC PROCEDURE Left 11/20/2023    Left heart cath / coronary angiography performed by Javier Brothers MD at [facility] CARDIAC CATH LAB    HC BLADDER SCAN  11/11/2023    SHOULDER ARTHROPLASTY Left     TURP N/A 2/5/2024    Cystoscopy Greenlight Photovaporization of Prostate performed by Abhay Levine Jr., MD at [facility] OR     Family History   Problem Relation Age of Onset    Hypertension Mother     Kidney Disease Father     Kidney Cancer Sister     Prostate Cancer Brother      Outpatient Medications

## 2024-07-02 ENCOUNTER — APPOINTMENT (OUTPATIENT)
Dept: GENERAL RADIOLOGY | Age: 84
End: 2024-07-02
Payer: MEDICARE

## 2024-07-02 ENCOUNTER — HOSPITAL ENCOUNTER (INPATIENT)
Age: 84
LOS: 3 days | Discharge: HOME | End: 2024-07-05
Attending: INTERNAL MEDICINE
Payer: MEDICARE

## 2024-07-02 ENCOUNTER — APPOINTMENT (OUTPATIENT)
Dept: INTERVENTIONAL RADIOLOGY/VASCULAR | Age: 84
End: 2024-07-02
Payer: MEDICARE

## 2024-07-02 DIAGNOSIS — I24.9 ACUTE CORONARY SYNDROME (HCC): ICD-10-CM

## 2024-07-02 DIAGNOSIS — R07.9 CHEST PAIN, UNSPECIFIED TYPE: Primary | ICD-10-CM

## 2024-07-02 PROBLEM — R07.89 ATYPICAL CHEST PAIN: Status: ACTIVE | Noted: 2024-07-02

## 2024-07-02 LAB
APTT PPP: 36.3 SECONDS (ref 22–38)
BASOPHILS ABSOLUTE: 0 THOU/MM3 (ref 0–0.1)
BASOPHILS NFR BLD AUTO: 0.4 %
BUN BLD-MCNC: 25 MG/DL (ref 8–26)
CHLORIDE BLD-SCNC: 103 MEQ/L (ref 98–109)
CO2 BLD CALC-SCNC: 28 MEQ/L (ref 23–33)
CREAT BLD-MCNC: 1.2 MG/DL (ref 0.5–1.2)
EOSINOPHIL NFR BLD AUTO: 6.3 %
EOSINOPHILS ABSOLUTE: 0.4 THOU/MM3 (ref 0–0.4)
ERYTHROCYTE [DISTWIDTH] IN BLOOD BY AUTOMATED COUNT: 15.1 % (ref 11.5–14.5)
GFR SERPL CREATININE-BSD FRML MDRD: 60 ML/MIN/1.73M2
GLUCOSE BLD STRIP.AUTO-MCNC: 212 MG/DL (ref 70–108)
GLUCOSE BLD-MCNC: 194 MG/DL (ref 70–108)
HCT VFR BLD AUTO: 33.8 % (ref 42–52)
HEPARIN UNFRACTIONATED: 0.04 U/ML (ref 0.3–0.7)
HGB BLD-MCNC: 10.8 GM/DL (ref 14–18)
IMM GRANULOCYTES # BLD AUTO: 0.01 THOU/MM3 (ref 0–0.07)
IMM GRANULOCYTES NFR BLD AUTO: 0.1 %
INR PPP: 1.03 (ref 0.85–1.13)
LYMPHOCYTES ABSOLUTE: 2.3 THOU/MM3 (ref 1–4.8)
LYMPHOCYTES NFR BLD AUTO: 35.2 %
MCH RBC QN AUTO: 29.1 PG (ref 27–31)
MCHC RBC AUTO-ENTMCNC: 32 GM/DL (ref 33–37)
MCV RBC AUTO: 91 FL (ref 80–94)
MONOCYTES ABSOLUTE: 0.4 THOU/MM3 (ref 0.4–1.3)
MONOCYTES NFR BLD AUTO: 5.7 %
NEUTROPHILS ABSOLUTE: 3.5 THOU/MM3 (ref 1.8–7.7)
NEUTROPHILS NFR BLD AUTO: 52.3 %
NRBC BLD AUTO-RTO: 0 /100 WBC
NT-PROBNP SERPL IA-MCNC: 2608 PG/ML (ref 0–449)
PLATELET # BLD AUTO: 169 THOU/MM3 (ref 130–400)
PMV BLD AUTO: 12.5 FL (ref 7.4–10.4)
POTASSIUM BLD-SCNC: 5.3 MEQ/L (ref 3.5–4.9)
POTASSIUM SERPL-SCNC: 5.2 MEQ/L (ref 3.5–5.2)
RBC # BLD AUTO: 3.71 MILL/MM3 (ref 4.7–6.1)
SODIUM BLD-SCNC: 139 MEQ/L (ref 138–146)
TROPONIN, HIGH SENSITIVITY: 35 NG/L (ref 0–12)
TROPONIN, HIGH SENSITIVITY: 37 NG/L (ref 0–12)
WBC # BLD AUTO: 6.6 THOU/MM3 (ref 4.8–10.8)

## 2024-07-02 PROCEDURE — 82947 ASSAY GLUCOSE BLOOD QUANT: CPT

## 2024-07-02 PROCEDURE — 84132 ASSAY OF SERUM POTASSIUM: CPT

## 2024-07-02 PROCEDURE — 82948 REAGENT STRIP/BLOOD GLUCOSE: CPT

## 2024-07-02 PROCEDURE — 71046 X-RAY EXAM CHEST 2 VIEWS: CPT

## 2024-07-02 PROCEDURE — 36415 COLL VENOUS BLD VENIPUNCTURE: CPT

## 2024-07-02 PROCEDURE — 99285 EMERGENCY DEPT VISIT HI MDM: CPT

## 2024-07-02 PROCEDURE — 93970 EXTREMITY STUDY: CPT

## 2024-07-02 PROCEDURE — 85730 THROMBOPLASTIN TIME PARTIAL: CPT

## 2024-07-02 PROCEDURE — 99214 OFFICE O/P EST MOD 30 MIN: CPT | Performed by: NURSE PRACTITIONER

## 2024-07-02 PROCEDURE — 99215 OFFICE O/P EST HI 40 MIN: CPT

## 2024-07-02 PROCEDURE — 85025 COMPLETE CBC W/AUTO DIFF WBC: CPT

## 2024-07-02 PROCEDURE — 84484 ASSAY OF TROPONIN QUANT: CPT

## 2024-07-02 PROCEDURE — 93005 ELECTROCARDIOGRAM TRACING: CPT

## 2024-07-02 PROCEDURE — 6370000000 HC RX 637 (ALT 250 FOR IP)

## 2024-07-02 PROCEDURE — 82565 ASSAY OF CREATININE: CPT

## 2024-07-02 PROCEDURE — 85520 HEPARIN ASSAY: CPT

## 2024-07-02 PROCEDURE — 84520 ASSAY OF UREA NITROGEN: CPT

## 2024-07-02 PROCEDURE — 80051 ELECTROLYTE PANEL: CPT

## 2024-07-02 PROCEDURE — 85610 PROTHROMBIN TIME: CPT

## 2024-07-02 PROCEDURE — 93005 ELECTROCARDIOGRAM TRACING: CPT | Performed by: INTERNAL MEDICINE

## 2024-07-02 PROCEDURE — 83880 ASSAY OF NATRIURETIC PEPTIDE: CPT

## 2024-07-02 PROCEDURE — 1200000003 HC TELEMETRY R&B

## 2024-07-02 PROCEDURE — 6360000002 HC RX W HCPCS

## 2024-07-02 PROCEDURE — 93005 ELECTROCARDIOGRAM TRACING: CPT | Performed by: NURSE PRACTITIONER

## 2024-07-02 RX ORDER — BUMETANIDE 0.25 MG/ML
1 INJECTION INTRAMUSCULAR; INTRAVENOUS ONCE
Status: COMPLETED | OUTPATIENT
Start: 2024-07-02 | End: 2024-07-02

## 2024-07-02 RX ORDER — ASPIRIN 81 MG/1
81 TABLET ORAL DAILY
Status: DISCONTINUED | OUTPATIENT
Start: 2024-07-03 | End: 2024-07-05 | Stop reason: HOSPADM

## 2024-07-02 RX ORDER — SODIUM CHLORIDE 9 MG/ML
INJECTION, SOLUTION INTRAVENOUS PRN
Status: DISCONTINUED | OUTPATIENT
Start: 2024-07-02 | End: 2024-07-05 | Stop reason: HOSPADM

## 2024-07-02 RX ORDER — LOSARTAN POTASSIUM 25 MG/1
25 TABLET ORAL DAILY
Status: DISCONTINUED | OUTPATIENT
Start: 2024-07-03 | End: 2024-07-05 | Stop reason: HOSPADM

## 2024-07-02 RX ORDER — ACETAMINOPHEN 325 MG/1
650 TABLET ORAL EVERY 6 HOURS PRN
Status: DISCONTINUED | OUTPATIENT
Start: 2024-07-02 | End: 2024-07-05 | Stop reason: HOSPADM

## 2024-07-02 RX ORDER — DEXTROSE MONOHYDRATE 100 MG/ML
INJECTION, SOLUTION INTRAVENOUS CONTINUOUS PRN
Status: DISCONTINUED | OUTPATIENT
Start: 2024-07-02 | End: 2024-07-05 | Stop reason: HOSPADM

## 2024-07-02 RX ORDER — PANTOPRAZOLE SODIUM 40 MG/1
40 TABLET, DELAYED RELEASE ORAL
Status: DISCONTINUED | OUTPATIENT
Start: 2024-07-03 | End: 2024-07-05 | Stop reason: HOSPADM

## 2024-07-02 RX ORDER — OXYBUTYNIN CHLORIDE 10 MG/1
10 TABLET, EXTENDED RELEASE ORAL DAILY
Status: DISCONTINUED | OUTPATIENT
Start: 2024-07-03 | End: 2024-07-05 | Stop reason: HOSPADM

## 2024-07-02 RX ORDER — SODIUM CHLORIDE 0.9 % (FLUSH) 0.9 %
5-40 SYRINGE (ML) INJECTION PRN
Status: DISCONTINUED | OUTPATIENT
Start: 2024-07-02 | End: 2024-07-05 | Stop reason: HOSPADM

## 2024-07-02 RX ORDER — INSULIN LISPRO 100 [IU]/ML
0-4 INJECTION, SOLUTION INTRAVENOUS; SUBCUTANEOUS
Status: DISCONTINUED | OUTPATIENT
Start: 2024-07-03 | End: 2024-07-05 | Stop reason: HOSPADM

## 2024-07-02 RX ORDER — GLUCAGON 1 MG/ML
1 KIT INJECTION PRN
Status: DISCONTINUED | OUTPATIENT
Start: 2024-07-02 | End: 2024-07-05 | Stop reason: HOSPADM

## 2024-07-02 RX ORDER — ONDANSETRON 4 MG/1
4 TABLET, ORALLY DISINTEGRATING ORAL EVERY 8 HOURS PRN
Status: DISCONTINUED | OUTPATIENT
Start: 2024-07-02 | End: 2024-07-05 | Stop reason: HOSPADM

## 2024-07-02 RX ORDER — BUPROPION HYDROCHLORIDE 150 MG/1
150 TABLET ORAL DAILY
Status: DISCONTINUED | OUTPATIENT
Start: 2024-07-03 | End: 2024-07-05 | Stop reason: HOSPADM

## 2024-07-02 RX ORDER — ASPIRIN 325 MG
325 TABLET ORAL DAILY
Status: DISCONTINUED | OUTPATIENT
Start: 2024-07-02 | End: 2024-07-02 | Stop reason: DRUGHIGH

## 2024-07-02 RX ORDER — HEPARIN SODIUM 10000 [USP'U]/100ML
5-30 INJECTION, SOLUTION INTRAVENOUS CONTINUOUS
Status: DISCONTINUED | OUTPATIENT
Start: 2024-07-02 | End: 2024-07-03

## 2024-07-02 RX ORDER — CITALOPRAM 40 MG/1
40 TABLET ORAL DAILY
Status: DISCONTINUED | OUTPATIENT
Start: 2024-07-03 | End: 2024-07-05 | Stop reason: HOSPADM

## 2024-07-02 RX ORDER — LEVOTHYROXINE SODIUM 0.15 MG/1
150 TABLET ORAL
Status: DISCONTINUED | OUTPATIENT
Start: 2024-07-03 | End: 2024-07-05 | Stop reason: HOSPADM

## 2024-07-02 RX ORDER — HEPARIN SODIUM 1000 [USP'U]/ML
4000 INJECTION, SOLUTION INTRAVENOUS; SUBCUTANEOUS PRN
Status: DISCONTINUED | OUTPATIENT
Start: 2024-07-02 | End: 2024-07-03 | Stop reason: ALTCHOICE

## 2024-07-02 RX ORDER — HEPARIN SODIUM 1000 [USP'U]/ML
4000 INJECTION, SOLUTION INTRAVENOUS; SUBCUTANEOUS ONCE
Status: COMPLETED | OUTPATIENT
Start: 2024-07-02 | End: 2024-07-02

## 2024-07-02 RX ORDER — GABAPENTIN 100 MG/1
200 CAPSULE ORAL 2 TIMES DAILY
Status: DISCONTINUED | OUTPATIENT
Start: 2024-07-02 | End: 2024-07-05 | Stop reason: HOSPADM

## 2024-07-02 RX ORDER — SODIUM CHLORIDE 0.9 % (FLUSH) 0.9 %
5-40 SYRINGE (ML) INJECTION EVERY 12 HOURS SCHEDULED
Status: DISCONTINUED | OUTPATIENT
Start: 2024-07-02 | End: 2024-07-05 | Stop reason: HOSPADM

## 2024-07-02 RX ORDER — INSULIN LISPRO 100 [IU]/ML
0-4 INJECTION, SOLUTION INTRAVENOUS; SUBCUTANEOUS NIGHTLY
Status: DISCONTINUED | OUTPATIENT
Start: 2024-07-02 | End: 2024-07-05 | Stop reason: HOSPADM

## 2024-07-02 RX ORDER — ONDANSETRON 2 MG/ML
4 INJECTION INTRAMUSCULAR; INTRAVENOUS EVERY 6 HOURS PRN
Status: DISCONTINUED | OUTPATIENT
Start: 2024-07-02 | End: 2024-07-05 | Stop reason: HOSPADM

## 2024-07-02 RX ORDER — PRAVASTATIN SODIUM 80 MG/1
80 TABLET ORAL NIGHTLY
Status: DISCONTINUED | OUTPATIENT
Start: 2024-07-02 | End: 2024-07-02

## 2024-07-02 RX ORDER — POLYETHYLENE GLYCOL 3350 17 G/17G
17 POWDER, FOR SOLUTION ORAL DAILY PRN
Status: DISCONTINUED | OUTPATIENT
Start: 2024-07-02 | End: 2024-07-05 | Stop reason: HOSPADM

## 2024-07-02 RX ORDER — TAMSULOSIN HYDROCHLORIDE 0.4 MG/1
0.4 CAPSULE ORAL DAILY
Status: DISCONTINUED | OUTPATIENT
Start: 2024-07-03 | End: 2024-07-05 | Stop reason: HOSPADM

## 2024-07-02 RX ORDER — DONEPEZIL HYDROCHLORIDE 10 MG/1
10 TABLET, FILM COATED ORAL NIGHTLY
Status: DISCONTINUED | OUTPATIENT
Start: 2024-07-03 | End: 2024-07-05 | Stop reason: HOSPADM

## 2024-07-02 RX ORDER — DONEPEZIL HYDROCHLORIDE 10 MG/1
10 TABLET, FILM COATED ORAL NIGHTLY
Status: DISCONTINUED | OUTPATIENT
Start: 2024-07-02 | End: 2024-07-02

## 2024-07-02 RX ORDER — INSULIN GLARGINE 100 [IU]/ML
10 INJECTION, SOLUTION SUBCUTANEOUS NIGHTLY
Status: DISCONTINUED | OUTPATIENT
Start: 2024-07-02 | End: 2024-07-05 | Stop reason: HOSPADM

## 2024-07-02 RX ORDER — HEPARIN SODIUM 1000 [USP'U]/ML
2000 INJECTION, SOLUTION INTRAVENOUS; SUBCUTANEOUS PRN
Status: DISCONTINUED | OUTPATIENT
Start: 2024-07-02 | End: 2024-07-03 | Stop reason: ALTCHOICE

## 2024-07-02 RX ORDER — ACETAMINOPHEN 650 MG/1
650 SUPPOSITORY RECTAL EVERY 6 HOURS PRN
Status: DISCONTINUED | OUTPATIENT
Start: 2024-07-02 | End: 2024-07-05 | Stop reason: HOSPADM

## 2024-07-02 RX ORDER — PRAVASTATIN SODIUM 80 MG/1
80 TABLET ORAL NIGHTLY
Status: DISCONTINUED | OUTPATIENT
Start: 2024-07-03 | End: 2024-07-05 | Stop reason: HOSPADM

## 2024-07-02 RX ADMIN — HEPARIN SODIUM 8 UNITS/KG/HR: 10000 INJECTION, SOLUTION INTRAVENOUS at 23:17

## 2024-07-02 RX ADMIN — ASPIRIN 325 MG: 325 TABLET ORAL at 23:17

## 2024-07-02 RX ADMIN — HEPARIN SODIUM 4000 UNITS: 1000 INJECTION INTRAVENOUS; SUBCUTANEOUS at 23:16

## 2024-07-02 RX ADMIN — GABAPENTIN 200 MG: 100 CAPSULE ORAL at 23:23

## 2024-07-02 RX ADMIN — BUMETANIDE 1 MG: 0.25 INJECTION INTRAMUSCULAR; INTRAVENOUS at 23:17

## 2024-07-02 RX ADMIN — METOPROLOL TARTRATE 25 MG: 25 TABLET, FILM COATED ORAL at 23:18

## 2024-07-02 RX ADMIN — INSULIN GLARGINE 10 UNITS: 100 INJECTION, SOLUTION SUBCUTANEOUS at 23:17

## 2024-07-02 ASSESSMENT — ENCOUNTER SYMPTOMS
NAUSEA: 0
DIARRHEA: 0
CHEST TIGHTNESS: 1
ABDOMINAL PAIN: 0
WHEEZING: 0
RHINORRHEA: 0
COUGH: 1
VOMITING: 0
SORE THROAT: 0
SHORTNESS OF BREATH: 1

## 2024-07-02 ASSESSMENT — PAIN - FUNCTIONAL ASSESSMENT: PAIN_FUNCTIONAL_ASSESSMENT: NONE - DENIES PAIN

## 2024-07-02 ASSESSMENT — HEART SCORE: ECG: NON-SPECIFC REPOLARIZATION DISTURBANCE/LBTB/PM

## 2024-07-02 NOTE — ED NOTES
Pt to ED from urgent care w/rprts of needing further evaluation of intermittent chest discomfort and SOB w/activity. States chest pain is improved, but still doesn't feel well. Pt A&O x4. Breathing easy and unlabored on RA. Placed on cardiac monitor.

## 2024-07-02 NOTE — ED NOTES
Pt with complaints of a cough and chest congestion that started 1 week ago. States he feels like he has rattling in chest but cannot cough it up. Denies trying any medications.      Rosa Lindo LPN  07/02/24 [id number]

## 2024-07-02 NOTE — ED PROVIDER NOTES
[facility] EMERGENCY DEPT  EMERGENCY DEPARTMENT ENCOUNTER          Pt Name: Leo Nevarez  MRN: [id number]  Birthdate 1940  Date of evaluation: 7/2/2024  Physician: Karey Casillas MD  Supervising Attending Physician: Faustino Monreal MD       CHIEF COMPLAINT       Chief Complaint   Patient presents with    Cough    Chest Congestion         HISTORY OF PRESENT ILLNESS    HPI  Leo Nevarez is a 84 y.o. male who presents to the emergency department from home, by private vehicle for evaluation of chest pain and cough. Patient reported the on Sunday he felt chest discomfort but today the pain got worse. Pt reported chest feeling \"congestion\" and the pain located in the left side of the chest and rads to left arm. On pain scale patient stated to be 8/10 on pain scale. The symptoms felt similar like previous heart attack. Pt reported coughing but producing nothing although he sensation to clear this throat.  The patient has no other acute complaints at this time.      REVIEW OF SYSTEMS   Review of Systems   Constitutional:  Positive for activity change. Negative for fever.        Alert and oriented.    HENT:  Negative for congestion, rhinorrhea, sneezing and sore throat.    Respiratory:  Positive for cough and chest tightness.    Cardiovascular:  Positive for chest pain and leg swelling.   Gastrointestinal:  Negative for nausea and vomiting.   Genitourinary:  Negative for difficulty urinating.   Neurological:  Positive for headaches. Negative for dizziness, weakness and numbness.         PAST MEDICAL AND SURGICAL HISTORY     Past Medical History:   Diagnosis Date    Chronic kidney disease     Diabetes mellitus (HCC)     Hyperlipidemia     Hypertension      Past Surgical History:   Procedure Laterality Date    BLADDER SCAN  11/11/2023    BLADDER SCAN  11/13/2023    CARDIAC PROCEDURE Left 11/20/2023    Left heart cath / coronary angiography performed by Javier Brothers MD at [facility] CARDIAC CATH LAB    HC  CBC will be notified his PCP for follow up.  Left heart cath on 11/20/2023 100% stenosed prox. RCA and collaterals filling up the distal RCA.  Given patient presentation and his underlying RCA occulusion, it was decided to admit the pt for further workup and close monitoring and the decision was discussed with patient and family who present in the room with patient and were agreeable.  ED COURSE   ED Medications administered this visit (None if left blank):   Medications - No data to display      ED Course as of 07/02/24 1930 Tue Jul 02, 2024   1437 CBC with Auto Differential(!):    WBC 6.6   RBC 3.71(!)   Hemoglobin Quant 10.8(!)   Hematocrit 33.8(!)   MCV 91   MCH 29.1   MCHC 32.0(!)   RDW 15.1(!)   Platelet Count 169   MPV 12.5(!) [HA]   1438 POC Basic Metabol Panel without Calcium(!):    Sodium 139   Potassium, Whole Blood 5.3(!)   Chloride, Whole Blood 103   Total CO2, Venous 28   Glucose, Whole Blood 194(!)   BUN, WHOLE BLOOD 25   CREATININE, WHOLE BLOOD 1.2 [HA]   1439 EKG 12 Lead  Sinus rhythm with sinus arrhythmia with first-degree AV block, right bundle branch block, abnormal ECG [HA]   1455 XR CHEST (2 VW) [HA]      ED Course User Index  [AYALA] Marta Willingham, APRN - CNP         PROCEDURES: (None if blank)  Procedures:     CRITICAL CARE:  None    MEDICATION CHANGES     New Prescriptions    No medications on file         FINAL DISPOSITION   Shared Decision-Making was performed, disposition discussed with the patient/family and questions answered.      Outpatient follow up (If applicable):  [facility], EMERGENCY DEPT  [address] [place] [address]  [phone]      GO DIRECTLY TO THE EMERGENCY DEPARTMENT, for further evalation, do not eat or drink prior to arrival    The patient’s provisional diagnosis and plan of care were discussed with the patient and present family who expressed understanding. Medications were reviewed and indications and risks of medications were discussed with the patient/family

## 2024-07-02 NOTE — ED PROVIDER NOTES
[facility] EMERGENCY DEPT  UrgentCare Encounter      CHIEFCOMPLAINT       Chief Complaint   Patient presents with    Cough    Chest Congestion       Nurses Notes reviewed and I agree except as noted in the HPI.  HISTORY OF PRESENT ILLNESS     Leo Nevarez is a 84 y.o. male who presents to the urgent care for evaluation.  The history is provided by the patient.   URI  Presenting symptoms: cough (nonproductive) and fatigue (and weakness)    Presenting symptoms: no fever    Duration:  1 week  Associated symptoms: no wheezing      He is accompanied by his wife and granddaughter who contribute to the visit.  Wife states that the home health nurse came to see him today and thought she heard \"fluid in the lungs\" and advised that he be seen.     Medical history consistent for CAD, chronic kidney disease, hypertension, BPH,  and diabetes    The patient/patient representative has no other acute complaints at this time.    REVIEW OF SYSTEMS     Review of Systems   Constitutional:  Positive for fatigue (and weakness). Negative for chills and fever.   Respiratory:  Positive for cough (nonproductive) and shortness of breath. Negative for wheezing.    Cardiovascular:  Positive for chest pain (\"earlier today but not now\") and leg swelling. Negative for palpitations.   Gastrointestinal:  Negative for abdominal pain, diarrhea, nausea and vomiting.   Skin:  Negative for rash.   Allergic/Immunologic: Negative for environmental allergies and food allergies.       PAST MEDICAL HISTORY         Diagnosis Date    Chronic kidney disease     Diabetes mellitus (HCC)     Hyperlipidemia     Hypertension        SURGICAL HISTORY     Patient  has a past surgical history that includes Shoulder Arthroplasty (Left); Bladder scan (11/11/2023); Bladder Scan (11/11/2023); Bladder scan (11/13/2023); Cardiac procedure (Left, 11/20/2023); and TURP (N/A, 2/5/2024).    CURRENT MEDICATIONS       Previous Medications    ASPIRIN 81 MG EC TABLET    Take 1  currently use drugs.    PHYSICAL EXAM     ED TRIAGE VITALS  BP: 121/68, Temp: 97.8 °F (36.6 °C), Pulse: 84, Respirations: 20, SpO2: 95 %  Physical Exam  Vitals and nursing note reviewed.   Constitutional:       General: He is not in acute distress.     Appearance: Normal appearance. He is well-developed and well-groomed.      Comments: Presents in wheelchair   HENT:      Right Ear: External ear normal.      Left Ear: External ear normal.      Nose: Nose normal.      Mouth/Throat:      Lips: Pink.      Mouth: Mucous membranes are moist.      Pharynx: Oropharynx is clear.   Cardiovascular:      Rate and Rhythm: Normal rate.      Heart sounds: Normal heart sounds.   Pulmonary:      Effort: Pulmonary effort is normal. No respiratory distress.      Breath sounds: Decreased breath sounds present.   Musculoskeletal:      Right lower leg: 3+ Edema present.      Left lower le+ Edema present.   Skin:     General: Skin is warm and dry.      Findings: No rash (on exposed surfaces).   Neurological:      Mental Status: He is alert and oriented to person, place, and time.      Gait: Gait is intact.   Psychiatric:         Speech: Speech normal.         Behavior: Behavior normal. Behavior is cooperative.         DIAGNOSTIC RESULTS   Labs:  Abnormal Labs Reviewed   POC BASIC METABOL PANEL W/O CALCIUM - Abnormal; Notable for the following components:       Result Value    Potassium, Whole Blood 5.3 (*)     Glucose, Whole Blood 194 (*)     All other components within normal limits   CBC WITH AUTO DIFFERENTIAL - Abnormal; Notable for the following components:    RBC 3.71 (*)     Hemoglobin 10.8 (*)     Hematocrit 33.8 (*)     MCHC 32.0 (*)     RDW 15.1 (*)     MPV 12.5 (*)     All other components within normal limits        IMAGING:  XR CHEST (2 VW)   Final Result   1. Mild cardiomegaly.   2. No acute findings. No infiltrates or effusions are seen.            **This report has been created using voice recognition software.  It may

## 2024-07-03 ENCOUNTER — APPOINTMENT (OUTPATIENT)
Age: 84
End: 2024-07-03
Payer: MEDICARE

## 2024-07-03 ENCOUNTER — APPOINTMENT (OUTPATIENT)
Dept: NUCLEAR MEDICINE | Age: 84
End: 2024-07-03
Payer: MEDICARE

## 2024-07-03 PROBLEM — R07.89 CHEST PAIN, ATYPICAL: Status: ACTIVE | Noted: 2024-07-03

## 2024-07-03 LAB
ANION GAP SERPL CALC-SCNC: 8 MEQ/L (ref 8–16)
BUN SERPL-MCNC: 24 MG/DL (ref 7–22)
CALCIUM SERPL-MCNC: 8.5 MG/DL (ref 8.5–10.5)
CHLORIDE SERPL-SCNC: 105 MEQ/L (ref 98–111)
CO2 SERPL-SCNC: 27 MEQ/L (ref 23–33)
CREAT SERPL-MCNC: 1 MG/DL (ref 0.4–1.2)
DEPRECATED RDW RBC AUTO: 53.9 FL (ref 35–45)
ECHO AO ASC DIAM: 3 CM
ECHO AO ASCENDING AORTA INDEX: 1.33 CM/M2
ECHO AV CUSP MM: 1.9 CM
ECHO BSA: 2.36 M2
ECHO BSA: 2.36 M2
ECHO IVC PROX: 1.9 CM
ECHO LA AREA 2C: 21.5 CM2
ECHO LA AREA 4C: 23.1 CM2
ECHO LA DIAMETER INDEX: 1.64 CM/M2
ECHO LA DIAMETER: 3.7 CM
ECHO LA MAJOR AXIS: 6.2 CM
ECHO LA MINOR AXIS: 5.8 CM
ECHO LA VOL BP: 69 ML (ref 18–58)
ECHO LA VOL MOD A2C: 66 ML (ref 18–58)
ECHO LA VOL MOD A4C: 68 ML (ref 18–58)
ECHO LA VOL/BSA BIPLANE: 31 ML/M2 (ref 16–34)
ECHO LA VOLUME INDEX MOD A2C: 29 ML/M2 (ref 16–34)
ECHO LA VOLUME INDEX MOD A4C: 30 ML/M2 (ref 16–34)
ECHO LV EDV A2C: 87 ML
ECHO LV EDV A4C: 125 ML
ECHO LV EDV INDEX A4C: 55 ML/M2
ECHO LV EDV NDEX A2C: 38 ML/M2
ECHO LV EJECTION FRACTION A2C: 43 %
ECHO LV EJECTION FRACTION A4C: 51 %
ECHO LV EJECTION FRACTION BIPLANE: 46 % (ref 55–100)
ECHO LV ESV A2C: 50 ML
ECHO LV ESV A4C: 61 ML
ECHO LV ESV INDEX A2C: 22 ML/M2
ECHO LV ESV INDEX A4C: 27 ML/M2
ECHO LV FRACTIONAL SHORTENING: 23 % (ref 28–44)
ECHO LV INTERNAL DIMENSION DIASTOLE INDEX: 2.48 CM/M2
ECHO LV INTERNAL DIMENSION DIASTOLIC: 5.6 CM (ref 4.2–5.9)
ECHO LV INTERNAL DIMENSION SYSTOLIC INDEX: 1.9 CM/M2
ECHO LV INTERNAL DIMENSION SYSTOLIC: 4.3 CM
ECHO LV IVSD: 1.2 CM (ref 0.6–1)
ECHO LV MASS 2D: 280.5 G (ref 88–224)
ECHO LV MASS INDEX 2D: 124.1 G/M2 (ref 49–115)
ECHO LV POSTERIOR WALL DIASTOLIC: 1.2 CM (ref 0.6–1)
ECHO LV RELATIVE WALL THICKNESS RATIO: 0.43
ECHO RV INTERNAL DIMENSION: 2.6 CM
ECHO RV TAPSE: 1.9 CM (ref 1.7–?)
EKG ATRIAL RATE: 72 BPM
EKG ATRIAL RATE: 75 BPM
EKG P AXIS: 34 DEGREES
EKG P AXIS: 36 DEGREES
EKG P-R INTERVAL: 248 MS
EKG P-R INTERVAL: 264 MS
EKG Q-T INTERVAL: 412 MS
EKG Q-T INTERVAL: 450 MS
EKG QRS DURATION: 124 MS
EKG QRS DURATION: 126 MS
EKG QTC CALCULATION (BAZETT): 460 MS
EKG QTC CALCULATION (BAZETT): 492 MS
EKG R AXIS: 104 DEGREES
EKG R AXIS: 113 DEGREES
EKG T AXIS: 23 DEGREES
EKG T AXIS: 9 DEGREES
EKG VENTRICULAR RATE: 72 BPM
EKG VENTRICULAR RATE: 75 BPM
ERYTHROCYTE [DISTWIDTH] IN BLOOD BY AUTOMATED COUNT: 15.4 % (ref 11.5–14.5)
GFR SERPL CREATININE-BSD FRML MDRD: 75 ML/MIN/1.73M2
GLUCOSE BLD STRIP.AUTO-MCNC: 102 MG/DL (ref 70–108)
GLUCOSE BLD STRIP.AUTO-MCNC: 139 MG/DL (ref 70–108)
GLUCOSE BLD STRIP.AUTO-MCNC: 222 MG/DL (ref 70–108)
GLUCOSE SERPL-MCNC: 178 MG/DL (ref 70–108)
HCT VFR BLD AUTO: 33.3 % (ref 42–52)
HEPARIN UNFRACTIONATED: 0.27 U/ML (ref 0.3–0.7)
HEPARIN UNFRACTIONATED: 0.38 U/ML (ref 0.3–0.7)
HGB BLD-MCNC: 10.4 GM/DL (ref 14–18)
LEFT VENTRICULAR EJECTION FRACTION HIGH VALUE: 50 %
LEFT VENTRICULAR EJECTION FRACTION MODE: NORMAL
LV EF: 45 %
MAGNESIUM SERPL-MCNC: 1.8 MG/DL (ref 1.6–2.4)
MCH RBC QN AUTO: 29.6 PG (ref 26–33)
MCHC RBC AUTO-ENTMCNC: 31.2 GM/DL (ref 32.2–35.5)
MCV RBC AUTO: 94.9 FL (ref 80–94)
NUC STRESS EJECTION FRACTION: 43 %
OSMOLALITY SERPL CALC.SUM OF ELEC: 287.9 MOSMOL/KG (ref 275–300)
PHOSPHATE SERPL-MCNC: 3 MG/DL (ref 2.4–4.7)
PLATELET # BLD AUTO: 176 THOU/MM3 (ref 130–400)
PMV BLD AUTO: 12.1 FL (ref 9.4–12.4)
POTASSIUM SERPL-SCNC: 5.2 MEQ/L (ref 3.5–5.2)
RBC # BLD AUTO: 3.51 MILL/MM3 (ref 4.7–6.1)
SODIUM SERPL-SCNC: 140 MEQ/L (ref 135–145)
STRESS BASELINE DIAS BP: 52 MMHG
STRESS BASELINE HR: 68 BPM
STRESS BASELINE ST DEPRESSION: 0 MM
STRESS BASELINE SYS BP: 90 MMHG
STRESS ST DEPRESSION: 0 MM
STRESS STAGE 1 BP: NORMAL MMHG
STRESS STAGE 1 DURATION: 1 MIN:SEC
STRESS STAGE 1 HR: 64 BPM
STRESS STAGE 2 BP: NORMAL MMHG
STRESS STAGE 2 DURATION: 1 MIN:SEC
STRESS STAGE 2 HR: 65 BPM
STRESS STAGE 3 BP: NORMAL MMHG
STRESS STAGE 3 DURATION: 1 MIN:SEC
STRESS STAGE 3 HR: 70 BPM
STRESS STAGE RECOVERY 1 BP: NORMAL MMHG
STRESS STAGE RECOVERY 1 DURATION: 1 MIN:SEC
STRESS STAGE RECOVERY 1 HR: 70 BPM
STRESS STAGE RECOVERY 2 BP: NORMAL MMHG
STRESS STAGE RECOVERY 2 DURATION: 1 MIN:SEC
STRESS STAGE RECOVERY 2 HR: 76 BPM
STRESS STAGE RECOVERY 3 BP: NORMAL MMHG
STRESS STAGE RECOVERY 3 DURATION: 1 MIN:SEC
STRESS STAGE RECOVERY 3 HR: 76 BPM
STRESS STAGE RECOVERY 4 BP: NORMAL MMHG
STRESS STAGE RECOVERY 4 DURATION: 1 MIN:SEC
STRESS STAGE RECOVERY 4 HR: 75 BPM
STRESS TARGET HR: 137 BPM
TID: 1.13
TROPONIN, HIGH SENSITIVITY: 42 NG/L (ref 0–12)
WBC # BLD AUTO: 6 THOU/MM3 (ref 4.8–10.8)

## 2024-07-03 PROCEDURE — 6370000000 HC RX 637 (ALT 250 FOR IP)

## 2024-07-03 PROCEDURE — 83735 ASSAY OF MAGNESIUM: CPT

## 2024-07-03 PROCEDURE — 78452 HT MUSCLE IMAGE SPECT MULT: CPT | Performed by: INTERNAL MEDICINE

## 2024-07-03 PROCEDURE — 84100 ASSAY OF PHOSPHORUS: CPT

## 2024-07-03 PROCEDURE — 36415 COLL VENOUS BLD VENIPUNCTURE: CPT

## 2024-07-03 PROCEDURE — 82948 REAGENT STRIP/BLOOD GLUCOSE: CPT

## 2024-07-03 PROCEDURE — 85520 HEPARIN ASSAY: CPT

## 2024-07-03 PROCEDURE — 99223 1ST HOSP IP/OBS HIGH 75: CPT | Performed by: INTERNAL MEDICINE

## 2024-07-03 PROCEDURE — 93016 CV STRESS TEST SUPVJ ONLY: CPT | Performed by: INTERNAL MEDICINE

## 2024-07-03 PROCEDURE — 1200000003 HC TELEMETRY R&B

## 2024-07-03 PROCEDURE — 84484 ASSAY OF TROPONIN QUANT: CPT

## 2024-07-03 PROCEDURE — 93017 CV STRESS TEST TRACING ONLY: CPT

## 2024-07-03 PROCEDURE — 85027 COMPLETE CBC AUTOMATED: CPT

## 2024-07-03 PROCEDURE — 78452 HT MUSCLE IMAGE SPECT MULT: CPT

## 2024-07-03 PROCEDURE — 6360000002 HC RX W HCPCS: Performed by: OPHTHALMOLOGY

## 2024-07-03 PROCEDURE — 93307 TTE W/O DOPPLER COMPLETE: CPT | Performed by: INTERNAL MEDICINE

## 2024-07-03 PROCEDURE — 93010 ELECTROCARDIOGRAM REPORT: CPT | Performed by: INTERNAL MEDICINE

## 2024-07-03 PROCEDURE — 93307 TTE W/O DOPPLER COMPLETE: CPT

## 2024-07-03 PROCEDURE — 3430000000 HC RX DIAGNOSTIC RADIOPHARMACEUTICAL: Performed by: INTERNAL MEDICINE

## 2024-07-03 PROCEDURE — 80048 BASIC METABOLIC PNL TOTAL CA: CPT

## 2024-07-03 PROCEDURE — A9500 TC99M SESTAMIBI: HCPCS | Performed by: INTERNAL MEDICINE

## 2024-07-03 PROCEDURE — 93018 CV STRESS TEST I&R ONLY: CPT | Performed by: INTERNAL MEDICINE

## 2024-07-03 PROCEDURE — 6360000002 HC RX W HCPCS

## 2024-07-03 PROCEDURE — 99222 1ST HOSP IP/OBS MODERATE 55: CPT | Performed by: OPHTHALMOLOGY

## 2024-07-03 RX ORDER — NITROGLYCERIN 0.4 MG/1
0.4 TABLET SUBLINGUAL EVERY 5 MIN PRN
Status: CANCELLED | OUTPATIENT
Start: 2024-07-03 | End: 2024-07-03

## 2024-07-03 RX ORDER — METOPROLOL TARTRATE 1 MG/ML
5 INJECTION, SOLUTION INTRAVENOUS EVERY 5 MIN PRN
Status: CANCELLED | OUTPATIENT
Start: 2024-07-03 | End: 2024-07-03

## 2024-07-03 RX ORDER — ALBUTEROL SULFATE 90 UG/1
2 AEROSOL, METERED RESPIRATORY (INHALATION) PRN
Status: CANCELLED | OUTPATIENT
Start: 2024-07-03 | End: 2024-07-03

## 2024-07-03 RX ORDER — AMINOPHYLLINE 25 MG/ML
50 INJECTION, SOLUTION INTRAVENOUS PRN
Status: CANCELLED | OUTPATIENT
Start: 2024-07-03 | End: 2024-07-03

## 2024-07-03 RX ORDER — SODIUM CHLORIDE 9 MG/ML
500 INJECTION, SOLUTION INTRAVENOUS CONTINUOUS PRN
Status: CANCELLED | OUTPATIENT
Start: 2024-07-03 | End: 2024-07-03

## 2024-07-03 RX ORDER — SODIUM CHLORIDE 0.9 % (FLUSH) 0.9 %
5-40 SYRINGE (ML) INJECTION PRN
Status: CANCELLED | OUTPATIENT
Start: 2024-07-03 | End: 2024-07-03

## 2024-07-03 RX ORDER — REGADENOSON 0.08 MG/ML
0.4 INJECTION, SOLUTION INTRAVENOUS
Status: COMPLETED | OUTPATIENT
Start: 2024-07-03 | End: 2024-07-03

## 2024-07-03 RX ORDER — ATROPINE SULFATE 0.1 MG/ML
0.5 INJECTION INTRAVENOUS EVERY 5 MIN PRN
Status: CANCELLED | OUTPATIENT
Start: 2024-07-03 | End: 2024-07-03

## 2024-07-03 RX ORDER — MAGNESIUM SULFATE IN WATER 40 MG/ML
2000 INJECTION, SOLUTION INTRAVENOUS ONCE
Status: COMPLETED | OUTPATIENT
Start: 2024-07-03 | End: 2024-07-03

## 2024-07-03 RX ORDER — BUMETANIDE 0.25 MG/ML
1 INJECTION INTRAMUSCULAR; INTRAVENOUS DAILY
Status: DISCONTINUED | OUTPATIENT
Start: 2024-07-03 | End: 2024-07-04

## 2024-07-03 RX ORDER — TETRAKIS(2-METHOXYISOBUTYLISOCYANIDE)COPPER(I) TETRAFLUOROBORATE 1 MG/ML
33 INJECTION, POWDER, LYOPHILIZED, FOR SOLUTION INTRAVENOUS
Status: COMPLETED | OUTPATIENT
Start: 2024-07-03 | End: 2024-07-03

## 2024-07-03 RX ORDER — REGADENOSON 0.08 MG/ML
0.4 INJECTION, SOLUTION INTRAVENOUS
Status: CANCELLED | OUTPATIENT
Start: 2024-07-03

## 2024-07-03 RX ORDER — TETRAKIS(2-METHOXYISOBUTYLISOCYANIDE)COPPER(I) TETRAFLUOROBORATE 1 MG/ML
9.5 INJECTION, POWDER, LYOPHILIZED, FOR SOLUTION INTRAVENOUS
Status: COMPLETED | OUTPATIENT
Start: 2024-07-03 | End: 2024-07-03

## 2024-07-03 RX ADMIN — HEPARIN SODIUM 2000 UNITS: 1000 INJECTION INTRAVENOUS; SUBCUTANEOUS at 06:28

## 2024-07-03 RX ADMIN — PANTOPRAZOLE SODIUM 40 MG: 40 TABLET, DELAYED RELEASE ORAL at 05:22

## 2024-07-03 RX ADMIN — ASPIRIN 81 MG: 81 TABLET, COATED ORAL at 09:31

## 2024-07-03 RX ADMIN — OXYBUTYNIN CHLORIDE 10 MG: 10 TABLET, EXTENDED RELEASE ORAL at 09:24

## 2024-07-03 RX ADMIN — Medication 9.5 MILLICURIE: at 12:45

## 2024-07-03 RX ADMIN — PRAVASTATIN SODIUM 80 MG: 80 TABLET ORAL at 19:46

## 2024-07-03 RX ADMIN — REGADENOSON 0.4 MG: 0.08 INJECTION, SOLUTION INTRAVENOUS at 13:48

## 2024-07-03 RX ADMIN — LEVOTHYROXINE SODIUM 150 MCG: 0.15 TABLET ORAL at 05:22

## 2024-07-03 RX ADMIN — LOSARTAN POTASSIUM 25 MG: 25 TABLET, FILM COATED ORAL at 09:25

## 2024-07-03 RX ADMIN — GABAPENTIN 200 MG: 100 CAPSULE ORAL at 19:47

## 2024-07-03 RX ADMIN — CITALOPRAM 40 MG: 40 TABLET, FILM COATED ORAL at 09:27

## 2024-07-03 RX ADMIN — TAMSULOSIN HYDROCHLORIDE 0.4 MG: 0.4 CAPSULE ORAL at 09:25

## 2024-07-03 RX ADMIN — METOPROLOL TARTRATE 25 MG: 25 TABLET, FILM COATED ORAL at 09:26

## 2024-07-03 RX ADMIN — Medication 33 MILLICURIE: at 13:48

## 2024-07-03 RX ADMIN — DONEPEZIL HYDROCHLORIDE 10 MG: 10 TABLET, FILM COATED ORAL at 19:46

## 2024-07-03 RX ADMIN — MAGNESIUM SULFATE HEPTAHYDRATE 2000 MG: 40 INJECTION, SOLUTION INTRAVENOUS at 05:26

## 2024-07-03 RX ADMIN — GABAPENTIN 200 MG: 100 CAPSULE ORAL at 09:24

## 2024-07-03 RX ADMIN — BUMETANIDE 1 MG: 0.25 INJECTION INTRAMUSCULAR; INTRAVENOUS at 09:31

## 2024-07-03 RX ADMIN — BUPROPION HYDROCHLORIDE 150 MG: 150 TABLET, EXTENDED RELEASE ORAL at 09:26

## 2024-07-03 ASSESSMENT — PAIN SCALES - GENERAL: PAINLEVEL_OUTOF10: 0

## 2024-07-03 NOTE — PROGRESS NOTES
Pt picked up by writer at 1700. Pt in bed & resting w/ family at bedside. Proper fall regimen in place at all times. Full report hand off given to night RN. No significant events during shift.

## 2024-07-03 NOTE — H&P
History & Physical  Internal Medicine Resident         Patient: Leo Nevarez 84 y.o. male      : 1940  Date of Admission: 2024  Date of Service: Pt seen/examined on 24 and Admitted to Inpatient with expected LOS greater than two midnights due to medical therapy.       ASSESSMENT AND PLAN    Atypical CP vs NSTEMI: VINNY score: 6. TADEO: 163.   Obstructive CAD   Hx of RBBB  Pt having CP that started 2 days ago, substernal with intermittent radiating pain. No N/V or diaphoresis. Does have SOB worsening over last few months. Not in active CP.   EKG: sinus rhythm w/ 1st AVB, with sinus arrhythmia. ST abnormality noted in anterior leads and inferior leads. .    Troponin x2: 37 and 35.   LHC 2023:  of RCA, L to R collaterals present.  Otherwise nonobstructive CAD.  Follows Dr. Spears.    - Continue statin + ASA + BB therapy.   - Started on ACEi/ARB   - Holding nitro gtt, as no active CP at this time, can start if pt develops CP.   - Started on heparin GTT, continue.  - TTE ordered.   - N.p.o. at midnight.  - Consult cardio in am.     Acute diastolic HFpEF: NYHA class II  TTE 2023: Low normal LV systolic function.  EF 50 to 55%.  No RWMA.  GDMT: BB  Diuretics: Lasix 20 mg BID (non-compliant)  CXR: Cardiomegaly with mild pulmonary vascular congestion noted.  proBNP ~2600, above previous of ~1000.  Pt having RIVAS, intermittent SOB, and PND that is gradually worsening over the last few months.  On physical exam, increased B/L LE edema, and crackles and rales noted in lung fields. Pt has Lasix 20 mg bid, has not taken medication in many months.  - Started on IV Bumex 1mg.   - Strict I/O, 2L/2g Na diet when able, daily weights.  Monitor electrolytes, maintain K> 4, Mg> 2.  - GDMT as tolerated.  - Started on ACEi/ARB   - Follow-up with CHF clinic on discharge.    Chronic:   Uncontrolled IDDM2 w/ neuropathy: A1c 2023: 9.4%. Holding home antiglycemic's. On Lantus 30 units (non-compliant). Will start

## 2024-07-03 NOTE — PROCEDURES
PROCEDURE NOTE  Date: 7/2/2024   Name: Leo Nevarez  YOB: 1940    Procedures    12 lead EKG completed. Results handed to FLORESITA Cadena

## 2024-07-03 NOTE — CARE COORDINATION
Case Management Assessment Initial Evaluation    Date/Time of Evaluation: 7/3/2024 8:10 AM  Assessment Completed by: Anahi Arechiga RN    If patient is discharged prior to next notation, then this note serves as note for discharge by case management.    Patient Name: Leo Nevarez                   YOB: 1940  Diagnosis: Acute coronary syndrome (HCC) [I24.9]  Atypical chest pain [R07.89]  Chest pain, unspecified type [R07.9]                   Date / Time: 2024  2:01 PM  Location: [address] [place] [address]     Patient Admission Status: Inpatient   Readmission Risk Low 0-14, Mod 15-19), High > 20: Readmission Risk Score: 20    Current PCP: Aileen Valadez, APRN - CNP    Additional Case Management Notes: Pt admitted with cough, chest congestion. CP as well. Hx MI. Crackles noted. Edema. BNP 2608. Trop 37. Cardiology consulted. Echo. IV Bumex. Heparin gtt.    Procedures: No    Imagin24  XR CHEST (2 VW)   Final Result   1. Mild cardiomegaly.   2. No acute findings. No infiltrates or effusions are seen.       Patient Goals/Plan/Treatment Preferences: Met with pt today. Resides with spouse and dtr. States he is independent but family does not allow him to drive. He has a cane and rollator. He has a PCP and is insured. Follow for needs.        24 1019   Service Assessment   Patient Orientation Alert and Oriented   Cognition Alert   History Provided By Patient   Primary Caregiver Self   Support Systems Spouse/Significant Other;Children   Patient's Healthcare Decision Maker is: Named in Scanned ACP Document   PCP Verified by CM Yes   Last Visit to PCP Within last 3 months   Prior Functional Level Independent in ADLs/IADLs   Current Functional Level Independent in ADLs/IADLs   Can patient return to prior living arrangement Yes   Ability to make needs known: Good   Family able to assist with home care needs: Yes   Would you like for me to discuss the discharge plan with any other family members/significant

## 2024-07-03 NOTE — PROGRESS NOTES
Pt was asleep but was anointed.    07/03/24 1758   Encounter Summary   Encounter Overview/Reason Initial Encounter   Service Provided For Patient and family together   Referral/Consult From Rounding   Support System Spouse;Children   Last Encounter  07/03/24  (Anointed)   Complexity of Encounter Low   Begin Time 1650   End Time  1655   Total Time Calculated 5 min   Spiritual/Emotional needs   Type Spiritual Support   Rituals, Rites and Sacraments   Type Anointing

## 2024-07-03 NOTE — PLAN OF CARE
Problem: Discharge Planning  Goal: Discharge to home or other facility with appropriate resources  Outcome: Progressing  Flowsheets (Taken 7/2/2024 2300)  Discharge to home or other facility with appropriate resources: Identify barriers to discharge with patient and caregiver     Problem: Skin/Tissue Integrity  Goal: Absence of new skin breakdown  Description: 1.  Monitor for areas of redness and/or skin breakdown  2.  Assess vascular access sites hourly  3.  Every 4-6 hours minimum:  Change oxygen saturation probe site  4.  Every 4-6 hours:  If on nasal continuous positive airway pressure, respiratory therapy assess nares and determine need for appliance change or resting period.  Outcome: Progressing     Problem: Safety - Adult  Goal: Free from fall injury  Outcome: Progressing     Problem: ABCDS Injury Assessment  Goal: Absence of physical injury  Outcome: Progressing

## 2024-07-03 NOTE — PROGRESS NOTES
Hospitalist Progress Note  Internal Medicine Resident      Patient: Leo Nevarez 84 y.o. male      Unit/Bed: 8B-22/022-A    Admit Date: 7/2/2024      ASSESSMENT AND PLAN  Active Problems    #Stable Angina  #CAD   Pt with a cardiac hx including CAD s/p LHC with  of RCA but with collaterals and HFpEF (EF 40-50% on TTE 7/3/2024) (GMT metoprolol 25mg BID) who presented with substernal CP on exertion (none at rest) onset 3 days ago. CP resolved before admission. Placed on statin, ASA, BB, and heparin drip. Seen by cards and recommended lexiscan.   Plan  -lexiscan  -cont pravastatin 80mg nightly, metoprolol tartrate 25mg BID daily, heparin gtt, ASA 81mg daily  -PT/OT    #HFpEF   On GDMT metoprolol 25mg BID. Home  furosemide 20mg BID daily   New echo revealed EF of 45-50% decreased from prior study, mild global hypokinesis and indeterminate diastolic fxn.BNP on admission elevated at 2608. He has been having dyspnea, CP, geneva leg edema the past few months. Makes urine. Reports compliance with meds.  Plan  -cont bumex 1mg daily    Chronic Conditions    #HTN  Cont losartan 25mg daily  #DM2  home glipizide 10mg daily, metformin 1000mg daily, lantus 10u, Januvia 25mg daily  Plan  -Lantus 10  -SS  -POC glu checks  -Hypoglycemia protocol    #hypothyroidism cont home [id number] daily  #alzheimers cont home Donepizil 10mg daily  #urinary incontinence cont home oxybutinin 10mg daily cont   #anxiety cont home celexa 40mg daily and wellbutrin 150mg daily  #BPH Cont flomax .4mg daily    LDA: []CVC / []PICC / []Midline / []Fugn / []Drains / []Mediport / [x]None  Antibiotics: none  Steroids: none  Labs (still needed?): []Yes / [x]No  IVF (still needed?): []Yes / [x]No    Level of care: []Step Down / []Med-Surg  Bed Status: [x]Inpatient / []Observation  Telemetry: [x]Yes / []No  PT/OT: [x]Yes / []No    DVT Prophylaxis: [] Lovenox / [x] Heparin / [] SCDs / [] Already on Systemic Anticoagulation / [] None     Expected

## 2024-07-03 NOTE — ED NOTES
ED to inpatient nurses report      Chief Complaint:  Chief Complaint   Patient presents with    Cough    Chest Congestion     Present to ED from: Home    MOA:     LOC: alert and orientated to name and place  Mobility: Requires assistance * 1  Oxygen Baseline: RA    Current needs required: RA     Code Status:   Prior    What abnormal results were found and what did you give/do to treat them? Generalized weakness  Any procedures or intervention occur? xray    Mental Status:  Level of Consciousness: Alert (0)    Psych Assessment:   Psychosocial  Psychosocial (WDL): Within Defined Limits    Vitals:  Patient Vitals for the past 24 hrs:   BP Temp Temp src Pulse Resp SpO2 Height Weight   07/02/24 1945 126/62 -- -- 67 19 99 % -- --   07/02/24 1730 125/60 -- -- 80 26 98 % -- --   07/02/24 1603 (!) 152/55 -- -- 70 22 -- -- --   07/02/24 1521 129/60 -- -- 80 22 93 % -- --   07/02/24 1401 121/68 97.8 °F (36.6 °C) Oral 84 20 95 % 1.702 m (5' 7\") 117.9 kg (260 lb)        LDAs:   Peripheral IV 07/02/24 Right Antecubital (Active)   Site Assessment Clean, dry & intact 07/02/24 1732   Line Status Blood return noted;Flushed 07/02/24 1732   Phlebitis Assessment No symptoms 07/02/24 1732   Infiltration Assessment 0 07/02/24 1732   Dressing Status New dressing applied;Clean, dry & intact 07/02/24 1732   Dressing Type Transparent 07/02/24 1732   Dressing Intervention New 07/02/24 1732       Ambulatory Status:  Presents to emergency department  because of falls (Syncope, seizure, or loss of consciousness): No, Age > 70: Yes, Altered Mental Status, Intoxication with alcohol or substance confusion (Disorientation, impaired judgment, poor safety awaremess, or inability to follow instructions): No, Impaired Mobility: Ambulates or transfers with assistive devices or assistance; Unable to ambulate or transer.: Yes, Nursing Judgement: No    Diagnosis:  DISPOSITION Admitted 07/02/2024 08:56:06 PM   Final diagnoses:   Chest pain, unspecified type

## 2024-07-03 NOTE — CONSULTS
[facility]'s  Consult    Patient's Name/Date of Birth: Leo Nevarez / 1940 (84 y.o.)    Date: July 3, 2024     Referring Provider: Arcelia Zarate MD    CHIEF COMPLAINT: Atypical chest pain      HPI: This is a pleasant 84 y.o. male, medical history of nonobstructive CAD, hypertension, hyperlipidemia, NIDDM2, CKD, who initially presented to urgent care with cough and chest congestion before being transferred to [facility] ED for further evaluation. Patient states that he has had chest pain and cough for approximately 4 days, but came to ED for evaluation 2/2 significantly worsening chest pain with radiation to bilateral arms and noted to be 8/10 intensity. Chest pain resolved prior to admission.        EKG: Compared to EKG from 7/2/2024, interpreted by me, normal sinus rhythm with marked arrhythmia and first degree AV block with RBBB and left posterior fascicular block, ventricular rate of 75, OR interval 248 ms, QRS duration 124 ms, QTc 460 ms, no acute ischemic changes.      Echo: TTE 11/11/23  EF 50 to 55%, low normal LV systolic function, normal wall thickness and wall motion       All labs, EKG's, diagnostic testing and images as well as cardiac cath, stress testing were reviewed during this encounter    Past Medical History:   Diagnosis Date    Chronic kidney disease     Diabetes mellitus (HCC)     Hyperlipidemia     Hypertension      Past Surgical History:   Procedure Laterality Date    BLADDER SCAN  11/11/2023    BLADDER SCAN  11/13/2023    CARDIAC PROCEDURE Left 11/20/2023    Left heart cath / coronary angiography performed by Javier Brothers MD at [facility] CARDIAC CATH LAB    HC BLADDER SCAN  11/11/2023    SHOULDER ARTHROPLASTY Left     TURP N/A 2/5/2024    Cystoscopy Greenlight Photovaporization of Prostate performed by Abhay Levine Jr., MD at [facility] OR     Current Facility-Administered Medications   Medication Dose Route Frequency Provider Last Rate Last Admin

## 2024-07-04 LAB
ANION GAP SERPL CALC-SCNC: 7 MEQ/L (ref 8–16)
BUN SERPL-MCNC: 24 MG/DL (ref 7–22)
CALCIUM SERPL-MCNC: 8.6 MG/DL (ref 8.5–10.5)
CHLORIDE SERPL-SCNC: 102 MEQ/L (ref 98–111)
CO2 SERPL-SCNC: 30 MEQ/L (ref 23–33)
CREAT SERPL-MCNC: 1.1 MG/DL (ref 0.4–1.2)
DEPRECATED RDW RBC AUTO: 51.8 FL (ref 35–45)
EKG ATRIAL RATE: 83 BPM
EKG Q-T INTERVAL: 430 MS
EKG QRS DURATION: 128 MS
EKG QTC CALCULATION (BAZETT): 490 MS
EKG R AXIS: 110 DEGREES
EKG T AXIS: 25 DEGREES
EKG VENTRICULAR RATE: 78 BPM
ERYTHROCYTE [DISTWIDTH] IN BLOOD BY AUTOMATED COUNT: 14.9 % (ref 11.5–14.5)
GFR SERPL CREATININE-BSD FRML MDRD: 67 ML/MIN/1.73M2
GLUCOSE BLD STRIP.AUTO-MCNC: 162 MG/DL (ref 70–108)
GLUCOSE BLD STRIP.AUTO-MCNC: 169 MG/DL (ref 70–108)
GLUCOSE BLD STRIP.AUTO-MCNC: 172 MG/DL (ref 70–108)
GLUCOSE BLD STRIP.AUTO-MCNC: 212 MG/DL (ref 70–108)
GLUCOSE BLD STRIP.AUTO-MCNC: 255 MG/DL (ref 70–108)
GLUCOSE SERPL-MCNC: 187 MG/DL (ref 70–108)
HCT VFR BLD AUTO: 32.3 % (ref 42–52)
HGB BLD-MCNC: 9.9 GM/DL (ref 14–18)
MAGNESIUM SERPL-MCNC: 1.9 MG/DL (ref 1.6–2.4)
MCH RBC QN AUTO: 28.9 PG (ref 26–33)
MCHC RBC AUTO-ENTMCNC: 30.7 GM/DL (ref 32.2–35.5)
MCV RBC AUTO: 94.2 FL (ref 80–94)
PHOSPHATE SERPL-MCNC: 4.1 MG/DL (ref 2.4–4.7)
PLATELET # BLD AUTO: 164 THOU/MM3 (ref 130–400)
PMV BLD AUTO: 12.5 FL (ref 9.4–12.4)
POTASSIUM SERPL-SCNC: 4.8 MEQ/L (ref 3.5–5.2)
RBC # BLD AUTO: 3.43 MILL/MM3 (ref 4.7–6.1)
SODIUM SERPL-SCNC: 139 MEQ/L (ref 135–145)
WBC # BLD AUTO: 5.9 THOU/MM3 (ref 4.8–10.8)

## 2024-07-04 PROCEDURE — 6370000000 HC RX 637 (ALT 250 FOR IP)

## 2024-07-04 PROCEDURE — 2580000003 HC RX 258

## 2024-07-04 PROCEDURE — 1200000003 HC TELEMETRY R&B

## 2024-07-04 PROCEDURE — 93010 ELECTROCARDIOGRAM REPORT: CPT | Performed by: INTERNAL MEDICINE

## 2024-07-04 PROCEDURE — 99238 HOSP IP/OBS DSCHRG MGMT 30/<: CPT | Performed by: OPHTHALMOLOGY

## 2024-07-04 PROCEDURE — 85027 COMPLETE CBC AUTOMATED: CPT

## 2024-07-04 PROCEDURE — 82948 REAGENT STRIP/BLOOD GLUCOSE: CPT

## 2024-07-04 PROCEDURE — 36415 COLL VENOUS BLD VENIPUNCTURE: CPT

## 2024-07-04 PROCEDURE — 6360000002 HC RX W HCPCS

## 2024-07-04 PROCEDURE — 80048 BASIC METABOLIC PNL TOTAL CA: CPT

## 2024-07-04 PROCEDURE — 84100 ASSAY OF PHOSPHORUS: CPT

## 2024-07-04 PROCEDURE — 83735 ASSAY OF MAGNESIUM: CPT

## 2024-07-04 PROCEDURE — 99222 1ST HOSP IP/OBS MODERATE 55: CPT | Performed by: NURSE PRACTITIONER

## 2024-07-04 RX ORDER — 0.9 % SODIUM CHLORIDE 0.9 %
1000 INTRAVENOUS SOLUTION INTRAVENOUS ONCE
Status: COMPLETED | OUTPATIENT
Start: 2024-07-04 | End: 2024-07-04

## 2024-07-04 RX ORDER — FUROSEMIDE 20 MG/1
20 TABLET ORAL 2 TIMES DAILY
Status: DISCONTINUED | OUTPATIENT
Start: 2024-07-05 | End: 2024-07-05 | Stop reason: HOSPADM

## 2024-07-04 RX ORDER — LOSARTAN POTASSIUM 25 MG/1
25 TABLET ORAL DAILY
Qty: 30 TABLET | Refills: 0 | Status: SHIPPED | OUTPATIENT
Start: 2024-07-05

## 2024-07-04 RX ORDER — ENOXAPARIN SODIUM 100 MG/ML
40 INJECTION SUBCUTANEOUS DAILY
Status: DISCONTINUED | OUTPATIENT
Start: 2024-07-04 | End: 2024-07-05 | Stop reason: HOSPADM

## 2024-07-04 RX ADMIN — INSULIN GLARGINE 10 UNITS: 100 INJECTION, SOLUTION SUBCUTANEOUS at 22:30

## 2024-07-04 RX ADMIN — INSULIN LISPRO 2 UNITS: 100 INJECTION, SOLUTION INTRAVENOUS; SUBCUTANEOUS at 13:27

## 2024-07-04 RX ADMIN — INSULIN LISPRO 1 UNITS: 100 INJECTION, SOLUTION INTRAVENOUS; SUBCUTANEOUS at 18:33

## 2024-07-04 RX ADMIN — OXYBUTYNIN CHLORIDE 10 MG: 10 TABLET, EXTENDED RELEASE ORAL at 08:53

## 2024-07-04 RX ADMIN — LOSARTAN POTASSIUM 25 MG: 25 TABLET, FILM COATED ORAL at 08:53

## 2024-07-04 RX ADMIN — PANTOPRAZOLE SODIUM 40 MG: 40 TABLET, DELAYED RELEASE ORAL at 04:57

## 2024-07-04 RX ADMIN — SODIUM CHLORIDE, PRESERVATIVE FREE 10 ML: 5 INJECTION INTRAVENOUS at 20:54

## 2024-07-04 RX ADMIN — GABAPENTIN 200 MG: 100 CAPSULE ORAL at 08:52

## 2024-07-04 RX ADMIN — GABAPENTIN 200 MG: 100 CAPSULE ORAL at 20:55

## 2024-07-04 RX ADMIN — DONEPEZIL HYDROCHLORIDE 10 MG: 10 TABLET, FILM COATED ORAL at 20:55

## 2024-07-04 RX ADMIN — BUPROPION HYDROCHLORIDE 150 MG: 150 TABLET, EXTENDED RELEASE ORAL at 08:53

## 2024-07-04 RX ADMIN — PRAVASTATIN SODIUM 80 MG: 80 TABLET ORAL at 20:54

## 2024-07-04 RX ADMIN — ASPIRIN 81 MG: 81 TABLET, COATED ORAL at 08:47

## 2024-07-04 RX ADMIN — SODIUM CHLORIDE, PRESERVATIVE FREE 10 ML: 5 INJECTION INTRAVENOUS at 08:53

## 2024-07-04 RX ADMIN — ENOXAPARIN SODIUM 40 MG: 100 INJECTION SUBCUTANEOUS at 15:13

## 2024-07-04 RX ADMIN — METOPROLOL TARTRATE 25 MG: 25 TABLET, FILM COATED ORAL at 08:48

## 2024-07-04 RX ADMIN — SODIUM CHLORIDE 1000 ML: 9 INJECTION, SOLUTION INTRAVENOUS at 12:30

## 2024-07-04 RX ADMIN — LEVOTHYROXINE SODIUM 150 MCG: 0.15 TABLET ORAL at 04:57

## 2024-07-04 RX ADMIN — BUMETANIDE 1 MG: 0.25 INJECTION INTRAMUSCULAR; INTRAVENOUS at 08:48

## 2024-07-04 RX ADMIN — TAMSULOSIN HYDROCHLORIDE 0.4 MG: 0.4 CAPSULE ORAL at 08:52

## 2024-07-04 RX ADMIN — CITALOPRAM 40 MG: 40 TABLET, FILM COATED ORAL at 08:53

## 2024-07-04 ASSESSMENT — PAIN SCALES - GENERAL
PAINLEVEL_OUTOF10: 0

## 2024-07-04 NOTE — PROGRESS NOTES
Hospitalist Progress Note  Internal Medicine Resident      Patient: Leo Nevarez 84 y.o. male      Unit/Bed: 8B-22/022-A    Admit Date: 7/2/2024      ASSESSMENT AND PLAN  Active Problems  Acute hypotension with history of hypertension: Home medication: Lopressor.   Patient started on Cozaar per cardiology recommendations with atypical chest pain  Continue Cozaar 25 mg daily  Continue Lopressor 25 mg twice daily  Patient received IV Bumex dose on 7/4.  Transition to Lasix on 7/5 7/4 blood pressure reported at 91/41 with repeat at 92/42.  No accompanying symptoms.  1 L normal saline fluid bolus given.  Hesitant to give additional fluid based on heart failure status.  Will reassess blood pressure following fluids  Will need to adjust blood pressure medication.  Hold parameters in place.  CAD status post [facility] 11/2023 with  of RCA, otherwise nonobstructive: Home medication: ASA, pravastatin.  Stress test 7/3 negative for new ischemia.  Continue ASA 81 mg daily  Continue pravastatin 80 mg nightly  Chronic HFpEF: TTE on 7/30/2024 showed EF 45-50% with mild global hypokinesis present.  Dilated left atrium.  Prior echo showed EF of 50-55%.  No significant decrease.  Home medication: Lasix  Cozaar 25 mg daily started per cardiology  Patient received dose of Bumex IV on 7/4.  Transition to home Lasix on 7/5  Insulin-dependent diabetes mellitus type 2: Home medication: Lantus, glipizide, Januvia, metformin  Continue Lantus 10 units nightly  Low-dose sliding scale  Hypoglycemia protocol  POCT glucose  Hypothyroidism: Home medication: Synthroid  Continue Synthroid 150 mcg daily  Alzheimer's dementia: Home medication: Aricept  Continue Aricept 10 mg nightly  Anxiety: Home medication: Celexa, Wellbutrin  Continue Celexa 40 mg daily  Continue Wellbutrin 150 mg daily  Urinary incontinence with known urinary retention: Home medication: Oxybutynin and Flomax.  Unclear as to why patient is on both these medications.  Atypical  chest pain: Resolved.  Heart score: 8, VINNY score: 5.  Onset of chest pain on 7/1 with bilateral radiation to axilla.  Patient underwent stress test on 7/3 that was negative for new ischemia.  Monitor for chest pain.      LDA: []CVC / []PICC / []Midline / []Fung / []Drains / []Mediport / [x]None  Antibiotics: None  Steroids: None  Labs (still needed?): [x]Yes / []No  IVF (still needed?): []Yes / [x]No    Level of care: []Step Down / [x]Med-Surg  Bed Status: [x]Inpatient / []Observation  Telemetry: [x]Yes / []No  PT/OT: []Yes / [x]No    DVT Prophylaxis: [x] Lovenox / [] Heparin / [] SCDs / [] Already on Systemic Anticoagulation / [] None     Expected discharge date: 7/5  Disposition: Home     Code status: Full Code     ===================================================================    Chief Complaint: Chest pain  Subjective (past 24 hours): Patient seen and evaluated at bedside this morning.  Patient denies chest pain, shortness of breath, shake, fever, chills, nausea, vomiting, constipation.  Stress test was negative on 7/3.  Original plan for discharge on 7/4, however patient became hypotensive.  Decision to keep patient and adjust blood pressure regimen.    HPI / Hospital Course:  Leo Nevarez is a 31-year-old male with past medical history significant for HFpEF, CKD, DM2, HLD, and HTN who presented on 7/2 for chest pain.  Onset of substernal chest pain on 7/1 that radiated to bilateral axilla.  Pain was persistent.  Patient initially presented to urgent care recommend patient follow-up with ED.  On arrival to ED EKG demonstrated first-degree AV block with bifascicular block as new EKG changes.  Troponin trend: 37-35-42.  Chest pain had relieved without intervention on arrival to ED.  Patient was not started on any ACS protocol in ED.  On admission patient was started on heparin drip, statin, beta-blocker, ASA.  Cardiology was consulted.  Patient underwent stress test on 7/3 that was negative for new

## 2024-07-05 VITALS
BODY MASS INDEX: 36.09 KG/M2 | SYSTOLIC BLOOD PRESSURE: 123 MMHG | WEIGHT: 229.94 LBS | HEIGHT: 67 IN | HEART RATE: 72 BPM | OXYGEN SATURATION: 96 % | RESPIRATION RATE: 16 BRPM | DIASTOLIC BLOOD PRESSURE: 52 MMHG | TEMPERATURE: 97.6 F

## 2024-07-05 LAB
ANION GAP SERPL CALC-SCNC: 9 MEQ/L (ref 8–16)
BUN SERPL-MCNC: 28 MG/DL (ref 7–22)
CALCIUM SERPL-MCNC: 8.3 MG/DL (ref 8.5–10.5)
CHLORIDE SERPL-SCNC: 101 MEQ/L (ref 98–111)
CO2 SERPL-SCNC: 29 MEQ/L (ref 23–33)
CREAT SERPL-MCNC: 1.3 MG/DL (ref 0.4–1.2)
DEPRECATED RDW RBC AUTO: 51.4 FL (ref 35–45)
ERYTHROCYTE [DISTWIDTH] IN BLOOD BY AUTOMATED COUNT: 14.9 % (ref 11.5–14.5)
GFR SERPL CREATININE-BSD FRML MDRD: 54 ML/MIN/1.73M2
GLUCOSE BLD STRIP.AUTO-MCNC: 186 MG/DL (ref 70–108)
GLUCOSE BLD STRIP.AUTO-MCNC: 97 MG/DL (ref 70–108)
GLUCOSE SERPL-MCNC: 107 MG/DL (ref 70–108)
HCT VFR BLD AUTO: 31.7 % (ref 42–52)
HGB BLD-MCNC: 9.9 GM/DL (ref 14–18)
MCH RBC QN AUTO: 29.4 PG (ref 26–33)
MCHC RBC AUTO-ENTMCNC: 31.2 GM/DL (ref 32.2–35.5)
MCV RBC AUTO: 94.1 FL (ref 80–94)
PLATELET # BLD AUTO: 169 THOU/MM3 (ref 130–400)
PMV BLD AUTO: 12.3 FL (ref 9.4–12.4)
POTASSIUM SERPL-SCNC: 5.2 MEQ/L (ref 3.5–5.2)
RBC # BLD AUTO: 3.37 MILL/MM3 (ref 4.7–6.1)
SODIUM SERPL-SCNC: 139 MEQ/L (ref 135–145)
WBC # BLD AUTO: 5.9 THOU/MM3 (ref 4.8–10.8)

## 2024-07-05 PROCEDURE — 80048 BASIC METABOLIC PNL TOTAL CA: CPT

## 2024-07-05 PROCEDURE — 36415 COLL VENOUS BLD VENIPUNCTURE: CPT

## 2024-07-05 PROCEDURE — 6370000000 HC RX 637 (ALT 250 FOR IP)

## 2024-07-05 PROCEDURE — 6360000002 HC RX W HCPCS

## 2024-07-05 PROCEDURE — 99238 HOSP IP/OBS DSCHRG MGMT 30/<: CPT | Performed by: OPHTHALMOLOGY

## 2024-07-05 PROCEDURE — 2580000003 HC RX 258

## 2024-07-05 PROCEDURE — 85027 COMPLETE CBC AUTOMATED: CPT

## 2024-07-05 PROCEDURE — 82948 REAGENT STRIP/BLOOD GLUCOSE: CPT

## 2024-07-05 RX ORDER — FUROSEMIDE 20 MG/1
20 TABLET ORAL DAILY
Qty: 30 TABLET | Refills: 0 | Status: SHIPPED | OUTPATIENT
Start: 2024-07-05 | End: 2024-08-04

## 2024-07-05 RX ADMIN — ASPIRIN 81 MG: 81 TABLET, COATED ORAL at 09:30

## 2024-07-05 RX ADMIN — LEVOTHYROXINE SODIUM 150 MCG: 0.15 TABLET ORAL at 07:38

## 2024-07-05 RX ADMIN — OXYBUTYNIN CHLORIDE 10 MG: 10 TABLET, EXTENDED RELEASE ORAL at 09:31

## 2024-07-05 RX ADMIN — BUPROPION HYDROCHLORIDE 150 MG: 150 TABLET, EXTENDED RELEASE ORAL at 09:30

## 2024-07-05 RX ADMIN — GABAPENTIN 200 MG: 100 CAPSULE ORAL at 09:30

## 2024-07-05 RX ADMIN — ENOXAPARIN SODIUM 40 MG: 100 INJECTION SUBCUTANEOUS at 09:30

## 2024-07-05 RX ADMIN — ACETAMINOPHEN 650 MG: 325 TABLET ORAL at 14:37

## 2024-07-05 RX ADMIN — TAMSULOSIN HYDROCHLORIDE 0.4 MG: 0.4 CAPSULE ORAL at 09:32

## 2024-07-05 RX ADMIN — FUROSEMIDE 20 MG: 20 TABLET ORAL at 09:31

## 2024-07-05 RX ADMIN — CITALOPRAM 40 MG: 40 TABLET, FILM COATED ORAL at 09:31

## 2024-07-05 RX ADMIN — SODIUM CHLORIDE, PRESERVATIVE FREE 10 ML: 5 INJECTION INTRAVENOUS at 09:29

## 2024-07-05 RX ADMIN — PANTOPRAZOLE SODIUM 40 MG: 40 TABLET, DELAYED RELEASE ORAL at 09:30

## 2024-07-05 RX ADMIN — LOSARTAN POTASSIUM 25 MG: 25 TABLET, FILM COATED ORAL at 09:31

## 2024-07-05 RX ADMIN — METOPROLOL TARTRATE 25 MG: 25 TABLET, FILM COATED ORAL at 09:30

## 2024-07-05 ASSESSMENT — PAIN SCALES - GENERAL
PAINLEVEL_OUTOF10: 0
PAINLEVEL_OUTOF10: 3
PAINLEVEL_OUTOF10: 0

## 2024-07-05 ASSESSMENT — PAIN DESCRIPTION - LOCATION: LOCATION: HEAD

## 2024-07-05 ASSESSMENT — PAIN DESCRIPTION - DESCRIPTORS: DESCRIPTORS: ACHING

## 2024-07-05 NOTE — PROGRESS NOTES
Patient and his POA educated on discharge instructions, states verbal understanding. Patient alert and stable at this time. Waiting on transportation.

## 2024-07-05 NOTE — CARE COORDINATION
7/5/24, 12:25 PM EDT    DISCHARGE ON GOING EVALUATION    [facility] day: 3  Location: -22/022-A Reason for admit: Acute coronary syndrome (HCC) [I24.9]  Atypical chest pain [R07.89]  Chest pain, unspecified type [R07.9]     Procedures: 7-3 24 Stress Test.    Imaging since last note: No.    Barriers to Discharge: Was planning discharge yesterday however pt developed some hypotension. Med adjustment was planned. Fluid challenge given of 1L. 0700 today /55 and 1100 121/58. Room air.     PCP: Aileen Valadez, APRN - CNP  Readmission Risk Score: 20.2    Patient Goals/Plan/Treatment Preferences: Plans home with spouse and dtr. Today pt family advises this CM that pt has been having HH services from [facility]. (Pt did not disclose this on initial interview). CM phoned [facility] and Fani states pt not current with them. CM phoned and left vm at pt PCP Aileen Valadez office requesting this information.     7/5/24, 12:29 PM EDT    Patient goals/plan/ treatment preferences discussed by  and .  Patient goals/plan/ treatment preferences reviewed with patient/ family.  Patient/ family verbalize understanding of discharge plan and are in agreement with goal/plan/treatment preferences.  Understanding was demonstrated using the teach back method.  AVS provided by RN at time of discharge, which includes all necessary medical information pertaining to the patients current course of illness, treatment, post-discharge goals of care, and treatment preferences.     Services At/After Discharge: Home Health    Discharge order in place. Pt PCP has not called back with information about his HH agency. Advised pt family to call HH when they get home to let them know he is home. Understanding. Plan return home with spouse and daughter.

## 2024-07-05 NOTE — DISCHARGE SUMMARY
Resident Discharge Summary (Hospitalist)      Patient: Leo Nevarez 84 y.o. male  : 1940  MRN: [id number]   Account: [id number]   Patient's PCP: Aileen Valadez APRN - CNP    Admit Date: 2024   Discharge Date: 2024      Admitting Physician: No admitting provider for patient encounter.  Discharge Physician: Carlyn Kaba,        Discharge Diagnoses:    Primary    #Atypical CP  #hx of CAD status post [facility] 2023 with  of RCA with collateral formation  #HTN  #HLD  Cont home ASA 81mg daily, pravastatin 80mg daily, metoprolol 25mg BID, and cozaar 25mg daily  F/U was scheduled with Dr. Spears in 3-4 weeks. F/U with PP in 1 week    #Chronic HFpEF   Cont home metoprolol 25mg BID and lasix 20mg daily    Secondary    #Insulin-dependent diabetes mellitus type 2: Home medication: Lantus, glipizide, Januvia, metformin  #Hypothyroidism: Continue Synthroid 150 mcg daily  #Alzheimer's dementia Continue Aricept 10 mg nightly  #Anxiety Continue home Celexa 40 mg daily and Wellbutrin 150 mg daily  #Urinary incontinence and BPH: Cont home Oxybutynin and Flomax     Hospital Course:   Pt is a 85 Yo male with PMH of CAD,HFpEF,CKD, DM2,HLD,HTN,  of RCA with collaterals, who presented with substernal dyspnea and CP radiating into geneva UE.Pt was advised by urgent care to go to the ED. Pt was found to have elevated troponin 35 to 37. EKG demonstrated new 1st degree AVB with bifascicular block. He also completed an TTE which showed EF of 45-50%, mild global hypokinesis, indeterminate diastolic fxn, and a mildly dilated LA. Pro-BNP was 2608 elevated from baseline. His CP resolved without intervention.     He was admitted to [facility] and started on heparin drip, ASA,statin,BB. He was then evaluated by cardiology with stress test which was negative for new ischemia.Cardiology started him on cozaar 25 mg daily and lopressor 25mg BID. He became volume overloaded so he IV received bumex 2024 and was transitioned to

## 2024-07-05 NOTE — PROGRESS NOTES
Physician Progress Note      PATIENT:               VIKAS HANSEN  CSN #:                  [id number]  :                       1940  ADMIT DATE:       2024 2:01 PM  DISCH DATE:  RESPONDING  PROVIDER #:        SARTHAK HINSON          QUERY TEXT:    Pt admitted with chest pain. If possible, please document in progress notes   and discharge summary if you are evaluating and/or treating any of the   following:    The medical record reflects the following:  Risk Factors: HTN, CAD status post [facility] 2023 with  of RCA, otherwise   nonobstructive, Chronic HFpEF, Anxiety  Clinical Indicators:  progress note \"Atypical chest pain: Resolved.  Heart   score: 8, VINNY score: 5.  Onset of chest pain on  with bilateral radiation   to axilla.  Patient underwent stress test on 7/3 that was negative for new   ischemia.  Monitor for chest pain.\"   Cardiology note \"No further ischemic work-up at this time\"  Treatment: Cardiology consult, stress test, Echo, EKG, Continue Cozaar, ASA,   Metoprolol, IV Bumex, PO Lasix    `Thank you,  Elvia Bro RN, BSN, [facility]  [email or web address]  .  Options provided:  -- Chest pain due to GERD  -- Chest pain due to  -- Other - I will add my own diagnosis  -- Disagree - Not applicable / Not valid  -- Disagree - Clinically unable to determine / Unknown  -- Refer to Clinical Documentation Reviewer    PROVIDER RESPONSE TEXT:    Provider is clinically unable to determine a response to this query.  Chest pain had resolved prior to admission. Unclear etiology due to   resolution.    Query created by: Elvia Bro on 2024 1:58 PM      Electronically signed by:  SARTHAK HINSON 2024 3:01 PM

## 2024-07-26 NOTE — PROGRESS NOTES
[facility] PHYSICIANS [place] [facility]  [facility] CARDIOLOGY  [address]  [address]  [place] [address]  Dept: [phone]  Dept Fax: [phone]  Loc: [phone]    Visit Date: 7/31/2024    Leo Nevarez is a 84 y.o. male who presents todayfor:  Chief Complaint   Patient presents with    Check-Up     Cardiologist: Regan    Hx of DM, CKD, non obs CAD, RCA  with L-R collaterals     HPI: hfu for chest pain, negative stress, stable echo   His symptoms are improved. Still some SOB and swelling but stable. Some orthopnea. His bp is stable. HR is stable. Tolerating current meds, torsemide, statin, lopressor.     Past Surgical History:   Procedure Laterality Date    BLADDER SCAN  11/11/2023    BLADDER SCAN  11/13/2023    CARDIAC PROCEDURE Left 11/20/2023    Left heart cath / coronary angiography performed by Javier Brothers MD at [facility] CARDIAC CATH LAB    HC BLADDER SCAN  11/11/2023    SHOULDER ARTHROPLASTY Left     TURP N/A 2/5/2024    Cystoscopy Greenlight Photovaporization of Prostate performed by Abhay Levine Jr., MD at [facility] OR     Family History   Problem Relation Age of Onset    Hypertension Mother     Kidney Disease Father     Kidney Cancer Sister     Prostate Cancer Brother      Social History     Tobacco Use    Smoking status: Former     Types: Cigarettes    Smokeless tobacco: Never   Substance Use Topics    Alcohol use: Not Currently      Current Outpatient Medications   Medication Sig Dispense Refill    MUCINEX 600 MG extended release tablet Take 1 tablet by mouth daily      melatonin 3 MG TABS tablet Take 1 tablet by mouth daily      torsemide (DEMADEX) 20 MG tablet Take 1 tablet by mouth 2 times daily      lisinopril (PRINIVIL;ZESTRIL) 10 MG tablet Take 1 tablet by mouth daily      tamsulosin (FLOMAX) 0.4 MG capsule Take 1 capsule by mouth daily 90 capsule 3    oxyBUTYnin (DITROPAN XL) 10 MG extended release tablet Take 1 tablet by mouth daily 30 tablet 3    metoprolol tartrate

## 2024-07-31 ENCOUNTER — OFFICE VISIT (OUTPATIENT)
Dept: CARDIOLOGY CLINIC | Age: 84
End: 2024-07-31
Payer: MEDICARE

## 2024-07-31 VITALS
HEIGHT: 67 IN | HEART RATE: 72 BPM | BODY MASS INDEX: 36.01 KG/M2 | DIASTOLIC BLOOD PRESSURE: 86 MMHG | SYSTOLIC BLOOD PRESSURE: 124 MMHG

## 2024-07-31 DIAGNOSIS — I10 ESSENTIAL HYPERTENSION: ICD-10-CM

## 2024-07-31 DIAGNOSIS — I25.10 CORONARY ARTERY DISEASE INVOLVING NATIVE CORONARY ARTERY OF NATIVE HEART WITHOUT ANGINA PECTORIS: Primary | ICD-10-CM

## 2024-07-31 DIAGNOSIS — I50.32 CHRONIC DIASTOLIC CHF (CONGESTIVE HEART FAILURE) (HCC): ICD-10-CM

## 2024-07-31 PROCEDURE — 1123F ACP DISCUSS/DSCN MKR DOCD: CPT | Performed by: STUDENT IN AN ORGANIZED HEALTH CARE EDUCATION/TRAINING PROGRAM

## 2024-07-31 PROCEDURE — 1111F DSCHRG MED/CURRENT MED MERGE: CPT | Performed by: STUDENT IN AN ORGANIZED HEALTH CARE EDUCATION/TRAINING PROGRAM

## 2024-07-31 PROCEDURE — 1036F TOBACCO NON-USER: CPT | Performed by: STUDENT IN AN ORGANIZED HEALTH CARE EDUCATION/TRAINING PROGRAM

## 2024-07-31 PROCEDURE — 3079F DIAST BP 80-89 MM HG: CPT | Performed by: STUDENT IN AN ORGANIZED HEALTH CARE EDUCATION/TRAINING PROGRAM

## 2024-07-31 PROCEDURE — 99214 OFFICE O/P EST MOD 30 MIN: CPT | Performed by: STUDENT IN AN ORGANIZED HEALTH CARE EDUCATION/TRAINING PROGRAM

## 2024-07-31 PROCEDURE — G8427 DOCREV CUR MEDS BY ELIG CLIN: HCPCS | Performed by: STUDENT IN AN ORGANIZED HEALTH CARE EDUCATION/TRAINING PROGRAM

## 2024-07-31 PROCEDURE — 3074F SYST BP LT 130 MM HG: CPT | Performed by: STUDENT IN AN ORGANIZED HEALTH CARE EDUCATION/TRAINING PROGRAM

## 2024-07-31 PROCEDURE — G8417 CALC BMI ABV UP PARAM F/U: HCPCS | Performed by: STUDENT IN AN ORGANIZED HEALTH CARE EDUCATION/TRAINING PROGRAM

## 2024-07-31 RX ORDER — TORSEMIDE 20 MG/1
20 TABLET ORAL 2 TIMES DAILY
COMMUNITY
Start: 2024-07-12

## 2024-07-31 RX ORDER — LISINOPRIL 10 MG/1
10 TABLET ORAL DAILY
COMMUNITY
Start: 2024-05-15

## 2024-07-31 RX ORDER — LANOLIN ALCOHOL/MO/W.PET/CERES
3 CREAM (GRAM) TOPICAL DAILY
COMMUNITY

## 2024-08-09 ENCOUNTER — APPOINTMENT (OUTPATIENT)
Dept: GENERAL RADIOLOGY | Age: 84
DRG: 291 | End: 2024-08-09
Payer: MEDICARE

## 2024-08-09 ENCOUNTER — HOSPITAL ENCOUNTER (INPATIENT)
Age: 84
LOS: 2 days | Discharge: HOME | DRG: 291 | End: 2024-08-12
Attending: STUDENT IN AN ORGANIZED HEALTH CARE EDUCATION/TRAINING PROGRAM | Admitting: STUDENT IN AN ORGANIZED HEALTH CARE EDUCATION/TRAINING PROGRAM
Payer: MEDICARE

## 2024-08-09 DIAGNOSIS — I50.9 ACUTE ON CHRONIC CONGESTIVE HEART FAILURE, UNSPECIFIED HEART FAILURE TYPE (HCC): Primary | ICD-10-CM

## 2024-08-09 LAB
ALBUMIN SERPL BCG-MCNC: 3.7 G/DL (ref 3.5–5.1)
ALP SERPL-CCNC: 123 U/L (ref 38–126)
ALT SERPL W/O P-5'-P-CCNC: 22 U/L (ref 11–66)
ANION GAP SERPL CALC-SCNC: 6 MEQ/L (ref 8–16)
AST SERPL-CCNC: 14 U/L (ref 5–40)
BASOPHILS ABSOLUTE: 0 THOU/MM3 (ref 0–0.1)
BASOPHILS NFR BLD AUTO: 0.5 %
BILIRUB SERPL-MCNC: 0.2 MG/DL (ref 0.3–1.2)
BUN SERPL-MCNC: 20 MG/DL (ref 7–22)
CALCIUM SERPL-MCNC: 8.4 MG/DL (ref 8.5–10.5)
CHLORIDE SERPL-SCNC: 105 MEQ/L (ref 98–111)
CO2 SERPL-SCNC: 27 MEQ/L (ref 23–33)
CREAT SERPL-MCNC: 1 MG/DL (ref 0.4–1.2)
DEPRECATED RDW RBC AUTO: 52.2 FL (ref 35–45)
EOSINOPHIL NFR BLD AUTO: 6.1 %
EOSINOPHILS ABSOLUTE: 0.4 THOU/MM3 (ref 0–0.4)
ERYTHROCYTE [DISTWIDTH] IN BLOOD BY AUTOMATED COUNT: 15.1 % (ref 11.5–14.5)
GFR SERPL CREATININE-BSD FRML MDRD: 75 ML/MIN/1.73M2
GLUCOSE SERPL-MCNC: 240 MG/DL (ref 70–108)
HCT VFR BLD AUTO: 34.4 % (ref 42–52)
HGB BLD-MCNC: 10.4 GM/DL (ref 14–18)
IMM GRANULOCYTES # BLD AUTO: 0.02 THOU/MM3 (ref 0–0.07)
IMM GRANULOCYTES NFR BLD AUTO: 0.3 %
LYMPHOCYTES ABSOLUTE: 2.3 THOU/MM3 (ref 1–4.8)
LYMPHOCYTES NFR BLD AUTO: 40.1 %
MAGNESIUM SERPL-MCNC: 1.8 MG/DL (ref 1.6–2.4)
MCH RBC QN AUTO: 28.7 PG (ref 26–33)
MCHC RBC AUTO-ENTMCNC: 30.2 GM/DL (ref 32.2–35.5)
MCV RBC AUTO: 94.8 FL (ref 80–94)
MONOCYTES ABSOLUTE: 0.5 THOU/MM3 (ref 0.4–1.3)
MONOCYTES NFR BLD AUTO: 8.5 %
NEUTROPHILS ABSOLUTE: 2.6 THOU/MM3 (ref 1.8–7.7)
NEUTROPHILS NFR BLD AUTO: 44.5 %
NRBC BLD AUTO-RTO: 0 /100 WBC
NT-PROBNP SERPL IA-MCNC: 5349 PG/ML (ref 0–449)
OSMOLALITY SERPL CALC.SUM OF ELEC: 286.2 MOSMOL/KG (ref 275–300)
PLATELET # BLD AUTO: 182 THOU/MM3 (ref 130–400)
PMV BLD AUTO: 11.7 FL (ref 9.4–12.4)
POTASSIUM SERPL-SCNC: 5.9 MEQ/L (ref 3.5–5.2)
PROT SERPL-MCNC: 6.3 G/DL (ref 6.1–8)
RBC # BLD AUTO: 3.63 MILL/MM3 (ref 4.7–6.1)
SODIUM SERPL-SCNC: 138 MEQ/L (ref 135–145)
TROPONIN, HIGH SENSITIVITY: 36 NG/L (ref 0–12)
WBC # BLD AUTO: 5.8 THOU/MM3 (ref 4.8–10.8)

## 2024-08-09 PROCEDURE — 71045 X-RAY EXAM CHEST 1 VIEW: CPT

## 2024-08-09 PROCEDURE — 83880 ASSAY OF NATRIURETIC PEPTIDE: CPT

## 2024-08-09 PROCEDURE — 83036 HEMOGLOBIN GLYCOSYLATED A1C: CPT

## 2024-08-09 PROCEDURE — 84484 ASSAY OF TROPONIN QUANT: CPT

## 2024-08-09 PROCEDURE — 85025 COMPLETE CBC W/AUTO DIFF WBC: CPT

## 2024-08-09 PROCEDURE — 99285 EMERGENCY DEPT VISIT HI MDM: CPT

## 2024-08-09 PROCEDURE — 93005 ELECTROCARDIOGRAM TRACING: CPT

## 2024-08-09 PROCEDURE — 83735 ASSAY OF MAGNESIUM: CPT

## 2024-08-09 PROCEDURE — 80053 COMPREHEN METABOLIC PANEL: CPT

## 2024-08-09 PROCEDURE — 36415 COLL VENOUS BLD VENIPUNCTURE: CPT

## 2024-08-09 RX ORDER — FUROSEMIDE 10 MG/ML
20 INJECTION INTRAMUSCULAR; INTRAVENOUS ONCE
Status: DISCONTINUED | OUTPATIENT
Start: 2024-08-09 | End: 2024-08-09

## 2024-08-09 ASSESSMENT — PAIN - FUNCTIONAL ASSESSMENT: PAIN_FUNCTIONAL_ASSESSMENT: NONE - DENIES PAIN

## 2024-08-10 PROBLEM — I50.23 ACUTE ON CHRONIC HFREF (HEART FAILURE WITH REDUCED EJECTION FRACTION) (HCC): Status: ACTIVE | Noted: 2024-08-10

## 2024-08-10 PROBLEM — I50.9 ACUTE ON CHRONIC CONGESTIVE HEART FAILURE (HCC): Status: ACTIVE | Noted: 2024-08-10

## 2024-08-10 LAB
ANION GAP SERPL CALC-SCNC: 7 MEQ/L (ref 8–16)
BILIRUB UR QL STRIP.AUTO: NEGATIVE
BUN SERPL-MCNC: 18 MG/DL (ref 7–22)
CA-I BLD ISE-SCNC: 1.23 MMOL/L (ref 1.12–1.32)
CALCIUM SERPL-MCNC: 8.7 MG/DL (ref 8.5–10.5)
CHARACTER UR: CLEAR
CHLORIDE SERPL-SCNC: 104 MEQ/L (ref 98–111)
CO2 SERPL-SCNC: 26 MEQ/L (ref 23–33)
COLOR, UA: YELLOW
CREAT SERPL-MCNC: 1.1 MG/DL (ref 0.4–1.2)
DEPRECATED MEAN GLUCOSE BLD GHB EST-ACNC: 153 MG/DL (ref 70–126)
DEPRECATED RDW RBC AUTO: 52.3 FL (ref 35–45)
EKG ATRIAL RATE: 77 BPM
EKG P AXIS: 29 DEGREES
EKG P-R INTERVAL: 214 MS
EKG Q-T INTERVAL: 410 MS
EKG QRS DURATION: 126 MS
EKG QTC CALCULATION (BAZETT): 463 MS
EKG R AXIS: 112 DEGREES
EKG T AXIS: 22 DEGREES
EKG VENTRICULAR RATE: 77 BPM
ERYTHROCYTE [DISTWIDTH] IN BLOOD BY AUTOMATED COUNT: 15.2 % (ref 11.5–14.5)
GFR SERPL CREATININE-BSD FRML MDRD: 67 ML/MIN/1.73M2
GLUCOSE BLD STRIP.AUTO-MCNC: 133 MG/DL (ref 70–108)
GLUCOSE BLD STRIP.AUTO-MCNC: 161 MG/DL (ref 70–108)
GLUCOSE BLD STRIP.AUTO-MCNC: 184 MG/DL (ref 70–108)
GLUCOSE BLD STRIP.AUTO-MCNC: 206 MG/DL (ref 70–108)
GLUCOSE SERPL-MCNC: 150 MG/DL (ref 70–108)
GLUCOSE UR QL STRIP.AUTO: NEGATIVE MG/DL
HBA1C MFR BLD HPLC: 7.1 % (ref 4.4–6.4)
HCT VFR BLD AUTO: 35 % (ref 42–52)
HGB BLD-MCNC: 10.9 GM/DL (ref 14–18)
HGB UR QL STRIP.AUTO: NEGATIVE
KETONES UR QL STRIP.AUTO: NEGATIVE
MAGNESIUM SERPL-MCNC: 1.8 MG/DL (ref 1.6–2.4)
MCH RBC QN AUTO: 29.2 PG (ref 26–33)
MCHC RBC AUTO-ENTMCNC: 31.1 GM/DL (ref 32.2–35.5)
MCV RBC AUTO: 93.8 FL (ref 80–94)
NITRITE UR QL STRIP: NEGATIVE
OSMOLALITY SERPL CALC.SUM OF ELEC: 278.6 MOSMOL/KG (ref 275–300)
PH UR STRIP.AUTO: 5 [PH] (ref 5–9)
PLATELET # BLD AUTO: 203 THOU/MM3 (ref 130–400)
PMV BLD AUTO: 11.6 FL (ref 9.4–12.4)
POTASSIUM SERPL-SCNC: 5.1 MEQ/L (ref 3.5–5.2)
POTASSIUM SERPL-SCNC: 5.2 MEQ/L (ref 3.5–5.2)
PROT UR STRIP.AUTO-MCNC: NEGATIVE MG/DL
RBC # BLD AUTO: 3.73 MILL/MM3 (ref 4.7–6.1)
SODIUM SERPL-SCNC: 137 MEQ/L (ref 135–145)
SP GR UR REFRACT.AUTO: 1.01 (ref 1–1.03)
TROPONIN, HIGH SENSITIVITY: 34 NG/L (ref 0–12)
UROBILINOGEN, URINE: 0.2 EU/DL (ref 0–1)
WBC # BLD AUTO: 6.1 THOU/MM3 (ref 4.8–10.8)
WBC #/AREA URNS HPF: NEGATIVE /[HPF]

## 2024-08-10 PROCEDURE — 83735 ASSAY OF MAGNESIUM: CPT

## 2024-08-10 PROCEDURE — 81003 URINALYSIS AUTO W/O SCOPE: CPT

## 2024-08-10 PROCEDURE — 6370000000 HC RX 637 (ALT 250 FOR IP)

## 2024-08-10 PROCEDURE — 84132 ASSAY OF SERUM POTASSIUM: CPT

## 2024-08-10 PROCEDURE — 2140000000 HC CCU INTERMEDIATE R&B

## 2024-08-10 PROCEDURE — 82948 REAGENT STRIP/BLOOD GLUCOSE: CPT

## 2024-08-10 PROCEDURE — 85027 COMPLETE CBC AUTOMATED: CPT

## 2024-08-10 PROCEDURE — 2580000003 HC RX 258

## 2024-08-10 PROCEDURE — 6360000002 HC RX W HCPCS

## 2024-08-10 PROCEDURE — 36415 COLL VENOUS BLD VENIPUNCTURE: CPT

## 2024-08-10 PROCEDURE — 93010 ELECTROCARDIOGRAM REPORT: CPT | Performed by: INTERNAL MEDICINE

## 2024-08-10 PROCEDURE — 99222 1ST HOSP IP/OBS MODERATE 55: CPT | Performed by: INTERNAL MEDICINE

## 2024-08-10 PROCEDURE — 82330 ASSAY OF CALCIUM: CPT

## 2024-08-10 PROCEDURE — 80048 BASIC METABOLIC PNL TOTAL CA: CPT

## 2024-08-10 RX ORDER — POTASSIUM CHLORIDE 20 MEQ/1
40 TABLET, EXTENDED RELEASE ORAL PRN
Status: DISCONTINUED | OUTPATIENT
Start: 2024-08-10 | End: 2024-08-12 | Stop reason: HOSPADM

## 2024-08-10 RX ORDER — LEVOTHYROXINE SODIUM 0.15 MG/1
150 TABLET ORAL
Status: DISCONTINUED | OUTPATIENT
Start: 2024-08-10 | End: 2024-08-12 | Stop reason: HOSPADM

## 2024-08-10 RX ORDER — INSULIN GLARGINE 100 [IU]/ML
20 INJECTION, SOLUTION SUBCUTANEOUS NIGHTLY
Status: DISCONTINUED | OUTPATIENT
Start: 2024-08-10 | End: 2024-08-12 | Stop reason: HOSPADM

## 2024-08-10 RX ORDER — ENOXAPARIN SODIUM 100 MG/ML
30 INJECTION SUBCUTANEOUS EVERY 12 HOURS
Status: DISCONTINUED | OUTPATIENT
Start: 2024-08-10 | End: 2024-08-12 | Stop reason: HOSPADM

## 2024-08-10 RX ORDER — PRAVASTATIN SODIUM 80 MG/1
80 TABLET ORAL NIGHTLY
Status: DISCONTINUED | OUTPATIENT
Start: 2024-08-10 | End: 2024-08-12 | Stop reason: HOSPADM

## 2024-08-10 RX ORDER — PANTOPRAZOLE SODIUM 40 MG/1
40 TABLET, DELAYED RELEASE ORAL
Status: DISCONTINUED | OUTPATIENT
Start: 2024-08-10 | End: 2024-08-12 | Stop reason: HOSPADM

## 2024-08-10 RX ORDER — ONDANSETRON 4 MG/1
4 TABLET, ORALLY DISINTEGRATING ORAL EVERY 8 HOURS PRN
Status: DISCONTINUED | OUTPATIENT
Start: 2024-08-10 | End: 2024-08-12 | Stop reason: HOSPADM

## 2024-08-10 RX ORDER — ACETAMINOPHEN 650 MG/1
650 SUPPOSITORY RECTAL EVERY 6 HOURS PRN
Status: DISCONTINUED | OUTPATIENT
Start: 2024-08-10 | End: 2024-08-12 | Stop reason: HOSPADM

## 2024-08-10 RX ORDER — FUROSEMIDE 10 MG/ML
40 INJECTION INTRAMUSCULAR; INTRAVENOUS ONCE
Status: COMPLETED | OUTPATIENT
Start: 2024-08-10 | End: 2024-08-10

## 2024-08-10 RX ORDER — DONEPEZIL HYDROCHLORIDE 10 MG/1
10 TABLET, FILM COATED ORAL NIGHTLY
Status: DISCONTINUED | OUTPATIENT
Start: 2024-08-10 | End: 2024-08-12 | Stop reason: HOSPADM

## 2024-08-10 RX ORDER — POTASSIUM CHLORIDE 7.45 MG/ML
10 INJECTION INTRAVENOUS PRN
Status: DISCONTINUED | OUTPATIENT
Start: 2024-08-10 | End: 2024-08-12 | Stop reason: HOSPADM

## 2024-08-10 RX ORDER — CALCIUM GLUCONATE 20 MG/ML
2000 INJECTION, SOLUTION INTRAVENOUS PRN
Status: DISCONTINUED | OUTPATIENT
Start: 2024-08-10 | End: 2024-08-12 | Stop reason: HOSPADM

## 2024-08-10 RX ORDER — POLYETHYLENE GLYCOL 3350 17 G/17G
17 POWDER, FOR SOLUTION ORAL DAILY PRN
Status: DISCONTINUED | OUTPATIENT
Start: 2024-08-10 | End: 2024-08-12 | Stop reason: HOSPADM

## 2024-08-10 RX ORDER — INSULIN LISPRO 100 [IU]/ML
0-4 INJECTION, SOLUTION INTRAVENOUS; SUBCUTANEOUS NIGHTLY
Status: DISCONTINUED | OUTPATIENT
Start: 2024-08-10 | End: 2024-08-12 | Stop reason: HOSPADM

## 2024-08-10 RX ORDER — BUPROPION HYDROCHLORIDE 300 MG/1
300 TABLET ORAL DAILY
Status: DISCONTINUED | OUTPATIENT
Start: 2024-08-10 | End: 2024-08-12 | Stop reason: HOSPADM

## 2024-08-10 RX ORDER — TAMSULOSIN HYDROCHLORIDE 0.4 MG/1
0.4 CAPSULE ORAL DAILY
Status: DISCONTINUED | OUTPATIENT
Start: 2024-08-10 | End: 2024-08-12 | Stop reason: HOSPADM

## 2024-08-10 RX ORDER — SODIUM CHLORIDE 0.9 % (FLUSH) 0.9 %
5-40 SYRINGE (ML) INJECTION EVERY 12 HOURS SCHEDULED
Status: DISCONTINUED | OUTPATIENT
Start: 2024-08-10 | End: 2024-08-12 | Stop reason: HOSPADM

## 2024-08-10 RX ORDER — FUROSEMIDE 10 MG/ML
20 INJECTION INTRAMUSCULAR; INTRAVENOUS ONCE
Status: DISCONTINUED | OUTPATIENT
Start: 2024-08-10 | End: 2024-08-10

## 2024-08-10 RX ORDER — INSULIN LISPRO 100 [IU]/ML
0-8 INJECTION, SOLUTION INTRAVENOUS; SUBCUTANEOUS
Status: DISCONTINUED | OUTPATIENT
Start: 2024-08-10 | End: 2024-08-12 | Stop reason: HOSPADM

## 2024-08-10 RX ORDER — SODIUM CHLORIDE 0.9 % (FLUSH) 0.9 %
5-40 SYRINGE (ML) INJECTION PRN
Status: DISCONTINUED | OUTPATIENT
Start: 2024-08-10 | End: 2024-08-12 | Stop reason: HOSPADM

## 2024-08-10 RX ORDER — SODIUM CHLORIDE 9 MG/ML
INJECTION, SOLUTION INTRAVENOUS PRN
Status: DISCONTINUED | OUTPATIENT
Start: 2024-08-10 | End: 2024-08-12 | Stop reason: HOSPADM

## 2024-08-10 RX ORDER — LISINOPRIL 10 MG/1
10 TABLET ORAL DAILY
Status: DISCONTINUED | OUTPATIENT
Start: 2024-08-10 | End: 2024-08-12 | Stop reason: HOSPADM

## 2024-08-10 RX ORDER — ENOXAPARIN SODIUM 100 MG/ML
40 INJECTION SUBCUTANEOUS DAILY
Status: DISCONTINUED | OUTPATIENT
Start: 2024-08-10 | End: 2024-08-10 | Stop reason: DRUGHIGH

## 2024-08-10 RX ORDER — LANOLIN ALCOHOL/MO/W.PET/CERES
3 CREAM (GRAM) TOPICAL DAILY
Status: DISCONTINUED | OUTPATIENT
Start: 2024-08-10 | End: 2024-08-12 | Stop reason: HOSPADM

## 2024-08-10 RX ORDER — BUMETANIDE 0.25 MG/ML
1 INJECTION INTRAMUSCULAR; INTRAVENOUS 2 TIMES DAILY
Status: DISCONTINUED | OUTPATIENT
Start: 2024-08-10 | End: 2024-08-12 | Stop reason: HOSPADM

## 2024-08-10 RX ORDER — MAGNESIUM SULFATE IN WATER 40 MG/ML
2000 INJECTION, SOLUTION INTRAVENOUS PRN
Status: DISCONTINUED | OUTPATIENT
Start: 2024-08-10 | End: 2024-08-12 | Stop reason: HOSPADM

## 2024-08-10 RX ORDER — TORSEMIDE 20 MG/1
20 TABLET ORAL 2 TIMES DAILY
Status: DISCONTINUED | OUTPATIENT
Start: 2024-08-10 | End: 2024-08-12 | Stop reason: HOSPADM

## 2024-08-10 RX ORDER — OXYBUTYNIN CHLORIDE 10 MG/1
10 TABLET, EXTENDED RELEASE ORAL DAILY
Status: DISCONTINUED | OUTPATIENT
Start: 2024-08-10 | End: 2024-08-12 | Stop reason: HOSPADM

## 2024-08-10 RX ORDER — DEXTROSE MONOHYDRATE 100 MG/ML
INJECTION, SOLUTION INTRAVENOUS CONTINUOUS PRN
Status: DISCONTINUED | OUTPATIENT
Start: 2024-08-10 | End: 2024-08-12 | Stop reason: HOSPADM

## 2024-08-10 RX ORDER — CITALOPRAM 40 MG/1
40 TABLET ORAL DAILY
Status: DISCONTINUED | OUTPATIENT
Start: 2024-08-10 | End: 2024-08-12 | Stop reason: HOSPADM

## 2024-08-10 RX ORDER — ACETAMINOPHEN 325 MG/1
650 TABLET ORAL EVERY 6 HOURS PRN
Status: DISCONTINUED | OUTPATIENT
Start: 2024-08-10 | End: 2024-08-12 | Stop reason: HOSPADM

## 2024-08-10 RX ORDER — GLUCAGON 1 MG/ML
1 KIT INJECTION PRN
Status: DISCONTINUED | OUTPATIENT
Start: 2024-08-10 | End: 2024-08-12 | Stop reason: HOSPADM

## 2024-08-10 RX ORDER — ONDANSETRON 2 MG/ML
4 INJECTION INTRAMUSCULAR; INTRAVENOUS EVERY 6 HOURS PRN
Status: DISCONTINUED | OUTPATIENT
Start: 2024-08-10 | End: 2024-08-12 | Stop reason: HOSPADM

## 2024-08-10 RX ADMIN — INSULIN LISPRO 2 UNITS: 100 INJECTION, SOLUTION INTRAVENOUS; SUBCUTANEOUS at 17:02

## 2024-08-10 RX ADMIN — TAMSULOSIN HYDROCHLORIDE 0.4 MG: 0.4 CAPSULE ORAL at 08:49

## 2024-08-10 RX ADMIN — FUROSEMIDE 40 MG: 10 INJECTION, SOLUTION INTRAMUSCULAR; INTRAVENOUS at 04:29

## 2024-08-10 RX ADMIN — PANTOPRAZOLE SODIUM 40 MG: 40 TABLET, DELAYED RELEASE ORAL at 06:35

## 2024-08-10 RX ADMIN — ENOXAPARIN SODIUM 30 MG: 100 INJECTION SUBCUTANEOUS at 20:01

## 2024-08-10 RX ADMIN — SODIUM CHLORIDE, PRESERVATIVE FREE 10 ML: 5 INJECTION INTRAVENOUS at 20:01

## 2024-08-10 RX ADMIN — SODIUM CHLORIDE, PRESERVATIVE FREE 10 ML: 5 INJECTION INTRAVENOUS at 16:35

## 2024-08-10 RX ADMIN — BUMETANIDE 1 MG: 0.25 INJECTION INTRAMUSCULAR; INTRAVENOUS at 08:48

## 2024-08-10 RX ADMIN — LEVOTHYROXINE SODIUM 150 MCG: 0.15 TABLET ORAL at 09:51

## 2024-08-10 RX ADMIN — ENOXAPARIN SODIUM 30 MG: 100 INJECTION SUBCUTANEOUS at 08:49

## 2024-08-10 RX ADMIN — SODIUM CHLORIDE, PRESERVATIVE FREE 10 ML: 5 INJECTION INTRAVENOUS at 08:48

## 2024-08-10 RX ADMIN — CITALOPRAM 40 MG: 40 TABLET, FILM COATED ORAL at 09:51

## 2024-08-10 RX ADMIN — METOPROLOL TARTRATE 25 MG: 25 TABLET, FILM COATED ORAL at 09:51

## 2024-08-10 RX ADMIN — BUMETANIDE 1 MG: 0.25 INJECTION INTRAMUSCULAR; INTRAVENOUS at 16:35

## 2024-08-10 RX ADMIN — OXYBUTYNIN CHLORIDE 10 MG: 10 TABLET, EXTENDED RELEASE ORAL at 08:48

## 2024-08-10 RX ADMIN — METOPROLOL TARTRATE 25 MG: 25 TABLET, FILM COATED ORAL at 21:20

## 2024-08-10 RX ADMIN — PRAVASTATIN SODIUM 80 MG: 80 TABLET ORAL at 21:20

## 2024-08-10 RX ADMIN — Medication 3 MG: at 21:20

## 2024-08-10 RX ADMIN — DONEPEZIL HYDROCHLORIDE 10 MG: 10 TABLET, FILM COATED ORAL at 21:20

## 2024-08-10 RX ADMIN — BUPROPION HYDROCHLORIDE 300 MG: 300 TABLET, EXTENDED RELEASE ORAL at 09:51

## 2024-08-10 ASSESSMENT — PAIN SCALES - GENERAL
PAINLEVEL_OUTOF10: 0

## 2024-08-10 NOTE — ED NOTES
Upon first contact with patient this RN receives bedside shift report from JADEN Saha. Patient resting in bed. Respirations easy and unlabored. No distress noted. Call light within reach.

## 2024-08-10 NOTE — ED NOTES
ED to inpatient nurses report      Chief Complaint:  Chief Complaint   Patient presents with    Shortness of Breath     Present to ED from: home    MOA:     LOC: alert and orientated to name and place  Mobility: Independent w/cane  Oxygen Baseline: RA    Current needs required: RA     Code Status:   Prior    What abnormal results were found and what did you give/do to treat them?   Any procedures or intervention occur?     Mental Status:  Level of Consciousness: Alert (0)    Psych Assessment:        Vitals:  Patient Vitals for the past 24 hrs:   BP Temp Temp src Pulse Resp SpO2 Height Weight   08/10/24 0042 136/62 -- -- 70 17 96 % -- --   08/09/24 2339 (!) 132/59 -- -- 75 17 95 % -- --   08/09/24 2153 (!) 134/55 98.9 °F (37.2 °C) Oral 78 18 92 % 1.702 m (5' 7\") 93.9 kg (207 lb)        LDAs:   Peripheral IV 08/09/24 Right Antecubital (Active)   Site Assessment Clean, dry & intact 08/09/24 2208   Line Status Brisk blood return;Specimen collected;Flushed 08/09/24 2208       Ambulatory Status:  Presents to emergency department  because of falls (Syncope, seizure, or loss of consciousness): No, Age > 70: Yes, Altered Mental Status, Intoxication with alcohol or substance confusion (Disorientation, impaired judgment, poor safety awaremess, or inability to follow instructions): No, Impaired Mobility: Ambulates or transfers with assistive devices or assistance; Unable to ambulate or transer.: Yes, Nursing Judgement: Yes    Diagnosis:  DISPOSITION Admitted 08/10/2024 02:02:13 AM   Final diagnoses:   Acute on chronic congestive heart failure, unspecified heart failure type (HCC)        Consults:  None     Pain Score:  Pain Assessment  Pain Assessment: None - Denies Pain    C-SSRS:   Risk of Suicide: No Risk    Sepsis Screening:       [place] Fall Risk:  [place] 1 Fall Risk  Presents to emergency department  because of falls (Syncope, seizure, or loss of consciousness): No  Age > 70: Yes  Altered Mental Status, Intoxication with

## 2024-08-10 NOTE — ED NOTES
Spoke to  staff who approved patient transfer to [facility]. Patient transported upstairs in stable condition.

## 2024-08-10 NOTE — ED PROVIDER NOTES
[facility] EMERGENCY DEPT  EMERGENCY DEPARTMENT ENCOUNTER          Pt Name: Leo Nevarez  MRN: [id number]  Birthdate 1940  Date of evaluation: 8/9/2024  Physician: Noé Lemus MD  Supervising Attending Physician: Wade Barcenas MD       CHIEF COMPLAINT       Chief Complaint   Patient presents with    Shortness of Breath         HISTORY OF PRESENT ILLNESS    HPI  Leo Nevarez is a 84 y.o. male who presents to the emergency department from home, as a walk in to the ED [facility] for evaluation of shortness of breath and bilateral lower extremity swelling is reported that over the past couple days the patient has had worsening shortness of breath and swelling of his bilateral lower extremities.  He is on health nurse came and saw the patient today and was concerned so sent him here for further evaluation.  He denies any chest pain.  Denies any fever or chills.  Denies any abdominal pain.  She reports that patient's been taking his Lasix twice daily.  Daughter states that he is actually lost some weight however the past couple days.  He reports that he does wear 3 L nasal cannula at night.  The patient has no other acute complaints at this time.            PAST MEDICAL AND SURGICAL HISTORY     Past Medical History:   Diagnosis Date    Chronic kidney disease     Diabetes mellitus (HCC)     Hyperlipidemia     Hypertension      Past Surgical History:   Procedure Laterality Date    BLADDER SCAN  11/11/2023    BLADDER SCAN  11/13/2023    CARDIAC PROCEDURE Left 11/20/2023    Left heart cath / coronary angiography performed by Javier Brothers MD at [facility] CARDIAC CATH LAB    HC BLADDER SCAN  11/11/2023    SHOULDER ARTHROPLASTY Left     TURP N/A 2/5/2024    Cystoscopy Greenlight Photovaporization of Prostate performed by Abhay Levine Jr., MD at [facility] OR         MEDICATIONS     Current Facility-Administered Medications:     furosemide (LASIX) injection 20 mg, 20 mg, IntraVENous, Once, Allan

## 2024-08-10 NOTE — ED NOTES
Pt ambulates with this tech, approximately 200 feet with minor difficulty.  Pt pulse oximetry ranges from 93-98% on room air throughout ambulation.  Pt back to bed, no signs of acute distress noted.  Primary RN Maria A notified of results, states understanding.

## 2024-08-10 NOTE — H&P
History & Physical  Internal Medicine Resident     Patient: Leo Nevarez 84 y.o. male      : 1940  Date of Admission: 2024  Date of Service: Pt seen/examined on 08/10/24 and Admitted to Inpatient with expected LOS greater than two midnights due to medical therapy.     ASSESSMENT AND PLAN  Acute HFrEF: EF 45 to 50%, 7/3/2024.  History of medication noncompliance.  Increasing shortness of breath at home.  Patient states he does feel short of breath when laying down.  Patient is noted increased swelling of his lower extremities.  Home medication Demadex 20 mg twice daily held.  Patient received 1 dose Lasix 40 mg in ED  - Bumex 1 mg twice daily  - Strict I's and O's, daily standing weights, low-sodium diet and fluid restriction  - Continuing beta-blocker.  ACE inhibitor held at this time in the setting of hyperkalemia  -On telemetry  Hyperkalemia: POA.  In the ED patient's potassium found to be 5.9.  EKG findings.  Patient was given one-time dose Lasix 40 mg in ED.  If repeat potassium comes back elevated hyperkalemia protocol.  Lisinopril held at this time  - Repeat potassium level ordered  - Repeat BMP ordered for a.m. 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  Patient is on Lantus at home, uses it as needed per family members.  Home oral antiglycemic's held.  - Medium dose sliding scale, POCT glucose checks, hypoglycemia protocol  - Continue monitor glucose levels, consider restarting patient's home Lantus.  Home units 30 units.  Did de-escalate to 20 units on admission.  Chronic Conditions (reviewed and stable unless otherwise stated)   Obstructive CAD:[facility] 2023:  of RCA, L to R collaterals present.  Otherwise nonobstructive CAD. Follows Dr. Spears.  Troponin at baseline.  Recent stress test negative.  Continue home statin, ASA, beta-blocker.  On ACE inhibitor, held this time in setting of hyperkalemia.  Hyperlipidemia:

## 2024-08-11 LAB
ANION GAP SERPL CALC-SCNC: 11 MEQ/L (ref 8–16)
BUN SERPL-MCNC: 22 MG/DL (ref 7–22)
CALCIUM SERPL-MCNC: 8.5 MG/DL (ref 8.5–10.5)
CHLORIDE SERPL-SCNC: 103 MEQ/L (ref 98–111)
CO2 SERPL-SCNC: 28 MEQ/L (ref 23–33)
CREAT SERPL-MCNC: 1.1 MG/DL (ref 0.4–1.2)
DEPRECATED RDW RBC AUTO: 50.6 FL (ref 35–45)
ERYTHROCYTE [DISTWIDTH] IN BLOOD BY AUTOMATED COUNT: 15.3 % (ref 11.5–14.5)
GFR SERPL CREATININE-BSD FRML MDRD: 67 ML/MIN/1.73M2
GLUCOSE BLD STRIP.AUTO-MCNC: 131 MG/DL (ref 70–108)
GLUCOSE BLD STRIP.AUTO-MCNC: 183 MG/DL (ref 70–108)
GLUCOSE BLD STRIP.AUTO-MCNC: 185 MG/DL (ref 70–108)
GLUCOSE BLD STRIP.AUTO-MCNC: 350 MG/DL (ref 70–108)
GLUCOSE BLD STRIP.AUTO-MCNC: 91 MG/DL (ref 70–108)
GLUCOSE SERPL-MCNC: 168 MG/DL (ref 70–108)
HCT VFR BLD AUTO: 31.6 % (ref 42–52)
HGB BLD-MCNC: 9.9 GM/DL (ref 14–18)
MCH RBC QN AUTO: 28.9 PG (ref 26–33)
MCHC RBC AUTO-ENTMCNC: 31.3 GM/DL (ref 32.2–35.5)
MCV RBC AUTO: 92.1 FL (ref 80–94)
PLATELET # BLD AUTO: 174 THOU/MM3 (ref 130–400)
PMV BLD AUTO: 11.7 FL (ref 9.4–12.4)
POTASSIUM SERPL-SCNC: 5.4 MEQ/L (ref 3.5–5.2)
RBC # BLD AUTO: 3.43 MILL/MM3 (ref 4.7–6.1)
SODIUM SERPL-SCNC: 142 MEQ/L (ref 135–145)
WBC # BLD AUTO: 5.6 THOU/MM3 (ref 4.8–10.8)

## 2024-08-11 PROCEDURE — 80048 BASIC METABOLIC PNL TOTAL CA: CPT

## 2024-08-11 PROCEDURE — 6370000000 HC RX 637 (ALT 250 FOR IP)

## 2024-08-11 PROCEDURE — 82948 REAGENT STRIP/BLOOD GLUCOSE: CPT

## 2024-08-11 PROCEDURE — 99232 SBSQ HOSP IP/OBS MODERATE 35: CPT | Performed by: INTERNAL MEDICINE

## 2024-08-11 PROCEDURE — 6360000002 HC RX W HCPCS

## 2024-08-11 PROCEDURE — 2580000003 HC RX 258

## 2024-08-11 PROCEDURE — 36415 COLL VENOUS BLD VENIPUNCTURE: CPT

## 2024-08-11 PROCEDURE — 85027 COMPLETE CBC AUTOMATED: CPT

## 2024-08-11 PROCEDURE — 2140000000 HC CCU INTERMEDIATE R&B

## 2024-08-11 RX ADMIN — OXYBUTYNIN CHLORIDE 10 MG: 10 TABLET, EXTENDED RELEASE ORAL at 09:29

## 2024-08-11 RX ADMIN — BUMETANIDE 1 MG: 0.25 INJECTION INTRAMUSCULAR; INTRAVENOUS at 17:00

## 2024-08-11 RX ADMIN — DONEPEZIL HYDROCHLORIDE 10 MG: 10 TABLET, FILM COATED ORAL at 21:35

## 2024-08-11 RX ADMIN — PRAVASTATIN SODIUM 80 MG: 80 TABLET ORAL at 21:35

## 2024-08-11 RX ADMIN — ENOXAPARIN SODIUM 30 MG: 100 INJECTION SUBCUTANEOUS at 09:28

## 2024-08-11 RX ADMIN — SODIUM CHLORIDE, PRESERVATIVE FREE 10 ML: 5 INJECTION INTRAVENOUS at 09:29

## 2024-08-11 RX ADMIN — METOPROLOL TARTRATE 25 MG: 25 TABLET, FILM COATED ORAL at 21:35

## 2024-08-11 RX ADMIN — SODIUM CHLORIDE, PRESERVATIVE FREE 10 ML: 5 INJECTION INTRAVENOUS at 21:36

## 2024-08-11 RX ADMIN — CITALOPRAM 40 MG: 40 TABLET, FILM COATED ORAL at 09:29

## 2024-08-11 RX ADMIN — ENOXAPARIN SODIUM 30 MG: 100 INJECTION SUBCUTANEOUS at 21:36

## 2024-08-11 RX ADMIN — BUPROPION HYDROCHLORIDE 300 MG: 300 TABLET, EXTENDED RELEASE ORAL at 09:29

## 2024-08-11 RX ADMIN — METOPROLOL TARTRATE 25 MG: 25 TABLET, FILM COATED ORAL at 09:30

## 2024-08-11 RX ADMIN — Medication 3 MG: at 21:36

## 2024-08-11 RX ADMIN — INSULIN LISPRO 8 UNITS: 100 INJECTION, SOLUTION INTRAVENOUS; SUBCUTANEOUS at 11:57

## 2024-08-11 RX ADMIN — BUMETANIDE 1 MG: 0.25 INJECTION INTRAMUSCULAR; INTRAVENOUS at 09:28

## 2024-08-11 RX ADMIN — LEVOTHYROXINE SODIUM 150 MCG: 0.15 TABLET ORAL at 08:01

## 2024-08-11 RX ADMIN — PANTOPRAZOLE SODIUM 40 MG: 40 TABLET, DELAYED RELEASE ORAL at 08:02

## 2024-08-11 RX ADMIN — TAMSULOSIN HYDROCHLORIDE 0.4 MG: 0.4 CAPSULE ORAL at 09:29

## 2024-08-11 ASSESSMENT — PAIN SCALES - GENERAL: PAINLEVEL_OUTOF10: 0

## 2024-08-12 VITALS
OXYGEN SATURATION: 98 % | HEART RATE: 62 BPM | WEIGHT: 226.4 LBS | RESPIRATION RATE: 18 BRPM | SYSTOLIC BLOOD PRESSURE: 135 MMHG | TEMPERATURE: 97.8 F | DIASTOLIC BLOOD PRESSURE: 62 MMHG | BODY MASS INDEX: 35.53 KG/M2 | HEIGHT: 67 IN

## 2024-08-12 LAB
ANION GAP SERPL CALC-SCNC: 12 MEQ/L (ref 8–16)
BUN SERPL-MCNC: 27 MG/DL (ref 7–22)
CALCIUM SERPL-MCNC: 8.7 MG/DL (ref 8.5–10.5)
CHLORIDE SERPL-SCNC: 98 MEQ/L (ref 98–111)
CO2 SERPL-SCNC: 30 MEQ/L (ref 23–33)
CREAT SERPL-MCNC: 1.1 MG/DL (ref 0.4–1.2)
DEPRECATED RDW RBC AUTO: 49.9 FL (ref 35–45)
ERYTHROCYTE [DISTWIDTH] IN BLOOD BY AUTOMATED COUNT: 14.9 % (ref 11.5–14.5)
GFR SERPL CREATININE-BSD FRML MDRD: 67 ML/MIN/1.73M2
GLUCOSE BLD STRIP.AUTO-MCNC: 177 MG/DL (ref 70–108)
GLUCOSE BLD STRIP.AUTO-MCNC: 294 MG/DL (ref 70–108)
GLUCOSE SERPL-MCNC: 180 MG/DL (ref 70–108)
HCT VFR BLD AUTO: 32.5 % (ref 42–52)
HGB BLD-MCNC: 10.4 GM/DL (ref 14–18)
MCH RBC QN AUTO: 29.3 PG (ref 26–33)
MCHC RBC AUTO-ENTMCNC: 32 GM/DL (ref 32.2–35.5)
MCV RBC AUTO: 91.5 FL (ref 80–94)
PLATELET # BLD AUTO: 180 THOU/MM3 (ref 130–400)
PMV BLD AUTO: 11.5 FL (ref 9.4–12.4)
POTASSIUM SERPL-SCNC: 4.2 MEQ/L (ref 3.5–5.2)
RBC # BLD AUTO: 3.55 MILL/MM3 (ref 4.7–6.1)
SODIUM SERPL-SCNC: 140 MEQ/L (ref 135–145)
WBC # BLD AUTO: 5.2 THOU/MM3 (ref 4.8–10.8)

## 2024-08-12 PROCEDURE — 82948 REAGENT STRIP/BLOOD GLUCOSE: CPT

## 2024-08-12 PROCEDURE — 36415 COLL VENOUS BLD VENIPUNCTURE: CPT

## 2024-08-12 PROCEDURE — 94761 N-INVAS EAR/PLS OXIMETRY MLT: CPT

## 2024-08-12 PROCEDURE — 6370000000 HC RX 637 (ALT 250 FOR IP)

## 2024-08-12 PROCEDURE — 85027 COMPLETE CBC AUTOMATED: CPT

## 2024-08-12 PROCEDURE — 6360000002 HC RX W HCPCS

## 2024-08-12 PROCEDURE — 80048 BASIC METABOLIC PNL TOTAL CA: CPT

## 2024-08-12 PROCEDURE — 99239 HOSP IP/OBS DSCHRG MGMT >30: CPT | Performed by: INTERNAL MEDICINE

## 2024-08-12 PROCEDURE — 2580000003 HC RX 258

## 2024-08-12 RX ADMIN — TAMSULOSIN HYDROCHLORIDE 0.4 MG: 0.4 CAPSULE ORAL at 10:09

## 2024-08-12 RX ADMIN — LEVOTHYROXINE SODIUM 150 MCG: 0.15 TABLET ORAL at 08:18

## 2024-08-12 RX ADMIN — SODIUM CHLORIDE, PRESERVATIVE FREE 10 ML: 5 INJECTION INTRAVENOUS at 08:31

## 2024-08-12 RX ADMIN — BUPROPION HYDROCHLORIDE 300 MG: 300 TABLET, EXTENDED RELEASE ORAL at 10:06

## 2024-08-12 RX ADMIN — METOPROLOL TARTRATE 25 MG: 25 TABLET, FILM COATED ORAL at 10:06

## 2024-08-12 RX ADMIN — OXYBUTYNIN CHLORIDE 10 MG: 10 TABLET, EXTENDED RELEASE ORAL at 10:09

## 2024-08-12 RX ADMIN — CITALOPRAM 40 MG: 40 TABLET, FILM COATED ORAL at 10:06

## 2024-08-12 RX ADMIN — BUMETANIDE 1 MG: 0.25 INJECTION INTRAMUSCULAR; INTRAVENOUS at 08:31

## 2024-08-12 RX ADMIN — ENOXAPARIN SODIUM 30 MG: 100 INJECTION SUBCUTANEOUS at 10:09

## 2024-08-12 RX ADMIN — PANTOPRAZOLE SODIUM 40 MG: 40 TABLET, DELAYED RELEASE ORAL at 08:18

## 2024-08-12 RX ADMIN — INSULIN LISPRO 4 UNITS: 100 INJECTION, SOLUTION INTRAVENOUS; SUBCUTANEOUS at 12:02

## 2024-08-12 NOTE — PLAN OF CARE
Problem: Discharge Planning  Goal: Discharge to home or other facility with appropriate resources  Outcome: Progressing  Flowsheets (Taken 8/11/2024 0458)  Discharge to home or other facility with appropriate resources:   Identify barriers to discharge with patient and caregiver   Identify discharge learning needs (meds, wound care, etc)     Problem: Pain  Goal: Verbalizes/displays adequate comfort level or baseline comfort level  Outcome: Progressing  Flowsheets (Taken 8/11/2024 0458)  Verbalizes/displays adequate comfort level or baseline comfort level:   Encourage patient to monitor pain and request assistance   Assess pain using appropriate pain scale     Problem: Skin/Tissue Integrity  Goal: Absence of new skin breakdown  Description: 1.  Monitor for areas of redness and/or skin breakdown  2.  Assess vascular access sites hourly  3.  Every 4-6 hours minimum:  Change oxygen saturation probe site  4.  Every 4-6 hours:  If on nasal continuous positive airway pressure, respiratory therapy assess nares and determine need for appliance change or resting period.  Outcome: Progressing  Note: Ongoing assessment & interventions provided throughout shift.  Skin assessments provided.  Encouraging/assisting patient to turn as needed.      Problem: Safety - Adult  Goal: Free from fall injury  Outcome: Progressing  Note: Bed locked & in low position, call light in reach, side-rails up x2, bed/chair alarm utilized, non-slip socks on when ambulating, reminded patient to use call light to call for assistance.    Care plan reviewed with patient.  Patient verbalizes understanding of the care plan and contributed to goal setting.   
  Problem: Pain  Goal: Verbalizes/displays adequate comfort level or baseline comfort level  Outcome: Progressing     Problem: Skin/Tissue Integrity  Goal: Absence of new skin breakdown  Description: 1.  Monitor for areas of redness and/or skin breakdown  2.  Assess vascular access sites hourly  3.  Every 4-6 hours minimum:  Change oxygen saturation probe site  4.  Every 4-6 hours:  If on nasal continuous positive airway pressure, respiratory therapy assess nares and determine need for appliance change or resting period.  Outcome: Progressing     Problem: Safety - Adult  Goal: Free from fall injury  Outcome: Progressing     
I agree with plan of care I have reviewed the chart, the patient's vital signs are adequate his peak blood pressure is 159/73 he appears to have a sinus rhythm at 75 bpm telemetry also confirms sinus rhythm at 82 beats no arrhythmias overnight peak glucose is 240, peak potassium 5.9, current potassium 5.2 require review of his visual EKG pattern but it appears to show bifascicular block he does have a history underlying coronary artery disease and chronic diastolic heart failure, he has chronically elevated troponins as well previous hospitalization indicates troponins between 35 and 37 he also appears to have diabetes mellitus type 2 I will consider speech evaluation for this patient based on his age his creatinine is stable we will continue diuresis for now.  
deterioration, or improvement   Collaborate with multidisciplinary team to address chronic and comorbid conditions and prevent exacerbation or deterioration   Update acute care plan with appropriate goals if chronic or comorbid symptoms are exacerbated and prevent overall improvement and discharge   Care plan reviewed with patient.  Patient verbalizes understanding of the care plan and contributed to goal setting.

## 2024-08-12 NOTE — CARE COORDINATION
Named in Scanned ACP Document   PCP Verified by CM Yes   Last Visit to PCP Within last 3 months   Prior Functional Level Assistance with the following:;Bathing;Dressing;Toileting;Feeding;Cooking;Housework;Shopping;Mobility   Current Functional Level Assistance with the following:;Bathing;Dressing;Toileting;Feeding;Cooking;Housework;Shopping;Mobility   Can patient return to prior living arrangement Yes   Ability to make needs known: Good   Family able to assist with home care needs: Yes   Would you like for me to discuss the discharge plan with any other family members/significant others, and if so, who? Yes  (daughter)   Financial Resources Medicare   Community Resources ECF/Home Care   Social/Functional History   Active  No   Patient's  Info Family   Discharge Planning   Type of Residence House   Living Arrangements Spouse/Significant Other;Children;Family Members   Current Services Prior To Admission Durable Medical Equipment   Current DME Prior to Arrival Cane;Walker   Potential Assistance Needed N/A   DME Ordered? No   Potential Assistance Purchasing Medications No   Type of Home Care Services Nursing Services   Patient expects to be discharged to: House   Services At/After Discharge   Mode of Transport at Discharge Other (see comment)  (Family)   Confirm Follow Up Transport Family   Condition of Participation: Discharge Planning   The Plan for Transition of Care is related to the following treatment goals: \"get swelling down\"

## 2024-08-12 NOTE — DISCHARGE SUMMARY
Discharge Summary    Patient:  Leo Nevarez  YOB: 1940    MRN: [id number]   Acct: [id number]    Primary Care Physician: Aileen Valadez APRN - CNP    Admit date:  8/9/2024    Discharge date:   8/12/24      Discharge Diagnoses:   Acute on chronic HFrEF (heart failure with reduced ejection fraction) (HCC)  Principal Problem:    Acute on chronic HFrEF (heart failure with reduced ejection fraction) (HCC)  Active Problems:    Acute on chronic congestive heart failure (HCC)  Resolved Problems:    * No resolved hospital problems. *        Admitted for: (HPI) dyspnea and lower extremity swelling    Hospital Course: 84 y.o. male with PMHx of hypertension, obstructive CAD, HFpEF, diabetes, hypothyroidism, Alzheimer's, anxiety who presents to [facility] for evaluation treatment of worsening shortness of breath and swelling of his bilateral lower extremities.  Patient presented with his daughter and son-in-law as they walked in to the ED.  Is noted patient's breathing has been worsening over the last several days.  Patient states that his home health nurse came and evaluated him today, 8/9/2024 where she was concern for the increased swelling of his lower extremities as well as his increasing shortness of breath.  She had listen to his lungs and told him that he had increased crackles and decreased breath sounds.  Patient notes compliance with home medications as patient has home health come and help him take his medications.  Daughter at bedside states that he has been compliant with his medications as well.  It is documented patient wears 3 L nasal cannula at night, currently on room air and is in no distress or discomfort.  Patient denies fever, chills, shortness of breath, difficulty breathing, chest pain, chest pressure, pleuritic chest pain, Ronnie pain nausea or vomiting.  Patient says he has no problems with urination or bowel movements.     The patient was placed on IV Bumex

## 2024-08-12 NOTE — PROGRESS NOTES
Pharmacist Review and Automatic Dose Adjustment of Prophylactic Enoxaparin    *Review reason for admission/hospital problem list*    The reviewing pharmacist has made an adjustment to the ordered enoxaparin dose or converted to UFH per the approved [facility] protocol and table as identified below.        Leo Nevarez is a 84 y.o. male.     Recent Labs     08/09/24  2200   CREATININE 1.0       Estimated Creatinine Clearance: 66 mL/min (based on SCr of 1 mg/dL).    Recent Labs     08/09/24  2200 08/10/24  0450   HGB 10.4* 10.9*   HCT 34.4* 35.0*    203     No results for input(s): \"INR\" in the last 72 hours.    Height:   Ht Readings from Last 1 Encounters:   08/09/24 1.702 m (5' 7\")     Weight:  Wt Readings from Last 1 Encounters:   08/10/24 109.6 kg (241 lb 11.2 oz)               Plan: Based upon the patient's weight and renal function, the ordered enoxaparin dose of 40 mg daily has been changed/converted to 30 mg twice daily.      Thank you,  Renea Holt, [facility]  8/10/2024, 5:26 AM    
CLINICAL PHARMACY: DISCHARGE MED RECONCILIATION/REVIEW    [facility] Patient?: No  Total # of Interventions Recommended: 0   -   Total # Interventions Accepted: 0  Intervention Severity:   - Level 1 Intervention Present?: No   - Level 2 #: 0   - Level 3 #: 0   Time Spent (min): 15    Additional Documentation:    
Discharge teaching and instructions for diagnosis/procedure of CHF completed with patient/family using teachback method. AVS reviewed. No new prescriptions. Patient/family voiced understanding regarding medications, follow up appointments, and care of self at home. Discharged in a wheelchair to  home with support per family. Patient stable at discharge.    
Patient admitted to 3B Room 36 from ED, leg swelling, K+ 5.9  No complaints upon arrival to the room.  IV site free of s/s of infection or infiltration.   Vital signs obtained. Assessment and data collection initiated. Oriented to room. Policies and procedures for 4A explained All questions answered with no further questions at this time. Fall prevention and safety brochure discussed with patient. 2 person skin check completed with Rosey STANLEY.    Patient declines PCP notification.  Patient declines family notification.    
Calcium:Invalid input(s): \"IONCA\"  Magnesium:  Recent Labs     08/10/24  0450   MG 1.8     Phosphorus:No results for input(s): \"PHOS\" in the last 72 hours.  BNP:No results for input(s): \"BNP\" in the last 72 hours.  Glucose:  Recent Labs     08/11/24  1144 08/11/24  1511 08/11/24  1633   POCGLU 350* 131* 91     HgbA1C:   Recent Labs     08/09/24  2354   LABA1C 7.1*     INR: No results for input(s): \"INR\" in the last 72 hours.  Hepatic:   Recent Labs     08/09/24  2200   ALKPHOS 123   ALT 22   AST 14   BILITOT 0.2*     Amylase and Lipase:No results for input(s): \"LACTA\", \"AMYLASE\" in the last 72 hours.  Lactic Acid: No results for input(s): \"LACTA\" in the last 72 hours.  Troponin: No results for input(s): \"CKTOTAL\", \"CKMB\", \"TROPONINT\" in the last 72 hours.  BNP: No results for input(s): \"BNP\" in the last 72 hours.  Lipids: No results for input(s): \"CHOL\", \"TRIG\", \"HDL\", \"LDL\" in the last 72 hours.    Invalid input(s): \"LDLCALC\"  ABGs:   Lab Results   Component Value Date/Time    PH 7.41 11/17/2023 10:12 AM    PCO2 44 11/17/2023 10:12 AM    PO2 58 11/17/2023 10:12 AM    HCO3 28 11/17/2023 10:12 AM    O2SAT 90 11/17/2023 10:12 AM           Radiology reports as per the Radiologist  Radiology: XR CHEST PORTABLE    Result Date: 8/9/2024  1 view chest x-ray Comparison: 07/02/2024 Findings: Lungs are clear without acute infiltrates. No pneumothorax. Heart size enlarged. No acute bony abnormalities.     Impression: No acute processes This document has been electronically signed by: Angel Osman MD on 08/09/2024 10:35 PM        Physical Exam:  Vitals: BP (!) 121/54   Pulse 60   Temp 97.6 °F (36.4 °C) (Oral)   Resp 18   Ht 1.702 m (5' 7\")   Wt 104.4 kg (230 lb 2.6 oz)   SpO2 99%   BMI 36.05 kg/m²   24 hour intake/output:  Intake/Output Summary (Last 24 hours) at 8/11/2024 1710  Last data filed at 8/11/2024 1649  Gross per 24 hour   Intake 620 ml   Output 1750 ml   Net -1130 ml     Last 3 weights:  Wt Readings from

## 2024-10-15 ENCOUNTER — OFFICE VISIT (OUTPATIENT)
Dept: CARDIOLOGY CLINIC | Age: 84
End: 2024-10-15
Payer: MEDICARE

## 2024-10-15 VITALS
SYSTOLIC BLOOD PRESSURE: 100 MMHG | HEIGHT: 67 IN | OXYGEN SATURATION: 98 % | DIASTOLIC BLOOD PRESSURE: 60 MMHG | HEART RATE: 75 BPM | WEIGHT: 216 LBS | BODY MASS INDEX: 33.9 KG/M2

## 2024-10-15 DIAGNOSIS — I25.10 CORONARY ARTERY DISEASE INVOLVING NATIVE CORONARY ARTERY OF NATIVE HEART WITHOUT ANGINA PECTORIS: ICD-10-CM

## 2024-10-15 DIAGNOSIS — Z91.89 AT RISK FOR FLUID VOLUME OVERLOAD: ICD-10-CM

## 2024-10-15 DIAGNOSIS — I50.32 CHRONIC DIASTOLIC CONGESTIVE HEART FAILURE, NYHA CLASS 3 (HCC): Primary | ICD-10-CM

## 2024-10-15 PROCEDURE — 3078F DIAST BP <80 MM HG: CPT | Performed by: NURSE PRACTITIONER

## 2024-10-15 PROCEDURE — G8484 FLU IMMUNIZE NO ADMIN: HCPCS | Performed by: NURSE PRACTITIONER

## 2024-10-15 PROCEDURE — 1036F TOBACCO NON-USER: CPT | Performed by: NURSE PRACTITIONER

## 2024-10-15 PROCEDURE — G8427 DOCREV CUR MEDS BY ELIG CLIN: HCPCS | Performed by: NURSE PRACTITIONER

## 2024-10-15 PROCEDURE — G8417 CALC BMI ABV UP PARAM F/U: HCPCS | Performed by: NURSE PRACTITIONER

## 2024-10-15 PROCEDURE — 99214 OFFICE O/P EST MOD 30 MIN: CPT | Performed by: NURSE PRACTITIONER

## 2024-10-15 PROCEDURE — 1123F ACP DISCUSS/DSCN MKR DOCD: CPT | Performed by: NURSE PRACTITIONER

## 2024-10-15 PROCEDURE — 3074F SYST BP LT 130 MM HG: CPT | Performed by: NURSE PRACTITIONER

## 2024-10-15 RX ORDER — HYDROMORPHONE HYDROCHLORIDE 2 MG/1
2 TABLET ORAL EVERY 4 HOURS PRN
COMMUNITY
Start: 2024-08-16

## 2024-10-15 RX ORDER — POTASSIUM CHLORIDE 750 MG/1
10 TABLET, EXTENDED RELEASE ORAL DAILY
Qty: 90 TABLET | Refills: 3 | Status: SHIPPED | OUTPATIENT
Start: 2024-10-15

## 2024-10-15 RX ORDER — LORAZEPAM 0.5 MG/1
0.5 TABLET ORAL PRN
COMMUNITY
Start: 2024-08-16

## 2024-10-15 ASSESSMENT — ENCOUNTER SYMPTOMS
VOMITING: 0
NAUSEA: 0
ABDOMINAL DISTENTION: 0
SHORTNESS OF BREATH: 1
COUGH: 0

## 2024-10-15 NOTE — PATIENT INSTRUCTIONS
You may receive a survey regarding the care you received during your visit.  Your input is valuable to us.  We encourage you to complete and return your survey.  We hope you will choose us in the future for your healthcare needs.    Your nurses today were Jae.  Office hours:   Mon-Thurs 8-4:30  Friday 8-12  Phone: [phone]    Continue:  Continue current medications  Daily weights and record  Fluid restriction of 2 Liters per day  Limit sodium in diet to around 3887-6519 mg/day  Monitor BP  Activity as tolerated     Call the [facility] Failure [facility] for any of the following symptoms:   Weight gain of -3 pounds in 1 day or 5 pounds in 1 week  Increased shortness of breath  Shortness of breath while laying down  Cough  Chest pain  Swelling in feet, ankles or legs  Bloating in abdomen  Fatigue      Repeat blood work in two weeks    Start torsemide 40meq daily   Start KCL 10meq daily    Continue diet/fluid adherence  Continue daily wts.  F/U w/ Cardiology  F/u in 3 months

## 2024-10-15 NOTE — PROGRESS NOTES
07/02/24 15:59 08/09/24 22:00   Pro-BNP 0.0 - 449.0 pg/mL 1007.0 (H) 2608.0 (H) 5349.0 (H)   (H): Data is abnormally high      ECHO 2024: LA mildly dilated  CATH 2023:  of RCA, 50% 1st diag, ramus 50% OM1 50%   Stress 7/2024: negative    Repeat blood work in two weeks    Start torsemide 40meq daily   Start KCL 10meq daily    Continue diet/fluid adherence  Continue daily wts.  F/U w/ Cardiology  F/u in 3 months        Tolerating above noted HF meds, no ill side effects noted. Will continue to monitor kidney function and electrolytes. Will optimize as tolerated.   Pt is compliant w/ medications.    Total visit time of 30 minutes has been spent with patient on education of symptoms, management, medication, and plan of care; as well as review of chart: labs, ECHO, radiology reports, etc.   I personally spent more then 50% of the appt time face to face with the patient.  Daily weights  Fluid restriction of 2 Liters per day  Limit sodium in diet to around 4848-8403 mg/day  Monitor BP  Activity as tolerated     Patient was instructed to call the Heart Failure Clinic for any changes in symptoms as noted in AVS.      Return in about 3 months (around 1/15/2025). or sooner if needed     Patient given educational materials - see patient instructions.   We discussed the importance of weighing oneself and recording daily. We also discussed the importance of a low sodium diet, higher sodium foods to avoid and better low sodium food options.   Patient verbalizes understanding of plan of care using teach back method, and is agreeable to the treatment plan.       Electronically signed by RELL Valero CNP on 10/15/2024 at 9:36 AM          
soft/nontender/no masses palpable/bowel sounds normal

## 2024-10-30 ENCOUNTER — OFFICE VISIT (OUTPATIENT)
Dept: PALLATIVE CARE | Age: 84
End: 2024-10-30

## 2024-10-30 ENCOUNTER — HOSPITAL ENCOUNTER (OUTPATIENT)
Age: 84
Discharge: HOME | End: 2024-10-30
Payer: MEDICARE

## 2024-10-30 VITALS
HEIGHT: 67 IN | WEIGHT: 227 LBS | SYSTOLIC BLOOD PRESSURE: 130 MMHG | BODY MASS INDEX: 35.63 KG/M2 | RESPIRATION RATE: 18 BRPM | OXYGEN SATURATION: 95 % | HEART RATE: 80 BPM | DIASTOLIC BLOOD PRESSURE: 60 MMHG

## 2024-10-30 DIAGNOSIS — Z51.5 PALLIATIVE CARE PATIENT: ICD-10-CM

## 2024-10-30 DIAGNOSIS — F03.90 DEMENTIA, UNSPECIFIED DEMENTIA SEVERITY, UNSPECIFIED DEMENTIA TYPE, UNSPECIFIED WHETHER BEHAVIORAL, PSYCHOTIC, OR MOOD DISTURBANCE OR ANXIETY (HCC): ICD-10-CM

## 2024-10-30 DIAGNOSIS — I50.32 CHRONIC DIASTOLIC CONGESTIVE HEART FAILURE, NYHA CLASS 3 (HCC): ICD-10-CM

## 2024-10-30 DIAGNOSIS — F41.9 ANXIETY DISORDER, UNSPECIFIED TYPE: ICD-10-CM

## 2024-10-30 DIAGNOSIS — I50.22 HEART FAILURE WITH MILDLY REDUCED EJECTION FRACTION (HFMREF) (HCC): Primary | ICD-10-CM

## 2024-10-30 DIAGNOSIS — R06.02 SHORTNESS OF BREATH: ICD-10-CM

## 2024-10-30 LAB
ANION GAP SERPL CALC-SCNC: 12 MEQ/L (ref 8–16)
BUN SERPL-MCNC: 48 MG/DL (ref 7–22)
CALCIUM SERPL-MCNC: 8.5 MG/DL (ref 8.5–10.5)
CHLORIDE SERPL-SCNC: 111 MEQ/L (ref 98–111)
CO2 SERPL-SCNC: 20 MEQ/L (ref 23–33)
CREAT SERPL-MCNC: 1.7 MG/DL (ref 0.4–1.2)
GFR SERPL CREATININE-BSD FRML MDRD: 39 ML/MIN/1.73M2
GLUCOSE SERPL-MCNC: 172 MG/DL (ref 70–108)
NT-PROBNP SERPL IA-MCNC: 4330 PG/ML (ref 0–449)
POTASSIUM SERPL-SCNC: 5.9 MEQ/L (ref 3.5–5.2)
SODIUM SERPL-SCNC: 143 MEQ/L (ref 135–145)

## 2024-10-30 PROCEDURE — 80048 BASIC METABOLIC PNL TOTAL CA: CPT

## 2024-10-30 PROCEDURE — 83880 ASSAY OF NATRIURETIC PEPTIDE: CPT

## 2024-10-30 PROCEDURE — 36415 COLL VENOUS BLD VENIPUNCTURE: CPT

## 2024-10-30 NOTE — PATIENT INSTRUCTIONS
Continue current plan of care for acute and chronic conditions per primary and specialist teams  Continue Hydromorphone IR 1-2 mg every 4 hours as needed for breakthrough pain and shortness of breath  Continue Ativan oral tablet 0.25-0.5 mg every 4 hours as needed for breakthrough shortness of breath, anxiety, and agitation  PDMP reviewed  Please call the office if your symptoms worsen or if you were to develop new symptoms  Please call the palliative care office when you need refills

## 2024-10-30 NOTE — PROGRESS NOTES
Worried About Running Out of Food in the Last Year: Never true     Ran Out of Food in the Last Year: Never true       Objective:     Vitals:    10/30/24 1204   BP: 130/60   Site: Left Upper Arm   Position: Sitting   Cuff Size: Medium Adult   Pulse: 80   Resp: 18   SpO2: 95%   Weight: 103 kg (227 lb)   Height: 1.702 m (5' 7\")       Body mass index is 35.55 kg/m².    Wt Readings from Last 3 Encounters:   10/30/24 103 kg (227 lb)   10/15/24 98 kg (216 lb)   08/12/24 102.7 kg (226 lb 6.4 oz)       Physical Exam  Vitals and nursing note reviewed.   Constitutional:       General: He is awake. He is not in acute distress.     Appearance: He is not ill-appearing.   HENT:      Head: Normocephalic and atraumatic.      Right Ear: External ear normal. Decreased hearing noted.      Left Ear: External ear normal. Decreased hearing noted.      Nose: No rhinorrhea.   Eyes:      General:         Right eye: No discharge.         Left eye: No discharge.   Musculoskeletal:      Cervical back: Neck supple.   Neurological:      Mental Status: He is alert.   Psychiatric:         Behavior: Behavior is not agitated, aggressive or hyperactive. Behavior is cooperative.         Palliative Performance Scale: 60%  Ambulation reduced; Significant disease; Can't do hobbies/housework; intake normal or reduced; occasional assist; LOC full/confusion    Assessment / Plan:   Leo Nevarez is a 84 y.o. male who presents to the palliative care clinic today as a new patient to establish care.  His symptoms appear to be well-controlled at this time.  He previously had a prescription for Ativan and Dilaudid that was prescribed by hospice provider for the assistance with symptom management.  I recommended starting at half doses of what he has available if he were to develop breakthrough pain, shortness of breath or anxiety.  He has valid healthcare power of  paperwork as well as a signed DNR CC.  His CODE STATUS was updated in the computer system.

## 2024-10-31 ENCOUNTER — TELEPHONE (OUTPATIENT)
Dept: CARDIOLOGY CLINIC | Age: 84
End: 2024-10-31

## 2024-10-31 ENCOUNTER — HOSPITAL ENCOUNTER (INPATIENT)
Age: 84
LOS: 10 days | Discharge: HOME | DRG: 683 | End: 2024-11-10
Attending: EMERGENCY MEDICINE
Payer: MEDICARE

## 2024-10-31 DIAGNOSIS — E87.5 HYPERKALEMIA: Primary | ICD-10-CM

## 2024-10-31 DIAGNOSIS — N17.9 AKI (ACUTE KIDNEY INJURY) (HCC): ICD-10-CM

## 2024-10-31 LAB
ALBUMIN SERPL BCG-MCNC: 3.5 G/DL (ref 3.5–5.1)
ALP SERPL-CCNC: 89 U/L (ref 38–126)
ALT SERPL W/O P-5'-P-CCNC: 16 U/L (ref 11–66)
ANION GAP SERPL CALC-SCNC: 7 MEQ/L (ref 8–16)
AST SERPL-CCNC: 14 U/L (ref 5–40)
BACTERIA: NORMAL
BASOPHILS ABSOLUTE: 0 THOU/MM3 (ref 0–0.1)
BASOPHILS NFR BLD AUTO: 0.5 %
BILIRUB SERPL-MCNC: 0.2 MG/DL (ref 0.3–1.2)
BILIRUB UR QL STRIP: NEGATIVE
BUN SERPL-MCNC: 37 MG/DL (ref 7–22)
CALCIUM SERPL-MCNC: 8.7 MG/DL (ref 8.5–10.5)
CASTS #/AREA URNS LPF: NORMAL /LPF
CASTS #/AREA URNS LPF: NORMAL /LPF
CHARACTER UR: CLEAR
CHARCOAL URNS QL MICRO: NORMAL
CHLORIDE 24H UR-SRATE: 134 MEQ/L
CHLORIDE SERPL-SCNC: 109 MEQ/L (ref 98–111)
CO2 SERPL-SCNC: 25 MEQ/L (ref 23–33)
COLOR UR: YELLOW
CREAT SERPL-MCNC: 1.6 MG/DL (ref 0.4–1.2)
CREAT UR-MCNC: 27.6 MG/DL
CRYSTALS URNS QL MICRO: NORMAL
DEPRECATED RDW RBC AUTO: 59.4 FL (ref 35–45)
EKG ATRIAL RATE: 63 BPM
EKG P AXIS: 51 DEGREES
EKG P-R INTERVAL: 264 MS
EKG Q-T INTERVAL: 458 MS
EKG QRS DURATION: 128 MS
EKG QTC CALCULATION (BAZETT): 468 MS
EKG R AXIS: 113 DEGREES
EKG T AXIS: 38 DEGREES
EKG VENTRICULAR RATE: 63 BPM
EOSINOPHIL NFR BLD AUTO: 4.7 %
EOSINOPHILS ABSOLUTE: 0.3 THOU/MM3 (ref 0–0.4)
EPITHELIAL CELLS, UA: NORMAL /HPF
ERYTHROCYTE [DISTWIDTH] IN BLOOD BY AUTOMATED COUNT: 17.2 % (ref 11.5–14.5)
GFR SERPL CREATININE-BSD FRML MDRD: 42 ML/MIN/1.73M2
GLUCOSE BLD STRIP.AUTO-MCNC: 127 MG/DL (ref 70–108)
GLUCOSE SERPL-MCNC: 179 MG/DL (ref 70–108)
GLUCOSE UR QL STRIP.AUTO: NEGATIVE MG/DL
HCT VFR BLD AUTO: 31.9 % (ref 42–52)
HGB BLD-MCNC: 9.8 GM/DL (ref 14–18)
HGB UR QL STRIP.AUTO: NEGATIVE
IMM GRANULOCYTES # BLD AUTO: 0.02 THOU/MM3 (ref 0–0.07)
IMM GRANULOCYTES NFR BLD AUTO: 0.3 %
KETONES UR QL STRIP.AUTO: NEGATIVE
LEUKOCYTE ESTERASE UR QL STRIP.AUTO: NEGATIVE
LYMPHOCYTES ABSOLUTE: 2 THOU/MM3 (ref 1–4.8)
LYMPHOCYTES NFR BLD AUTO: 34.5 %
MAGNESIUM SERPL-MCNC: 2.3 MG/DL (ref 1.6–2.4)
MCH RBC QN AUTO: 29.3 PG (ref 26–33)
MCHC RBC AUTO-ENTMCNC: 30.7 GM/DL (ref 32.2–35.5)
MCV RBC AUTO: 95.5 FL (ref 80–94)
MONOCYTES ABSOLUTE: 0.4 THOU/MM3 (ref 0.4–1.3)
MONOCYTES NFR BLD AUTO: 7.1 %
NEUTROPHILS ABSOLUTE: 3.1 THOU/MM3 (ref 1.8–7.7)
NEUTROPHILS NFR BLD AUTO: 52.9 %
NITRITE UR QL STRIP.AUTO: NEGATIVE
NRBC BLD AUTO-RTO: 0 /100 WBC
OSMOLALITY SERPL CALC.SUM OF ELEC: 294.4 MOSMOL/KG (ref 275–300)
OSMOLALITY SERPL: NORMAL MOSMOL/KG (ref 275–295)
OSMOLALITY UR: NORMAL MOSMOL/KG (ref 250–750)
PH UR STRIP.AUTO: 5 [PH] (ref 5–9)
PHOSPHATE SERPL-MCNC: 3 MG/DL (ref 2.4–4.7)
PLATELET # BLD AUTO: 162 THOU/MM3 (ref 130–400)
PMV BLD AUTO: 11.9 FL (ref 9.4–12.4)
POTASSIUM SERPL-SCNC: 5.5 MEQ/L (ref 3.5–5.2)
POTASSIUM SERPL-SCNC: 6 MEQ/L (ref 3.5–5.2)
POTASSIUM UR-SCNC: 14.2 MEQ/L
PROT SERPL-MCNC: 6.2 G/DL (ref 6.1–8)
PROT UR STRIP.AUTO-MCNC: NEGATIVE MG/DL
RBC # BLD AUTO: 3.34 MILL/MM3 (ref 4.7–6.1)
RBC #/AREA URNS HPF: NORMAL /HPF
RENAL EPI CELLS #/AREA URNS HPF: NORMAL /[HPF]
SODIUM SERPL-SCNC: 141 MEQ/L (ref 135–145)
SODIUM UR-SCNC: 122 MEQ/L
SPECIFIC GRAVITY UA: 1.01 (ref 1–1.03)
UROBILINOGEN, URINE: 0.2 EU/DL (ref 0–1)
UUN 24H UR-MCNC: 221 MG/DL
WBC # BLD AUTO: 5.8 THOU/MM3 (ref 4.8–10.8)
WBC #/AREA URNS HPF: NORMAL /HPF
YEAST LIKE FUNGI URNS QL MICRO: NORMAL

## 2024-10-31 PROCEDURE — 85025 COMPLETE CBC W/AUTO DIFF WBC: CPT

## 2024-10-31 PROCEDURE — 99285 EMERGENCY DEPT VISIT HI MDM: CPT

## 2024-10-31 PROCEDURE — 83935 ASSAY OF URINE OSMOLALITY: CPT

## 2024-10-31 PROCEDURE — 83930 ASSAY OF BLOOD OSMOLALITY: CPT

## 2024-10-31 PROCEDURE — 2580000003 HC RX 258

## 2024-10-31 PROCEDURE — 6370000000 HC RX 637 (ALT 250 FOR IP): Performed by: EMERGENCY MEDICINE

## 2024-10-31 PROCEDURE — 93010 ELECTROCARDIOGRAM REPORT: CPT | Performed by: INTERNAL MEDICINE

## 2024-10-31 PROCEDURE — 82570 ASSAY OF URINE CREATININE: CPT

## 2024-10-31 PROCEDURE — 83735 ASSAY OF MAGNESIUM: CPT

## 2024-10-31 PROCEDURE — 84100 ASSAY OF PHOSPHORUS: CPT

## 2024-10-31 PROCEDURE — 6360000002 HC RX W HCPCS: Performed by: EMERGENCY MEDICINE

## 2024-10-31 PROCEDURE — 84540 ASSAY OF URINE/UREA-N: CPT

## 2024-10-31 PROCEDURE — 1200000003 HC TELEMETRY R&B

## 2024-10-31 PROCEDURE — 84300 ASSAY OF URINE SODIUM: CPT

## 2024-10-31 PROCEDURE — 80053 COMPREHEN METABOLIC PANEL: CPT

## 2024-10-31 PROCEDURE — 2580000003 HC RX 258: Performed by: EMERGENCY MEDICINE

## 2024-10-31 PROCEDURE — 81001 URINALYSIS AUTO W/SCOPE: CPT

## 2024-10-31 PROCEDURE — 82436 ASSAY OF URINE CHLORIDE: CPT

## 2024-10-31 PROCEDURE — 93005 ELECTROCARDIOGRAM TRACING: CPT | Performed by: EMERGENCY MEDICINE

## 2024-10-31 PROCEDURE — 36415 COLL VENOUS BLD VENIPUNCTURE: CPT

## 2024-10-31 PROCEDURE — 84132 ASSAY OF SERUM POTASSIUM: CPT

## 2024-10-31 PROCEDURE — 84133 ASSAY OF URINE POTASSIUM: CPT

## 2024-10-31 PROCEDURE — 96365 THER/PROPH/DIAG IV INF INIT: CPT

## 2024-10-31 PROCEDURE — 96375 TX/PRO/DX INJ NEW DRUG ADDON: CPT

## 2024-10-31 PROCEDURE — 6370000000 HC RX 637 (ALT 250 FOR IP)

## 2024-10-31 PROCEDURE — 82948 REAGENT STRIP/BLOOD GLUCOSE: CPT

## 2024-10-31 RX ORDER — ACETAMINOPHEN 650 MG/1
650 SUPPOSITORY RECTAL EVERY 6 HOURS PRN
Status: DISCONTINUED | OUTPATIENT
Start: 2024-10-31 | End: 2024-11-10 | Stop reason: HOSPADM

## 2024-10-31 RX ORDER — ONDANSETRON 2 MG/ML
4 INJECTION INTRAMUSCULAR; INTRAVENOUS EVERY 6 HOURS PRN
Status: DISCONTINUED | OUTPATIENT
Start: 2024-10-31 | End: 2024-11-10 | Stop reason: HOSPADM

## 2024-10-31 RX ORDER — DEXTROSE MONOHYDRATE 25 G/50ML
25 INJECTION, SOLUTION INTRAVENOUS ONCE
Status: DISCONTINUED | OUTPATIENT
Start: 2024-10-31 | End: 2024-10-31

## 2024-10-31 RX ORDER — METOPROLOL TARTRATE 25 MG/1
25 TABLET, FILM COATED ORAL 2 TIMES DAILY
Status: DISCONTINUED | OUTPATIENT
Start: 2024-10-31 | End: 2024-11-10 | Stop reason: HOSPADM

## 2024-10-31 RX ORDER — TORSEMIDE 20 MG/1
40 TABLET ORAL DAILY
Status: DISCONTINUED | OUTPATIENT
Start: 2024-10-31 | End: 2024-11-10 | Stop reason: HOSPADM

## 2024-10-31 RX ORDER — INSULIN LISPRO 100 [IU]/ML
0-8 INJECTION, SOLUTION INTRAVENOUS; SUBCUTANEOUS
Status: DISCONTINUED | OUTPATIENT
Start: 2024-10-31 | End: 2024-11-08

## 2024-10-31 RX ORDER — DEXTROSE MONOHYDRATE 100 MG/ML
INJECTION, SOLUTION INTRAVENOUS CONTINUOUS PRN
Status: DISCONTINUED | OUTPATIENT
Start: 2024-10-31 | End: 2024-11-10 | Stop reason: HOSPADM

## 2024-10-31 RX ORDER — HYDROMORPHONE HYDROCHLORIDE 2 MG/1
2 TABLET ORAL EVERY 4 HOURS PRN
Status: DISCONTINUED | OUTPATIENT
Start: 2024-10-31 | End: 2024-11-10 | Stop reason: HOSPADM

## 2024-10-31 RX ORDER — BUPROPION HYDROCHLORIDE 300 MG/1
300 TABLET ORAL DAILY
Status: DISCONTINUED | OUTPATIENT
Start: 2024-10-31 | End: 2024-11-10 | Stop reason: HOSPADM

## 2024-10-31 RX ORDER — GLUCAGON 1 MG/ML
1 KIT INJECTION PRN
Status: DISCONTINUED | OUTPATIENT
Start: 2024-10-31 | End: 2024-11-10 | Stop reason: HOSPADM

## 2024-10-31 RX ORDER — ACETAMINOPHEN 325 MG/1
650 TABLET ORAL EVERY 6 HOURS PRN
Status: DISCONTINUED | OUTPATIENT
Start: 2024-10-31 | End: 2024-11-10 | Stop reason: HOSPADM

## 2024-10-31 RX ORDER — POTASSIUM CHLORIDE 1500 MG/1
40 TABLET, EXTENDED RELEASE ORAL PRN
Status: ACTIVE | OUTPATIENT
Start: 2024-10-31 | End: 2024-10-31

## 2024-10-31 RX ORDER — SODIUM CHLORIDE 0.9 % (FLUSH) 0.9 %
5-40 SYRINGE (ML) INJECTION PRN
Status: DISCONTINUED | OUTPATIENT
Start: 2024-10-31 | End: 2024-11-10 | Stop reason: HOSPADM

## 2024-10-31 RX ORDER — PANTOPRAZOLE SODIUM 40 MG/1
40 TABLET, DELAYED RELEASE ORAL
Status: DISCONTINUED | OUTPATIENT
Start: 2024-11-01 | End: 2024-11-10 | Stop reason: HOSPADM

## 2024-10-31 RX ORDER — DONEPEZIL HYDROCHLORIDE 10 MG/1
10 TABLET, FILM COATED ORAL NIGHTLY
Status: DISCONTINUED | OUTPATIENT
Start: 2024-10-31 | End: 2024-11-10 | Stop reason: HOSPADM

## 2024-10-31 RX ORDER — SODIUM CHLORIDE 9 MG/ML
INJECTION, SOLUTION INTRAVENOUS PRN
Status: DISCONTINUED | OUTPATIENT
Start: 2024-10-31 | End: 2024-11-10 | Stop reason: HOSPADM

## 2024-10-31 RX ORDER — MAGNESIUM SULFATE IN WATER 40 MG/ML
2000 INJECTION, SOLUTION INTRAVENOUS PRN
Status: DISCONTINUED | OUTPATIENT
Start: 2024-10-31 | End: 2024-11-10 | Stop reason: HOSPADM

## 2024-10-31 RX ORDER — POTASSIUM CHLORIDE 7.45 MG/ML
10 INJECTION INTRAVENOUS PRN
Status: DISCONTINUED | OUTPATIENT
Start: 2024-10-31 | End: 2024-10-31 | Stop reason: RX

## 2024-10-31 RX ORDER — POLYETHYLENE GLYCOL 3350 17 G/17G
17 POWDER, FOR SOLUTION ORAL DAILY PRN
Status: DISCONTINUED | OUTPATIENT
Start: 2024-10-31 | End: 2024-11-10 | Stop reason: HOSPADM

## 2024-10-31 RX ORDER — LORAZEPAM 0.5 MG/1
0.5 TABLET ORAL PRN
Status: DISCONTINUED | OUTPATIENT
Start: 2024-10-31 | End: 2024-11-10 | Stop reason: HOSPADM

## 2024-10-31 RX ORDER — ONDANSETRON 4 MG/1
4 TABLET, ORALLY DISINTEGRATING ORAL EVERY 8 HOURS PRN
Status: DISCONTINUED | OUTPATIENT
Start: 2024-10-31 | End: 2024-11-10 | Stop reason: HOSPADM

## 2024-10-31 RX ORDER — CALCIUM GLUCONATE 20 MG/ML
2000 INJECTION, SOLUTION INTRAVENOUS ONCE
Status: COMPLETED | OUTPATIENT
Start: 2024-10-31 | End: 2024-10-31

## 2024-10-31 RX ORDER — CITALOPRAM HYDROBROMIDE 40 MG/1
40 TABLET ORAL DAILY
Status: DISCONTINUED | OUTPATIENT
Start: 2024-10-31 | End: 2024-11-10 | Stop reason: HOSPADM

## 2024-10-31 RX ORDER — LEVOTHYROXINE SODIUM 150 UG/1
150 TABLET ORAL DAILY
Status: DISCONTINUED | OUTPATIENT
Start: 2024-11-01 | End: 2024-11-10 | Stop reason: HOSPADM

## 2024-10-31 RX ORDER — SODIUM CHLORIDE 0.9 % (FLUSH) 0.9 %
5-40 SYRINGE (ML) INJECTION EVERY 12 HOURS SCHEDULED
Status: DISCONTINUED | OUTPATIENT
Start: 2024-10-31 | End: 2024-11-10 | Stop reason: HOSPADM

## 2024-10-31 RX ORDER — TAMSULOSIN HYDROCHLORIDE 0.4 MG/1
0.4 CAPSULE ORAL DAILY
Status: DISCONTINUED | OUTPATIENT
Start: 2024-11-01 | End: 2024-11-10 | Stop reason: HOSPADM

## 2024-10-31 RX ADMIN — SODIUM CHLORIDE, PRESERVATIVE FREE 10 ML: 5 INJECTION INTRAVENOUS at 20:46

## 2024-10-31 RX ADMIN — SODIUM ZIRCONIUM CYCLOSILICATE 10 G: 10 POWDER, FOR SUSPENSION ORAL at 20:46

## 2024-10-31 RX ADMIN — METOPROLOL TARTRATE 25 MG: 50 TABLET, FILM COATED ORAL at 20:46

## 2024-10-31 RX ADMIN — DEXTROSE MONOHYDRATE 250 ML: 100 INJECTION, SOLUTION INTRAVENOUS at 16:03

## 2024-10-31 RX ADMIN — CALCIUM GLUCONATE 2000 MG: 20 INJECTION, SOLUTION INTRAVENOUS at 15:44

## 2024-10-31 RX ADMIN — Medication 10 UNITS: at 16:05

## 2024-10-31 ASSESSMENT — PAIN - FUNCTIONAL ASSESSMENT: PAIN_FUNCTIONAL_ASSESSMENT: NONE - DENIES PAIN

## 2024-10-31 NOTE — TELEPHONE ENCOUNTER
----- Message from RELL Valero - CNP sent at 10/31/2024 12:39 PM EDT -----  Pt needs to go to ER for tx of NAVNEET since increasing his torsemide.

## 2024-10-31 NOTE — ED NOTES
ED to inpatient nurses report      Chief Complaint:  Chief Complaint   Patient presents with    Abnormal Lab     Present to ED from: home    MOA:     LOC: alert and orientated to name, place, date  Mobility: Requires assistance * 1  Oxygen Baseline: room air    Current needs required: none     Code Status:   Limited    What abnormal results were found and what did you give/do to treat them? Hyperkalemia - calcium gluconate, d10, insulin  Any procedures or intervention occur?     Mental Status:  Level of Consciousness: Alert (0)    Psych Assessment:        Vitals:  Patient Vitals for the past 24 hrs:   BP Temp Temp src Pulse Resp SpO2 Height Weight   10/31/24 1729 (!) 154/66 -- -- 73 25 99 % -- --   10/31/24 1700 (!) 150/62 -- -- 65 23 99 % -- --   10/31/24 1608 137/62 -- -- 61 19 98 % -- --   10/31/24 1542 133/69 -- -- 66 21 98 % -- --   10/31/24 1528 (!) 126/57 -- -- 62 19 97 % -- --   10/31/24 1445 (!) 111/48 -- -- 64 23 99 % -- --   10/31/24 1357 (!) 116/52 98.4 °F (36.9 °C) Oral 65 16 97 % 1.702 m (5' 7\") 103 kg (227 lb)        LDAs:   Peripheral IV 10/31/24 Distal;Posterior;Right Forearm (Active)   Site Assessment Clean, dry & intact 10/31/24 1729   Line Status Normal saline locked 10/31/24 1729   Phlebitis Assessment No symptoms 10/31/24 1729   Infiltration Assessment 0 10/31/24 1729   Alcohol Cap Used Yes 10/31/24 1729   Dressing Status Clean, dry & intact 10/31/24 1729   Dressing Type Transparent 10/31/24 1729       Ambulatory Status:  No data recorded    Diagnosis:  DISPOSITION Admitted 10/31/2024 05:25:26 PM   Final diagnoses:   None        Consults:  None     Pain Score:  Pain Assessment  Pain Assessment: None - Denies Pain    C-SSRS:   Risk of Suicide: No Risk    Sepsis Screening:       [place] Fall Risk:       Swallow Screening        Preferred Language:   English      ALLERGIES     Morphine    SURGICAL HISTORY       Past Surgical History:   Procedure Laterality Date    BLADDER SCAN  11/11/2023

## 2024-10-31 NOTE — ED NOTES
Pt medicated per MAR. Pt in bed, eyes open, respirations even and unlabored. Vital signs reassessed, no needs voiced.

## 2024-10-31 NOTE — ED NOTES
RN to room for first contact with patient. Pt daughter confirms that heart failure clinic sent him here for kidney function problem but can not confirm what lab. Pt daughter also notes that he was recently on hospice and taken off hospice and put back of palliative care. Pt daughter states, \"he does whatever he wants with his health.\" Sees Dr. Mock for cardiology and Mr. Wynn for nephrology.

## 2024-10-31 NOTE — ED PROVIDER NOTES
[facility]  EMERGENCY DEPARTMENT ENCOUNTER          Pt Name: Leo Nevarez  MRN: [id number]  Birthdate 1940  Date of evaluation: 10/31/2024    CHIEF COMPLAINT       Chief Complaint   Patient presents with    Abnormal Lab       Nurses Notes reviewed and I agree except as noted in the HPI.    HISTORY OF PRESENT ILLNESS    Leo Nevarez is a 84 y.o. pleasant male who presents to the emergency department for evaluation of abnormal labs.  Patient states that he was called and told to come to the emergency department due to a lab test that was abnormal.  Patient reports that he is sick very often, states that his whole body hurts at times.  He reports occasional fatigue.  Denies recent fall or trauma.  Denies fever or cough.  Denies nausea or vomiting.  Denies dysuria or hematuria.  He states that his urinary stream is weaker than it was in the past.  Denies palpitations, syncope, or shortness of breath.  Denies chest pain or abdominal pain.  He reports he is having regular bowel movements.  He states occasionally in the morning when he first wakes up he feels a little winded but as he moves around this goes away and he feels better.  He does reportedly see both cardiology and nephrology.  The patient has no other acute complaints at this time.        REVIEW OF SYSTEMS   Review of Systems    PAST MEDICAL AND SURGICAL HISTORY     Past Medical History:   Diagnosis Date    Chronic kidney disease     Diabetes mellitus (HCC)     Hyperlipidemia     Hypertension      Past Surgical History:   Procedure Laterality Date    BLADDER SCAN  11/11/2023    BLADDER SCAN  11/13/2023    CARDIAC PROCEDURE Left 11/20/2023    Left heart cath / coronary angiography performed by Javier Brothers MD at [facility] CARDIAC CATH LAB    HC BLADDER SCAN  11/11/2023    SHOULDER ARTHROPLASTY Left     TURP N/A 2/5/2024    Cystoscopy Greenlight Photovaporization of Prostate performed by Abhay Levine Jr., MD at [facility] OR

## 2024-10-31 NOTE — ED NOTES
Pt transported to [facility]. Floor contacted prior to transport, spoke to [place]. Pt in stable condition.

## 2024-10-31 NOTE — ED TRIAGE NOTES
Pt presents to ED with chief complaint of abnormal lab. Pt had labs done per cardiology and was told to return d/t his kidney function. Pt denies symptoms.

## 2024-10-31 NOTE — ED NOTES
Pt in bed, eyes open, respirations even and unlabored. Vital signs reassessed, no needs voiced. IP team at bedside.

## 2024-10-31 NOTE — H&P
HISTORY & PHYSICAL       Patient:  Leo Nevarez  YOB: 1940    MRN: [id number]     Acct: [id number]    PCP: Aileen Valadez, APRN - CNP    Date of Admission: 10/31/2024    Date of Service: Pt seen/examined on 10/31/2024 and Admitted to Inpatient with expected LOS greater than two midnights due to medical therapy.    ASSESSMENT:    Active Hospital Problems    Diagnosis Date Noted    NAVNEET (acute kidney injury) (HCC) [N17.9] 11/12/2023       PLAN:    NAVNEET on CKD III: Baseline Cr ~1.1 with eGFR ~65%. PTA increased to 1.7 -> 1.6 in ED. Likely iatrogenic with recent medication change and overall limited PO intake. Oral hydration protocol ordered, avoiding aggressive IVF given CHF. Hold nephrotoxic agents.  Hyperkalemia: Likely due to NAVNEET. S/p calcium gluconate, insulin, dextrose in ED. Will order repeat K for overnight and lokelma x 1 dose. Monitor overnight on telemetry (patient/family agreeable with goals of care).  Chronic HFmrEF: Last Echo was TTE on 7/3/2024 demonstrating EF of ~45-50%, mild global hypokinesis, and mildly dilated LA. Oral hydration protocols ordered, avoid volume overload. Holding diuretics at this time.  DM-2: Med dose sliding scale insulin with hypoglycemia protocols. Holding hold metformin, januvia, and glipizide. PTA on Lantus 30 u PRN. Monitor.  HTN: C/w Lopressor 25 mg BID. Hold Diuretics, ACEi, and other nephrotoxic agents.  HLD: PTA pravastatin 80 mg. Hold; restart as renal function improves.  MDD: PTA Celexa 40 mg and Wellbutrin 300 mg. Order & hold; restart as renal function improves.  Dementia: PTA donepezil 10 mg. Order & hold; restart as renal function improves.  Palliative Care: Follows as outpatient. Outpatient team notified. Code status reviewed. Limited x4 (DNR-CC as outpatient). C/w home dilaudid PO PRN and Ativan PO PRN.      Chief Complaint:  Labs abnormal    History Of Present Illness:   84 y.o. male who presented to [facility] due to abnormal

## 2024-11-01 PROBLEM — I50.22 HEART FAILURE WITH MILDLY REDUCED EJECTION FRACTION (HFMREF) (HCC): Status: ACTIVE | Noted: 2024-11-01

## 2024-11-01 PROBLEM — Z51.5 PALLIATIVE CARE PATIENT: Status: ACTIVE | Noted: 2024-11-01

## 2024-11-01 LAB
ANION GAP SERPL CALC-SCNC: 11 MEQ/L (ref 8–16)
BASOPHILS ABSOLUTE: 0 THOU/MM3 (ref 0–0.1)
BASOPHILS NFR BLD AUTO: 0.5 %
BUN SERPL-MCNC: 40 MG/DL (ref 7–22)
CALCIUM SERPL-MCNC: 8.7 MG/DL (ref 8.5–10.5)
CHLORIDE SERPL-SCNC: 110 MEQ/L (ref 98–111)
CO2 SERPL-SCNC: 23 MEQ/L (ref 23–33)
CREAT SERPL-MCNC: 1.7 MG/DL (ref 0.4–1.2)
DEPRECATED RDW RBC AUTO: 58.9 FL (ref 35–45)
EOSINOPHIL NFR BLD AUTO: 4.3 %
EOSINOPHILS ABSOLUTE: 0.3 THOU/MM3 (ref 0–0.4)
ERYTHROCYTE [DISTWIDTH] IN BLOOD BY AUTOMATED COUNT: 17.1 % (ref 11.5–14.5)
GFR SERPL CREATININE-BSD FRML MDRD: 39 ML/MIN/1.73M2
GLUCOSE BLD STRIP.AUTO-MCNC: 111 MG/DL (ref 70–108)
GLUCOSE BLD STRIP.AUTO-MCNC: 229 MG/DL (ref 70–108)
GLUCOSE BLD STRIP.AUTO-MCNC: 231 MG/DL (ref 70–108)
GLUCOSE BLD STRIP.AUTO-MCNC: 267 MG/DL (ref 70–108)
GLUCOSE SERPL-MCNC: 107 MG/DL (ref 70–108)
HCT VFR BLD AUTO: 29 % (ref 42–52)
HGB BLD-MCNC: 9 GM/DL (ref 14–18)
IMM GRANULOCYTES # BLD AUTO: 0.02 THOU/MM3 (ref 0–0.07)
IMM GRANULOCYTES NFR BLD AUTO: 0.3 %
LYMPHOCYTES ABSOLUTE: 2.6 THOU/MM3 (ref 1–4.8)
LYMPHOCYTES NFR BLD AUTO: 43 %
MCH RBC QN AUTO: 29.4 PG (ref 26–33)
MCHC RBC AUTO-ENTMCNC: 31 GM/DL (ref 32.2–35.5)
MCV RBC AUTO: 94.8 FL (ref 80–94)
MONOCYTES ABSOLUTE: 0.5 THOU/MM3 (ref 0.4–1.3)
MONOCYTES NFR BLD AUTO: 8 %
NEUTROPHILS ABSOLUTE: 2.6 THOU/MM3 (ref 1.8–7.7)
NEUTROPHILS NFR BLD AUTO: 43.9 %
NRBC BLD AUTO-RTO: 0 /100 WBC
ORIGINAL SAMPLE NUMBER: NORMAL
ORIGINAL SAMPLE NUMBER: NORMAL
OSMOLALITY SERPL CALC.SUM OF ELEC: 297.1 MOSMOL/KG (ref 275–300)
PLATELET # BLD AUTO: 152 THOU/MM3 (ref 130–400)
PMV BLD AUTO: 12.5 FL (ref 9.4–12.4)
POTASSIUM SERPL-SCNC: 5.6 MEQ/L (ref 3.5–5.2)
RBC # BLD AUTO: 3.06 MILL/MM3 (ref 4.7–6.1)
REFERENCE LOCATION: NORMAL
REFERENCE LOCATION: NORMAL
REFERENCE RANGE: NORMAL
REFERENCE RANGE: NORMAL
SODIUM SERPL-SCNC: 144 MEQ/L (ref 135–145)
TEST RESULTS WITH UNITS: 313
TEST RESULTS WITH UNITS: 365
TEST(S) BEING PERFORMED: NORMAL
TEST(S) BEING PERFORMED: NORMAL
WBC # BLD AUTO: 6 THOU/MM3 (ref 4.8–10.8)

## 2024-11-01 PROCEDURE — 85025 COMPLETE CBC W/AUTO DIFF WBC: CPT

## 2024-11-01 PROCEDURE — 2580000003 HC RX 258

## 2024-11-01 PROCEDURE — 36415 COLL VENOUS BLD VENIPUNCTURE: CPT

## 2024-11-01 PROCEDURE — 82948 REAGENT STRIP/BLOOD GLUCOSE: CPT

## 2024-11-01 PROCEDURE — 6370000000 HC RX 637 (ALT 250 FOR IP)

## 2024-11-01 PROCEDURE — 1200000003 HC TELEMETRY R&B

## 2024-11-01 PROCEDURE — 80048 BASIC METABOLIC PNL TOTAL CA: CPT

## 2024-11-01 RX ORDER — TORSEMIDE 20 MG/1
20 TABLET ORAL ONCE
Status: COMPLETED | OUTPATIENT
Start: 2024-11-01 | End: 2024-11-01

## 2024-11-01 RX ADMIN — INSULIN LISPRO 4 UNITS: 100 INJECTION, SOLUTION INTRAVENOUS; SUBCUTANEOUS at 16:32

## 2024-11-01 RX ADMIN — ACETAMINOPHEN 650 MG: 325 TABLET ORAL at 22:23

## 2024-11-01 RX ADMIN — PANTOPRAZOLE SODIUM 40 MG: 40 TABLET, DELAYED RELEASE ORAL at 05:21

## 2024-11-01 RX ADMIN — SODIUM ZIRCONIUM CYCLOSILICATE 10 G: 10 POWDER, FOR SUSPENSION ORAL at 11:25

## 2024-11-01 RX ADMIN — METOPROLOL TARTRATE 25 MG: 50 TABLET, FILM COATED ORAL at 08:39

## 2024-11-01 RX ADMIN — INSULIN LISPRO 2 UNITS: 100 INJECTION, SOLUTION INTRAVENOUS; SUBCUTANEOUS at 20:34

## 2024-11-01 RX ADMIN — SODIUM CHLORIDE, PRESERVATIVE FREE 10 ML: 5 INJECTION INTRAVENOUS at 20:35

## 2024-11-01 RX ADMIN — METOPROLOL TARTRATE 25 MG: 50 TABLET, FILM COATED ORAL at 20:35

## 2024-11-01 RX ADMIN — LEVOTHYROXINE SODIUM 150 MCG: 0.15 TABLET ORAL at 08:39

## 2024-11-01 RX ADMIN — TORSEMIDE 20 MG: 20 TABLET ORAL at 13:16

## 2024-11-01 RX ADMIN — TAMSULOSIN HYDROCHLORIDE 0.4 MG: 0.4 CAPSULE ORAL at 08:39

## 2024-11-01 RX ADMIN — SODIUM CHLORIDE, PRESERVATIVE FREE 10 ML: 5 INJECTION INTRAVENOUS at 08:39

## 2024-11-01 RX ADMIN — INSULIN LISPRO 2 UNITS: 100 INJECTION, SOLUTION INTRAVENOUS; SUBCUTANEOUS at 11:25

## 2024-11-01 ASSESSMENT — PAIN DESCRIPTION - ORIENTATION: ORIENTATION: POSTERIOR

## 2024-11-01 ASSESSMENT — PAIN SCALES - GENERAL
PAINLEVEL_OUTOF10: 3
PAINLEVEL_OUTOF10: 0

## 2024-11-01 ASSESSMENT — PAIN DESCRIPTION - LOCATION: LOCATION: HEAD

## 2024-11-01 ASSESSMENT — PAIN DESCRIPTION - ONSET: ONSET: GRADUAL

## 2024-11-01 ASSESSMENT — PAIN - FUNCTIONAL ASSESSMENT: PAIN_FUNCTIONAL_ASSESSMENT: ACTIVITIES ARE NOT PREVENTED

## 2024-11-01 ASSESSMENT — PAIN DESCRIPTION - PAIN TYPE: TYPE: ACUTE PAIN

## 2024-11-01 ASSESSMENT — PAIN DESCRIPTION - DESCRIPTORS: DESCRIPTORS: SORE;ACHING

## 2024-11-01 ASSESSMENT — PAIN DESCRIPTION - FREQUENCY: FREQUENCY: INTERMITTENT

## 2024-11-02 ENCOUNTER — APPOINTMENT (OUTPATIENT)
Dept: ULTRASOUND IMAGING | Age: 84
DRG: 683 | End: 2024-11-02
Payer: MEDICARE

## 2024-11-02 LAB
ANION GAP SERPL CALC-SCNC: 10 MEQ/L (ref 8–16)
BACTERIA: ABNORMAL
BASOPHILS ABSOLUTE: 0 THOU/MM3 (ref 0–0.1)
BASOPHILS NFR BLD AUTO: 0.4 %
BILIRUB UR QL STRIP: NEGATIVE
BUN SERPL-MCNC: 39 MG/DL (ref 7–22)
CALCIUM SERPL-MCNC: 8.4 MG/DL (ref 8.5–10.5)
CALCIUM UR-MCNC: < 0.8 MG/DL
CASTS #/AREA URNS LPF: ABNORMAL /LPF
CASTS #/AREA URNS LPF: ABNORMAL /LPF
CHARACTER UR: CLEAR
CHARCOAL URNS QL MICRO: ABNORMAL
CHLORIDE SERPL-SCNC: 105 MEQ/L (ref 98–111)
CO2 SERPL-SCNC: 25 MEQ/L (ref 23–33)
COLOR UR: YELLOW
CREAT SERPL-MCNC: 1.8 MG/DL (ref 0.4–1.2)
CRYSTALS URNS QL MICRO: ABNORMAL
DEPRECATED RDW RBC AUTO: 59.2 FL (ref 35–45)
EOSINOPHIL NFR BLD AUTO: 5.5 %
EOSINOPHILS ABSOLUTE: 0.3 THOU/MM3 (ref 0–0.4)
EPITHELIAL CELLS, UA: ABNORMAL /HPF
ERYTHROCYTE [DISTWIDTH] IN BLOOD BY AUTOMATED COUNT: 17.2 % (ref 11.5–14.5)
GFR SERPL CREATININE-BSD FRML MDRD: 37 ML/MIN/1.73M2
GLUCOSE BLD STRIP.AUTO-MCNC: 172 MG/DL (ref 70–108)
GLUCOSE BLD STRIP.AUTO-MCNC: 238 MG/DL (ref 70–108)
GLUCOSE BLD STRIP.AUTO-MCNC: 261 MG/DL (ref 70–108)
GLUCOSE BLD STRIP.AUTO-MCNC: 266 MG/DL (ref 70–108)
GLUCOSE SERPL-MCNC: 158 MG/DL (ref 70–108)
GLUCOSE UR QL STRIP.AUTO: 100 MG/DL
HCT VFR BLD AUTO: 29.2 % (ref 42–52)
HGB BLD-MCNC: 9 GM/DL (ref 14–18)
HGB UR QL STRIP.AUTO: NEGATIVE
IMM GRANULOCYTES # BLD AUTO: 0.02 THOU/MM3 (ref 0–0.07)
IMM GRANULOCYTES NFR BLD AUTO: 0.4 %
KETONES UR QL STRIP.AUTO: NEGATIVE
LEUKOCYTE ESTERASE UR QL STRIP.AUTO: NEGATIVE
LYMPHOCYTES ABSOLUTE: 2.7 THOU/MM3 (ref 1–4.8)
LYMPHOCYTES NFR BLD AUTO: 46.5 %
MAGNESIUM UR-MCNC: 6.6 MG/DL
MCH RBC QN AUTO: 28.9 PG (ref 26–33)
MCHC RBC AUTO-ENTMCNC: 30.8 GM/DL (ref 32.2–35.5)
MCV RBC AUTO: 93.9 FL (ref 80–94)
MONOCYTES ABSOLUTE: 0.5 THOU/MM3 (ref 0.4–1.3)
MONOCYTES NFR BLD AUTO: 8.1 %
NEUTROPHILS ABSOLUTE: 2.2 THOU/MM3 (ref 1.8–7.7)
NEUTROPHILS NFR BLD AUTO: 39.1 %
NITRITE UR QL STRIP.AUTO: NEGATIVE
NRBC BLD AUTO-RTO: 0 /100 WBC
ORIGINAL SAMPLE NUMBER: NORMAL
OSMOLALITY UR: NORMAL MOSMOL/KG (ref 250–750)
PH UR STRIP.AUTO: 5 [PH] (ref 5–9)
PLATELET # BLD AUTO: 144 THOU/MM3 (ref 130–400)
PMV BLD AUTO: 12.3 FL (ref 9.4–12.4)
POTASSIUM SERPL-SCNC: 5.1 MEQ/L (ref 3.5–5.2)
PROT UR STRIP.AUTO-MCNC: NEGATIVE MG/DL
RBC # BLD AUTO: 3.11 MILL/MM3 (ref 4.7–6.1)
RBC #/AREA URNS HPF: ABNORMAL /HPF
REFERENCE LOCATION: NORMAL
REFERENCE RANGE: NORMAL
RENAL EPI CELLS #/AREA URNS HPF: ABNORMAL /[HPF]
SODIUM SERPL-SCNC: 140 MEQ/L (ref 135–145)
SODIUM UR-SCNC: 36 MEQ/L
SPECIFIC GRAVITY UA: 1.02 (ref 1–1.03)
TEST RESULTS WITH UNITS: NORMAL
TEST(S) BEING PERFORMED: NORMAL
UROBILINOGEN, URINE: 0.2 EU/DL (ref 0–1)
UUN 24H UR-MCNC: 734 MG/DL
WBC # BLD AUTO: 5.7 THOU/MM3 (ref 4.8–10.8)
WBC #/AREA URNS HPF: ABNORMAL /HPF
YEAST LIKE FUNGI URNS QL MICRO: ABNORMAL

## 2024-11-02 PROCEDURE — 82340 ASSAY OF CALCIUM IN URINE: CPT

## 2024-11-02 PROCEDURE — 85025 COMPLETE CBC W/AUTO DIFF WBC: CPT

## 2024-11-02 PROCEDURE — 83735 ASSAY OF MAGNESIUM: CPT

## 2024-11-02 PROCEDURE — 76770 US EXAM ABDO BACK WALL COMP: CPT

## 2024-11-02 PROCEDURE — 6370000000 HC RX 637 (ALT 250 FOR IP)

## 2024-11-02 PROCEDURE — 2580000003 HC RX 258

## 2024-11-02 PROCEDURE — 99232 SBSQ HOSP IP/OBS MODERATE 35: CPT | Performed by: STUDENT IN AN ORGANIZED HEALTH CARE EDUCATION/TRAINING PROGRAM

## 2024-11-02 PROCEDURE — 1200000003 HC TELEMETRY R&B

## 2024-11-02 PROCEDURE — 83935 ASSAY OF URINE OSMOLALITY: CPT

## 2024-11-02 PROCEDURE — 80048 BASIC METABOLIC PNL TOTAL CA: CPT

## 2024-11-02 PROCEDURE — 84300 ASSAY OF URINE SODIUM: CPT

## 2024-11-02 PROCEDURE — 84540 ASSAY OF URINE/UREA-N: CPT

## 2024-11-02 PROCEDURE — 82948 REAGENT STRIP/BLOOD GLUCOSE: CPT

## 2024-11-02 PROCEDURE — 81001 URINALYSIS AUTO W/SCOPE: CPT

## 2024-11-02 PROCEDURE — 36415 COLL VENOUS BLD VENIPUNCTURE: CPT

## 2024-11-02 RX ADMIN — INSULIN LISPRO 4 UNITS: 100 INJECTION, SOLUTION INTRAVENOUS; SUBCUTANEOUS at 12:11

## 2024-11-02 RX ADMIN — LEVOTHYROXINE SODIUM 150 MCG: 0.15 TABLET ORAL at 08:20

## 2024-11-02 RX ADMIN — SODIUM ZIRCONIUM CYCLOSILICATE 10 G: 10 POWDER, FOR SUSPENSION ORAL at 08:19

## 2024-11-02 RX ADMIN — INSULIN LISPRO 4 UNITS: 100 INJECTION, SOLUTION INTRAVENOUS; SUBCUTANEOUS at 20:41

## 2024-11-02 RX ADMIN — PANTOPRAZOLE SODIUM 40 MG: 40 TABLET, DELAYED RELEASE ORAL at 06:54

## 2024-11-02 RX ADMIN — METOPROLOL TARTRATE 25 MG: 50 TABLET, FILM COATED ORAL at 20:41

## 2024-11-02 RX ADMIN — SODIUM CHLORIDE, PRESERVATIVE FREE 10 ML: 5 INJECTION INTRAVENOUS at 08:19

## 2024-11-02 RX ADMIN — TAMSULOSIN HYDROCHLORIDE 0.4 MG: 0.4 CAPSULE ORAL at 08:20

## 2024-11-02 RX ADMIN — HYDROMORPHONE HYDROCHLORIDE 2 MG: 2 TABLET ORAL at 03:18

## 2024-11-02 RX ADMIN — SODIUM CHLORIDE, PRESERVATIVE FREE 10 ML: 5 INJECTION INTRAVENOUS at 20:44

## 2024-11-02 RX ADMIN — INSULIN LISPRO 2 UNITS: 100 INJECTION, SOLUTION INTRAVENOUS; SUBCUTANEOUS at 17:19

## 2024-11-02 RX ADMIN — METOPROLOL TARTRATE 25 MG: 50 TABLET, FILM COATED ORAL at 08:19

## 2024-11-02 ASSESSMENT — PAIN SCALES - GENERAL
PAINLEVEL_OUTOF10: 5
PAINLEVEL_OUTOF10: 2

## 2024-11-02 ASSESSMENT — PAIN DESCRIPTION - ORIENTATION: ORIENTATION: POSTERIOR

## 2024-11-02 ASSESSMENT — PAIN DESCRIPTION - ONSET: ONSET: ON-GOING

## 2024-11-02 ASSESSMENT — PAIN DESCRIPTION - PAIN TYPE: TYPE: ACUTE PAIN

## 2024-11-02 ASSESSMENT — PAIN DESCRIPTION - FREQUENCY: FREQUENCY: INTERMITTENT

## 2024-11-02 ASSESSMENT — PAIN DESCRIPTION - LOCATION: LOCATION: HEAD

## 2024-11-02 ASSESSMENT — PAIN DESCRIPTION - DESCRIPTORS: DESCRIPTORS: ACHING;SORE

## 2024-11-02 ASSESSMENT — PAIN - FUNCTIONAL ASSESSMENT: PAIN_FUNCTIONAL_ASSESSMENT: ACTIVITIES ARE NOT PREVENTED

## 2024-11-03 LAB
ANION GAP SERPL CALC-SCNC: 10 MEQ/L (ref 8–16)
BASOPHILS ABSOLUTE: 0 THOU/MM3 (ref 0–0.1)
BASOPHILS NFR BLD AUTO: 0.7 %
BUN SERPL-MCNC: 45 MG/DL (ref 7–22)
CALCIUM SERPL-MCNC: 8.3 MG/DL (ref 8.5–10.5)
CHLORIDE SERPL-SCNC: 105 MEQ/L (ref 98–111)
CO2 SERPL-SCNC: 24 MEQ/L (ref 23–33)
CREAT SERPL-MCNC: 1.9 MG/DL (ref 0.4–1.2)
CREAT UR-MCNC: 89.4 MG/DL
DEPRECATED RDW RBC AUTO: 58.2 FL (ref 35–45)
EOSINOPHIL NFR BLD AUTO: 4.7 %
EOSINOPHIL SMEAR, URINE: NORMAL
EOSINOPHILS ABSOLUTE: 0.3 THOU/MM3 (ref 0–0.4)
ERYTHROCYTE [DISTWIDTH] IN BLOOD BY AUTOMATED COUNT: 16.8 % (ref 11.5–14.5)
GFR SERPL CREATININE-BSD FRML MDRD: 34 ML/MIN/1.73M2
GLUCOSE BLD STRIP.AUTO-MCNC: 162 MG/DL (ref 70–108)
GLUCOSE BLD STRIP.AUTO-MCNC: 208 MG/DL (ref 70–108)
GLUCOSE BLD STRIP.AUTO-MCNC: 288 MG/DL (ref 70–108)
GLUCOSE BLD STRIP.AUTO-MCNC: 327 MG/DL (ref 70–108)
GLUCOSE SERPL-MCNC: 205 MG/DL (ref 70–108)
HCT VFR BLD AUTO: 31 % (ref 42–52)
HGB BLD-MCNC: 9.5 GM/DL (ref 14–18)
IMM GRANULOCYTES # BLD AUTO: 0.02 THOU/MM3 (ref 0–0.07)
IMM GRANULOCYTES NFR BLD AUTO: 0.3 %
LYMPHOCYTES ABSOLUTE: 2.1 THOU/MM3 (ref 1–4.8)
LYMPHOCYTES NFR BLD AUTO: 36.8 %
MCH RBC QN AUTO: 29.1 PG (ref 26–33)
MCHC RBC AUTO-ENTMCNC: 30.6 GM/DL (ref 32.2–35.5)
MCV RBC AUTO: 95.1 FL (ref 80–94)
MONOCYTES ABSOLUTE: 0.5 THOU/MM3 (ref 0.4–1.3)
MONOCYTES NFR BLD AUTO: 8.7 %
NEUTROPHILS ABSOLUTE: 2.8 THOU/MM3 (ref 1.8–7.7)
NEUTROPHILS NFR BLD AUTO: 48.8 %
NRBC BLD AUTO-RTO: 0 /100 WBC
PLATELET # BLD AUTO: 153 THOU/MM3 (ref 130–400)
PMV BLD AUTO: 12.6 FL (ref 9.4–12.4)
POTASSIUM SERPL-SCNC: 5.4 MEQ/L (ref 3.5–5.2)
PROT UR-MCNC: 11.2 MG/DL
PROT/CREAT 24H UR: 0.13 MG/G{CREAT}
RBC # BLD AUTO: 3.26 MILL/MM3 (ref 4.7–6.1)
SODIUM SERPL-SCNC: 139 MEQ/L (ref 135–145)
WBC # BLD AUTO: 5.8 THOU/MM3 (ref 4.8–10.8)

## 2024-11-03 PROCEDURE — 80048 BASIC METABOLIC PNL TOTAL CA: CPT

## 2024-11-03 PROCEDURE — 99222 1ST HOSP IP/OBS MODERATE 55: CPT | Performed by: INTERNAL MEDICINE

## 2024-11-03 PROCEDURE — 82948 REAGENT STRIP/BLOOD GLUCOSE: CPT

## 2024-11-03 PROCEDURE — 2580000003 HC RX 258: Performed by: INTERNAL MEDICINE

## 2024-11-03 PROCEDURE — 84156 ASSAY OF PROTEIN URINE: CPT

## 2024-11-03 PROCEDURE — 85025 COMPLETE CBC W/AUTO DIFF WBC: CPT

## 2024-11-03 PROCEDURE — 36415 COLL VENOUS BLD VENIPUNCTURE: CPT

## 2024-11-03 PROCEDURE — 6370000000 HC RX 637 (ALT 250 FOR IP)

## 2024-11-03 PROCEDURE — 99232 SBSQ HOSP IP/OBS MODERATE 35: CPT | Performed by: STUDENT IN AN ORGANIZED HEALTH CARE EDUCATION/TRAINING PROGRAM

## 2024-11-03 PROCEDURE — 82570 ASSAY OF URINE CREATININE: CPT

## 2024-11-03 PROCEDURE — 6370000000 HC RX 637 (ALT 250 FOR IP): Performed by: STUDENT IN AN ORGANIZED HEALTH CARE EDUCATION/TRAINING PROGRAM

## 2024-11-03 PROCEDURE — 51798 US URINE CAPACITY MEASURE: CPT

## 2024-11-03 PROCEDURE — 2580000003 HC RX 258

## 2024-11-03 PROCEDURE — 6370000000 HC RX 637 (ALT 250 FOR IP): Performed by: INTERNAL MEDICINE

## 2024-11-03 PROCEDURE — 89190 NASAL SMEAR FOR EOSINOPHILS: CPT

## 2024-11-03 PROCEDURE — 1200000000 HC SEMI PRIVATE

## 2024-11-03 RX ORDER — GLUCAGON 1 MG/ML
1 KIT INJECTION PRN
Status: DISCONTINUED | OUTPATIENT
Start: 2024-11-03 | End: 2024-11-03 | Stop reason: SDUPTHER

## 2024-11-03 RX ORDER — SODIUM POLYSTYRENE SULFONATE 15 G/60ML
30 SUSPENSION ORAL; RECTAL ONCE
Status: DISCONTINUED | OUTPATIENT
Start: 2024-11-03 | End: 2024-11-03

## 2024-11-03 RX ORDER — SODIUM CHLORIDE 9 MG/ML
INJECTION, SOLUTION INTRAVENOUS CONTINUOUS
Status: ACTIVE | OUTPATIENT
Start: 2024-11-03 | End: 2024-11-04

## 2024-11-03 RX ORDER — DEXTROSE MONOHYDRATE 100 MG/ML
INJECTION, SOLUTION INTRAVENOUS CONTINUOUS PRN
Status: DISCONTINUED | OUTPATIENT
Start: 2024-11-03 | End: 2024-11-03 | Stop reason: SDUPTHER

## 2024-11-03 RX ADMIN — INSULIN LISPRO 2 UNITS: 100 INJECTION, SOLUTION INTRAVENOUS; SUBCUTANEOUS at 07:48

## 2024-11-03 RX ADMIN — SODIUM ZIRCONIUM CYCLOSILICATE 10 G: 10 POWDER, FOR SUSPENSION ORAL at 09:30

## 2024-11-03 RX ADMIN — PANTOPRAZOLE SODIUM 40 MG: 40 TABLET, DELAYED RELEASE ORAL at 05:22

## 2024-11-03 RX ADMIN — INSULIN LISPRO 4 UNITS: 100 INJECTION, SOLUTION INTRAVENOUS; SUBCUTANEOUS at 11:01

## 2024-11-03 RX ADMIN — METOPROLOL TARTRATE 25 MG: 50 TABLET, FILM COATED ORAL at 23:48

## 2024-11-03 RX ADMIN — LEVOTHYROXINE SODIUM 150 MCG: 0.15 TABLET ORAL at 05:22

## 2024-11-03 RX ADMIN — LORAZEPAM 0.5 MG: 1 TABLET ORAL at 05:22

## 2024-11-03 RX ADMIN — SODIUM CHLORIDE: 9 INJECTION, SOLUTION INTRAVENOUS at 12:15

## 2024-11-03 RX ADMIN — INSULIN HUMAN 10 UNITS: 100 INJECTION, SOLUTION PARENTERAL at 12:11

## 2024-11-03 RX ADMIN — TAMSULOSIN HYDROCHLORIDE 0.4 MG: 0.4 CAPSULE ORAL at 07:48

## 2024-11-03 RX ADMIN — INSULIN LISPRO 6 UNITS: 100 INJECTION, SOLUTION INTRAVENOUS; SUBCUTANEOUS at 20:45

## 2024-11-03 RX ADMIN — LORAZEPAM 0.5 MG: 1 TABLET ORAL at 20:44

## 2024-11-03 RX ADMIN — SODIUM CHLORIDE, PRESERVATIVE FREE 10 ML: 5 INJECTION INTRAVENOUS at 07:49

## 2024-11-03 RX ADMIN — METOPROLOL TARTRATE 25 MG: 50 TABLET, FILM COATED ORAL at 07:48

## 2024-11-03 RX ADMIN — ACETAMINOPHEN 650 MG: 325 TABLET ORAL at 16:04

## 2024-11-03 RX ADMIN — LORAZEPAM 0.5 MG: 1 TABLET ORAL at 07:48

## 2024-11-03 ASSESSMENT — PAIN SCALES - GENERAL
PAINLEVEL_OUTOF10: 0

## 2024-11-03 NOTE — CONSULTS
[facility]    [address], [place] [address]  [phone]     Hospital Consult  11/3/2024 11:54 AM    Pt Name:    Leo Nevarez  MRN:     [id number]   [id number]  YOB: 1940  Admit Date:    10/31/2024  2:04 PM  Primary Care Physician:  Aileen Valadez, RELL - CNP        Reason for Consult:  NAVNEET/CKD III    History:   The patient is a 84 y.o. male seen in renal consult for NAVNEET. He has underlying CKD III and follows with Dr. Hicks, baseline creatinine around 1.1-1.3 mg/dL. Has hx of DM, BPH/TURP,  HFmrEF 45-50%,and as below. He was sent from CHF clinic for NAVNEET.  Patient was hospitalized in August for CHF.  Was diuresed and discharged on torsemide 20 mg daily.  No labs were checked after that hospitalization. He saw CHF clinic mid October who increased torsemide to 40 mg daily and added kcl.  Repeat labs were done a few weeks after that change which showed creatinine of 1.7 and K of 5.9 so he was instructed to go to ED.  Since his stay his renal function has not improved. Today creatinine is 1.9. intermittently hyperkalemic.  He has been encouraged to increase his oral fluid intake but has not been on any IV fluids.  His torsemide has been held except for 11/1 he did get a 20 mg dose. He denies shortness of breath. Has intermittent leg swelling which he states is doing well for now.  Renal US was normal.  Urinalysis bland.     Past Medical History:  Past Medical History:   Diagnosis Date    Chronic kidney disease     Diabetes mellitus (HCC)     Hyperlipidemia     Hypertension        Past Surgical History:  Past Surgical History:   Procedure Laterality Date    BLADDER SCAN  11/11/2023    BLADDER SCAN  11/13/2023    CARDIAC PROCEDURE Left 11/20/2023    Left heart cath / coronary angiography performed by Javier Brothers MD at [facility] CARDIAC CATH LAB    HC BLADDER SCAN  11/11/2023    SHOULDER ARTHROPLASTY Left     TURP N/A 2/5/2024    Cystoscopy Greenlight Photovaporization of

## 2024-11-04 LAB
ANION GAP SERPL CALC-SCNC: 15 MEQ/L (ref 8–16)
BASOPHILS ABSOLUTE: 0 THOU/MM3 (ref 0–0.1)
BASOPHILS NFR BLD AUTO: 0.6 %
BUN SERPL-MCNC: 48 MG/DL (ref 7–22)
CALCIUM SERPL-MCNC: 8.2 MG/DL (ref 8.5–10.5)
CHLORIDE SERPL-SCNC: 105 MEQ/L (ref 98–111)
CO2 SERPL-SCNC: 20 MEQ/L (ref 23–33)
CREAT SERPL-MCNC: 1.8 MG/DL (ref 0.4–1.2)
DEPRECATED RDW RBC AUTO: 58.3 FL (ref 35–45)
EOSINOPHIL NFR BLD AUTO: 5.1 %
EOSINOPHILS ABSOLUTE: 0.2 THOU/MM3 (ref 0–0.4)
ERYTHROCYTE [DISTWIDTH] IN BLOOD BY AUTOMATED COUNT: 16.9 % (ref 11.5–14.5)
FERRITIN SERPL IA-MCNC: 91 NG/ML (ref 22–322)
FREE KAPPA/LAMBDA RATIO: 0.15 (ref 0.22–1.74)
GFR SERPL CREATININE-BSD FRML MDRD: 37 ML/MIN/1.73M2
GLUCOSE BLD STRIP.AUTO-MCNC: 205 MG/DL (ref 70–108)
GLUCOSE BLD STRIP.AUTO-MCNC: 212 MG/DL (ref 70–108)
GLUCOSE BLD STRIP.AUTO-MCNC: 240 MG/DL (ref 70–108)
GLUCOSE BLD STRIP.AUTO-MCNC: 260 MG/DL (ref 70–108)
GLUCOSE SERPL-MCNC: 217 MG/DL (ref 70–108)
HCT VFR BLD AUTO: 26.7 % (ref 42–52)
HGB BLD-MCNC: 8.3 GM/DL (ref 14–18)
IMM GRANULOCYTES # BLD AUTO: 0.02 THOU/MM3 (ref 0–0.07)
IMM GRANULOCYTES NFR BLD AUTO: 0.4 %
IRON SATN MFR SERPL: 22 % (ref 20–50)
IRON SERPL-MCNC: 44 UG/DL (ref 65–195)
KAPPA LC FREE SER-MCNC: 55.9 MG/L
LAMBDA LC FREE SERPL-MCNC: 378 MG/L (ref 4.2–27.7)
LYMPHOCYTES ABSOLUTE: 1.9 THOU/MM3 (ref 1–4.8)
LYMPHOCYTES NFR BLD AUTO: 39.7 %
MCH RBC QN AUTO: 29.3 PG (ref 26–33)
MCHC RBC AUTO-ENTMCNC: 31.1 GM/DL (ref 32.2–35.5)
MCV RBC AUTO: 94.3 FL (ref 80–94)
MONOCYTES ABSOLUTE: 0.4 THOU/MM3 (ref 0.4–1.3)
MONOCYTES NFR BLD AUTO: 8.2 %
NEUTROPHILS ABSOLUTE: 2.3 THOU/MM3 (ref 1.8–7.7)
NEUTROPHILS NFR BLD AUTO: 46 %
NRBC BLD AUTO-RTO: 0 /100 WBC
PLATELET # BLD AUTO: 140 THOU/MM3 (ref 130–400)
PMV BLD AUTO: 12 FL (ref 9.4–12.4)
POTASSIUM SERPL-SCNC: 4.8 MEQ/L (ref 3.5–5.2)
RBC # BLD AUTO: 2.83 MILL/MM3 (ref 4.7–6.1)
SODIUM SERPL-SCNC: 140 MEQ/L (ref 135–145)
TIBC SERPL-MCNC: 197 UG/DL (ref 171–450)
WBC # BLD AUTO: 4.9 THOU/MM3 (ref 4.8–10.8)

## 2024-11-04 PROCEDURE — 83540 ASSAY OF IRON: CPT

## 2024-11-04 PROCEDURE — 1200000000 HC SEMI PRIVATE

## 2024-11-04 PROCEDURE — 82948 REAGENT STRIP/BLOOD GLUCOSE: CPT

## 2024-11-04 PROCEDURE — 83883 ASSAY NEPHELOMETRY NOT SPEC: CPT

## 2024-11-04 PROCEDURE — 84165 PROTEIN E-PHORESIS SERUM: CPT

## 2024-11-04 PROCEDURE — 6370000000 HC RX 637 (ALT 250 FOR IP)

## 2024-11-04 PROCEDURE — 84155 ASSAY OF PROTEIN SERUM: CPT

## 2024-11-04 PROCEDURE — 99232 SBSQ HOSP IP/OBS MODERATE 35: CPT | Performed by: PHYSICIAN ASSISTANT

## 2024-11-04 PROCEDURE — 80048 BASIC METABOLIC PNL TOTAL CA: CPT

## 2024-11-04 PROCEDURE — 85025 COMPLETE CBC W/AUTO DIFF WBC: CPT

## 2024-11-04 PROCEDURE — 82728 ASSAY OF FERRITIN: CPT

## 2024-11-04 PROCEDURE — 83550 IRON BINDING TEST: CPT

## 2024-11-04 PROCEDURE — 36415 COLL VENOUS BLD VENIPUNCTURE: CPT

## 2024-11-04 PROCEDURE — 99232 SBSQ HOSP IP/OBS MODERATE 35: CPT | Performed by: INTERNAL MEDICINE

## 2024-11-04 RX ADMIN — HYDROMORPHONE HYDROCHLORIDE 2 MG: 2 TABLET ORAL at 03:20

## 2024-11-04 RX ADMIN — LEVOTHYROXINE SODIUM 150 MCG: 0.15 TABLET ORAL at 06:54

## 2024-11-04 RX ADMIN — INSULIN LISPRO 2 UNITS: 100 INJECTION, SOLUTION INTRAVENOUS; SUBCUTANEOUS at 16:34

## 2024-11-04 RX ADMIN — METOPROLOL TARTRATE 25 MG: 50 TABLET, FILM COATED ORAL at 08:03

## 2024-11-04 RX ADMIN — INSULIN LISPRO 2 UNITS: 100 INJECTION, SOLUTION INTRAVENOUS; SUBCUTANEOUS at 06:54

## 2024-11-04 RX ADMIN — PANTOPRAZOLE SODIUM 40 MG: 40 TABLET, DELAYED RELEASE ORAL at 06:54

## 2024-11-04 RX ADMIN — INSULIN LISPRO 2 UNITS: 100 INJECTION, SOLUTION INTRAVENOUS; SUBCUTANEOUS at 20:47

## 2024-11-04 RX ADMIN — INSULIN LISPRO 4 UNITS: 100 INJECTION, SOLUTION INTRAVENOUS; SUBCUTANEOUS at 11:44

## 2024-11-04 RX ADMIN — TAMSULOSIN HYDROCHLORIDE 0.4 MG: 0.4 CAPSULE ORAL at 08:03

## 2024-11-04 ASSESSMENT — PAIN SCALES - GENERAL
PAINLEVEL_OUTOF10: 5
PAINLEVEL_OUTOF10: 0
PAINLEVEL_OUTOF10: 5

## 2024-11-04 ASSESSMENT — PAIN DESCRIPTION - LOCATION: LOCATION: ABDOMEN

## 2024-11-05 LAB
ANION GAP SERPL CALC-SCNC: 11 MEQ/L (ref 8–16)
BASOPHILS ABSOLUTE: 0 THOU/MM3 (ref 0–0.1)
BASOPHILS NFR BLD AUTO: 0.5 %
BUN SERPL-MCNC: 49 MG/DL (ref 7–22)
CALCIUM SERPL-MCNC: 8.1 MG/DL (ref 8.5–10.5)
CHLORIDE SERPL-SCNC: 106 MEQ/L (ref 98–111)
CO2 SERPL-SCNC: 21 MEQ/L (ref 23–33)
CREAT SERPL-MCNC: 2.1 MG/DL (ref 0.4–1.2)
DEPRECATED RDW RBC AUTO: 59.4 FL (ref 35–45)
EOSINOPHIL NFR BLD AUTO: 4.6 %
EOSINOPHILS ABSOLUTE: 0.3 THOU/MM3 (ref 0–0.4)
ERYTHROCYTE [DISTWIDTH] IN BLOOD BY AUTOMATED COUNT: 17.2 % (ref 11.5–14.5)
GFR SERPL CREATININE-BSD FRML MDRD: 31 ML/MIN/1.73M2
GLUCOSE BLD STRIP.AUTO-MCNC: 226 MG/DL (ref 70–108)
GLUCOSE BLD STRIP.AUTO-MCNC: 252 MG/DL (ref 70–108)
GLUCOSE BLD STRIP.AUTO-MCNC: 261 MG/DL (ref 70–108)
GLUCOSE BLD STRIP.AUTO-MCNC: 307 MG/DL (ref 70–108)
GLUCOSE SERPL-MCNC: 217 MG/DL (ref 70–108)
HCT VFR BLD AUTO: 27.8 % (ref 42–52)
HGB BLD-MCNC: 8.5 GM/DL (ref 14–18)
IMM GRANULOCYTES # BLD AUTO: 0.03 THOU/MM3 (ref 0–0.07)
IMM GRANULOCYTES NFR BLD AUTO: 0.5 %
LYMPHOCYTES ABSOLUTE: 2.3 THOU/MM3 (ref 1–4.8)
LYMPHOCYTES NFR BLD AUTO: 36.9 %
MCH RBC QN AUTO: 29.1 PG (ref 26–33)
MCHC RBC AUTO-ENTMCNC: 30.6 GM/DL (ref 32.2–35.5)
MCV RBC AUTO: 95.2 FL (ref 80–94)
MONOCYTES ABSOLUTE: 0.5 THOU/MM3 (ref 0.4–1.3)
MONOCYTES NFR BLD AUTO: 9 %
NEUTROPHILS ABSOLUTE: 3 THOU/MM3 (ref 1.8–7.7)
NEUTROPHILS NFR BLD AUTO: 48.5 %
NRBC BLD AUTO-RTO: 0 /100 WBC
PLATELET # BLD AUTO: 139 THOU/MM3 (ref 130–400)
PMV BLD AUTO: 12.3 FL (ref 9.4–12.4)
POTASSIUM SERPL-SCNC: 4.8 MEQ/L (ref 3.5–5.2)
RBC # BLD AUTO: 2.92 MILL/MM3 (ref 4.7–6.1)
SODIUM SERPL-SCNC: 138 MEQ/L (ref 135–145)
WBC # BLD AUTO: 6.1 THOU/MM3 (ref 4.8–10.8)

## 2024-11-05 PROCEDURE — 99232 SBSQ HOSP IP/OBS MODERATE 35: CPT | Performed by: INTERNAL MEDICINE

## 2024-11-05 PROCEDURE — 80048 BASIC METABOLIC PNL TOTAL CA: CPT

## 2024-11-05 PROCEDURE — 6370000000 HC RX 637 (ALT 250 FOR IP)

## 2024-11-05 PROCEDURE — 1200000000 HC SEMI PRIVATE

## 2024-11-05 PROCEDURE — 82948 REAGENT STRIP/BLOOD GLUCOSE: CPT

## 2024-11-05 PROCEDURE — 85025 COMPLETE CBC W/AUTO DIFF WBC: CPT

## 2024-11-05 PROCEDURE — 36415 COLL VENOUS BLD VENIPUNCTURE: CPT

## 2024-11-05 RX ADMIN — PANTOPRAZOLE SODIUM 40 MG: 40 TABLET, DELAYED RELEASE ORAL at 06:33

## 2024-11-05 RX ADMIN — LEVOTHYROXINE SODIUM 150 MCG: 0.15 TABLET ORAL at 06:33

## 2024-11-05 RX ADMIN — INSULIN LISPRO 6 UNITS: 100 INJECTION, SOLUTION INTRAVENOUS; SUBCUTANEOUS at 19:41

## 2024-11-05 RX ADMIN — INSULIN LISPRO 2 UNITS: 100 INJECTION, SOLUTION INTRAVENOUS; SUBCUTANEOUS at 07:42

## 2024-11-05 RX ADMIN — INSULIN LISPRO 4 UNITS: 100 INJECTION, SOLUTION INTRAVENOUS; SUBCUTANEOUS at 11:43

## 2024-11-05 RX ADMIN — METOPROLOL TARTRATE 25 MG: 50 TABLET, FILM COATED ORAL at 19:42

## 2024-11-05 RX ADMIN — METOPROLOL TARTRATE 25 MG: 50 TABLET, FILM COATED ORAL at 07:43

## 2024-11-05 RX ADMIN — TAMSULOSIN HYDROCHLORIDE 0.4 MG: 0.4 CAPSULE ORAL at 07:43

## 2024-11-05 RX ADMIN — INSULIN LISPRO 4 UNITS: 100 INJECTION, SOLUTION INTRAVENOUS; SUBCUTANEOUS at 16:32

## 2024-11-05 ASSESSMENT — PAIN SCALES - GENERAL: PAINLEVEL_OUTOF10: 0

## 2024-11-06 PROBLEM — E87.20 METABOLIC ACIDOSIS: Status: ACTIVE | Noted: 2024-11-06

## 2024-11-06 LAB
ANION GAP SERPL CALC-SCNC: 8 MEQ/L (ref 8–16)
BASOPHILS ABSOLUTE: 0 THOU/MM3 (ref 0–0.1)
BASOPHILS NFR BLD AUTO: 0.5 %
BUN SERPL-MCNC: 52 MG/DL (ref 7–22)
CALCIUM SERPL-MCNC: 8.2 MG/DL (ref 8.5–10.5)
CHLORIDE SERPL-SCNC: 109 MEQ/L (ref 98–111)
CO2 SERPL-SCNC: 21 MEQ/L (ref 23–33)
CREAT SERPL-MCNC: 1.8 MG/DL (ref 0.4–1.2)
DEPRECATED RDW RBC AUTO: 61.1 FL (ref 35–45)
EOSINOPHIL NFR BLD AUTO: 5.6 %
EOSINOPHILS ABSOLUTE: 0.3 THOU/MM3 (ref 0–0.4)
ERYTHROCYTE [DISTWIDTH] IN BLOOD BY AUTOMATED COUNT: 17.3 % (ref 11.5–14.5)
GFR SERPL CREATININE-BSD FRML MDRD: 37 ML/MIN/1.73M2
GLUCOSE BLD STRIP.AUTO-MCNC: 177 MG/DL (ref 70–108)
GLUCOSE BLD STRIP.AUTO-MCNC: 220 MG/DL (ref 70–108)
GLUCOSE BLD STRIP.AUTO-MCNC: 301 MG/DL (ref 70–108)
GLUCOSE BLD STRIP.AUTO-MCNC: 324 MG/DL (ref 70–108)
GLUCOSE SERPL-MCNC: 208 MG/DL (ref 70–108)
HCT VFR BLD AUTO: 28.2 % (ref 42–52)
HGB BLD-MCNC: 8.6 GM/DL (ref 14–18)
IMM GRANULOCYTES # BLD AUTO: 0.02 THOU/MM3 (ref 0–0.07)
IMM GRANULOCYTES NFR BLD AUTO: 0.4 %
LYMPHOCYTES ABSOLUTE: 2.4 THOU/MM3 (ref 1–4.8)
LYMPHOCYTES NFR BLD AUTO: 41.4 %
MCH RBC QN AUTO: 29.3 PG (ref 26–33)
MCHC RBC AUTO-ENTMCNC: 30.5 GM/DL (ref 32.2–35.5)
MCV RBC AUTO: 95.9 FL (ref 80–94)
MONOCYTES ABSOLUTE: 0.5 THOU/MM3 (ref 0.4–1.3)
MONOCYTES NFR BLD AUTO: 9.3 %
NEUTROPHILS ABSOLUTE: 2.4 THOU/MM3 (ref 1.8–7.7)
NEUTROPHILS NFR BLD AUTO: 42.8 %
NRBC BLD AUTO-RTO: 0 /100 WBC
PLATELET # BLD AUTO: 137 THOU/MM3 (ref 130–400)
PMV BLD AUTO: 12.4 FL (ref 9.4–12.4)
POTASSIUM SERPL-SCNC: 4.8 MEQ/L (ref 3.5–5.2)
PROTEIN ELECTROPHORESIS, SERUM: NORMAL
RBC # BLD AUTO: 2.94 MILL/MM3 (ref 4.7–6.1)
SODIUM SERPL-SCNC: 138 MEQ/L (ref 135–145)
WBC # BLD AUTO: 5.7 THOU/MM3 (ref 4.8–10.8)

## 2024-11-06 PROCEDURE — 6370000000 HC RX 637 (ALT 250 FOR IP)

## 2024-11-06 PROCEDURE — 85025 COMPLETE CBC W/AUTO DIFF WBC: CPT

## 2024-11-06 PROCEDURE — 1200000000 HC SEMI PRIVATE

## 2024-11-06 PROCEDURE — 99232 SBSQ HOSP IP/OBS MODERATE 35: CPT | Performed by: INTERNAL MEDICINE

## 2024-11-06 PROCEDURE — 36415 COLL VENOUS BLD VENIPUNCTURE: CPT

## 2024-11-06 PROCEDURE — 82948 REAGENT STRIP/BLOOD GLUCOSE: CPT

## 2024-11-06 PROCEDURE — 80048 BASIC METABOLIC PNL TOTAL CA: CPT

## 2024-11-06 PROCEDURE — 99232 SBSQ HOSP IP/OBS MODERATE 35: CPT | Performed by: PHYSICIAN ASSISTANT

## 2024-11-06 RX ADMIN — INSULIN LISPRO 6 UNITS: 100 INJECTION, SOLUTION INTRAVENOUS; SUBCUTANEOUS at 11:42

## 2024-11-06 RX ADMIN — LEVOTHYROXINE SODIUM 150 MCG: 0.15 TABLET ORAL at 06:45

## 2024-11-06 RX ADMIN — METOPROLOL TARTRATE 25 MG: 50 TABLET, FILM COATED ORAL at 07:32

## 2024-11-06 RX ADMIN — TAMSULOSIN HYDROCHLORIDE 0.4 MG: 0.4 CAPSULE ORAL at 07:32

## 2024-11-06 RX ADMIN — PANTOPRAZOLE SODIUM 40 MG: 40 TABLET, DELAYED RELEASE ORAL at 06:45

## 2024-11-06 RX ADMIN — INSULIN LISPRO 6 UNITS: 100 INJECTION, SOLUTION INTRAVENOUS; SUBCUTANEOUS at 21:29

## 2024-11-06 RX ADMIN — INSULIN LISPRO 2 UNITS: 100 INJECTION, SOLUTION INTRAVENOUS; SUBCUTANEOUS at 07:51

## 2024-11-07 LAB
ANION GAP SERPL CALC-SCNC: 8 MEQ/L (ref 8–16)
BUN SERPL-MCNC: 50 MG/DL (ref 7–22)
CALCIUM SERPL-MCNC: 8.5 MG/DL (ref 8.5–10.5)
CHLORIDE SERPL-SCNC: 107 MEQ/L (ref 98–111)
CO2 SERPL-SCNC: 23 MEQ/L (ref 23–33)
CREAT SERPL-MCNC: 1.7 MG/DL (ref 0.4–1.2)
DEPRECATED RDW RBC AUTO: 59.7 FL (ref 35–45)
ERYTHROCYTE [DISTWIDTH] IN BLOOD BY AUTOMATED COUNT: 17.4 % (ref 11.5–14.5)
GFR SERPL CREATININE-BSD FRML MDRD: 39 ML/MIN/1.73M2
GLUCOSE BLD STRIP.AUTO-MCNC: 220 MG/DL (ref 70–108)
GLUCOSE BLD STRIP.AUTO-MCNC: 248 MG/DL (ref 70–108)
GLUCOSE BLD STRIP.AUTO-MCNC: 269 MG/DL (ref 70–108)
GLUCOSE BLD STRIP.AUTO-MCNC: 334 MG/DL (ref 70–108)
GLUCOSE SERPL-MCNC: 249 MG/DL (ref 70–108)
HCT VFR BLD AUTO: 28.7 % (ref 42–52)
HGB BLD-MCNC: 9 GM/DL (ref 14–18)
MAGNESIUM SERPL-MCNC: 2.3 MG/DL (ref 1.6–2.4)
MCH RBC QN AUTO: 29.2 PG (ref 26–33)
MCHC RBC AUTO-ENTMCNC: 31.4 GM/DL (ref 32.2–35.5)
MCV RBC AUTO: 93.2 FL (ref 80–94)
PHOSPHATE SERPL-MCNC: 3 MG/DL (ref 2.4–4.7)
PLATELET # BLD AUTO: 149 THOU/MM3 (ref 130–400)
PMV BLD AUTO: 12.1 FL (ref 9.4–12.4)
POTASSIUM SERPL-SCNC: 5.1 MEQ/L (ref 3.5–5.2)
RBC # BLD AUTO: 3.08 MILL/MM3 (ref 4.7–6.1)
SODIUM SERPL-SCNC: 138 MEQ/L (ref 135–145)
WBC # BLD AUTO: 6.2 THOU/MM3 (ref 4.8–10.8)

## 2024-11-07 PROCEDURE — 83735 ASSAY OF MAGNESIUM: CPT

## 2024-11-07 PROCEDURE — 82948 REAGENT STRIP/BLOOD GLUCOSE: CPT

## 2024-11-07 PROCEDURE — 99232 SBSQ HOSP IP/OBS MODERATE 35: CPT | Performed by: PHYSICIAN ASSISTANT

## 2024-11-07 PROCEDURE — 6370000000 HC RX 637 (ALT 250 FOR IP)

## 2024-11-07 PROCEDURE — 80048 BASIC METABOLIC PNL TOTAL CA: CPT

## 2024-11-07 PROCEDURE — 1200000000 HC SEMI PRIVATE

## 2024-11-07 PROCEDURE — 36415 COLL VENOUS BLD VENIPUNCTURE: CPT

## 2024-11-07 PROCEDURE — 85027 COMPLETE CBC AUTOMATED: CPT

## 2024-11-07 PROCEDURE — 99232 SBSQ HOSP IP/OBS MODERATE 35: CPT | Performed by: INTERNAL MEDICINE

## 2024-11-07 PROCEDURE — 84100 ASSAY OF PHOSPHORUS: CPT

## 2024-11-07 RX ADMIN — INSULIN LISPRO 2 UNITS: 100 INJECTION, SOLUTION INTRAVENOUS; SUBCUTANEOUS at 12:00

## 2024-11-07 RX ADMIN — PANTOPRAZOLE SODIUM 40 MG: 40 TABLET, DELAYED RELEASE ORAL at 05:43

## 2024-11-07 RX ADMIN — METOPROLOL TARTRATE 25 MG: 50 TABLET, FILM COATED ORAL at 08:34

## 2024-11-07 RX ADMIN — INSULIN LISPRO 4 UNITS: 100 INJECTION, SOLUTION INTRAVENOUS; SUBCUTANEOUS at 20:14

## 2024-11-07 RX ADMIN — INSULIN LISPRO 6 UNITS: 100 INJECTION, SOLUTION INTRAVENOUS; SUBCUTANEOUS at 16:45

## 2024-11-07 RX ADMIN — LEVOTHYROXINE SODIUM 150 MCG: 0.15 TABLET ORAL at 05:44

## 2024-11-07 RX ADMIN — ACETAMINOPHEN 650 MG: 325 TABLET ORAL at 08:38

## 2024-11-07 RX ADMIN — INSULIN LISPRO 2 UNITS: 100 INJECTION, SOLUTION INTRAVENOUS; SUBCUTANEOUS at 08:33

## 2024-11-07 RX ADMIN — TAMSULOSIN HYDROCHLORIDE 0.4 MG: 0.4 CAPSULE ORAL at 08:34

## 2024-11-07 RX ADMIN — ACETAMINOPHEN 650 MG: 325 TABLET ORAL at 23:42

## 2024-11-07 ASSESSMENT — PAIN SCALES - GENERAL
PAINLEVEL_OUTOF10: 8
PAINLEVEL_OUTOF10: 3
PAINLEVEL_OUTOF10: 8

## 2024-11-07 ASSESSMENT — PAIN DESCRIPTION - LOCATION
LOCATION: ABDOMEN
LOCATION: HEAD

## 2024-11-07 ASSESSMENT — PAIN DESCRIPTION - DESCRIPTORS
DESCRIPTORS: ACHING
DESCRIPTORS: ACHING

## 2024-11-07 ASSESSMENT — PAIN DESCRIPTION - PAIN TYPE: TYPE: ACUTE PAIN

## 2024-11-07 ASSESSMENT — PAIN DESCRIPTION - FREQUENCY: FREQUENCY: INTERMITTENT

## 2024-11-07 ASSESSMENT — PAIN DESCRIPTION - ONSET: ONSET: ON-GOING

## 2024-11-07 ASSESSMENT — PAIN DESCRIPTION - ORIENTATION
ORIENTATION: MID
ORIENTATION: MID

## 2024-11-07 ASSESSMENT — PAIN - FUNCTIONAL ASSESSMENT: PAIN_FUNCTIONAL_ASSESSMENT: ACTIVITIES ARE NOT PREVENTED

## 2024-11-08 LAB
ANION GAP SERPL CALC-SCNC: 6 MEQ/L (ref 8–16)
BUN SERPL-MCNC: 49 MG/DL (ref 7–22)
CALCIUM SERPL-MCNC: 8.4 MG/DL (ref 8.5–10.5)
CHLORIDE SERPL-SCNC: 110 MEQ/L (ref 98–111)
CO2 SERPL-SCNC: 24 MEQ/L (ref 23–33)
CREAT SERPL-MCNC: 1.7 MG/DL (ref 0.4–1.2)
DEPRECATED RDW RBC AUTO: 60.5 FL (ref 35–45)
ERYTHROCYTE [DISTWIDTH] IN BLOOD BY AUTOMATED COUNT: 17.3 % (ref 11.5–14.5)
GFR SERPL CREATININE-BSD FRML MDRD: 39 ML/MIN/1.73M2
GLUCOSE BLD STRIP.AUTO-MCNC: 228 MG/DL (ref 70–108)
GLUCOSE BLD STRIP.AUTO-MCNC: 289 MG/DL (ref 70–108)
GLUCOSE BLD STRIP.AUTO-MCNC: 303 MG/DL (ref 70–108)
GLUCOSE BLD STRIP.AUTO-MCNC: 349 MG/DL (ref 70–108)
GLUCOSE SERPL-MCNC: 240 MG/DL (ref 70–108)
HCT VFR BLD AUTO: 28.4 % (ref 42–52)
HGB BLD-MCNC: 8.6 GM/DL (ref 14–18)
MAGNESIUM SERPL-MCNC: 2.3 MG/DL (ref 1.6–2.4)
MCH RBC QN AUTO: 29 PG (ref 26–33)
MCHC RBC AUTO-ENTMCNC: 30.3 GM/DL (ref 32.2–35.5)
MCV RBC AUTO: 95.6 FL (ref 80–94)
PHOSPHATE SERPL-MCNC: 3.2 MG/DL (ref 2.4–4.7)
PLATELET # BLD AUTO: 153 THOU/MM3 (ref 130–400)
PMV BLD AUTO: 12.2 FL (ref 9.4–12.4)
POTASSIUM SERPL-SCNC: 5.2 MEQ/L (ref 3.5–5.2)
POTASSIUM SERPL-SCNC: 5.4 MEQ/L (ref 3.5–5.2)
RBC # BLD AUTO: 2.97 MILL/MM3 (ref 4.7–6.1)
SODIUM SERPL-SCNC: 140 MEQ/L (ref 135–145)
WBC # BLD AUTO: 5 THOU/MM3 (ref 4.8–10.8)

## 2024-11-08 PROCEDURE — 80048 BASIC METABOLIC PNL TOTAL CA: CPT

## 2024-11-08 PROCEDURE — 6370000000 HC RX 637 (ALT 250 FOR IP): Performed by: PHYSICIAN ASSISTANT

## 2024-11-08 PROCEDURE — 1200000000 HC SEMI PRIVATE

## 2024-11-08 PROCEDURE — 6370000000 HC RX 637 (ALT 250 FOR IP)

## 2024-11-08 PROCEDURE — 83735 ASSAY OF MAGNESIUM: CPT

## 2024-11-08 PROCEDURE — 36415 COLL VENOUS BLD VENIPUNCTURE: CPT

## 2024-11-08 PROCEDURE — 85027 COMPLETE CBC AUTOMATED: CPT

## 2024-11-08 PROCEDURE — 84100 ASSAY OF PHOSPHORUS: CPT

## 2024-11-08 PROCEDURE — 84132 ASSAY OF SERUM POTASSIUM: CPT

## 2024-11-08 PROCEDURE — 99232 SBSQ HOSP IP/OBS MODERATE 35: CPT | Performed by: INTERNAL MEDICINE

## 2024-11-08 PROCEDURE — 6370000000 HC RX 637 (ALT 250 FOR IP): Performed by: INTERNAL MEDICINE

## 2024-11-08 PROCEDURE — 82948 REAGENT STRIP/BLOOD GLUCOSE: CPT

## 2024-11-08 RX ORDER — INSULIN GLARGINE 100 [IU]/ML
0.1 INJECTION, SOLUTION SUBCUTANEOUS NIGHTLY
Status: DISCONTINUED | OUTPATIENT
Start: 2024-11-08 | End: 2024-11-10 | Stop reason: HOSPADM

## 2024-11-08 RX ORDER — INSULIN LISPRO 100 [IU]/ML
0-16 INJECTION, SOLUTION INTRAVENOUS; SUBCUTANEOUS
Status: DISCONTINUED | OUTPATIENT
Start: 2024-11-08 | End: 2024-11-10 | Stop reason: HOSPADM

## 2024-11-08 RX ADMIN — METOPROLOL TARTRATE 25 MG: 50 TABLET, FILM COATED ORAL at 20:44

## 2024-11-08 RX ADMIN — INSULIN LISPRO 8 UNITS: 100 INJECTION, SOLUTION INTRAVENOUS; SUBCUTANEOUS at 17:01

## 2024-11-08 RX ADMIN — SODIUM ZIRCONIUM CYCLOSILICATE 10 G: 10 POWDER, FOR SUSPENSION ORAL at 11:51

## 2024-11-08 RX ADMIN — INSULIN LISPRO 12 UNITS: 100 INJECTION, SOLUTION INTRAVENOUS; SUBCUTANEOUS at 20:44

## 2024-11-08 RX ADMIN — INSULIN GLARGINE 10 UNITS: 100 INJECTION, SOLUTION SUBCUTANEOUS at 20:45

## 2024-11-08 RX ADMIN — LEVOTHYROXINE SODIUM 150 MCG: 0.15 TABLET ORAL at 05:56

## 2024-11-08 RX ADMIN — INSULIN LISPRO 6 UNITS: 100 INJECTION, SOLUTION INTRAVENOUS; SUBCUTANEOUS at 11:51

## 2024-11-08 RX ADMIN — INSULIN LISPRO 2 UNITS: 100 INJECTION, SOLUTION INTRAVENOUS; SUBCUTANEOUS at 08:40

## 2024-11-08 RX ADMIN — PANTOPRAZOLE SODIUM 40 MG: 40 TABLET, DELAYED RELEASE ORAL at 05:56

## 2024-11-08 RX ADMIN — METOPROLOL TARTRATE 25 MG: 50 TABLET, FILM COATED ORAL at 08:40

## 2024-11-08 RX ADMIN — TAMSULOSIN HYDROCHLORIDE 0.4 MG: 0.4 CAPSULE ORAL at 08:40

## 2024-11-08 NOTE — CARE COORDINATION
11/4/24, 8:39 AM EST    DISCHARGE ON GOING EVALUATION    [facility] day: 4  Location: 6A-06/006-A Reason for admit: NAVNEET (acute kidney injury) (HCC) [N17.9]     Procedures: none    Imaging since last note:   11/2 US Renal Normal bilateral renal ultrasound     Barriers to Discharge: Creatinine 1.8, Nephrology following, K+ 4.8, diabetes management,  Zofran prn, Protonix.     PCP: Aileen Valadez, RELL - CNP  Readmission Risk Score: 25.9    Patient Goals/Plan/Treatment Preferences: Plans return home with spouse, support from daughter, current  Palliative Care and HF clinics. Denies needs. Will follow.    
11/5/24, 8:47 AM EST    DISCHARGE ON GOING EVALUATION    [facility] day: 5  Location: 6A-06/006-A Reason for admit: NAVNEET (acute kidney injury) (HCC) [N17.9]     Procedures: none    Imaging since last note: none    Barriers to Discharge: Creatinine 2.1, Nephrology following, diabetes management, Zofran prn, Protonix.     PCP: Aileen Valadez, RELL - CNP  Readmission Risk Score: 26.4    Patient Goals/Plan/Treatment Preferences:  Plans return home with spouse, support from daughter, current  Palliative Care and HF clinics. Denies needs. Will follow.   
11/6/24, 12:46 PM EST    DISCHARGE ON GOING EVALUATION    [facility] day: 6  Location: 6A-06/006-A Reason for admit: NAVNEET (acute kidney injury) (HCC) [N17.9]     Procedures: none    Imaging since last note: none     Barriers to Discharge: Hospitalist and Nephrology following. Creat 1.8 (2.1) PO hydration 4x/day. Telesitter at bedside. Nephrotoxic meds held. DM management.     PCP: Aileen Valadez APRN - CNP  Readmission Risk Score: 26.6    Patient Goals/Plan/Treatment Preferences: Return home w/ wife. Current [facility] Palliative Care and HF clinics. Denies needs.     
11/7/24, 11:50 AM EST    DISCHARGE ON GOING EVALUATION    [facility] day: 7  Location: 6A-06/006-A Reason for admit: NAVNEET (acute kidney injury) (HCC) [N17.9]     Procedures: none    Imaging since last note: none     Barriers to Discharge: Hospitalist and Nephrology following. Creat 1.7 (1.8) PO hydration 4x/day. Telesitter at bedside. Nephrotoxic meds remain on hold. DM management.     PCP: Aileen Valadez APRN - CNP  Readmission Risk Score: 26.5    Patient Goals/Plan/Treatment Preferences: Return home w/ wife. Current [facility] Palliative Care and HF clinics. Denies needs.     
11/8/24, 9:00 AM EST    DISCHARGE ON GOING EVALUATION    [facility] day: 8  Location: 6A-06/006-A Reason for admit: NAVNEET (acute kidney injury) (HCC) [N17.9]     Procedures: none    Imaging since last note: none    Barriers to Discharge: Hospitalist and Nephrology following. Creat 1.7 (1.7) PO hydration 4x/day. Telesitter at bedside. Nephrotoxic meds remain on hold. DM management.          PCP: Aileen Valadez APRN - CNP  Readmission Risk Score: 26.2    Patient Goals/Plan/Treatment Preferences:  Return home w/ wife. Current [facility] Palliative Care and HF clinics. Denies needs.   
  Discharge Planning   Type of Residence House   Living Arrangements Spouse/Significant Other   Current Services Prior To Admission Other (Comment);Oxygen Therapy  ([facility] palliative care clinic, CHF clinic)   Current DME Prior to Arrival Oxygen Therapy (Comment)  (pt is uncertain who provides oxygen, states uses it as needed)   Potential Assistance Needed N/A   DME Ordered? No   Potential Assistance Purchasing Medications No   Type of Home Care Services None   Patient expects to be discharged to: House   Condition of Participation: Discharge Planning   The Plan for Transition of Care is related to the following treatment goals: improve kidney fx

## 2024-11-09 LAB
ANION GAP SERPL CALC-SCNC: 11 MEQ/L (ref 8–16)
BUN SERPL-MCNC: 44 MG/DL (ref 7–22)
CALCIUM SERPL-MCNC: 8.6 MG/DL (ref 8.5–10.5)
CHLORIDE SERPL-SCNC: 110 MEQ/L (ref 98–111)
CO2 SERPL-SCNC: 21 MEQ/L (ref 23–33)
CREAT SERPL-MCNC: 1.7 MG/DL (ref 0.4–1.2)
DEPRECATED RDW RBC AUTO: 59.7 FL (ref 35–45)
ERYTHROCYTE [DISTWIDTH] IN BLOOD BY AUTOMATED COUNT: 17.3 % (ref 11.5–14.5)
GFR SERPL CREATININE-BSD FRML MDRD: 39 ML/MIN/1.73M2
GLUCOSE BLD STRIP.AUTO-MCNC: 184 MG/DL (ref 70–108)
GLUCOSE BLD STRIP.AUTO-MCNC: 237 MG/DL (ref 70–108)
GLUCOSE BLD STRIP.AUTO-MCNC: 246 MG/DL (ref 70–108)
GLUCOSE BLD STRIP.AUTO-MCNC: 252 MG/DL (ref 70–108)
GLUCOSE SERPL-MCNC: 169 MG/DL (ref 70–108)
HCT VFR BLD AUTO: 28.1 % (ref 42–52)
HGB BLD-MCNC: 9 GM/DL (ref 14–18)
MAGNESIUM SERPL-MCNC: 2.3 MG/DL (ref 1.6–2.4)
MCH RBC QN AUTO: 30.3 PG (ref 26–33)
MCHC RBC AUTO-ENTMCNC: 32 GM/DL (ref 32.2–35.5)
MCV RBC AUTO: 94.6 FL (ref 80–94)
PHOSPHATE SERPL-MCNC: 3.3 MG/DL (ref 2.4–4.7)
PLATELET # BLD AUTO: 166 THOU/MM3 (ref 130–400)
PMV BLD AUTO: 12.1 FL (ref 9.4–12.4)
POTASSIUM SERPL-SCNC: 5.4 MEQ/L (ref 3.5–5.2)
RBC # BLD AUTO: 2.97 MILL/MM3 (ref 4.7–6.1)
SODIUM SERPL-SCNC: 142 MEQ/L (ref 135–145)
WBC # BLD AUTO: 5.6 THOU/MM3 (ref 4.8–10.8)

## 2024-11-09 PROCEDURE — 84100 ASSAY OF PHOSPHORUS: CPT

## 2024-11-09 PROCEDURE — 80048 BASIC METABOLIC PNL TOTAL CA: CPT

## 2024-11-09 PROCEDURE — 82948 REAGENT STRIP/BLOOD GLUCOSE: CPT

## 2024-11-09 PROCEDURE — 99232 SBSQ HOSP IP/OBS MODERATE 35: CPT | Performed by: PHYSICIAN ASSISTANT

## 2024-11-09 PROCEDURE — 1200000000 HC SEMI PRIVATE

## 2024-11-09 PROCEDURE — 6370000000 HC RX 637 (ALT 250 FOR IP)

## 2024-11-09 PROCEDURE — 6370000000 HC RX 637 (ALT 250 FOR IP): Performed by: PHYSICIAN ASSISTANT

## 2024-11-09 PROCEDURE — 36415 COLL VENOUS BLD VENIPUNCTURE: CPT

## 2024-11-09 PROCEDURE — 85027 COMPLETE CBC AUTOMATED: CPT

## 2024-11-09 PROCEDURE — 83735 ASSAY OF MAGNESIUM: CPT

## 2024-11-09 PROCEDURE — 99232 SBSQ HOSP IP/OBS MODERATE 35: CPT | Performed by: INTERNAL MEDICINE

## 2024-11-09 RX ADMIN — SODIUM ZIRCONIUM CYCLOSILICATE 10 G: 10 POWDER, FOR SUSPENSION ORAL at 22:34

## 2024-11-09 RX ADMIN — INSULIN LISPRO 4 UNITS: 100 INJECTION, SOLUTION INTRAVENOUS; SUBCUTANEOUS at 22:35

## 2024-11-09 RX ADMIN — INSULIN LISPRO 4 UNITS: 100 INJECTION, SOLUTION INTRAVENOUS; SUBCUTANEOUS at 09:01

## 2024-11-09 RX ADMIN — PANTOPRAZOLE SODIUM 40 MG: 40 TABLET, DELAYED RELEASE ORAL at 05:32

## 2024-11-09 RX ADMIN — INSULIN GLARGINE 10 UNITS: 100 INJECTION, SOLUTION SUBCUTANEOUS at 22:35

## 2024-11-09 RX ADMIN — SODIUM ZIRCONIUM CYCLOSILICATE 10 G: 10 POWDER, FOR SUSPENSION ORAL at 10:40

## 2024-11-09 RX ADMIN — LORAZEPAM 0.5 MG: 1 TABLET ORAL at 22:34

## 2024-11-09 RX ADMIN — INSULIN LISPRO 4 UNITS: 100 INJECTION, SOLUTION INTRAVENOUS; SUBCUTANEOUS at 17:16

## 2024-11-09 RX ADMIN — SODIUM ZIRCONIUM CYCLOSILICATE 10 G: 10 POWDER, FOR SUSPENSION ORAL at 16:05

## 2024-11-09 RX ADMIN — TAMSULOSIN HYDROCHLORIDE 0.4 MG: 0.4 CAPSULE ORAL at 08:55

## 2024-11-09 RX ADMIN — INSULIN LISPRO 8 UNITS: 100 INJECTION, SOLUTION INTRAVENOUS; SUBCUTANEOUS at 11:49

## 2024-11-09 RX ADMIN — LORAZEPAM 0.5 MG: 1 TABLET ORAL at 08:55

## 2024-11-09 RX ADMIN — METOPROLOL TARTRATE 25 MG: 50 TABLET, FILM COATED ORAL at 08:55

## 2024-11-09 RX ADMIN — LEVOTHYROXINE SODIUM 150 MCG: 0.15 TABLET ORAL at 05:32

## 2024-11-09 RX ADMIN — METOPROLOL TARTRATE 25 MG: 50 TABLET, FILM COATED ORAL at 22:34

## 2024-11-09 NOTE — FLOWSHEET NOTE
11/09/24 0934   Treatment Team Notification   Reason for Communication Review case   Name of Team Member Notified Iliana   Treatment Team Role Consulting Provider   Method of Communication Secure Message   Response Waiting for response   Notification Time 0934     Potassium 5.4

## 2024-11-09 NOTE — PLAN OF CARE
Problem: Chronic Conditions and Co-morbidities  Goal: Patient's chronic conditions and co-morbidity symptoms are monitored and maintained or improved  11/1/2024 1256 by Luis Bravo RN  Outcome: Completed     Problem: Discharge Planning  Goal: Discharge to home or other facility with appropriate resources  11/1/2024 1256 by Luis Bravo RN  Outcome: Completed  Flowsheets (Taken 10/31/2024 2051 by Carole Marcnao, RN)  Discharge to home or other facility with appropriate resources: Identify barriers to discharge with patient and caregiver  Note: Patient plans to be discharged to home with spouse and family support when medically stable.      Problem: Safety - Adult  Goal: Free from fall injury  11/1/2024 1256 by Luis Bravo RN  Outcome: Completed  Flowsheets (Taken 11/1/2024 1256)  Free From Fall Injury: Instruct family/caregiver on patient safety  Note: No falls noted this shift. Patient ambulates independently with a walker/cane as needed without difficulty. Bed kept in low position. Safe environment maintained. Bedside table & call light in reach. Uses call light appropriately when needing assistance.      Care plan reviewed with patient. Patient verbalizes understanding of plan of care and contributes to goal setting.      
  Problem: Chronic Conditions and Co-morbidities  Goal: Patient's chronic conditions and co-morbidity symptoms are monitored and maintained or improved  11/9/2024 1426 by Sturgeon, Cara B, RN  Outcome: Progressing  Care Plan - Patient's Chronic Conditions and Co-Morbidity Symptoms are Monitored and Maintained or Improved:   Monitor and assess patient's chronic conditions and comorbid symptoms for stability, deterioration, or improvement   Collaborate with multidisciplinary team to address chronic and comorbid conditions and prevent exacerbation or deterioration   Update acute care plan with appropriate goals if chronic or comorbid symptoms are exacerbated and prevent overall improvement and discharge      Problem: Discharge Planning  Goal: Discharge to home or other facility with appropriate resources  11/9/2024 1426 by Sturgeon, Cara B, RN  Outcome: Progressing  Discharge to home or other facility with appropriate resources:   Identify barriers to discharge with patient and caregiver   Arrange for needed discharge resources and transportation as appropriate   Identify discharge learning needs (meds, wound care, etc)      Problem: Safety - Adult  Goal: Free from fall injury  Recent Flowsheet Documentation  Taken 11/9/2024 1426 by Sturgeon, Cara B, RN  Free From Fall Injury: Instruct family/caregiver on patient safety  Free From Fall Injury:   Instruct family/caregiver on patient safety   Based on caregiver fall risk screen, instruct family/caregiver to ask for assistance with transferring infant if caregiver noted to have fall risk factors      Problem: Safety - Adult  Goal: Free from fall injury  11/9/2024 1426 by Sturgeon, Cara B, RN  Outcome: Progressing  Flowsheets (Taken 11/9/2024 1426)  Free From Fall Injury: Instruct family/caregiver on patient safety     Problem: Pain  Goal: Verbalizes/displays adequate comfort level or baseline comfort level  11/9/2024 1426 by Sturgeon, Cara B, RN  Outcome: 
  Problem: Chronic Conditions and Co-morbidities  Goal: Patient's chronic conditions and co-morbidity symptoms are monitored and maintained or improved  Outcome: Progressing  Flowsheets (Taken 10/31/2024 2051)  Care Plan - Patient's Chronic Conditions and Co-Morbidity Symptoms are Monitored and Maintained or Improved: Monitor and assess patient's chronic conditions and comorbid symptoms for stability, deterioration, or improvement     Problem: Discharge Planning  Goal: Discharge to home or other facility with appropriate resources  Outcome: Progressing  Flowsheets (Taken 10/31/2024 2051)  Discharge to home or other facility with appropriate resources: Identify barriers to discharge with patient and caregiver     Problem: Safety - Adult  Goal: Free from fall injury  Outcome: Progressing     
  Problem: Chronic Conditions and Co-morbidities  Goal: Patient's chronic conditions and co-morbidity symptoms are monitored and maintained or improved  Outcome: Progressing  Flowsheets (Taken 11/2/2024 1358 by Roxy Bonilla, RN)  Care Plan - Patient's Chronic Conditions and Co-Morbidity Symptoms are Monitored and Maintained or Improved:   Monitor and assess patient's chronic conditions and comorbid symptoms for stability, deterioration, or improvement   Collaborate with multidisciplinary team to address chronic and comorbid conditions and prevent exacerbation or deterioration   Update acute care plan with appropriate goals if chronic or comorbid symptoms are exacerbated and prevent overall improvement and discharge     Problem: Discharge Planning  Goal: Discharge to home or other facility with appropriate resources  Recent Flowsheet Documentation  Taken 11/3/2024 0530 by Kaycee Bland RN  Discharge to home or other facility with appropriate resources:   Identify barriers to discharge with patient and caregiver   Identify discharge learning needs (meds, wound care, etc)     Problem: Discharge Planning  Goal: Discharge to home or other facility with appropriate resources  Outcome: Progressing  Flowsheets (Taken 11/3/2024 0530)  Discharge to home or other facility with appropriate resources:   Identify barriers to discharge with patient and caregiver   Identify discharge learning needs (meds, wound care, etc)     Problem: Safety - Adult  Goal: Free from fall injury  Outcome: Progressing  Flowsheets (Taken 11/2/2024 1358 by Roxy Bonilla, RN)  Free From Fall Injury:   Instruct family/caregiver on patient safety   Based on caregiver fall risk screen, instruct family/caregiver to ask for assistance with transferring infant if caregiver noted to have fall risk factors     
  Problem: Chronic Conditions and Co-morbidities  Goal: Patient's chronic conditions and co-morbidity symptoms are monitored and maintained or improved  Outcome: Progressing  Flowsheets (Taken 11/4/2024 1438)  Care Plan - Patient's Chronic Conditions and Co-Morbidity Symptoms are Monitored and Maintained or Improved: Monitor and assess patient's chronic conditions and comorbid symptoms for stability, deterioration, or improvement     Problem: Discharge Planning  Goal: Discharge to home or other facility with appropriate resources  Outcome: Progressing  Flowsheets (Taken 11/4/2024 1438)  Discharge to home or other facility with appropriate resources: Identify barriers to discharge with patient and caregiver     Problem: Safety - Adult  Goal: Free from fall injury  Outcome: Progressing  Flowsheets (Taken 11/4/2024 1438)  Free From Fall Injury: Instruct family/caregiver on patient safety     Problem: Discharge Planning  Goal: Discharge to home or other facility with appropriate resources  Recent Flowsheet Documentation  Taken 11/4/2024 1438 by Jacey Sanchez RN  Discharge to home or other facility with appropriate resources: Identify barriers to discharge with patient and caregiver     Problem: Chronic Conditions and Co-morbidities  Goal: Patient's chronic conditions and co-morbidity symptoms are monitored and maintained or improved  Recent Flowsheet Documentation  Taken 11/4/2024 1438 by Jacey Sanchez RN  Care Plan - Patient's Chronic Conditions and Co-Morbidity Symptoms are Monitored and Maintained or Improved: Monitor and assess patient's chronic conditions and comorbid symptoms for stability, deterioration, or improvement     
  Problem: Chronic Conditions and Co-morbidities  Goal: Patient's chronic conditions and co-morbidity symptoms are monitored and maintained or improved  Outcome: Progressing  Flowsheets (Taken 11/4/2024 1919 by Lucila Vizcarra, RN)  Care Plan - Patient's Chronic Conditions and Co-Morbidity Symptoms are Monitored and Maintained or Improved: Monitor and assess patient's chronic conditions and comorbid symptoms for stability, deterioration, or improvement     Problem: Discharge Planning  Goal: Discharge to home or other facility with appropriate resources  Outcome: Progressing  Flowsheets (Taken 11/4/2024 1919 by Lucila Vizcarra, RN)  Discharge to home or other facility with appropriate resources:   Identify barriers to discharge with patient and caregiver   Arrange for needed discharge resources and transportation as appropriate   Identify discharge learning needs (meds, wound care, etc)     Problem: Safety - Adult  Goal: Free from fall injury  Outcome: Progressing  Flowsheets (Taken 11/4/2024 1438 by Jacey Sanchez RN)  Free From Fall Injury: Instruct family/caregiver on patient safety     Problem: Pain  Goal: Verbalizes/displays adequate comfort level or baseline comfort level  Outcome: Progressing  Flowsheets (Taken 11/3/2024 1045 by Jacey Mckay, RN)  Verbalizes/displays adequate comfort level or baseline comfort level:   Assess pain using appropriate pain scale   Encourage patient to monitor pain and request assistance   Administer analgesics based on type and severity of pain and evaluate response   Implement non-pharmacological measures as appropriate and evaluate response   Consider cultural and social influences on pain and pain management     Problem: Skin/Tissue Integrity  Goal: Absence of new skin breakdown  Description: 1.  Monitor for areas of redness and/or skin breakdown  2.  Assess vascular access sites hourly  3.  Every 4-6 hours minimum:  Change oxygen saturation probe site  4.  Every 4-6 hours: 
  Problem: Chronic Conditions and Co-morbidities  Goal: Patient's chronic conditions and co-morbidity symptoms are monitored and maintained or improved  Recent Flowsheet Documentation  Taken 11/3/2024 1045 by Jacey Mckay RN  Care Plan - Patient's Chronic Conditions and Co-Morbidity Symptoms are Monitored and Maintained or Improved:   Monitor and assess patient's chronic conditions and comorbid symptoms for stability, deterioration, or improvement   Collaborate with multidisciplinary team to address chronic and comorbid conditions and prevent exacerbation or deterioration   Update acute care plan with appropriate goals if chronic or comorbid symptoms are exacerbated and prevent overall improvement and discharge     Problem: Safety - Adult  Goal: Free from fall injury  11/3/2024 1045 by Jacey Mckay RN  Outcome: Progressing  Flowsheets (Taken 11/3/2024 1045)  Free From Fall Injury: Instruct family/caregiver on patient safety  Note: Patient instructed to call for assistance when getting out of bed. Bed is locked, lowered.  Bed alarm is engaged and call light is within reach.       Problem: Pain  Goal: Verbalizes/displays adequate comfort level or baseline comfort level  11/3/2024 1045 by Jacey Mckay RN  Outcome: Progressing  Flowsheets (Taken 11/3/2024 1045)  Verbalizes/displays adequate comfort level or baseline comfort level:   Assess pain using appropriate pain scale   Encourage patient to monitor pain and request assistance   Administer analgesics based on type and severity of pain and evaluate response   Implement non-pharmacological measures as appropriate and evaluate response   Consider cultural and social influences on pain and pain management  Note: Patient encouraged to monitor their pain and request assistance if needed.     Care plan reviewed with patient.  Patient expresses understanding of goals of care and states willingness to work toward the objectives.    
  Problem: Chronic Conditions and Co-morbidities  Goal: Patient's chronic conditions and co-morbidity symptoms are monitored and maintained or improved  Recent Flowsheet Documentation  Taken 11/6/2024 2110 by Carmelina Mcknight RN  Care Plan - Patient's Chronic Conditions and Co-Morbidity Symptoms are Monitored and Maintained or Improved:   Monitor and assess patient's chronic conditions and comorbid symptoms for stability, deterioration, or improvement   Collaborate with multidisciplinary team to address chronic and comorbid conditions and prevent exacerbation or deterioration   Update acute care plan with appropriate goals if chronic or comorbid symptoms are exacerbated and prevent overall improvement and discharge     Problem: Chronic Conditions and Co-morbidities  Goal: Patient's chronic conditions and co-morbidity symptoms are monitored and maintained or improved  11/7/2024 0958 by Vidhya Juan RN  Outcome: Progressing  Flowsheets (Taken 11/6/2024 2110 by Carmelina Mcknight RN)  Care Plan - Patient's Chronic Conditions and Co-Morbidity Symptoms are Monitored and Maintained or Improved:   Monitor and assess patient's chronic conditions and comorbid symptoms for stability, deterioration, or improvement   Collaborate with multidisciplinary team to address chronic and comorbid conditions and prevent exacerbation or deterioration   Update acute care plan with appropriate goals if chronic or comorbid symptoms are exacerbated and prevent overall improvement and discharge  11/7/2024 0044 by Carmelina Mcknight RN  Outcome: Progressing  Flowsheets (Taken 11/6/2024 2110)  Care Plan - Patient's Chronic Conditions and Co-Morbidity Symptoms are Monitored and Maintained or Improved:   Monitor and assess patient's chronic conditions and comorbid symptoms for stability, deterioration, or improvement   Collaborate with multidisciplinary team to address chronic and comorbid conditions and prevent exacerbation or 
  Problem: Pain  Goal: Verbalizes/displays adequate comfort level or baseline comfort level  11/3/2024 2139 by Lucila Vizcarra, RN  Outcome: Progressing  Flowsheets (Taken 11/3/2024 1045 by Jacey Mckay, RN)  Verbalizes/displays adequate comfort level or baseline comfort level:   Assess pain using appropriate pain scale   Encourage patient to monitor pain and request assistance   Administer analgesics based on type and severity of pain and evaluate response   Implement non-pharmacological measures as appropriate and evaluate response   Consider cultural and social influences on pain and pain management     Problem: Skin/Tissue Integrity  Goal: Absence of new skin breakdown  Description: 1.  Monitor for areas of redness and/or skin breakdown  2.  Assess vascular access sites hourly  3.  Every 4-6 hours minimum:  Change oxygen saturation probe site  4.  Every 4-6 hours:  If on nasal continuous positive airway pressure, respiratory therapy assess nares and determine need for appliance change or resting period.  Outcome: Progressing     Problem: Chronic Conditions and Co-morbidities  Goal: Patient's chronic conditions and co-morbidity symptoms are monitored and maintained or improved  11/3/2024 2139 by Lucila Vizcarra, RN  Outcome: Progressing  Flowsheets (Taken 11/3/2024 2100)  Care Plan - Patient's Chronic Conditions and Co-Morbidity Symptoms are Monitored and Maintained or Improved: Monitor and assess patient's chronic conditions and comorbid symptoms for stability, deterioration, or improvement     Problem: Discharge Planning  Goal: Discharge to home or other facility with appropriate resources  Outcome: Progressing  Flowsheets (Taken 11/3/2024 2100)  Discharge to home or other facility with appropriate resources:   Identify barriers to discharge with patient and caregiver   Arrange for needed discharge resources and transportation as appropriate   Identify discharge learning needs (meds, wound care, etc)   
  Problem: Pain  Goal: Verbalizes/displays adequate comfort level or baseline comfort level  11/6/2024 0043 by Lucila Vizcarra RN  Outcome: Progressing  Flowsheets (Taken 11/3/2024 1045 by Jacey Mckay RN)  Verbalizes/displays adequate comfort level or baseline comfort level:   Assess pain using appropriate pain scale   Encourage patient to monitor pain and request assistance   Administer analgesics based on type and severity of pain and evaluate response   Implement non-pharmacological measures as appropriate and evaluate response   Consider cultural and social influences on pain and pain management     Problem: Skin/Tissue Integrity  Goal: Absence of new skin breakdown  Description: 1.  Monitor for areas of redness and/or skin breakdown  2.  Assess vascular access sites hourly  3.  Every 4-6 hours minimum:  Change oxygen saturation probe site  4.  Every 4-6 hours:  If on nasal continuous positive airway pressure, respiratory therapy assess nares and determine need for appliance change or resting period.  11/6/2024 0043 by Lucila Vizcarra RN  Outcome: Progressing     Problem: Chronic Conditions and Co-morbidities  Goal: Patient's chronic conditions and co-morbidity symptoms are monitored and maintained or improved  11/6/2024 0043 by Lucila Vizcarra RN  Outcome: Progressing  Flowsheets (Taken 11/5/2024 1929)  Care Plan - Patient's Chronic Conditions and Co-Morbidity Symptoms are Monitored and Maintained or Improved: Monitor and assess patient's chronic conditions and comorbid symptoms for stability, deterioration, or improvement     Problem: Discharge Planning  Goal: Discharge to home or other facility with appropriate resources  11/6/2024 0043 by Lucila Vizcarra RN  Outcome: Progressing  Flowsheets (Taken 11/5/2024 1929)  Discharge to home or other facility with appropriate resources:   Identify barriers to discharge with patient and caregiver   Arrange for needed discharge resources and transportation as 
  Problem: Pain  Goal: Verbalizes/displays adequate comfort level or baseline comfort level  Outcome: Progressing  Flowsheets (Taken 11/1/2024 2208)  Verbalizes/displays adequate comfort level or baseline comfort level:   Encourage patient to monitor pain and request assistance   Administer analgesics based on type and severity of pain and evaluate response   Consider cultural and social influences on pain and pain management   Implement non-pharmacological measures as appropriate and evaluate response   Assess pain using appropriate pain scale  Note: Ongoing assessment & interventions provided throughout shift.  Reminded patient to report any pain, pressure, or shortness of breath to the nurse.  Pain medications provided per physician's orders.      Problem: Skin/Tissue Integrity  Goal: Absence of new skin breakdown  Description: 1.  Monitor for areas of redness and/or skin breakdown  2.  Assess vascular access sites hourly  3.  Every 4-6 hours minimum:  Change oxygen saturation probe site  4.  Every 4-6 hours:  If on nasal continuous positive airway pressure, respiratory therapy assess nares and determine need for appliance change or resting period.  Outcome: Progressing  Note: Ongoing assessment & interventions provided throughout shift.  Skin assessments provided.  Encouraging/assisting patient to turn as needed.      Problem: Chronic Conditions and Co-morbidities  Goal: Patient's chronic conditions and co-morbidity symptoms are monitored and maintained or improved  Outcome: Progressing  Flowsheets (Taken 11/1/2024 2208)  Care Plan - Patient's Chronic Conditions and Co-Morbidity Symptoms are Monitored and Maintained or Improved:   Monitor and assess patient's chronic conditions and comorbid symptoms for stability, deterioration, or improvement   Collaborate with multidisciplinary team to address chronic and comorbid conditions and prevent exacerbation or deterioration   Update acute care plan with appropriate 
  Problem: Pain  Goal: Verbalizes/displays adequate comfort level or baseline comfort level  Outcome: Progressing  Flowsheets (Taken 11/3/2024 1045 by Jacey Mckay, RN)  Verbalizes/displays adequate comfort level or baseline comfort level:   Assess pain using appropriate pain scale   Encourage patient to monitor pain and request assistance   Administer analgesics based on type and severity of pain and evaluate response   Implement non-pharmacological measures as appropriate and evaluate response   Consider cultural and social influences on pain and pain management     Problem: Skin/Tissue Integrity  Goal: Absence of new skin breakdown  Description: 1.  Monitor for areas of redness and/or skin breakdown  2.  Assess vascular access sites hourly  3.  Every 4-6 hours minimum:  Change oxygen saturation probe site  4.  Every 4-6 hours:  If on nasal continuous positive airway pressure, respiratory therapy assess nares and determine need for appliance change or resting period.  Outcome: Progressing     Problem: Chronic Conditions and Co-morbidities  Goal: Patient's chronic conditions and co-morbidity symptoms are monitored and maintained or improved  11/4/2024 2205 by Lcuila Vizcarra, RN  Outcome: Progressing  Flowsheets (Taken 11/4/2024 1919)  Care Plan - Patient's Chronic Conditions and Co-Morbidity Symptoms are Monitored and Maintained or Improved: Monitor and assess patient's chronic conditions and comorbid symptoms for stability, deterioration, or improvement     Problem: Discharge Planning  Goal: Discharge to home or other facility with appropriate resources  11/4/2024 2205 by Lucila Vizcarra, RN  Outcome: Progressing  Flowsheets (Taken 11/4/2024 1919)  Discharge to home or other facility with appropriate resources:   Identify barriers to discharge with patient and caregiver   Arrange for needed discharge resources and transportation as appropriate   Identify discharge learning needs (meds, wound care, etc)   
ability or social support system   Arrange for needed discharge resources and transportation as appropriate     Problem: Discharge Planning  Goal: Discharge to home or other facility with appropriate resources  Outcome: Progressing  Flowsheets (Taken 11/2/2024 1358)  Discharge to home or other facility with appropriate resources:   Identify barriers to discharge with patient and caregiver   Identify discharge learning needs (meds, wound care, etc)   Refer to discharge planning if patient needs post-hospital services based on physician order or complex needs related to functional status, cognitive ability or social support system   Arrange for needed discharge resources and transportation as appropriate     Problem: Safety - Adult  Goal: Free from fall injury  Recent Flowsheet Documentation  Taken 11/2/2024 1358 by Roxy Bonilla RN  Free From Fall Injury:   Instruct family/caregiver on patient safety   Based on caregiver fall risk screen, instruct family/caregiver to ask for assistance with transferring infant if caregiver noted to have fall risk factors     Problem: Safety - Adult  Goal: Free from fall injury  Outcome: Progressing  Flowsheets (Taken 11/2/2024 1358)  Free From Fall Injury:   Instruct family/caregiver on patient safety   Based on caregiver fall risk screen, instruct family/caregiver to ask for assistance with transferring infant if caregiver noted to have fall risk factors     Problem: Pain  Goal: Verbalizes/displays adequate comfort level or baseline comfort level  Outcome: Progressing  Flowsheets (Taken 11/2/2024 1358)  Verbalizes/displays adequate comfort level or baseline comfort level:   Encourage patient to monitor pain and request assistance   Administer analgesics based on type and severity of pain and evaluate response   Consider cultural and social influences on pain and pain management   Assess pain using appropriate pain scale   Implement non-pharmacological measures as appropriate and 
facility with appropriate resources  Outcome: Progressing  Flowsheets (Taken 11/8/2024 2031)  Discharge to home or other facility with appropriate resources:   Identify barriers to discharge with patient and caregiver   Arrange for needed discharge resources and transportation as appropriate   Identify discharge learning needs (meds, wound care, etc)     Problem: Safety - Adult  Goal: Free from fall injury  Recent Flowsheet Documentation  Taken 11/9/2024 0045 by Adrianne Rust RN  Free From Fall Injury:   Instruct family/caregiver on patient safety   Based on caregiver fall risk screen, instruct family/caregiver to ask for assistance with transferring infant if caregiver noted to have fall risk factors     Problem: Safety - Adult  Goal: Free from fall injury  Outcome: Progressing  Flowsheets (Taken 11/9/2024 0045)  Free From Fall Injury:   Instruct family/caregiver on patient safety   Based on caregiver fall risk screen, instruct family/caregiver to ask for assistance with transferring infant if caregiver noted to have fall risk factors     Problem: Pain  Goal: Verbalizes/displays adequate comfort level or baseline comfort level  Outcome: Progressing  Flowsheets (Taken 11/8/2024 2031)  Verbalizes/displays adequate comfort level or baseline comfort level:   Encourage patient to monitor pain and request assistance   Assess pain using appropriate pain scale   Administer analgesics based on type and severity of pain and evaluate response   Implement non-pharmacological measures as appropriate and evaluate response   Consider cultural and social influences on pain and pain management     Problem: Skin/Tissue Integrity  Goal: Absence of new skin breakdown  Description: 1.  Monitor for areas of redness and/or skin breakdown  2.  Assess vascular access sites hourly  3.  Every 4-6 hours minimum:  Change oxygen saturation probe site  4.  Every 4-6 hours:  If on nasal continuous positive airway pressure, respiratory 
RN  Outcome: Not Progressing  Flowsheets (Taken 11/5/2024 1929 by Lucila Vizcarra RN)  Hemodynamic stability and optimal renal function maintained:   Monitor intake, output and patient weight   Encourage oral intake as appropriate     Problem: Respiratory - Adult  Goal: Achieves optimal ventilation and oxygenation  11/6/2024 1312 by Thi Rodríguez RN  Outcome: Progressing  Flowsheets (Taken 11/4/2024 1919 by Lucila Vizcarra RN)  Achieves optimal ventilation and oxygenation:   Assess for changes in respiratory status   Assess for changes in mentation and behavior   Position to facilitate oxygenation and minimize respiratory effort   Oxygen supplementation based on oxygen saturation or arterial blood gases   Encourage broncho-pulmonary hygiene including cough, deep breathe, incentive spirometry   Assess the need for suctioning and aspirate as needed   Assess and instruct to report shortness of breath or any respiratory difficulty     Problem: Gastrointestinal - Adult  Goal: Maintains adequate nutritional intake  11/6/2024 1312 by Thi Rodríguez RN  Flowsheets (Taken 11/4/2024 1919 by Lucila Vizcarra RN)  Maintains adequate nutritional intake:   Monitor percentage of each meal consumed   Monitor intake and output, weight and lab values     Problem: Metabolic/Fluid and Electrolytes - Adult  Goal: Hemodynamic stability and optimal renal function maintained  11/6/2024 1312 by Thi Rodríguez RN  Outcome: Not Progressing  Flowsheets (Taken 11/5/2024 1929 by Lucila Vizcarra RN)  Hemodynamic stability and optimal renal function maintained:   Monitor intake, output and patient weight   Encourage oral intake as appropriate  11/6/2024 0043 by Lucila Vizcarra RN  Outcome: Progressing  Flowsheets (Taken 11/5/2024 1929)  Hemodynamic stability and optimal renal function maintained:   Monitor intake, output and patient weight   Encourage oral intake as appropriate   Discussed plan of care with patient and daughter  
hospitalization:   Assess and monitor for signs and symptoms of infection   Monitor lab/diagnostic results   Monitor all insertion sites i.e., indwelling lines, tubes and drains   [place] appropriate cooling/warming therapies per order   Administer medications as ordered   Instruct and encourage patient and family to use good hand hygiene technique  11/6/2024 1312 by Thi Rodríguez RN  Outcome: Progressing  Flowsheets (Taken 11/5/2024 1929 by Lucila Vizcarra RN)  Absence of infection during hospitalization:   Assess and monitor for signs and symptoms of infection   Monitor lab/diagnostic results   Monitor all insertion sites i.e., indwelling lines, tubes and drains   [place] appropriate cooling/warming therapies per order   Administer medications as ordered   Instruct and encourage patient and family to use good hand hygiene technique     Problem: Metabolic/Fluid and Electrolytes - Adult  Goal: Electrolytes maintained within normal limits  11/7/2024 0044 by Carmelina Mcknight RN  Outcome: Progressing  Flowsheets (Taken 11/6/2024 2110)  Electrolytes maintained within normal limits: Monitor labs and assess patient for signs and symptoms of electrolyte imbalances  11/6/2024 1312 by Thi Rodríguez RN  Outcome: Progressing  Flowsheets (Taken 11/5/2024 1929 by Lucila Vizcarra RN)  Electrolytes maintained within normal limits:   Monitor labs and assess patient for signs and symptoms of electrolyte imbalances   Administer electrolyte replacement as ordered   Monitor response to electrolyte replacements, including repeat lab results as appropriate  Goal: Hemodynamic stability and optimal renal function maintained  11/7/2024 0044 by Carmelina Mcknight RN  Outcome: Progressing  Flowsheets (Taken 11/6/2024 2110)  Hemodynamic stability and optimal renal function maintained:   Monitor labs and assess for signs and symptoms of volume excess or deficit   Monitor intake, output and patient weight  11/6/2024 1312 by Thi Rodríguez 
ordered     Problem: Respiratory - Adult  Goal: Achieves optimal ventilation and oxygenation  11/8/2024 1025 by Vidhya Juan, RN  Outcome: Progressing  Flowsheets (Taken 11/7/2024 2013 by Carmelina Mcknight, RN)  Achieves optimal ventilation and oxygenation:   Assess for changes in respiratory status   Assess for changes in mentation and behavior   Position to facilitate oxygenation and minimize respiratory effort   Assess the need for suctioning and aspirate as needed   Assess and instruct to report shortness of breath or any respiratory difficulty  11/7/2024 2257 by Carmelina Mcknight, RN  Outcome: Progressing  Flowsheets (Taken 11/7/2024 2013)  Achieves optimal ventilation and oxygenation:   Assess for changes in respiratory status   Assess for changes in mentation and behavior   Position to facilitate oxygenation and minimize respiratory effort   Assess the need for suctioning and aspirate as needed   Assess and instruct to report shortness of breath or any respiratory difficulty     Problem: Gastrointestinal - Adult  Goal: Maintains adequate nutritional intake  11/8/2024 1025 by Vidhya Juan RN  Outcome: Progressing  Flowsheets (Taken 11/7/2024 2013 by Carmelina Mcknight, RN)  Maintains adequate nutritional intake:   Monitor percentage of each meal consumed   Identify factors contributing to decreased intake, treat as appropriate   Assist with meals as needed   Monitor intake and output, weight and lab values  11/7/2024 2257 by Carmelina Mcknight, RN  Outcome: Progressing  Flowsheets (Taken 11/7/2024 2013)  Maintains adequate nutritional intake:   Monitor percentage of each meal consumed   Identify factors contributing to decreased intake, treat as appropriate   Assist with meals as needed   Monitor intake and output, weight and lab values     
oxygenation:   Assess for changes in respiratory status   Assess for changes in mentation and behavior   Position to facilitate oxygenation and minimize respiratory effort   Assess the need for suctioning and aspirate as needed   Assess and instruct to report shortness of breath or any respiratory difficulty  11/7/2024 0958 by Vidhya Juan RN  Outcome: Progressing  Flowsheets (Taken 11/6/2024 2110 by Carmelina Mcknight, RN)  Achieves optimal ventilation and oxygenation:   Assess for changes in respiratory status   Assess for changes in mentation and behavior   Position to facilitate oxygenation and minimize respiratory effort   Assess the need for suctioning and aspirate as needed   Assess and instruct to report shortness of breath or any respiratory difficulty     Problem: Gastrointestinal - Adult  Goal: Maintains adequate nutritional intake  11/7/2024 2257 by Carmelina Mcknight RN  Outcome: Progressing  Flowsheets (Taken 11/7/2024 2013)  Maintains adequate nutritional intake:   Monitor percentage of each meal consumed   Identify factors contributing to decreased intake, treat as appropriate   Assist with meals as needed   Monitor intake and output, weight and lab values  11/7/2024 0958 by Vidhya Juan RN  Outcome: Progressing  Flowsheets (Taken 11/6/2024 2110 by Carmelina Mcknight, RN)  Maintains adequate nutritional intake:   Identify factors contributing to decreased intake, treat as appropriate   Assist with meals as needed   Monitor percentage of each meal consumed     Care plan reviewed with patient. Patient verbalized understanding of plan of care and contributed to goal setting.

## 2024-11-09 NOTE — FLOWSHEET NOTE
11/09/24 1531   Treatment Team Notification   Reason for Communication Review case   Name of Team Member Notified Danny HYATT   Treatment Team Role Attending Provider   Method of Communication Secure Message   Response Waiting for response   Notification Time 1531     Increased confusion this afternoon. Patient's wife and daughter were just here and stated that is new for him.

## 2024-11-10 VITALS
WEIGHT: 238.1 LBS | OXYGEN SATURATION: 95 % | DIASTOLIC BLOOD PRESSURE: 78 MMHG | TEMPERATURE: 98.5 F | SYSTOLIC BLOOD PRESSURE: 148 MMHG | HEIGHT: 67 IN | HEART RATE: 71 BPM | RESPIRATION RATE: 16 BRPM | BODY MASS INDEX: 37.37 KG/M2

## 2024-11-10 LAB
ANION GAP SERPL CALC-SCNC: 7 MEQ/L (ref 8–16)
BUN SERPL-MCNC: 37 MG/DL (ref 7–22)
CALCIUM SERPL-MCNC: 8.5 MG/DL (ref 8.5–10.5)
CHLORIDE SERPL-SCNC: 109 MEQ/L (ref 98–111)
CO2 SERPL-SCNC: 24 MEQ/L (ref 23–33)
CREAT SERPL-MCNC: 1.7 MG/DL (ref 0.4–1.2)
DEPRECATED RDW RBC AUTO: 61.7 FL (ref 35–45)
ERYTHROCYTE [DISTWIDTH] IN BLOOD BY AUTOMATED COUNT: 17.7 % (ref 11.5–14.5)
GFR SERPL CREATININE-BSD FRML MDRD: 39 ML/MIN/1.73M2
GLUCOSE BLD STRIP.AUTO-MCNC: 183 MG/DL (ref 70–108)
GLUCOSE BLD STRIP.AUTO-MCNC: 195 MG/DL (ref 70–108)
GLUCOSE SERPL-MCNC: 189 MG/DL (ref 70–108)
HCT VFR BLD AUTO: 23.7 % (ref 42–52)
HGB BLD-MCNC: 7.3 GM/DL (ref 14–18)
MCH RBC QN AUTO: 29.6 PG (ref 26–33)
MCHC RBC AUTO-ENTMCNC: 30.8 GM/DL (ref 32.2–35.5)
MCV RBC AUTO: 96 FL (ref 80–94)
PLATELET # BLD AUTO: 102 THOU/MM3 (ref 130–400)
PMV BLD AUTO: 13.5 FL (ref 9.4–12.4)
POTASSIUM SERPL-SCNC: 4.8 MEQ/L (ref 3.5–5.2)
RBC # BLD AUTO: 2.47 MILL/MM3 (ref 4.7–6.1)
SODIUM SERPL-SCNC: 140 MEQ/L (ref 135–145)
WBC # BLD AUTO: 4.9 THOU/MM3 (ref 4.8–10.8)

## 2024-11-10 PROCEDURE — 85027 COMPLETE CBC AUTOMATED: CPT

## 2024-11-10 PROCEDURE — 36415 COLL VENOUS BLD VENIPUNCTURE: CPT

## 2024-11-10 PROCEDURE — 80048 BASIC METABOLIC PNL TOTAL CA: CPT

## 2024-11-10 PROCEDURE — 82948 REAGENT STRIP/BLOOD GLUCOSE: CPT

## 2024-11-10 PROCEDURE — 99232 SBSQ HOSP IP/OBS MODERATE 35: CPT | Performed by: INTERNAL MEDICINE

## 2024-11-10 PROCEDURE — 6370000000 HC RX 637 (ALT 250 FOR IP): Performed by: PHYSICIAN ASSISTANT

## 2024-11-10 PROCEDURE — 6370000000 HC RX 637 (ALT 250 FOR IP)

## 2024-11-10 RX ORDER — TORSEMIDE 20 MG/1
20 TABLET ORAL DAILY
Qty: 30 TABLET | Refills: 3 | Status: SHIPPED | OUTPATIENT
Start: 2024-11-10

## 2024-11-10 RX ADMIN — LEVOTHYROXINE SODIUM 150 MCG: 0.15 TABLET ORAL at 12:48

## 2024-11-10 RX ADMIN — METOPROLOL TARTRATE 25 MG: 50 TABLET, FILM COATED ORAL at 12:48

## 2024-11-10 RX ADMIN — TAMSULOSIN HYDROCHLORIDE 0.4 MG: 0.4 CAPSULE ORAL at 12:48

## 2024-11-10 RX ADMIN — INSULIN LISPRO 4 UNITS: 100 INJECTION, SOLUTION INTRAVENOUS; SUBCUTANEOUS at 12:46

## 2024-11-10 RX ADMIN — PANTOPRAZOLE SODIUM 40 MG: 40 TABLET, DELAYED RELEASE ORAL at 12:48

## 2024-11-10 NOTE — DISCHARGE SUMMARY
Hospital Medicine Discharge Summary      Patient Identification:   Leo Nevarez   : 1940  MRN: [id number]   Account: [id number]      Patient's PCP: Aileen Valadez APRN - CNP    Admit Date: 10/31/2024     Discharge Date: 11/10/24    Admitting Physician: No admitting provider for patient encounter.     Discharge Physician: Gurdeep Engel PA-C     Leo Nevarez is a 84 y.o. male admitted to [facility] on 10/31/2024.      HPI On Admission From H&P:    \"84 y.o. male who presented to [facility] due to abnormal labs. He follows with Dr. Larson for palliative care, as well as [facility] CHF clinic. He has a past medical history of Chronic kidney disease, Diabetes mellitus, Hyperlipidemia, and Hypertension. He is a hospice patient who was placed back with palliative care. Yesterday he had labs drawn for CHF clinic after medication changes with diuretics. These labs showed an NAVNEET with Cr at 1.7 from baseline ~1.1 and potassium of 5.9. His daughter was called and informed to take him to the ED given these findings.     ED course: BUN 48, Crt 1.6 (previous 22 and 1.1, yesterday 1.7), also K 6.0 today (5.9 yesterday). Same message as reported above; CHF clinic increased his torsemide recently, instructed him to come in due to abnormal labs. EKG with AVB degree 1, sinus, no clear peaked t waves. S/p calcium gluconate, insulin, dextrose.\"    Assessment/Plan With Discharge Diagnoses:    NAVNEET on CKD stage III: Likely to be due to recent medication change and generally limited PO intake.   Pt was taking Torsemide 20 mg which was increased to 40 mg PTA. Pt presented to the ED with Cr 1.6, Baseline Cr is 1.1 with eGFR ~65%.  Creatinine holding at 1.7 day of discharge  UA positive for hyaline casts.   Nephrology consulted and followed inpatient, will resume Tosemide upon discharge.  IVF x 12 hours and bladder scan  Eosinophil smear pending.  Avoid nephrotoxic agents, Metformin

## 2024-11-10 NOTE — PROGRESS NOTES
Hospitalist Progress Note    Patient:  Leo Nevarez      Unit/Bed:6A-06/006-A    YOB: 1940    MRN: [id number]       Acct: [id number]     PCP: Aileen Valadez APRN - CNP    Date of Admission: 10/31/2024    Assessment/Plan:    NAVNEET on CKD stage III: Likely to be due to recent medication change and generally limited PO intake.   Pt was taking Torsemide 20 mg which was increased to 40 mg PTA. Pt presented to the ED with Cr 1.6, Baseline Cr is 1.1 with eGFR ~65%.  Creatinine improved to 1.8 today  UA positive for hyaline casts.   Potassium improved with 1 dose of Torsemide, however, creatinine increased.   Nephrology consulted and following  IVF x 12 hours and bladder scan  Eosinophil smear pending.  Avoid nephrotoxic agents, Metformin held  Encourage oral hydration  Free kappa light chains elevated at 55.9, free lambda light chains elevated at 378, kappa lambda ratio 0.15.  Nephrology notified of findings.  Elevated creatinine may be secondary to new baseline    Hyperkalemia: Potassium on admission was 6. Likely related to NAVNEET and limited PO fluid intake.   On admission pt received calcium gluconate, insulin, dextrose, and Lokelma.   Telemetry ordered only for 24 hours due to pt/family goals of care.   Potassium 4.8 today  Continue to hold Wellbutrin, Celexa, and Aricept  Nephrology following  Low potassium diet    Chronic HFmrEF: Last Echo was TTE on 7/3/2024 with estimated EF of 45-50%, mild global hypokinesis, and mildly dilated LA.  Monitor for fluid overload with IVF running  300 mL free water ordered    Normocytic anemia: Likely to be anemia of chronic disease secondary to CKD stage III  Nephrology following    Headache, improving: Likely due to electrolyte abnormalities.   Continue home meds Dilaudid and Ativan as needed.  Tylenol as needed.     Type 2 Diabetes: Chronic and well controlled. Last HbA1c was on 7.1 on 8/9/2024, which improved from previous 9.4   Continue basal insulin and 
    Hospitalist Progress Note    Patient:  Leo Nevarez      Unit/Bed:6A-06/006-A    YOB: 1940    MRN: [id number]       Acct: [id number]     PCP: Aileen Valadez APRN - CNP    Date of Admission: 10/31/2024    Assessment/Plan:    NAVNEET on CKD stage III: Likely to be due to recent medication change and generally limited PO intake.   Pt was taking Torsemide 20 mg which was increased to 40 mg PTA. Pt presented to the ED with Cr 1.6, Baseline Cr is 1.1 with eGFR ~65%.  Creatinine worsened to 2.1 today  UA positive for hyaline casts.   Potassium improved with 1 dose of Torsemide, however, creatinine increased.   Nephrology consulted and following  IVF x 12 hours and bladder scan  Eosinophil smear pending.  Avoid nephrotoxic agents, Metformin held  Encourage oral hydration  Free kappa light chains elevated at55.9, free lambda light chains elevated at 378, kappa lambda ratio 0.15.  Nephrology notified of findings.  Elevated creatinine may be secondary to new baseline    Hyperkalemia: Potassium on admission was 6. Likely related to NAVNEET and limited PO fluid intake.   On admission pt received calcium gluconate, insulin, dextrose, and Lokelma.   Telemetry ordered only for 24 hours due to pt/family goals of care.   Potassium 4.8 today  Continue to hold Wellbutrin, Celexa, and Aricept  Nephrology following  Low potassium diet    Chronic HFmrEF: Last Echo was TTE on 7/3/2024 with estimated EF of 45-50%, mild global hypokinesis, and mildly dilated LA.  Monitor for fluid overload with IVF running  300 mL free water ordered    Normocytic anemia: Likely to be anemia of chronic disease secondary to CKD stage III  Nephrology following    Headache, improving: Likely due to electrolyte abnormalities.   Continue home meds Dilaudid and Ativan as needed.  Tylenol as needed.     Type 2 Diabetes: Chronic and well controlled. Last HbA1c was on 7.1 on 8/9/2024, which improved from previous 9.4   Continue basal insulin and 
    Hospitalist Progress Note    Patient:  Leo Nevarez      Unit/Bed:6A-06/006-A    YOB: 1940    MRN: [id number]       Acct: [id number]     PCP: Aileen Valadez APRN - CNP    Date of Admission: 10/31/2024    Assessment/Plan:    NAVNEET on CKD stage III: Likely to be due to recent medication change and generally limited PO intake.   Pt was taking Torsemide 20 mg which was increased to 40 mg PTA. Pt presented to the ED with Cr 1.6, Baseline Cr is 1.1 with eGFR ~65%.  Creatinine holding at 1.7 today  UA positive for hyaline casts.   Potassium improved with 1 dose of Torsemide, however, creatinine increased.   Nephrology consulted and following  IVF x 12 hours and bladder scan  Eosinophil smear pending.  Avoid nephrotoxic agents, Metformin held  Encourage oral hydration  Free kappa light chains elevated at 55.9, free lambda light chains elevated at 378, kappa lambda ratio 0.15.  Nephrology notified of findings.  Elevated creatinine may be secondary to new baseline    Hyperkalemia: Potassium on admission was 6. Likely related to NAVNEET and limited PO fluid intake.   On admission pt received calcium gluconate, insulin, dextrose, and Lokelma.   Telemetry ordered only for 24 hours due to pt/family goals of care.   Potassium 5.4 today, Lokelma ordered by nephrology  Creatinine 1.7 again today  Continue to hold Wellbutrin, Celexa, and Aricept  Nephrology following  Low potassium diet    Chronic HFmrEF: Last Echo was TTE on 7/3/2024 with estimated EF of 45-50%, mild global hypokinesis, and mildly dilated LA.  Monitor for fluid overload with IVF running  300 mL free water ordered    Normocytic anemia: Likely to be anemia of chronic disease secondary to CKD stage III  Nephrology following    Headache, improving: Likely due to electrolyte abnormalities.   Continue home meds Dilaudid and Ativan as needed.  Tylenol as needed.     Type 2 Diabetes: Chronic and well controlled. Last HbA1c was on 7.1 on 8/9/2024, which 
    Hospitalist Progress Note    Patient:  Leo Nevarez      Unit/Bed:6A-06/006-A    YOB: 1940    MRN: [id number]       Acct: [id number]     PCP: Aileen Valadez APRN - CNP    Date of Admission: 10/31/2024    Assessment/Plan:    NAVNEET on CKD stage III: Likely to be due to recent medication change and generally limited PO intake.   Pt was taking Torsemide 20 mg which was increased to 40 mg 3 days ago. Pt presented to the ED with Cr 1.6, Baseline Cr is 1.1 with eGFR ~65%.   UA positive for hyaline casts.   Potassium improved with 1 dose of Torsemide, however, creatinine increased.   Nephrology consulted and following  IVF x 12 hours and bladder scan  Eosinophil smear pending.  Avoid nephrotoxic agents, Metformin held  Encourage oral hydration  Free light chains pending    Hyperkalemia: Potassium on admission was 6. Likely related to NAVNEET and limited PO fluid intake.   On admission pt received calcium gluconate, insulin, dextrose, and Lokelma.   Telemetry ordered only for 24 hours due to pt/family goals of care.   Potassium 4.8 today  Continue to hold Wellbutrin, Celexa, and Aricept  Nephrology following  Low potassium diet    Chronic HFmrEF: Last Echo was TTE on 7/3/2024 with estimated EF of 45-50%, mild global hypokinesis, and mildly dilated LA.  Monitor for fluid overload with IVF running  300 mL free water ordered    Normocytic anemia: Likely to be anemia of chronic disease secondary to CKD stage III  Nephrology following    Headache, improving: Likely due to electrolyte abnormalities.   Continue home meds Dilaudid and Ativan as needed.  Tylenol as needed.     Type 2 Diabetes: Chronic and well controlled. Last HbA1c was on 7.1 on 8/9/2024, which improved from previous 9.4   Continue basal insulin and sliding scale insulin  Accu-cheks AC and HS  Carb controlled diet  Hypoglycemia protocol     Hypertension:   Continue Lopressor 25 mg BID.   Hold Diuretics, ACEi, and other nephrotoxic 
    Hospitalist Progress Note    Patient:  Leo Nevarez      Unit/Bed:6A-06/006-A    YOB: 1940    MRN: [id number]       Acct: [id number]     PCP: Aileen Valadez APRN - CNP    Date of Admission: 10/31/2024    Assessment/Plan:    NAVNEET on CKD stage III: Likely to be due to recent medication change and generally limited PO intake.   Pt was taking Torsemide 20 mg which was increased to 40 mg PTA. Pt presented to the ED with Cr 1.6, Baseline Cr is 1.1 with eGFR ~65%.  Creatinine improved to 1.7 today  UA positive for hyaline casts.   Potassium improved with 1 dose of Torsemide, however, creatinine increased.   Nephrology consulted and following  IVF x 12 hours and bladder scan  Eosinophil smear pending.  Avoid nephrotoxic agents, Metformin held  Encourage oral hydration  Free kappa light chains elevated at 55.9, free lambda light chains elevated at 378, kappa lambda ratio 0.15.  Nephrology notified of findings.  Elevated creatinine may be secondary to new baseline    Hyperkalemia: Potassium on admission was 6. Likely related to NAVNEET and limited PO fluid intake.   On admission pt received calcium gluconate, insulin, dextrose, and Lokelma.   Telemetry ordered only for 24 hours due to pt/family goals of care.   Potassium 5.1 today, creatinine 1.7  Continue to hold Wellbutrin, Celexa, and Aricept  Nephrology following  Low potassium diet    Chronic HFmrEF: Last Echo was TTE on 7/3/2024 with estimated EF of 45-50%, mild global hypokinesis, and mildly dilated LA.  Monitor for fluid overload with IVF running  300 mL free water ordered    Normocytic anemia: Likely to be anemia of chronic disease secondary to CKD stage III  Nephrology following    Headache, improving: Likely due to electrolyte abnormalities.   Continue home meds Dilaudid and Ativan as needed.  Tylenol as needed.     Type 2 Diabetes: Chronic and well controlled. Last HbA1c was on 7.1 on 8/9/2024, which improved from previous 9.4   Continue basal 
    Hospitalist Progress Note    Patient:  Leo Nevarez      Unit/Bed:6A-06/006-A    YOB: 1940    MRN: [id number]       Acct: [id number]     PCP: Aileen Valadez APRN - CNP    Date of Admission: 10/31/2024    Assessment/Plan:    NAVNEET on CKD stage III: Likely to be due to recent medication change and generally limited PO intake.   Pt was taking Torsemide 20 mg which was increased to 40 mg PTA. Pt presented to the ED with Cr 1.6, Baseline Cr is 1.1 with eGFR ~65%.  Creatinine holding at 1.7 today  UA positive for hyaline casts.   Potassium improved with 1 dose of Torsemide, however, creatinine increased.   Nephrology consulted and following  IVF x 12 hours and bladder scan  Eosinophil smear pending.  Avoid nephrotoxic agents, Metformin held  Encourage oral hydration  Free kappa light chains elevated at 55.9, free lambda light chains elevated at 378, kappa lambda ratio 0.15.  Nephrology notified of findings.  Elevated creatinine may be secondary to new baseline    Hyperkalemia: Potassium on admission was 6. Likely related to NAVNEET and limited PO fluid intake.   On admission pt received calcium gluconate, insulin, dextrose, and Lokelma.   Telemetry ordered only for 24 hours due to pt/family goals of care.   Potassium 5.4 today, Lokelma ordered TID, will plan to recheck tomorrow  Creatinine 1.7 again today  Continue to hold Wellbutrin, Celexa, and Aricept  Nephrology following  Low potassium diet    Chronic HFmrEF: Last Echo was TTE on 7/3/2024 with estimated EF of 45-50%, mild global hypokinesis, and mildly dilated LA.  Monitor for fluid overload with IVF running  300 mL free water ordered    Normocytic anemia: Likely to be anemia of chronic disease secondary to CKD stage III  Nephrology following    Headache, improving: Likely due to electrolyte abnormalities.   Continue home meds Dilaudid and Ativan as needed.  Tylenol as needed.     Type 2 Diabetes: Chronic and well controlled. Last HbA1c was on 7.1 
  PROGRESS NOTE      Patient:  Leo Nevarez  Unit/Bed:6-13/013-A  YOB: 1940  MRN: [id number]   Acct: [id number]    PCP: Aileen Valadez APRN - CNP    Date of Admission: 10/31/2024 LOS: 3    Date of Evaluation:  11/3/2024    Anticipated Discharge: 1-2 days    Assessment/Plan:    NAVNEET on CKD stage III: Likely to be due to recent medication change and generally limited PO intake. Pt was taking Torsemide 20 mg which was increased to 40 mg 3 days ago. Pt presented to the ED with Cr 1.6, Baseline Cr is 1.1 with eGFR ~65%. UA positive for hyaline casts. Potassium improved with 1 dose of Torsemide, whoever creatinine increased.   - Continue oral hydration protocol, discussed with nursing.   -Continue to monitor BMP.  - Hold nephrotoxic agents: Metformin.  - Consulted nephrology due to worsening NAVNEET and hyperkalemia, recommended starting gentle IV fluids for 12 hours and bladder scan.   - Bladder scan pending.    - [place] lambda free light chains pending.  - Eosinophil smear pending.  Hyperkalemia: Potassium on admission was 6. Likely to be secondary to NAVNEET. On admission pt received calcium gluconate, insulin, dextrose, and Lokelma. Telemetry ordered only for 24 hours due to pt/family goals of care. Potassium improved with 1 dose of Torsemide, whoever creatinine increased.  - Continue to monitor potassium.   -Continue to hold Wellbutrin, Celexa, and Aricept due to electrolyte abnormality.   - Consulted nephrology  3. Chronic HFmrEF: Last Echo was TTE on 7/3/2024 demonstrating EF of 45-50%, mild global hypokinesis, and mildly dilated LA. Not currently fluid overloaded.   -Oral hydration protocols ordered, avoid volume overload.    - Continue to monitor.   4. Normocytic anemia: Likely to be anemia of chronic disease secondary to CKD stage III.   - Ferritin and iron saturation pending.   -Electrophoresis protein pending.   5. Palliative Care: Followed outpatient by . Code status reviewed. Limited 
0112 - patient setting off bed alarm multiple times over the past couple of hours. This RN was helping JADEN Gaytan with an admission and patient sets off bed alarm. JADEN Gaytan went into patient's room and tried to reorient patient. Patient thinks he is in his home. JADEN Gaytan still unable to reorient patient. House supervisor Aileen called. Looking for a tech to come sit with patient.    0120- Patient setting off bed alarm again at this time. This RN assisted patient back into bed.     0140 - Patient setting off bed alarm again at this time. Patient unable to be reoriented. This RN assisted patient back into bed. House supervisor called this RN and stated a tech would be over to sit with the patient.   
Called pt's daughter to inform her of room change.    
[facility]   Nephrology progress note  11/10/2024, 11:43 AM      Pt Name:    Leo Nevarez  MRN:     [id number]     YOB: 1940  Admit Date:    10/31/2024  2:04 PM    Chief Complaint: Nephrology following for NAVNEET/CKD    Subjective:  Patient was seen and examined this morning  Under tele sitter observation    Objective:  24HR INTAKE/OUTPUT:    Intake/Output Summary (Last 24 hours) at 11/10/2024 1143  Last data filed at 11/10/2024 1011  Gross per 24 hour   Intake 970 ml   Output 725 ml   Net 245 ml         I/O last 3 completed shifts:  In: 1110 [P.O.:1110]  Out: 1075 [Urine:1075]  I/O this shift:  In: 200 [P.O.:200]  Out: -    Admission weight: 103 kg (227 lb)  Wt Readings from Last 3 Encounters:   11/09/24 108 kg (238 lb 1.6 oz)   10/30/24 103 kg (227 lb)   10/15/24 98 kg (216 lb)        Vitals :   Vitals:    11/09/24 1530 11/09/24 2232 11/09/24 2310 11/10/24 0845   BP: 130/61 (!) 144/69 (!) 140/67 (!) 148/78   Pulse: 74 91 88 71   Resp: 18 20 18 16   Temp: 97.6 °F (36.4 °C) 97.7 °F (36.5 °C) 97.5 °F (36.4 °C) 98.5 °F (36.9 °C)   TempSrc: Oral Oral Oral Axillary   SpO2: 98% 96% 96% 95%   Weight:       Height:           Physical examination  General Appearance: appears comfortable, no distress.  Sleeping but arousable  Mouth/Throat: Oral mucosa appears somewhat dry  Neck: No JVD  Lungs:no use of accessory muscles, no labored breathing  GI: soft, non-tender, no guarding  Extremities: + Pitting edema      Medications:  Infusion:    sodium chloride      dextrose       Meds:    insulin glargine  0.1 Units/kg SubCUTAneous Nightly    insulin lispro  0-16 Units SubCUTAneous 4x Daily AC & HS    [Held by provider] buPROPion  300 mg Oral Daily    [Held by provider] citalopram  40 mg Oral Daily    [Held by provider] donepezil  10 mg Oral Nightly    levothyroxine  150 mcg Oral Daily    [Held by provider] metFORMIN  1,000 mg Oral BID    metoprolol tartrate  25 mg Oral BID    pantoprazole  40 mg 
[facility]   Nephrology progress note  11/4/2024, 9:50 AM      Pt Name:    Leo Nevarez  MRN:     [id number]     YOB: 1940  Admit Date:    10/31/2024  2:04 PM    Chief Complaint: Nephrology following for NAVNEET    Subjective:  Patient was seen and examined this morning.   He feels ok. No complaints.   Denies SOB or edema.    Objective:  24HR INTAKE/OUTPUT:    Intake/Output Summary (Last 24 hours) at 11/4/2024 0950  Last data filed at 11/4/2024 0805  Gross per 24 hour   Intake 1641.97 ml   Output 700 ml   Net 941.97 ml         I/O last 3 completed shifts:  In: 1642 [P.O.:1160; I.V.:482]  Out: 700 [Urine:700]  I/O this shift:  In: 360 [P.O.:360]  Out: -    Admission weight: 103 kg (227 lb)  Wt Readings from Last 3 Encounters:   11/03/24 104.2 kg (229 lb 12.8 oz)   10/30/24 103 kg (227 lb)   10/15/24 98 kg (216 lb)        Vitals :   Vitals:    11/03/24 2330 11/04/24 0314 11/04/24 0350 11/04/24 0805   BP: (!) 125/57 (!) 126/56  (!) 126/54   Pulse: 79 69  66   Resp: 16 18 16 18   Temp: 97.6 °F (36.4 °C) 97.8 °F (36.6 °C)  97.7 °F (36.5 °C)   TempSrc: Oral Oral  Oral   SpO2: 100% 100%  96%   Weight:       Height:           Physical examination  General Appearance: alert and cooperative with exam, appears comfortable, no distress  Mouth/Throat: Oral mucosa moist  Neck: No JVD  Lungs: faint rales at bases  Heart:  S1, S2 heard  GI: soft, non-tender, no guarding  Extremities: no significant leg edema    Medications:  Infusion:    sodium chloride      dextrose       Meds:    [Held by provider] buPROPion  300 mg Oral Daily    [Held by provider] citalopram  40 mg Oral Daily    [Held by provider] donepezil  10 mg Oral Nightly    levothyroxine  150 mcg Oral Daily    [Held by provider] metFORMIN  1,000 mg Oral BID    metoprolol tartrate  25 mg Oral BID    pantoprazole  40 mg Oral QAM AC    tamsulosin  0.4 mg Oral Daily    [Held by provider] torsemide  40 mg Oral Daily    sodium chloride flush  
[facility]   Nephrology progress note  11/5/2024, 12:36 PM      Pt Name:    Leo Nevarez  MRN:     [id number]     YOB: 1940  Admit Date:    10/31/2024  2:04 PM    Chief Complaint: Nephrology following for NAVNEET/CKD    Subjective:  Patient was seen and examined this morning.   Awake and alert denies any complaints  Appears slightly confused    Objective:  24HR INTAKE/OUTPUT:    Intake/Output Summary (Last 24 hours) at 11/5/2024 1236  Last data filed at 11/5/2024 1212  Gross per 24 hour   Intake 1000 ml   Output 800 ml   Net 200 ml    Admission weight: 103 kg (227 lb)  Wt Readings from Last 3 Encounters:   11/05/24 104.2 kg (229 lb 11.5 oz)   10/30/24 103 kg (227 lb)   10/15/24 98 kg (216 lb)        Vitals :   Vitals:    11/05/24 0022 11/05/24 0248 11/05/24 0730 11/05/24 1125   BP: (!) 120/55 (!) 125/56 (!) 134/52 (!) 114/49   Pulse: 80 79 76 79   Resp: 18 18 18 18   Temp: 99 °F (37.2 °C) 98.2 °F (36.8 °C) 98.1 °F (36.7 °C) 98.2 °F (36.8 °C)   TempSrc: Oral Oral Oral Oral   SpO2: 95% 95% 97% 96%   Weight:  104.2 kg (229 lb 11.5 oz)     Height:           Physical examination  General Appearance: Awake and alert no distress  Mouth/Throat: Oral mucosa moist  Neck: No JVD  Lungs: Reasonably clear  Heart:  S1, S2 heard  GI: soft, non-tender, no guarding  Extremities: no significant leg edema    Medications:  Infusion:    sodium chloride      dextrose       Meds:    [Held by provider] buPROPion  300 mg Oral Daily    [Held by provider] citalopram  40 mg Oral Daily    [Held by provider] donepezil  10 mg Oral Nightly    levothyroxine  150 mcg Oral Daily    [Held by provider] metFORMIN  1,000 mg Oral BID    metoprolol tartrate  25 mg Oral BID    pantoprazole  40 mg Oral QAM AC    tamsulosin  0.4 mg Oral Daily    [Held by provider] torsemide  40 mg Oral Daily    sodium chloride flush  5-40 mL IntraVENous 2 times per day    insulin lispro  0-8 Units SubCUTAneous 4x Daily AC & HS       Lab Data 
[facility]   Nephrology progress note  11/6/2024, 8:25 AM      Pt Name:    Leo Nevarez  MRN:     [id number]     YOB: 1940  Admit Date:    10/31/2024  2:04 PM    Chief Complaint: Nephrology following for NAVNEET/CKD    Subjective:  Patient was seen and examined this morning.   Awake and alert denies any complaints  Appears slightly confused, oriented to self only    Objective:  24HR INTAKE/OUTPUT:    Intake/Output Summary (Last 24 hours) at 11/6/2024 0825  Last data filed at 11/5/2024 2000  Gross per 24 hour   Intake 1140 ml   Output 120 ml   Net 1020 ml    Admission weight: 103 kg (227 lb)  Wt Readings from Last 3 Encounters:   11/05/24 104.2 kg (229 lb 11.5 oz)   10/30/24 103 kg (227 lb)   10/15/24 98 kg (216 lb)        Vitals :   Vitals:    11/05/24 1535 11/05/24 1929 11/06/24 0307 11/06/24 0715   BP: (!) 118/43 (!) 129/50 (!) 127/50 113/60   Pulse: 76 64 67 67   Resp: 16 16 19 16   Temp: 98.1 °F (36.7 °C) 98 °F (36.7 °C) 97.8 °F (36.6 °C) 97.6 °F (36.4 °C)   TempSrc: Oral Oral Oral Oral   SpO2: 95% 96% 95% 97%   Weight:       Height:           Physical examination  General Appearance: Awake and alert no distress  Mouth/Throat: Oral mucosa moist  Neck: No JVD  Lungs: Reasonably clear  Heart:  S1, S2 heard  GI: soft, non-tender, no guarding  Extremities: 1+ pitting edema right lower leg, 2+ pitting edema left lower leg     Medications:  Infusion:    sodium chloride      dextrose       Meds:    [Held by provider] buPROPion  300 mg Oral Daily    [Held by provider] citalopram  40 mg Oral Daily    [Held by provider] donepezil  10 mg Oral Nightly    levothyroxine  150 mcg Oral Daily    [Held by provider] metFORMIN  1,000 mg Oral BID    metoprolol tartrate  25 mg Oral BID    pantoprazole  40 mg Oral QAM AC    tamsulosin  0.4 mg Oral Daily    [Held by provider] torsemide  40 mg Oral Daily    sodium chloride flush  5-40 mL IntraVENous 2 times per day    insulin lispro  0-8 Units 
[facility]   Nephrology progress note  11/7/2024, 8:50 AM      Pt Name:    Leo Nevarez  MRN:     [id number]     YOB: 1940  Admit Date:    10/31/2024  2:04 PM    Chief Complaint: Nephrology following for NAVNEET/CKD    Subjective:  Patient was seen and examined this morning.   Awake and alert denies any complaints  Denies SOB, dysuria, overall fells good    Objective:  24HR INTAKE/OUTPUT:    Intake/Output Summary (Last 24 hours) at 11/7/2024 0850  Last data filed at 11/7/2024 0829  Gross per 24 hour   Intake 960 ml   Output 275 ml   Net 685 ml    Admission weight: 103 kg (227 lb)  Wt Readings from Last 3 Encounters:   11/05/24 104.2 kg (229 lb 11.5 oz)   10/30/24 103 kg (227 lb)   10/15/24 98 kg (216 lb)        Vitals :   Vitals:    11/06/24 1526 11/06/24 2110 11/07/24 0330 11/07/24 0825   BP: (!) 110/56 (!) 132/49 (!) 131/54 (!) 139/53   Pulse: 65 69 76 79   Resp: 16 18 16 18   Temp: 97.9 °F (36.6 °C) 97.9 °F (36.6 °C) 98.2 °F (36.8 °C) 97.9 °F (36.6 °C)   TempSrc: Oral Oral Oral Oral   SpO2: 96% 98% 96% 97%   Weight:       Height:           Physical examination  General Appearance: Awake and alert no distress  Mouth/Throat: Oral mucosa moist  Neck: No JVD  Lungs: Reasonably clear  Heart:  S1, S2 heard  GI: soft, non-tender, no guarding  Extremities: 2+ pitting edema right lower leg, 1+ pitting edema left lower leg     Medications:  Infusion:    sodium chloride      dextrose       Meds:    [Held by provider] buPROPion  300 mg Oral Daily    [Held by provider] citalopram  40 mg Oral Daily    [Held by provider] donepezil  10 mg Oral Nightly    levothyroxine  150 mcg Oral Daily    [Held by provider] metFORMIN  1,000 mg Oral BID    metoprolol tartrate  25 mg Oral BID    pantoprazole  40 mg Oral QAM AC    tamsulosin  0.4 mg Oral Daily    [Held by provider] torsemide  40 mg Oral Daily    sodium chloride flush  5-40 mL IntraVENous 2 times per day    insulin lispro  0-8 Units SubCUTAneous 4x 
[facility]   Nephrology progress note  11/8/2024, 7:37 AM      Pt Name:    Leo Nevarez  MRN:     [id number]     YOB: 1940  Admit Date:    10/31/2024  2:04 PM    Chief Complaint: Nephrology following for NAVNEET/CKD    Subjective:  Patient was seen and examined this morning.   Awake and alert denies any complaints  Denies SOB, dysuria - feels good overall    Objective:  24HR INTAKE/OUTPUT:    Intake/Output Summary (Last 24 hours) at 11/8/2024 0737  Last data filed at 11/8/2024 0304  Gross per 24 hour   Intake 840 ml   Output --   Net 840 ml    Admission weight: 103 kg (227 lb)  Wt Readings from Last 3 Encounters:   11/05/24 104.2 kg (229 lb 11.5 oz)   10/30/24 103 kg (227 lb)   10/15/24 98 kg (216 lb)        Vitals :   Vitals:    11/07/24 1134 11/07/24 1523 11/07/24 2013 11/08/24 0253   BP: (!) 126/58 (!) 126/56 (!) 129/48 124/68   Pulse: 69 69 72 84   Resp: 18 18 16 18   Temp: 97.8 °F (36.6 °C) 97.7 °F (36.5 °C) 97.8 °F (36.6 °C) 99 °F (37.2 °C)   TempSrc: Oral Oral Oral Oral   SpO2: 97% 99% 98% 97%   Weight:       Height:           Physical examination  General Appearance: Awake and alert no distress  Mouth/Throat: Oral mucosa moist  Neck: No JVD  Lungs: Reasonably clear  Heart:  S1, S2 heard  GI: soft, non-tender, no guarding  Extremities: 1+ pitting edema right lower leg, 1+ pitting edema left lower leg     Medications:  Infusion:    sodium chloride      dextrose       Meds:    [Held by provider] buPROPion  300 mg Oral Daily    [Held by provider] citalopram  40 mg Oral Daily    [Held by provider] donepezil  10 mg Oral Nightly    levothyroxine  150 mcg Oral Daily    [Held by provider] metFORMIN  1,000 mg Oral BID    metoprolol tartrate  25 mg Oral BID    pantoprazole  40 mg Oral QAM AC    tamsulosin  0.4 mg Oral Daily    [Held by provider] torsemide  40 mg Oral Daily    sodium chloride flush  5-40 mL IntraVENous 2 times per day    insulin lispro  0-8 Units SubCUTAneous 4x Daily AC 
[facility]   Nephrology progress note  11/9/2024, 12:06 PM      Pt Name:    Leo Nevarez  MRN:     [id number]     YOB: 1940  Admit Date:    10/31/2024  2:04 PM    Chief Complaint: Nephrology following for NAVNEET/CKD    Subjective:  Patient was seen and examined this morning  No chest pain or shortness of breath  Feels okay  Under tele sitter observation    Objective:  24HR INTAKE/OUTPUT:    Intake/Output Summary (Last 24 hours) at 11/9/2024 1206  Last data filed at 11/9/2024 1010  Gross per 24 hour   Intake 1060 ml   Output 600 ml   Net 460 ml         I/O last 3 completed shifts:  In: 1240 [P.O.:1240]  Out: 250 [Urine:250]  I/O this shift:  In: 240 [P.O.:240]  Out: 350 [Urine:350]   Admission weight: 103 kg (227 lb)  Wt Readings from Last 3 Encounters:   11/05/24 104.2 kg (229 lb 11.5 oz)   10/30/24 103 kg (227 lb)   10/15/24 98 kg (216 lb)        Vitals :   Vitals:    11/08/24 2031 11/08/24 2322 11/09/24 0849 11/09/24 1123   BP: (!) 133/52 132/62 (!) 137/47 134/66   Pulse: 80 70 79 73   Resp: 16 18 20 18   Temp: 97.6 °F (36.4 °C) 98.5 °F (36.9 °C) 97.7 °F (36.5 °C) 97.6 °F (36.4 °C)   TempSrc: Oral Oral Oral Oral   SpO2: 99% 99% 99% 98%   Weight:       Height:           Physical examination  General Appearance: alert and cooperative with exam, appears comfortable, no distress  Mouth/Throat: Oral mucosa moist  Neck: No JVD  Lungs:no use of accessory muscles  GI: soft, non-tender, no guarding  Extremities: + Pitting edema      Medications:  Infusion:    sodium chloride      dextrose       Meds:    sodium zirconium cyclosilicate  10 g Oral TID    insulin glargine  0.1 Units/kg SubCUTAneous Nightly    insulin lispro  0-16 Units SubCUTAneous 4x Daily AC & HS    [Held by provider] buPROPion  300 mg Oral Daily    [Held by provider] citalopram  40 mg Oral Daily    [Held by provider] donepezil  10 mg Oral Nightly    levothyroxine  150 mcg Oral Daily    [Held by provider] metFORMIN  1,000 mg 
Prayer and encouragement    11/08/24 1555   Encounter Summary   Service Provided For Patient   Referral/Consult From Rounding   Support System Family members   Last Encounter  11/08/24   Complexity of Encounter Low   Begin Time 1140   End Time  1146   Total Time Calculated 6 min   Spiritual/Emotional needs   Type Spiritual Support   Assessment/Intervention/Outcome   Assessment Calm   Intervention Active listening   Outcome Encouraged       
Pt admitted to  6K13 from ED.   Complaints: Shortness of breath.    IV site free of s/s of infection or infiltration. Vital signs obtained. Assessment and data collection initiated. Two nurse skin assessment performed by Luis STANLEY and Madison STANLEY. Oriented to room. Policies and procedures for 6K explained. Luis STANLEY discussed hourly rounding with patient addressing 5 P's. Fall prevention and safety brochure discussed with patient.  Bed alarm on. Call light in reach.    
Pt was alone and in bed at the time of the visit. He was dealing with acute kidney injury. He was encouraged   11/01/24 1810   Encounter Summary   Encounter Overview/Reason Initial Encounter   Service Provided For Patient   Referral/Consult From [facility]   Support System Family members   Last Encounter  11/01/24  (Anointed)   Complexity of Encounter Low   Spiritual/Emotional needs   Type Spiritual Support   Rituals, Rites and Sacraments   Type Anointing   Assessment/Intervention/Outcome   Assessment Hopeful   Intervention Empowerment   Outcome Encouraged      and anointed.   
Report called to JADEN Vaughn, on 6A.  All questions answered.  Will transport to 6A-06 by 1830.  
  HGB 9.8* 9.0*   HCT 31.9* 29.0*    152     Recent Labs     10/30/24  1017 10/31/24  1419 10/31/24  1849 11/01/24  0633    141  --  144   K 5.9* 6.0* 5.5* 5.6*    109  --  110   CO2 20* 25  --  23   BUN 48* 37*  --  40*   CREATININE 1.7* 1.6*  --  1.7*   CALCIUM 8.5 8.7  --  8.7   PHOS  --  3.0  --   --      Recent Labs     10/31/24  1419   AST 14   ALT 16   BILITOT 0.2*   ALKPHOS 89     No results for input(s): \"INR\" in the last 72 hours.  No results for input(s): \"CKTOTAL\", \"TROPONINT\" in the last 72 hours.    Urinalysis:      Lab Results   Component Value Date/Time    NITRU NEGATIVE 10/31/2024 07:15 PM    WBCUA NONE SEEN 10/31/2024 07:15 PM    BACTERIA NONE SEEN 10/31/2024 07:15 PM    RBCUA NONE SEEN 10/31/2024 07:15 PM    BLOODU NEGATIVE 10/31/2024 07:15 PM    GLUCOSEU NEGATIVE 10/31/2024 07:15 PM       All radiology images and reports reviewed and interpreted by me:  Radiology:  No orders to display       Diet: ADULT DIET; Regular  ORAL HYDRATION; Water; 300 ml (10 oz)  ADULT ORAL NUTRITION SUPPLEMENT; AM Snack, PM Snack; Standard High Calorie/High Protein Oral Supplement    Microbiology: no  Antibiotics: no    Steroids: no    Telemetry: [x]Yes / []No discontinue at 24 hours from start   Telemetry Review of past 24 hours:  NSR    LDA: []CVC / []PICC / []Midline / []Fung / []Drains / []Mediport / [x]PIV / []None    Labs (still needed?): [x]Yes / []No  IVF (still needed?): [x]Yes / []No    Level of care: [x]Step Down / []Med-Surg  Bed Status: [x]Inpatient / []Observation    DVT Prophylaxis: [] Lovenox / [] Heparin / [x] SCDs / [] Already on Systemic Anticoagulation / [] None     PT/OT: []Yes / [x]No    Disposition:    [x] Home       [] TCU       [] Rehab       [] Psych       [] SNF       [] Long Term Care Facility       [] Other-    Code Status: LimitedX4      An electronic signature was used to authenticate this note  - Heidi Muñoz MD PGY-1 on 11/1/2024 at 2:00 PM       
prophylaxis: [] Lovenox                                 [x] SCDs                                 [] SQ Heparin                                 [] Encourage ambulation           [] Already on Anticoagulation                                 [] None    PT/OT: []Yes / [x]No     Disposition:    [x] Home       [] TCU       [] Rehab       [] Psych       [] SNF       [] Long Term Care Facility       [] Other-    Code Status: Limited      Electronically signed by Gurdeep Blackwood MD on 11/2/2024 at 4:19 PM    This note was electronically signed. Parts of this note may have been dictated by use of voice recognition software and electronically transcribed. The note may contain errors not detected in proofreading. Please refer to my supervising physician's documentation if my documentation differs.

## 2024-11-11 DIAGNOSIS — R06.02 SHORTNESS OF BREATH: ICD-10-CM

## 2024-11-11 DIAGNOSIS — I50.22 HEART FAILURE WITH MILDLY REDUCED EJECTION FRACTION (HFMREF) (HCC): Primary | ICD-10-CM

## 2024-11-11 DIAGNOSIS — F41.9 ANXIETY DISORDER, UNSPECIFIED TYPE: ICD-10-CM

## 2024-11-11 DIAGNOSIS — Z51.5 PALLIATIVE CARE PATIENT: ICD-10-CM

## 2024-11-11 RX ORDER — HYDROMORPHONE HYDROCHLORIDE 2 MG/1
2 TABLET ORAL EVERY 4 HOURS PRN
Qty: 180 TABLET | Refills: 0 | Status: SHIPPED | OUTPATIENT
Start: 2024-11-11 | End: 2024-11-13 | Stop reason: SDUPTHER

## 2024-11-11 RX ORDER — LORAZEPAM 0.5 MG/1
0.5 TABLET ORAL EVERY 6 HOURS PRN
Qty: 120 TABLET | Refills: 0 | Status: SHIPPED | OUTPATIENT
Start: 2024-11-11 | End: 2024-12-11

## 2024-11-11 NOTE — TELEPHONE ENCOUNTER
Pt daughter Margareth stopped by the office. Daughter states that [facility] came to their home and took all the medications that the patient was taking due to patient signed on with palliative Care. [facility] told the daughter that they have to take their medications since the patient is no longer hospice.     Patient is needing both Dilaudid and Ativan. Pt is experience pain and headaches since not having his medications.      [facility] verified.

## 2024-11-13 ENCOUNTER — TELEPHONE (OUTPATIENT)
Dept: PALLATIVE CARE | Age: 84
End: 2024-11-13

## 2024-11-13 DIAGNOSIS — I50.22 HEART FAILURE WITH MILDLY REDUCED EJECTION FRACTION (HFMREF) (HCC): ICD-10-CM

## 2024-11-13 DIAGNOSIS — Z51.5 PALLIATIVE CARE PATIENT: ICD-10-CM

## 2024-11-13 DIAGNOSIS — R06.02 SHORTNESS OF BREATH: ICD-10-CM

## 2024-11-13 RX ORDER — HYDROMORPHONE HYDROCHLORIDE 2 MG/1
2 TABLET ORAL EVERY 4 HOURS PRN
Qty: 180 TABLET | Refills: 0 | Status: SHIPPED | OUTPATIENT
Start: 2024-11-13 | End: 2024-12-13

## 2024-11-14 ENCOUNTER — TELEPHONE (OUTPATIENT)
Dept: CARDIOLOGY CLINIC | Age: 84
End: 2024-11-14

## 2024-11-14 DIAGNOSIS — E87.5 HYPERKALEMIA: ICD-10-CM

## 2024-11-14 DIAGNOSIS — I50.32 CHRONIC DIASTOLIC CONGESTIVE HEART FAILURE, NYHA CLASS 3 (HCC): Primary | ICD-10-CM

## 2024-11-14 RX ORDER — METOLAZONE 2.5 MG/1
2.5 TABLET ORAL ONCE
Qty: 1 TABLET | Refills: 0 | Status: SHIPPED | OUTPATIENT
Start: 2024-11-14 | End: 2024-11-14

## 2024-11-14 NOTE — TELEPHONE ENCOUNTER
Patients daughter Shoshana Called in -  she is not with the patient at this time.    Leg swelling -  geneva- right is worse- tight shiney  Not urinating as much as he should    S/p  Discharge-  11.10.2024 has gained 8 pounds  State that patient is SOB has noticed some panting.   Was seen by PCP 11.13.2024-  was told to take an extra Torsemide

## 2024-11-14 NOTE — TELEPHONE ENCOUNTER
Metolazone 2.5mg called in to take today or tomorrow. Then can double Torsemide Sunday and Monday. F/u phone call next week.     BMP was ordered at hospital discharge as well.

## 2024-11-15 ENCOUNTER — LAB (OUTPATIENT)
Dept: LAB | Age: 84
End: 2024-11-15

## 2024-11-15 DIAGNOSIS — I50.32 CHRONIC DIASTOLIC CONGESTIVE HEART FAILURE, NYHA CLASS 3 (HCC): ICD-10-CM

## 2024-11-15 LAB
ANION GAP SERPL CALC-SCNC: 8 MEQ/L (ref 8–16)
BUN SERPL-MCNC: 43 MG/DL (ref 7–22)
CALCIUM SERPL-MCNC: 8.6 MG/DL (ref 8.5–10.5)
CHLORIDE SERPL-SCNC: 104 MEQ/L (ref 98–111)
CO2 SERPL-SCNC: 28 MEQ/L (ref 23–33)
CREAT SERPL-MCNC: 2 MG/DL (ref 0.4–1.2)
GFR SERPL CREATININE-BSD FRML MDRD: 32 ML/MIN/1.73M2
GLUCOSE SERPL-MCNC: 312 MG/DL (ref 70–108)
POTASSIUM SERPL-SCNC: 5.5 MEQ/L (ref 3.5–5.2)
SODIUM SERPL-SCNC: 140 MEQ/L (ref 135–145)

## 2024-11-15 NOTE — TELEPHONE ENCOUNTER
Unable to obtain labs that were done post d/c.   [facility]tr [facility]. Lab put in under Stephany for results to go to her.

## 2024-11-18 ENCOUNTER — LAB (OUTPATIENT)
Dept: LAB | Age: 84
End: 2024-11-18

## 2024-11-18 DIAGNOSIS — I50.32 CHRONIC DIASTOLIC CONGESTIVE HEART FAILURE, NYHA CLASS 3 (HCC): ICD-10-CM

## 2024-11-18 DIAGNOSIS — I50.32 CHRONIC DIASTOLIC CONGESTIVE HEART FAILURE, NYHA CLASS 3 (HCC): Primary | ICD-10-CM

## 2024-11-18 LAB
ANION GAP SERPL CALC-SCNC: 9 MEQ/L (ref 8–16)
BUN SERPL-MCNC: 30 MG/DL (ref 7–22)
CALCIUM SERPL-MCNC: 8.6 MG/DL (ref 8.5–10.5)
CHLORIDE SERPL-SCNC: 104 MEQ/L (ref 98–111)
CO2 SERPL-SCNC: 29 MEQ/L (ref 23–33)
CREAT SERPL-MCNC: 1.7 MG/DL (ref 0.4–1.2)
GFR SERPL CREATININE-BSD FRML MDRD: 39 ML/MIN/1.73M2
GLUCOSE SERPL-MCNC: 297 MG/DL (ref 70–108)
POTASSIUM SERPL-SCNC: 5.6 MEQ/L (ref 3.5–5.2)
SODIUM SERPL-SCNC: 142 MEQ/L (ref 135–145)

## 2024-11-18 NOTE — TELEPHONE ENCOUNTER
Stephany Clark, APRN - CNP  11/18/2024  6:45 AM EST Back to Top      Renal function within recent range, k level trending up again. Low k diet. Repeat BMP again today or tomorrow. Will need to discuss lokelma  with nephrology

## 2024-11-18 NOTE — TELEPHONE ENCOUNTER
Spoke to dtr Margareth. Is not eating any high potassium foods. Will go today/tomorrow for BMP.    Dtr said legs are very swollen. Has had to cut stockings to put them on. Shiny also. Said he hasn't been eating a whole lot either. Has not gotten a weight yet, will call office back if weight is up

## 2024-11-19 NOTE — TELEPHONE ENCOUNTER
Wt today 237  Legs very swollen \"looks like they are going to open\"  Able to get samples from nephrology   Will discuss lokelma further at his appointment on Monday

## 2024-11-19 NOTE — TELEPHONE ENCOUNTER
Barbara Clark's office called stating patient was started on Lokelma, but now his insurance is requiring a prior authorization.  Asking if we have samples here in the office we can give him until he come for his f/u on Mon 11/25/2024.    Okay per Neva to give samples until appt.    Samples at the  for patient's daughter to .

## 2024-11-19 NOTE — TELEPHONE ENCOUNTER
Did the metolazone help at all?     Renal function is stable - start lokelma 5mg daily with repeat K level on Friday. If expensive please call nephrology to see if they have any suggestions.

## 2024-11-19 NOTE — TELEPHONE ENCOUNTER
Spoke with daughter   Not weighed today yet still in bed  Wt yesterday 242  Metolazone and extra torsemide did not help  She will call pharmacy to check price

## 2024-11-22 ENCOUNTER — LAB (OUTPATIENT)
Dept: LAB | Age: 84
End: 2024-11-22

## 2024-11-22 DIAGNOSIS — E87.5 HYPERKALEMIA: ICD-10-CM

## 2024-11-22 LAB — POTASSIUM SERPL-SCNC: 4.7 MEQ/L (ref 3.5–5.2)

## 2024-11-22 NOTE — TELEPHONE ENCOUNTER
Weight was down to 233, then back up to 241. Going to check today and call back before office closes. Has been c/o head hurting from back of neck to the top of his head bad since end of October. Feels best when he lays down. BP okay, sometimes low.

## 2024-11-25 ENCOUNTER — OFFICE VISIT (OUTPATIENT)
Dept: NEPHROLOGY | Age: 84
End: 2024-11-25
Payer: MEDICARE

## 2024-11-25 VITALS
WEIGHT: 238 LBS | BODY MASS INDEX: 37.28 KG/M2 | DIASTOLIC BLOOD PRESSURE: 80 MMHG | SYSTOLIC BLOOD PRESSURE: 112 MMHG | OXYGEN SATURATION: 96 % | HEART RATE: 78 BPM

## 2024-11-25 DIAGNOSIS — E87.79 OTHER FLUID OVERLOAD: ICD-10-CM

## 2024-11-25 DIAGNOSIS — I10 ESSENTIAL HYPERTENSION: Primary | ICD-10-CM

## 2024-11-25 DIAGNOSIS — N18.32 STAGE 3B CHRONIC KIDNEY DISEASE (HCC): ICD-10-CM

## 2024-11-25 PROCEDURE — 1159F MED LIST DOCD IN RCRD: CPT | Performed by: INTERNAL MEDICINE

## 2024-11-25 PROCEDURE — G8484 FLU IMMUNIZE NO ADMIN: HCPCS | Performed by: INTERNAL MEDICINE

## 2024-11-25 PROCEDURE — 3079F DIAST BP 80-89 MM HG: CPT | Performed by: INTERNAL MEDICINE

## 2024-11-25 PROCEDURE — G8417 CALC BMI ABV UP PARAM F/U: HCPCS | Performed by: INTERNAL MEDICINE

## 2024-11-25 PROCEDURE — 1123F ACP DISCUSS/DSCN MKR DOCD: CPT | Performed by: INTERNAL MEDICINE

## 2024-11-25 PROCEDURE — 1036F TOBACCO NON-USER: CPT | Performed by: INTERNAL MEDICINE

## 2024-11-25 PROCEDURE — 99213 OFFICE O/P EST LOW 20 MIN: CPT | Performed by: INTERNAL MEDICINE

## 2024-11-25 PROCEDURE — 1111F DSCHRG MED/CURRENT MED MERGE: CPT | Performed by: INTERNAL MEDICINE

## 2024-11-25 PROCEDURE — G8427 DOCREV CUR MEDS BY ELIG CLIN: HCPCS | Performed by: INTERNAL MEDICINE

## 2024-11-25 PROCEDURE — 3074F SYST BP LT 130 MM HG: CPT | Performed by: INTERNAL MEDICINE

## 2024-11-25 NOTE — PROGRESS NOTES
How Severe Is Your Skin Lesion?: mild
Have Your Skin Lesions Been Treated?: not been treated
        Renal Ultrasound Scan -- 6/27  Rt kidney 9.3 cm, left kidney 11.2 cm     Other : old old records have been reviewed patient has seen urology in [place], darrel gemtasa, had leg swelling.     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  His creatinine has somewhat fluctuated and worsened within the last month while he was hospitalized probably due to the diuretics and ACE inhibitor but for now creatinine appears to be stable  -Heart failure clinic closely adjusting the medications some degree of renal dysfunction is acceptable to maintain good volume status  Electrolytes appear to be within normal limits  Essential hypertension running well continue current medications  Diabetes mellitus with possible diabetic nephropathy quantify protein in the next visit  Urinary incontinence on oxybutyrin  BPH S/P surgery  Medications reviewed discussed with the family in detail  Follow-up in 4 months    Tests and orders placed this Encounter     No orders of the defined types were placed in this encounter.      Morgan Hicks M.D  [facility].      
Is This A New Presentation, Or A Follow-Up?: Skin Lesion

## 2024-11-29 ENCOUNTER — LAB (OUTPATIENT)
Dept: LAB | Age: 84
End: 2024-11-29

## 2024-11-29 DIAGNOSIS — I50.32 CHRONIC DIASTOLIC CONGESTIVE HEART FAILURE, NYHA CLASS 3 (HCC): ICD-10-CM

## 2024-11-29 LAB
ANION GAP SERPL CALC-SCNC: 10 MEQ/L (ref 8–16)
BUN SERPL-MCNC: 37 MG/DL (ref 7–22)
CALCIUM SERPL-MCNC: 8.8 MG/DL (ref 8.5–10.5)
CHLORIDE SERPL-SCNC: 103 MEQ/L (ref 98–111)
CO2 SERPL-SCNC: 29 MEQ/L (ref 23–33)
CREAT SERPL-MCNC: 2.2 MG/DL (ref 0.4–1.2)
GFR SERPL CREATININE-BSD FRML MDRD: 29 ML/MIN/1.73M2
GLUCOSE SERPL-MCNC: 156 MG/DL (ref 70–108)
POTASSIUM SERPL-SCNC: 4.7 MEQ/L (ref 3.5–5.2)
SODIUM SERPL-SCNC: 142 MEQ/L (ref 135–145)

## 2024-11-30 ENCOUNTER — APPOINTMENT (OUTPATIENT)
Dept: GENERAL RADIOLOGY | Age: 84
End: 2024-11-30
Payer: MEDICARE

## 2024-11-30 ENCOUNTER — HOSPITAL ENCOUNTER (EMERGENCY)
Age: 84
Discharge: HOME | End: 2024-11-30
Attending: FAMILY MEDICINE
Payer: MEDICARE

## 2024-11-30 ENCOUNTER — APPOINTMENT (OUTPATIENT)
Dept: CT IMAGING | Age: 84
End: 2024-11-30
Payer: MEDICARE

## 2024-11-30 VITALS
HEIGHT: 67 IN | DIASTOLIC BLOOD PRESSURE: 67 MMHG | RESPIRATION RATE: 21 BRPM | BODY MASS INDEX: 36.63 KG/M2 | OXYGEN SATURATION: 95 % | WEIGHT: 233.4 LBS | TEMPERATURE: 97.6 F | HEART RATE: 69 BPM | SYSTOLIC BLOOD PRESSURE: 118 MMHG

## 2024-11-30 DIAGNOSIS — G44.209 TENSION HEADACHE: Primary | ICD-10-CM

## 2024-11-30 DIAGNOSIS — I50.22 CHRONIC SYSTOLIC CONGESTIVE HEART FAILURE (HCC): ICD-10-CM

## 2024-11-30 LAB
ALBUMIN SERPL BCG-MCNC: 3.7 G/DL (ref 3.5–5.1)
ALP SERPL-CCNC: 110 U/L (ref 38–126)
ALT SERPL W/O P-5'-P-CCNC: 25 U/L (ref 11–66)
ANION GAP SERPL CALC-SCNC: 10 MEQ/L (ref 8–16)
AST SERPL-CCNC: 13 U/L (ref 5–40)
BASOPHILS ABSOLUTE: 0 THOU/MM3 (ref 0–0.1)
BASOPHILS NFR BLD AUTO: 0.5 %
BILIRUB SERPL-MCNC: 0.5 MG/DL (ref 0.3–1.2)
BUN SERPL-MCNC: 40 MG/DL (ref 7–22)
CALCIUM SERPL-MCNC: 8.6 MG/DL (ref 8.5–10.5)
CHLORIDE SERPL-SCNC: 100 MEQ/L (ref 98–111)
CO2 SERPL-SCNC: 28 MEQ/L (ref 23–33)
CREAT SERPL-MCNC: 2.2 MG/DL (ref 0.4–1.2)
DEPRECATED RDW RBC AUTO: 59.1 FL (ref 35–45)
EOSINOPHIL NFR BLD AUTO: 3.4 %
EOSINOPHILS ABSOLUTE: 0.2 THOU/MM3 (ref 0–0.4)
ERYTHROCYTE [DISTWIDTH] IN BLOOD BY AUTOMATED COUNT: 16.7 % (ref 11.5–14.5)
GFR SERPL CREATININE-BSD FRML MDRD: 29 ML/MIN/1.73M2
GLUCOSE SERPL-MCNC: 104 MG/DL (ref 70–108)
HCT VFR BLD AUTO: 33.4 % (ref 42–52)
HGB BLD-MCNC: 10.1 GM/DL (ref 14–18)
IMM GRANULOCYTES # BLD AUTO: 0.01 THOU/MM3 (ref 0–0.07)
IMM GRANULOCYTES NFR BLD AUTO: 0.2 %
LYMPHOCYTES ABSOLUTE: 1.8 THOU/MM3 (ref 1–4.8)
LYMPHOCYTES NFR BLD AUTO: 31.7 %
MCH RBC QN AUTO: 29.4 PG (ref 26–33)
MCHC RBC AUTO-ENTMCNC: 30.2 GM/DL (ref 32.2–35.5)
MCV RBC AUTO: 97.1 FL (ref 80–94)
MONOCYTES ABSOLUTE: 0.5 THOU/MM3 (ref 0.4–1.3)
MONOCYTES NFR BLD AUTO: 9.5 %
NEUTROPHILS ABSOLUTE: 3.1 THOU/MM3 (ref 1.8–7.7)
NEUTROPHILS NFR BLD AUTO: 54.7 %
NRBC BLD AUTO-RTO: 0 /100 WBC
NT-PROBNP SERPL IA-MCNC: ABNORMAL PG/ML (ref 0–449)
OSMOLALITY SERPL CALC.SUM OF ELEC: 285.7 MOSMOL/KG (ref 275–300)
PLATELET # BLD AUTO: 184 THOU/MM3 (ref 130–400)
PMV BLD AUTO: 11.7 FL (ref 9.4–12.4)
POTASSIUM SERPL-SCNC: 4.5 MEQ/L (ref 3.5–5.2)
PROT SERPL-MCNC: 6.7 G/DL (ref 6.1–8)
RBC # BLD AUTO: 3.44 MILL/MM3 (ref 4.7–6.1)
SODIUM SERPL-SCNC: 138 MEQ/L (ref 135–145)
TROPONIN, HIGH SENSITIVITY: 55 NG/L (ref 0–12)
WBC # BLD AUTO: 5.6 THOU/MM3 (ref 4.8–10.8)

## 2024-11-30 PROCEDURE — 36415 COLL VENOUS BLD VENIPUNCTURE: CPT

## 2024-11-30 PROCEDURE — 99285 EMERGENCY DEPT VISIT HI MDM: CPT

## 2024-11-30 PROCEDURE — 70450 CT HEAD/BRAIN W/O DYE: CPT

## 2024-11-30 PROCEDURE — 80053 COMPREHEN METABOLIC PANEL: CPT

## 2024-11-30 PROCEDURE — 71045 X-RAY EXAM CHEST 1 VIEW: CPT

## 2024-11-30 PROCEDURE — 83880 ASSAY OF NATRIURETIC PEPTIDE: CPT

## 2024-11-30 PROCEDURE — 93005 ELECTROCARDIOGRAM TRACING: CPT | Performed by: FAMILY MEDICINE

## 2024-11-30 PROCEDURE — 6370000000 HC RX 637 (ALT 250 FOR IP): Performed by: FAMILY MEDICINE

## 2024-11-30 PROCEDURE — 6360000002 HC RX W HCPCS: Performed by: FAMILY MEDICINE

## 2024-11-30 PROCEDURE — 96374 THER/PROPH/DIAG INJ IV PUSH: CPT

## 2024-11-30 PROCEDURE — 85025 COMPLETE CBC W/AUTO DIFF WBC: CPT

## 2024-11-30 PROCEDURE — 84484 ASSAY OF TROPONIN QUANT: CPT

## 2024-11-30 RX ORDER — METOCLOPRAMIDE HYDROCHLORIDE 5 MG/ML
10 INJECTION INTRAMUSCULAR; INTRAVENOUS ONCE
Status: COMPLETED | OUTPATIENT
Start: 2024-11-30 | End: 2024-11-30

## 2024-11-30 RX ORDER — BUTALBITAL, ASPIRIN, AND CAFFEINE 325; 50; 40 MG/1; MG/1; MG/1
1 CAPSULE ORAL EVERY 8 HOURS PRN
Qty: 20 CAPSULE | Refills: 0 | Status: SHIPPED | OUTPATIENT
Start: 2024-11-30 | End: 2024-12-10

## 2024-11-30 RX ORDER — BUTALBITAL, ACETAMINOPHEN AND CAFFEINE 50; 325; 40 MG/1; MG/1; MG/1
1 TABLET ORAL ONCE
Status: COMPLETED | OUTPATIENT
Start: 2024-11-30 | End: 2024-11-30

## 2024-11-30 RX ADMIN — BUTALBITAL, ACETAMINOPHEN AND CAFFEINE 1 TABLET: 325; 50; 40 TABLET ORAL at 13:16

## 2024-11-30 RX ADMIN — METOCLOPRAMIDE HYDROCHLORIDE 10 MG: 5 INJECTION, SOLUTION INTRAMUSCULAR; INTRAVENOUS at 11:51

## 2024-11-30 ASSESSMENT — PAIN SCALES - GENERAL
PAINLEVEL_OUTOF10: 8
PAINLEVEL_OUTOF10: 8

## 2024-11-30 ASSESSMENT — PAIN DESCRIPTION - LOCATION
LOCATION: HEAD
LOCATION: HEAD

## 2024-11-30 ASSESSMENT — PAIN - FUNCTIONAL ASSESSMENT: PAIN_FUNCTIONAL_ASSESSMENT: 0-10

## 2024-11-30 NOTE — DISCHARGE INSTRUCTIONS
WE EVALUATED MR. HANSEN FOR HIS HEADACHE.    NO STROKE OR BLEEDING WAS FOUND, FORTUNATELY.    HIS HEART IS DOING OK.    MAKE SURE HE DRINKS SOME FLUIDS EACH DAY BY MOUTH AS DEHYDRATION CAN LEAD TO HEADACHES.    GIVE HIM FIORECET HEADACHE MEDICATION TWICE DAILY AS NEEDED.    BRING HIM BACK IF HIS CONDITION WORSENS.  
BLEs/weight-bearing as tolerated

## 2024-11-30 NOTE — ED PROVIDER NOTES
EMERGENCY DEPARTMENT ENCOUNTER    CHIEF COMPLAINT     HPI     Leo Nevarez is a 84 y.o. male on home palliative care due to end-stage CHF, accompanied by his daughter Shoshana, who presents with gradual onset of a headache since the onset in the last day or so. The quality of the headache is tension, worse in the occiput, without antecedent trauma. This headache is not the worst headache of the patient's life. The patient has no fever, stiff neck, speech difficulty or associated photophobia, nausea. He denies antecedent fever, vision problem, facial droop or rash.    All other systems reviewed and are negative.    Pt's daughter Shoshana also assisted with providing corroborative information regarding the patient.     PAST MEDICAL & SURGICAL HISTORY   Past Medical History:   Diagnosis Date    Chronic kidney disease     Diabetes mellitus (HCC)     Hyperlipidemia     Hypertension       Past Surgical History:   Procedure Laterality Date    BLADDER SCAN  11/11/2023    BLADDER SCAN  11/13/2023    CARDIAC PROCEDURE Left 11/20/2023    Left heart cath / coronary angiography performed by Javier Brothers MD at [facility] CARDIAC CATH LAB    HC BLADDER SCAN  11/11/2023    SHOULDER ARTHROPLASTY Left     TURP N/A 2/5/2024    Cystoscopy Greenlight Photovaporization of Prostate performed by Abhay Levine Jr., MD at [facility] OR      CURRENT MEDICATIONS   Current Outpatient Rx   Medication Sig Dispense Refill    butalbital-aspirin-caffeine (FIORINAL) -40 MG capsule Take 1 capsule by mouth every 8 hours as needed for Headaches for up to 10 days. Max Daily Amount: 3 capsules 20 capsule 0    sodium zirconium cyclosilicate (LOKELMA) 5 g PACK oral suspension Take 1 packet by mouth daily 90 packet 3    HYDROmorphone (DILAUDID) 2 MG tablet Take 1 tablet by mouth every 4 hours as needed for Pain (Shortness of breath) for up to 30 days. Max Daily Amount: 12 mg 180 tablet 0    LORazepam (ATIVAN) 0.5 MG tablet Take 1 tablet by mouth every

## 2024-11-30 NOTE — ED TRIAGE NOTES
Pt presents to ED via lobby for evaluation of headache and difficulty walking. Family states onset approx. 1 week ago for both headache and walking. Current pain rated 8/10, denies medications prior to arrival. Denies CP or SOB. Vitals, labs, EKG obtained.

## 2024-12-01 LAB
EKG ATRIAL RATE: 267 BPM
EKG Q-T INTERVAL: 486 MS
EKG QRS DURATION: 134 MS
EKG QTC CALCULATION (BAZETT): 567 MS
EKG R AXIS: 109 DEGREES
EKG T AXIS: 35 DEGREES
EKG VENTRICULAR RATE: 82 BPM

## 2024-12-01 PROCEDURE — 93010 ELECTROCARDIOGRAM REPORT: CPT | Performed by: INTERNAL MEDICINE

## 2024-12-02 ENCOUNTER — OFFICE VISIT (OUTPATIENT)
Dept: CARDIOLOGY CLINIC | Age: 84
End: 2024-12-02
Payer: MEDICARE

## 2024-12-02 VITALS
OXYGEN SATURATION: 92 % | HEIGHT: 67 IN | BODY MASS INDEX: 36.41 KG/M2 | RESPIRATION RATE: 18 BRPM | SYSTOLIC BLOOD PRESSURE: 132 MMHG | DIASTOLIC BLOOD PRESSURE: 68 MMHG | HEART RATE: 84 BPM | WEIGHT: 232 LBS

## 2024-12-02 DIAGNOSIS — I10 ESSENTIAL HYPERTENSION: ICD-10-CM

## 2024-12-02 DIAGNOSIS — R60.0 EDEMA OF BOTH LOWER LEGS: ICD-10-CM

## 2024-12-02 DIAGNOSIS — I50.32 CHRONIC DIASTOLIC CONGESTIVE HEART FAILURE, NYHA CLASS 3 (HCC): Primary | ICD-10-CM

## 2024-12-02 DIAGNOSIS — E87.5 HYPERKALEMIA: ICD-10-CM

## 2024-12-02 PROCEDURE — G8427 DOCREV CUR MEDS BY ELIG CLIN: HCPCS | Performed by: NURSE PRACTITIONER

## 2024-12-02 PROCEDURE — 1111F DSCHRG MED/CURRENT MED MERGE: CPT | Performed by: NURSE PRACTITIONER

## 2024-12-02 PROCEDURE — 99214 OFFICE O/P EST MOD 30 MIN: CPT | Performed by: NURSE PRACTITIONER

## 2024-12-02 PROCEDURE — 1123F ACP DISCUSS/DSCN MKR DOCD: CPT | Performed by: NURSE PRACTITIONER

## 2024-12-02 PROCEDURE — 1160F RVW MEDS BY RX/DR IN RCRD: CPT | Performed by: NURSE PRACTITIONER

## 2024-12-02 PROCEDURE — 3075F SYST BP GE 130 - 139MM HG: CPT | Performed by: NURSE PRACTITIONER

## 2024-12-02 PROCEDURE — G8417 CALC BMI ABV UP PARAM F/U: HCPCS | Performed by: NURSE PRACTITIONER

## 2024-12-02 PROCEDURE — 1159F MED LIST DOCD IN RCRD: CPT | Performed by: NURSE PRACTITIONER

## 2024-12-02 PROCEDURE — 3078F DIAST BP <80 MM HG: CPT | Performed by: NURSE PRACTITIONER

## 2024-12-02 PROCEDURE — 1036F TOBACCO NON-USER: CPT | Performed by: NURSE PRACTITIONER

## 2024-12-02 PROCEDURE — G8484 FLU IMMUNIZE NO ADMIN: HCPCS | Performed by: NURSE PRACTITIONER

## 2024-12-02 RX ORDER — TORSEMIDE 20 MG/1
40 TABLET ORAL DAILY
Qty: 180 TABLET | Refills: 3 | Status: SHIPPED | OUTPATIENT
Start: 2024-12-02

## 2024-12-02 ASSESSMENT — ENCOUNTER SYMPTOMS
SHORTNESS OF BREATH: 1
VOMITING: 0
ABDOMINAL DISTENTION: 0
NAUSEA: 0
COUGH: 0

## 2024-12-02 NOTE — PROGRESS NOTES
Lab Results   Component Value Date/Time    TRIG 329 06/02/2023 12:00 AM    HDL 33 06/02/2023 12:00 AM       ASSESSMENT AND PLAN:   The patient's condition/symptoms are stable        Diagnosis Orders   1. Chronic diastolic congestive heart failure, NYHA class 3, Stage C (HCC)  torsemide (DEMADEX) 20 MG tablet    Potassium      2. Edema of both lower legs        3. Hyperkalemia        4. Essential hypertension            Continue:  GDMT:   ACE/ARB/ARNI - lisinopril 5 daily    BB - lopressor 25 BID   Diuretic - demadex 40 daily   AA -   SGLT2 -    Vasodilator -   Other -       HFmrEf 45-50% 2024  DD  CAD - moderate disease w/  of RCA  Hyperkalemia - out of lokelma - getting samples from nephrology - will see what K level is on Thursday, continue low K diet.   HTN- stable on current medications     Stable, return of lower leg swelling since NAVNEET and decrease of torsemide. Back on higher dose, decline in renal function noted. Down 10lbs with improving lower leg swelling, SOB stable for pt. Will keep torsemide at current dose. Check K level on Thursday. 3 month f/u    Lab reviewed - K 4.5 Cr. 2.2 mag 2.3 hgb 10.1        ECHO 2024: LA mildly dilated  CATH 2023:  of RCA, 50% 1st diag, ramus 50% OM1 50%   Stress 7/2024: negative    Potassium level on Thursday     Continue torsemide 40meq daily     Continue diet/fluid adherence  Continue daily wts.  F/U w/ Cardiology  F/u in 3 months        Tolerating above noted HF meds, no ill side effects noted. Will continue to monitor kidney function and electrolytes. Will optimize as tolerated.   Pt is compliant w/ medications.    Total visit time of 30 minutes has been spent with patient on education of symptoms, management, medication, and plan of care; as well as review of chart: labs, ECHO, radiology reports, etc.   I personally spent more then 50% of the appt time face to face with the patient.  Daily weights  Fluid restriction of 2 Liters per day  Limit sodium in diet to

## 2024-12-02 NOTE — PATIENT INSTRUCTIONS
You may receive a survey regarding the care you received during your visit.  Your input is valuable to us.  We encourage you to complete and return your survey.  We hope you will choose us in the future for your healthcare needs.    Your nurses today were Jae.  Office hours:   Mon-Thurs 8-4:30  Friday 8-12  Phone: [phone]    Continue:  Continue current medications  Daily weights and record  Fluid restriction of 2 Liters per day  Limit sodium in diet to around 5807-2624 mg/day  Monitor BP  Activity as tolerated     Call the Heart Failure Clinic for any of the following symptoms:   Weight gain of -3 pounds in 1 day or 5 pounds in 1 week  Increased shortness of breath  Shortness of breath while laying down  Cough  Chest pain  Swelling in feet, ankles or legs  Bloating in abdomen  Fatigue

## 2024-12-06 ENCOUNTER — LAB (OUTPATIENT)
Dept: LAB | Age: 84
End: 2024-12-06

## 2024-12-06 DIAGNOSIS — I50.32 CHRONIC DIASTOLIC CONGESTIVE HEART FAILURE, NYHA CLASS 3 (HCC): ICD-10-CM

## 2024-12-06 LAB — POTASSIUM SERPL-SCNC: 4.6 MEQ/L (ref 3.5–5.2)

## 2024-12-28 ENCOUNTER — LAB (OUTPATIENT)
Dept: LAB | Age: 84
End: 2024-12-28

## 2024-12-28 DIAGNOSIS — I50.32 CHRONIC DIASTOLIC CONGESTIVE HEART FAILURE, NYHA CLASS 3 (HCC): ICD-10-CM

## 2024-12-28 LAB — POTASSIUM SERPL-SCNC: 5.6 MEQ/L (ref 3.5–5.2)

## 2024-12-30 ENCOUNTER — TELEPHONE (OUTPATIENT)
Dept: NEPHROLOGY | Age: 84
End: 2024-12-30

## 2024-12-30 ENCOUNTER — OFFICE VISIT (OUTPATIENT)
Dept: PALLATIVE CARE | Age: 84
End: 2024-12-30
Payer: MEDICARE

## 2024-12-30 VITALS
RESPIRATION RATE: 18 BRPM | WEIGHT: 240 LBS | BODY MASS INDEX: 37.67 KG/M2 | SYSTOLIC BLOOD PRESSURE: 132 MMHG | HEART RATE: 72 BPM | DIASTOLIC BLOOD PRESSURE: 70 MMHG | HEIGHT: 67 IN | OXYGEN SATURATION: 100 %

## 2024-12-30 DIAGNOSIS — G47.09 OTHER INSOMNIA: ICD-10-CM

## 2024-12-30 DIAGNOSIS — I50.22 HEART FAILURE WITH MILDLY REDUCED EJECTION FRACTION (HFMREF) (HCC): Primary | ICD-10-CM

## 2024-12-30 DIAGNOSIS — Z51.5 PALLIATIVE CARE PATIENT: ICD-10-CM

## 2024-12-30 DIAGNOSIS — R53.81 PHYSICAL DECONDITIONING: ICD-10-CM

## 2024-12-30 DIAGNOSIS — R06.02 SHORTNESS OF BREATH: ICD-10-CM

## 2024-12-30 DIAGNOSIS — F03.90 DEMENTIA, UNSPECIFIED DEMENTIA SEVERITY, UNSPECIFIED DEMENTIA TYPE, UNSPECIFIED WHETHER BEHAVIORAL, PSYCHOTIC, OR MOOD DISTURBANCE OR ANXIETY (HCC): ICD-10-CM

## 2024-12-30 DIAGNOSIS — F41.9 ANXIETY DISORDER, UNSPECIFIED TYPE: ICD-10-CM

## 2024-12-30 PROCEDURE — G8484 FLU IMMUNIZE NO ADMIN: HCPCS | Performed by: STUDENT IN AN ORGANIZED HEALTH CARE EDUCATION/TRAINING PROGRAM

## 2024-12-30 PROCEDURE — 1159F MED LIST DOCD IN RCRD: CPT | Performed by: STUDENT IN AN ORGANIZED HEALTH CARE EDUCATION/TRAINING PROGRAM

## 2024-12-30 PROCEDURE — 3078F DIAST BP <80 MM HG: CPT | Performed by: STUDENT IN AN ORGANIZED HEALTH CARE EDUCATION/TRAINING PROGRAM

## 2024-12-30 PROCEDURE — 1123F ACP DISCUSS/DSCN MKR DOCD: CPT | Performed by: STUDENT IN AN ORGANIZED HEALTH CARE EDUCATION/TRAINING PROGRAM

## 2024-12-30 PROCEDURE — G8427 DOCREV CUR MEDS BY ELIG CLIN: HCPCS | Performed by: STUDENT IN AN ORGANIZED HEALTH CARE EDUCATION/TRAINING PROGRAM

## 2024-12-30 PROCEDURE — G8417 CALC BMI ABV UP PARAM F/U: HCPCS | Performed by: STUDENT IN AN ORGANIZED HEALTH CARE EDUCATION/TRAINING PROGRAM

## 2024-12-30 PROCEDURE — 1036F TOBACCO NON-USER: CPT | Performed by: STUDENT IN AN ORGANIZED HEALTH CARE EDUCATION/TRAINING PROGRAM

## 2024-12-30 PROCEDURE — 3075F SYST BP GE 130 - 139MM HG: CPT | Performed by: STUDENT IN AN ORGANIZED HEALTH CARE EDUCATION/TRAINING PROGRAM

## 2024-12-30 PROCEDURE — 99214 OFFICE O/P EST MOD 30 MIN: CPT | Performed by: STUDENT IN AN ORGANIZED HEALTH CARE EDUCATION/TRAINING PROGRAM

## 2024-12-30 RX ORDER — TRAZODONE HYDROCHLORIDE 50 MG/1
25 TABLET, FILM COATED ORAL NIGHTLY
Qty: 90 TABLET | Refills: 1 | Status: SHIPPED | OUTPATIENT
Start: 2024-12-30

## 2024-12-30 NOTE — TELEPHONE ENCOUNTER
Morgan Hicks MD  [facility] Clinical Staff; Galina Kahn, JADEN  Please check if the patient is taking Lokelma and also repeat another potassium in a week    Talked with daughter and he has not taken the lokelma since before christman. They ran out and CHF clinic told them to see how labs were without it. Patient has also gained 8 pounds in 2 weeks. Please advise

## 2024-12-30 NOTE — PATIENT INSTRUCTIONS
Continue current plan of care for acute and chronic conditions per primary and specialist teams  Continue Hydromorphone IR 2 mg every 4 hours as needed for breakthrough pain and shortness of breath  Continue Ativan oral tablet 0.5 mg every 6 hours as needed for breakthrough shortness of breath, anxiety, and agitation  Start trazodone 25 mg nightly for insomnia  Lightweight wheelchair ordered  PDMP reviewed  Please call the office if your symptoms worsen or if you were to develop new symptoms  Please call the palliative care office when you need refills

## 2024-12-30 NOTE — PROGRESS NOTES
Leo Nevarez is a 84 y.o. male who presents to the palliative care clinic today for:  Chief Complaint   Patient presents with    Follow-up     2 month follow      Fatigue     Increased weakness    Shortness of Breath     Increased SOB       HPI:   Leo Nevarez presents to the palliative care clinic today For follow-up of symptoms related to his chronic medical conditions.    Symptoms:  Pain:   Location- Throat and Neck  Current Treatments- They have No Medications for scheduled pain relief. From 8 AM yesterday to 8 AM today, they have used Hydromorphone IR 2 mg three times for as needed pain relief.  Effectiveness of Current Treatment- Their pain medications are working well to control their pain.  Shortness of breath: They report shortness of breath. Their shortness of breath is worsening. They are currently taking Hydromorphone IR 2 mg and Ativan oral Tablet 0.5 mg to help with their shortness of breath. Their medication is not working well to help with their shortness of breath.  Sleep: Family reports trouble sleeping. Their sleep is worsening.  Fatigue/Drowsiness: Family reports fatigue.  Appetite: Patient denied issues with appetite. Patient reports their appetite is good.  Wt. Loss: Patient reports weight gain.  Dry Mouth: Patient denies dry mouth.  Nausea/Vomiting: Patient reports nausea and vomiting. Family reports nausea.  Constipation/Diarrhea: Patient denies constipation or diarrhea.  Depression: Patient denies depression or issues with mood.  Anxiety: Patient denies anxiety.        Current Outpatient Medications   Medication Sig Dispense Refill    traZODone (DESYREL) 50 MG tablet Take 0.5 tablets by mouth nightly 90 tablet 1    torsemide (DEMADEX) 20 MG tablet Take 2 tablets by mouth daily 180 tablet 3    butalbital-aspirin-caffeine (FIORINAL) -40 MG capsule Take 1 capsule by mouth every 8 hours as needed for Headaches for up to 10 days. Max Daily Amount: 3 capsules 20 capsule 0    sodium

## 2024-12-31 ENCOUNTER — APPOINTMENT (OUTPATIENT)
Dept: GENERAL RADIOLOGY | Age: 84
End: 2024-12-31
Payer: MEDICARE

## 2024-12-31 ENCOUNTER — HOSPITAL ENCOUNTER (INPATIENT)
Age: 84
LOS: 4 days | Discharge: HOME | End: 2025-01-04
Attending: INTERNAL MEDICINE | Admitting: INTERNAL MEDICINE
Payer: MEDICARE

## 2024-12-31 ENCOUNTER — APPOINTMENT (OUTPATIENT)
Dept: CT IMAGING | Age: 84
End: 2024-12-31
Payer: MEDICARE

## 2024-12-31 DIAGNOSIS — I50.43 ACUTE ON CHRONIC COMBINED SYSTOLIC AND DIASTOLIC CHF (CONGESTIVE HEART FAILURE) (HCC): Primary | ICD-10-CM

## 2024-12-31 DIAGNOSIS — J18.9 PNEUMONIA OF LOWER LOBE DUE TO INFECTIOUS ORGANISM, UNSPECIFIED LATERALITY: ICD-10-CM

## 2024-12-31 DIAGNOSIS — E03.8 OTHER SPECIFIED HYPOTHYROIDISM: ICD-10-CM

## 2024-12-31 DIAGNOSIS — R60.1 ANASARCA: ICD-10-CM

## 2024-12-31 PROBLEM — E87.70 VOLUME OVERLOAD: Status: ACTIVE | Noted: 2024-12-31

## 2024-12-31 LAB
ALBUMIN SERPL BCG-MCNC: 3.6 G/DL (ref 3.5–5.1)
ALP SERPL-CCNC: 106 U/L (ref 38–126)
ALT SERPL W/O P-5'-P-CCNC: 18 U/L (ref 11–66)
ANION GAP SERPL CALC-SCNC: 16 MEQ/L (ref 8–16)
ARTERIAL PATENCY WRIST A: POSITIVE
ARTERIAL PATENCY WRIST A: POSITIVE
AST SERPL-CCNC: 12 U/L (ref 5–40)
BACTERIA URNS QL MICRO: ABNORMAL /HPF
BASE EXCESS BLDA CALC-SCNC: -0.4 MMOL/L (ref -2.5–2.5)
BASE EXCESS BLDA CALC-SCNC: -1.3 MMOL/L (ref -2.5–2.5)
BASOPHILS ABSOLUTE: 0 THOU/MM3 (ref 0–0.1)
BASOPHILS NFR BLD AUTO: 0.6 %
BDY SITE: ABNORMAL
BDY SITE: ABNORMAL
BILIRUB CONJ SERPL-MCNC: < 0.1 MG/DL (ref 0.1–13.8)
BILIRUB SERPL-MCNC: 0.2 MG/DL (ref 0.3–1.2)
BILIRUB UR QL STRIP.AUTO: NEGATIVE
BUN SERPL-MCNC: 38 MG/DL (ref 7–22)
CALCIUM SERPL-MCNC: 8.7 MG/DL (ref 8.5–10.5)
CASTS #/AREA URNS LPF: ABNORMAL /LPF
CASTS 2: ABNORMAL /LPF
CHARACTER UR: CLEAR
CHLORIDE SERPL-SCNC: 107 MEQ/L (ref 98–111)
CO2 SERPL-SCNC: 20 MEQ/L (ref 23–33)
COLLECTED BY:: ABNORMAL
COLLECTED BY:: ABNORMAL
COLOR, UA: YELLOW
CREAT SERPL-MCNC: 2 MG/DL (ref 0.4–1.2)
CRYSTALS URNS MICRO: ABNORMAL
DEPRECATED RDW RBC AUTO: 56.3 FL (ref 35–45)
DEVICE: ABNORMAL
DEVICE: ABNORMAL
EKG ATRIAL RATE: 66 BPM
EKG P AXIS: 23 DEGREES
EKG P-R INTERVAL: 240 MS
EKG Q-T INTERVAL: 462 MS
EKG QRS DURATION: 134 MS
EKG QTC CALCULATION (BAZETT): 484 MS
EKG R AXIS: 134 DEGREES
EKG T AXIS: 15 DEGREES
EKG VENTRICULAR RATE: 66 BPM
EOSINOPHIL NFR BLD AUTO: 6.3 %
EOSINOPHILS ABSOLUTE: 0.4 THOU/MM3 (ref 0–0.4)
EPITHELIAL CELLS, UA: ABNORMAL /HPF
ERYTHROCYTE [DISTWIDTH] IN BLOOD BY AUTOMATED COUNT: 15.7 % (ref 11.5–14.5)
FIO2 ON VENT O2 ANALYZER: 28 %
FIO2 ON VENT O2 ANALYZER: 32 %
GFR SERPL CREATININE-BSD FRML MDRD: 32 ML/MIN/1.73M2
GLUCOSE BLD STRIP.AUTO-MCNC: 112 MG/DL (ref 70–108)
GLUCOSE BLD STRIP.AUTO-MCNC: 120 MG/DL (ref 70–108)
GLUCOSE SERPL-MCNC: 133 MG/DL (ref 70–108)
GLUCOSE UR QL STRIP.AUTO: NEGATIVE MG/DL
HCO3 BLDA-SCNC: 26 MMOL/L (ref 23–28)
HCO3 BLDA-SCNC: 26 MMOL/L (ref 23–28)
HCT VFR BLD AUTO: 33.4 % (ref 42–52)
HGB BLD-MCNC: 10.2 GM/DL (ref 14–18)
HGB UR QL STRIP.AUTO: ABNORMAL
IMM GRANULOCYTES # BLD AUTO: 0.02 THOU/MM3 (ref 0–0.07)
IMM GRANULOCYTES NFR BLD AUTO: 0.3 %
KETONES UR QL STRIP.AUTO: ABNORMAL
LACTATE SERPL-SCNC: 1.9 MMOL/L (ref 0.5–2)
LIPASE SERPL-CCNC: 34.3 U/L (ref 5.6–51.3)
LYMPHOCYTES ABSOLUTE: 2.7 THOU/MM3 (ref 1–4.8)
LYMPHOCYTES NFR BLD AUTO: 42.5 %
MCH RBC QN AUTO: 30 PG (ref 26–33)
MCHC RBC AUTO-ENTMCNC: 30.5 GM/DL (ref 32.2–35.5)
MCV RBC AUTO: 98.2 FL (ref 80–94)
MISCELLANEOUS 2: ABNORMAL
MONOCYTES ABSOLUTE: 0.6 THOU/MM3 (ref 0.4–1.3)
MONOCYTES NFR BLD AUTO: 8.8 %
NEUTROPHILS ABSOLUTE: 2.7 THOU/MM3 (ref 1.8–7.7)
NEUTROPHILS NFR BLD AUTO: 41.5 %
NITRITE UR QL STRIP: NEGATIVE
NRBC BLD AUTO-RTO: 0 /100 WBC
NT-PROBNP SERPL IA-MCNC: ABNORMAL PG/ML (ref 0–449)
OSMOLALITY SERPL CALC.SUM OF ELEC: 295.9 MOSMOL/KG (ref 275–300)
PCO2 BLDA: 48 MMHG (ref 35–45)
PCO2 BLDA: 56 MMHG (ref 35–45)
PEEP SETTING VENT: 6 MMHG
PH BLDA: 7.28 [PH] (ref 7.35–7.45)
PH BLDA: 7.34 [PH] (ref 7.35–7.45)
PH UR STRIP.AUTO: 5 [PH] (ref 5–9)
PIP: 16 CMH2O
PLATELET # BLD AUTO: 183 THOU/MM3 (ref 130–400)
PMV BLD AUTO: 12.2 FL (ref 9.4–12.4)
PO2 BLDA: 130 MMHG (ref 71–104)
PO2 BLDA: 95 MMHG (ref 71–104)
POTASSIUM SERPL-SCNC: 5.2 MEQ/L (ref 3.5–5.2)
PROCALCITONIN SERPL IA-MCNC: 0.05 NG/ML (ref 0.01–0.09)
PROT SERPL-MCNC: 6.5 G/DL (ref 6.1–8)
PROT UR STRIP.AUTO-MCNC: 30 MG/DL
RBC # BLD AUTO: 3.4 MILL/MM3 (ref 4.7–6.1)
RBC URINE: ABNORMAL /HPF
RENAL EPI CELLS #/AREA URNS HPF: ABNORMAL /[HPF]
SAO2 % BLDA: 97 %
SAO2 % BLDA: 98 %
SODIUM BLD-SCNC: 143 MEQ/L (ref 138–146)
SODIUM SERPL-SCNC: 143 MEQ/L (ref 135–145)
SP GR UR REFRACT.AUTO: 1.02 (ref 1–1.03)
TROPONIN, HIGH SENSITIVITY: 51 NG/L (ref 0–12)
TROPONIN, HIGH SENSITIVITY: 60 NG/L (ref 0–12)
UROBILINOGEN, URINE: 0.2 EU/DL (ref 0–1)
VENTILATION MODE VENT: ABNORMAL
VENTILATION MODE VENT: ABNORMAL
WBC # BLD AUTO: 6.4 THOU/MM3 (ref 4.8–10.8)
WBC #/AREA URNS HPF: ABNORMAL /HPF
WBC #/AREA URNS HPF: NEGATIVE /[HPF]
YEAST LIKE FUNGI URNS QL MICRO: ABNORMAL

## 2024-12-31 PROCEDURE — 74176 CT ABD & PELVIS W/O CONTRAST: CPT

## 2024-12-31 PROCEDURE — 6360000002 HC RX W HCPCS

## 2024-12-31 PROCEDURE — 87040 BLOOD CULTURE FOR BACTERIA: CPT

## 2024-12-31 PROCEDURE — 93005 ELECTROCARDIOGRAM TRACING: CPT | Performed by: NURSE PRACTITIONER

## 2024-12-31 PROCEDURE — 99223 1ST HOSP IP/OBS HIGH 75: CPT | Performed by: INTERNAL MEDICINE

## 2024-12-31 PROCEDURE — 80053 COMPREHEN METABOLIC PANEL: CPT

## 2024-12-31 PROCEDURE — 82803 BLOOD GASES ANY COMBINATION: CPT

## 2024-12-31 PROCEDURE — 36600 WITHDRAWAL OF ARTERIAL BLOOD: CPT

## 2024-12-31 PROCEDURE — 70450 CT HEAD/BRAIN W/O DYE: CPT

## 2024-12-31 PROCEDURE — 5A09357 ASSISTANCE WITH RESPIRATORY VENTILATION, LESS THAN 24 CONSECUTIVE HOURS, CONTINUOUS POSITIVE AIRWAY PRESSURE: ICD-10-PCS | Performed by: NURSE PRACTITIONER

## 2024-12-31 PROCEDURE — 2140000000 HC CCU INTERMEDIATE R&B

## 2024-12-31 PROCEDURE — 6360000002 HC RX W HCPCS: Performed by: NURSE PRACTITIONER

## 2024-12-31 PROCEDURE — 82248 BILIRUBIN DIRECT: CPT

## 2024-12-31 PROCEDURE — 82948 REAGENT STRIP/BLOOD GLUCOSE: CPT

## 2024-12-31 PROCEDURE — 99285 EMERGENCY DEPT VISIT HI MDM: CPT

## 2024-12-31 PROCEDURE — 93010 ELECTROCARDIOGRAM REPORT: CPT | Performed by: INTERNAL MEDICINE

## 2024-12-31 PROCEDURE — 94660 CPAP INITIATION&MGMT: CPT

## 2024-12-31 PROCEDURE — 2580000003 HC RX 258: Performed by: NURSE PRACTITIONER

## 2024-12-31 PROCEDURE — 85025 COMPLETE CBC W/AUTO DIFF WBC: CPT

## 2024-12-31 PROCEDURE — 84295 ASSAY OF SERUM SODIUM: CPT

## 2024-12-31 PROCEDURE — 83605 ASSAY OF LACTIC ACID: CPT

## 2024-12-31 PROCEDURE — 36415 COLL VENOUS BLD VENIPUNCTURE: CPT

## 2024-12-31 PROCEDURE — 71045 X-RAY EXAM CHEST 1 VIEW: CPT

## 2024-12-31 PROCEDURE — 83880 ASSAY OF NATRIURETIC PEPTIDE: CPT

## 2024-12-31 PROCEDURE — 83690 ASSAY OF LIPASE: CPT

## 2024-12-31 PROCEDURE — 81001 URINALYSIS AUTO W/SCOPE: CPT

## 2024-12-31 PROCEDURE — 84484 ASSAY OF TROPONIN QUANT: CPT

## 2024-12-31 PROCEDURE — 96374 THER/PROPH/DIAG INJ IV PUSH: CPT

## 2024-12-31 PROCEDURE — 2500000003 HC RX 250 WO HCPCS: Performed by: NURSE PRACTITIONER

## 2024-12-31 PROCEDURE — 84145 PROCALCITONIN (PCT): CPT

## 2024-12-31 PROCEDURE — 96375 TX/PRO/DX INJ NEW DRUG ADDON: CPT

## 2024-12-31 PROCEDURE — 2500000003 HC RX 250 WO HCPCS

## 2024-12-31 RX ORDER — BUPROPION HYDROCHLORIDE 300 MG/1
300 TABLET ORAL EVERY MORNING
COMMUNITY

## 2024-12-31 RX ORDER — TRAZODONE HYDROCHLORIDE 50 MG/1
25 TABLET, FILM COATED ORAL NIGHTLY
Status: DISCONTINUED | OUTPATIENT
Start: 2024-12-31 | End: 2025-01-04 | Stop reason: HOSPADM

## 2024-12-31 RX ORDER — SODIUM CHLORIDE 0.9 % (FLUSH) 0.9 %
5-40 SYRINGE (ML) INJECTION EVERY 12 HOURS SCHEDULED
Status: DISCONTINUED | OUTPATIENT
Start: 2024-12-31 | End: 2025-01-04 | Stop reason: HOSPADM

## 2024-12-31 RX ORDER — HYDROMORPHONE HYDROCHLORIDE 2 MG/1
2 TABLET ORAL EVERY 4 HOURS PRN
COMMUNITY

## 2024-12-31 RX ORDER — POLYETHYLENE GLYCOL 3350 17 G/17G
17 POWDER, FOR SOLUTION ORAL DAILY PRN
Status: DISCONTINUED | OUTPATIENT
Start: 2024-12-31 | End: 2025-01-04

## 2024-12-31 RX ORDER — GLUCAGON 1 MG/ML
1 KIT INJECTION PRN
Status: DISCONTINUED | OUTPATIENT
Start: 2024-12-31 | End: 2025-01-04 | Stop reason: HOSPADM

## 2024-12-31 RX ORDER — PRAVASTATIN SODIUM 80 MG/1
80 TABLET ORAL NIGHTLY
Status: DISCONTINUED | OUTPATIENT
Start: 2024-12-31 | End: 2025-01-04 | Stop reason: HOSPADM

## 2024-12-31 RX ORDER — METOPROLOL TARTRATE 25 MG/1
25 TABLET, FILM COATED ORAL 2 TIMES DAILY
Status: DISCONTINUED | OUTPATIENT
Start: 2024-12-31 | End: 2025-01-04 | Stop reason: HOSPADM

## 2024-12-31 RX ORDER — ACETAMINOPHEN 325 MG/1
650 TABLET ORAL EVERY 6 HOURS PRN
Status: DISCONTINUED | OUTPATIENT
Start: 2024-12-31 | End: 2025-01-04 | Stop reason: HOSPADM

## 2024-12-31 RX ORDER — BUMETANIDE 0.25 MG/ML
1 INJECTION, SOLUTION INTRAMUSCULAR; INTRAVENOUS DAILY
Status: DISCONTINUED | OUTPATIENT
Start: 2024-12-31 | End: 2025-01-03

## 2024-12-31 RX ORDER — PANTOPRAZOLE SODIUM 40 MG/1
40 TABLET, DELAYED RELEASE ORAL
Status: DISCONTINUED | OUTPATIENT
Start: 2025-01-01 | End: 2025-01-04 | Stop reason: HOSPADM

## 2024-12-31 RX ORDER — LISINOPRIL 5 MG/1
5 TABLET ORAL DAILY
Status: DISCONTINUED | OUTPATIENT
Start: 2024-12-31 | End: 2025-01-04 | Stop reason: HOSPADM

## 2024-12-31 RX ORDER — FUROSEMIDE 10 MG/ML
40 INJECTION INTRAMUSCULAR; INTRAVENOUS ONCE
Status: COMPLETED | OUTPATIENT
Start: 2024-12-31 | End: 2024-12-31

## 2024-12-31 RX ORDER — HYDROMORPHONE HYDROCHLORIDE 2 MG/1
2 TABLET ORAL EVERY 4 HOURS PRN
Status: DISCONTINUED | OUTPATIENT
Start: 2024-12-31 | End: 2025-01-04 | Stop reason: HOSPADM

## 2024-12-31 RX ORDER — ENOXAPARIN SODIUM 100 MG/ML
30 INJECTION SUBCUTANEOUS 2 TIMES DAILY
Status: DISCONTINUED | OUTPATIENT
Start: 2024-12-31 | End: 2025-01-04 | Stop reason: HOSPADM

## 2024-12-31 RX ORDER — ONDANSETRON 4 MG/1
4 TABLET, ORALLY DISINTEGRATING ORAL EVERY 8 HOURS PRN
Status: DISCONTINUED | OUTPATIENT
Start: 2024-12-31 | End: 2025-01-04 | Stop reason: HOSPADM

## 2024-12-31 RX ORDER — TAMSULOSIN HYDROCHLORIDE 0.4 MG/1
0.4 CAPSULE ORAL DAILY
Status: DISCONTINUED | OUTPATIENT
Start: 2025-01-01 | End: 2024-12-31

## 2024-12-31 RX ORDER — DEXTROSE MONOHYDRATE 100 MG/ML
INJECTION, SOLUTION INTRAVENOUS CONTINUOUS PRN
Status: DISCONTINUED | OUTPATIENT
Start: 2024-12-31 | End: 2025-01-04 | Stop reason: HOSPADM

## 2024-12-31 RX ORDER — LORAZEPAM 0.5 MG/1
0.5 TABLET ORAL EVERY 6 HOURS PRN
COMMUNITY

## 2024-12-31 RX ORDER — ONDANSETRON 2 MG/ML
4 INJECTION INTRAMUSCULAR; INTRAVENOUS EVERY 6 HOURS PRN
Status: DISCONTINUED | OUTPATIENT
Start: 2024-12-31 | End: 2025-01-04 | Stop reason: HOSPADM

## 2024-12-31 RX ORDER — LORAZEPAM 0.5 MG/1
0.5 TABLET ORAL EVERY 6 HOURS PRN
Status: DISCONTINUED | OUTPATIENT
Start: 2024-12-31 | End: 2025-01-04 | Stop reason: HOSPADM

## 2024-12-31 RX ORDER — BUTALBITAL, ASPIRIN, AND CAFFEINE 325; 50; 40 MG/1; MG/1; MG/1
1 CAPSULE ORAL EVERY 8 HOURS PRN
Status: DISCONTINUED | OUTPATIENT
Start: 2024-12-31 | End: 2024-12-31

## 2024-12-31 RX ORDER — DONEPEZIL HYDROCHLORIDE 10 MG/1
10 TABLET, FILM COATED ORAL NIGHTLY
Status: DISCONTINUED | OUTPATIENT
Start: 2024-12-31 | End: 2025-01-04 | Stop reason: HOSPADM

## 2024-12-31 RX ORDER — SODIUM CHLORIDE 0.9 % (FLUSH) 0.9 %
5-40 SYRINGE (ML) INJECTION PRN
Status: DISCONTINUED | OUTPATIENT
Start: 2024-12-31 | End: 2025-01-04 | Stop reason: HOSPADM

## 2024-12-31 RX ORDER — ACETAMINOPHEN 650 MG/1
650 SUPPOSITORY RECTAL EVERY 6 HOURS PRN
Status: DISCONTINUED | OUTPATIENT
Start: 2024-12-31 | End: 2025-01-04 | Stop reason: HOSPADM

## 2024-12-31 RX ORDER — BUPROPION HYDROCHLORIDE 300 MG/1
300 TABLET ORAL DAILY
Status: DISCONTINUED | OUTPATIENT
Start: 2025-01-01 | End: 2025-01-04 | Stop reason: HOSPADM

## 2024-12-31 RX ORDER — OXYBUTYNIN CHLORIDE 10 MG/1
10 TABLET, EXTENDED RELEASE ORAL DAILY
Status: DISCONTINUED | OUTPATIENT
Start: 2024-12-31 | End: 2024-12-31

## 2024-12-31 RX ORDER — CITALOPRAM HYDROBROMIDE 40 MG/1
40 TABLET ORAL DAILY
Status: DISCONTINUED | OUTPATIENT
Start: 2025-01-01 | End: 2025-01-04 | Stop reason: HOSPADM

## 2024-12-31 RX ORDER — LEVOTHYROXINE SODIUM 150 UG/1
150 TABLET ORAL DAILY
Status: DISCONTINUED | OUTPATIENT
Start: 2025-01-01 | End: 2025-01-03

## 2024-12-31 RX ORDER — SODIUM CHLORIDE 9 MG/ML
INJECTION, SOLUTION INTRAVENOUS PRN
Status: DISCONTINUED | OUTPATIENT
Start: 2024-12-31 | End: 2025-01-04 | Stop reason: HOSPADM

## 2024-12-31 RX ADMIN — SODIUM CHLORIDE, PRESERVATIVE FREE 10 ML: 5 INJECTION INTRAVENOUS at 16:27

## 2024-12-31 RX ADMIN — SODIUM CHLORIDE, PRESERVATIVE FREE 10 ML: 5 INJECTION INTRAVENOUS at 21:24

## 2024-12-31 RX ADMIN — WATER 1000 MG: 1 INJECTION INTRAMUSCULAR; INTRAVENOUS; SUBCUTANEOUS at 10:38

## 2024-12-31 RX ADMIN — BUMETANIDE 1 MG: 0.25 INJECTION INTRAMUSCULAR; INTRAVENOUS at 16:27

## 2024-12-31 RX ADMIN — ENOXAPARIN SODIUM 30 MG: 100 INJECTION SUBCUTANEOUS at 21:24

## 2024-12-31 RX ADMIN — SODIUM CHLORIDE 500 MG: 9 INJECTION, SOLUTION INTRAVENOUS at 10:45

## 2024-12-31 RX ADMIN — FUROSEMIDE 40 MG: 10 INJECTION, SOLUTION INTRAMUSCULAR; INTRAVENOUS at 10:41

## 2024-12-31 ASSESSMENT — PAIN - FUNCTIONAL ASSESSMENT: PAIN_FUNCTIONAL_ASSESSMENT: 0-10

## 2024-12-31 ASSESSMENT — PAIN SCALES - GENERAL
PAINLEVEL_OUTOF10: 8
PAINLEVEL_OUTOF10: 0

## 2024-12-31 NOTE — TELEPHONE ENCOUNTER
Ok watch the salt and fluid restriction of 1500 ml.  Take additional 2 pills of demedex  in the evening as well. For 2 days

## 2024-12-31 NOTE — ED TRIAGE NOTES
Pt present to the ED from home with c/c of left sided abdominal pain. Pt reports 8/10 pain that started yesterday at 0700 and worsened as the pain went on. Pt denies any N/V. Pt denies taking medications prior to help relieve symptoms. Pt's daughter reports pt pt started trazodone yesterday as well. IV access established and labs drawn. Pt is bilingual (english and Hungarian).

## 2024-12-31 NOTE — ED NOTES
Patient straight cathed at this time. Urine sample obtained at this time and sent to lab. Patient resting in bed. Respirations easy and unlabored. No distress noted. Call light within reach.

## 2024-12-31 NOTE — ED NOTES
Report received from JADEN Landry. Patient resting in bed. Respirations easy and unlabored. No distress noted. Call light within reach. Daughter at the bedside.

## 2024-12-31 NOTE — PROGRESS NOTES
Pharmacy Medication History Note    List of current medications patient is taking is complete.    Source of information: Wife, daughter, epic fill history, Dr. Larson notes from 24 visit    Changes made to medication list:  Medications removed (include reason, ex. therapy complete or physician discontinued):  Bupropion  - alternate therapy  Fiorinal - script   Oxybutynin - therapy complete  Tamsulosin - therapy complete    Medications added/doses adjusted:  Bupropion XL 300mg daily  Corrected lisinopril to 10mg daily  Hydromorphone 2mg q4h prn BTP and SOB  Lorazepam 0.5mg q6h prn BT SOB/anxiety/agitation    Other notes (ex. Recent course of antibiotics, Coumadin dosing):    Denies use of other OTC or herbal medications.    Allergies reviewed    Electronically signed by Raine Monzon RPH on 2024 at 4:11 PM

## 2024-12-31 NOTE — ED NOTES
ED to inpatient nurses report      Chief Complaint:  Chief Complaint   Patient presents with    Abdominal Pain     Left sided      Present to ED from: home    MOA:     LOC: oriented, responds to voice  Mobility: Requires assistance * 2  Oxygen Baseline: room air    Current needs required: BiPAP     Code Status:   DNR-CC    What abnormal results were found and what did you give/do to treat them? See chart  Any procedures or intervention occur? Lasix, atb, straight cath    Mental Status:  Level of Consciousness: Responds to voice (1)    Psych Assessment:        Vitals:  Patient Vitals for the past 24 hrs:   BP Temp Temp src Pulse Resp SpO2 Height Weight   12/31/24 1420 131/71 -- -- 65 12 97 % -- --   12/31/24 1320 127/61 -- -- 68 15 96 % -- --   12/31/24 1254 -- -- -- 69 22 95 % -- --   12/31/24 1200 104/68 -- -- 77 20 99 % -- --   12/31/24 1108 -- -- -- 67 22 100 % -- --   12/31/24 1053 132/65 -- -- 65 16 100 % -- --   12/31/24 1041 (!) 142/68 -- -- -- -- -- -- --   12/31/24 0920 133/63 -- -- 67 18 -- -- --   12/31/24 0750 111/61 -- -- 73 17 100 % -- --   12/31/24 0720 103/61 -- -- 72 17 92 % -- --   12/31/24 0639 125/74 97.8 °F (36.6 °C) Oral 71 16 92 % 1.676 m (5' 6\") 108.9 kg (240 lb)        LDAs:   Peripheral IV 12/31/24 Left Forearm (Active)       Ambulatory Status:  Presents to emergency department  because of falls (Syncope, seizure, or loss of consciousness): No, Age > 70: No, Altered Mental Status, Intoxication with alcohol or substance confusion (Disorientation, impaired judgment, poor safety awaremess, or inability to follow instructions): No, Impaired Mobility: Ambulates or transfers with assistive devices or assistance; Unable to ambulate or transer.: No, Nursing Judgement: No    Diagnosis:  DISPOSITION Admitted 12/31/2024 11:16:05 AM   Final diagnoses:   Acute on chronic combined systolic and diastolic CHF (congestive heart failure) (HCC)   Anasarca   Pneumonia of lower lobe due to infectious organism,

## 2024-12-31 NOTE — ED PROVIDER NOTES
[facility] EMERGENCY DEPT      EMERGENCY MEDICINE     Pt Name: Leo Nevarez  MRN: [id number]  Birthdate 1940  Date of evaluation: 12/31/2024  Provider: RELL Riggs CNP    CHIEF COMPLAINT       Chief Complaint   Patient presents with    Abdominal Pain     Left sided      HISTORY OF PRESENT ILLNESS   Leo Nevarez is a pleasant 84 y.o. male who presents to the emergency department from home with c/o left sided abdominal pain.  Daughter is present with the patient.  She states that the patient has been awake most of the night complaining of intermittent abdominal pain.  The patient has dementia.  Family denies fever.  No NV.  Symptoms got worse through the night and seem to wax and wane.  Daughter reports swelling of the legs and 8 pound weight gain.        History is obtained from:  family member - daughter  PASTMEDICAL HISTORY     Past Medical History:   Diagnosis Date    Chronic kidney disease     Diabetes mellitus (Formerly [facility])     Hyperlipidemia     Hypertension        Patient Active Problem List   Diagnosis Code    Anxiety disorder F41.9    Chronic kidney disease N18.9    Dementia (Former[facility]) F03.90    Edema R60.9    Frequency of urination R35.0    Hypothyroidism E03.9    Hyperlipidemia E78.5    Pneumonia J18.9    Recurrent major depressive disorder ([facility]) F33.9    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 ([facility]ly [facility]) E11.9, Z79.4    Urinary incontinence R32    Vitamin D deficiency E55.9    Upper abdominal pain R10.10    Chest pain R07.9    Acute pulmonary edema J81.0    Dyspnea R06.00    Lightheaded R42    Elevated brain natriuretic peptide (BNP) level R79.89    Chronic anemia D64.9    Hyponatremia E87.1    Essential hypertension I10    Morbid obesity E66.01    NAVNEET (acute kidney injury) (Formerly [facility]) N17.9    Fever R50.9    Wheezing R06.2    Hypotension I95.9    Angina of effort ([facility]ly [facility]) I20.89    Abnormal stress test R94.39    Urinary tract infection associated with indwelling urethral catheter ([facility]) T83.511A, N39.0  (HCC)    2. Anasarca    3. Pneumonia of lower lobe due to infectious organism, unspecified laterality          DISPOSITION/PLAN   DISPOSITION Decision To Admit 12/31/2024 10:18:47 AM   DISPOSITION CONDITION Stable           OUTPATIENT FOLLOW UP THE PATIENT:  No follow-up provider specified.    Fany Mccallum, RELL - Fany Walters, RELL - JAXON  12/31/24 1053

## 2024-12-31 NOTE — PLAN OF CARE
Problem: Discharge Planning  Goal: Discharge to home or other facility with appropriate resources  Outcome: Progressing  Flowsheets (Taken 12/31/2024 1718)  Discharge to home or other facility with appropriate resources: Identify barriers to discharge with patient and caregiver     Problem: Confusion  Goal: Confusion, delirium, dementia, or psychosis is improved or at baseline  Description: INTERVENTIONS:  1. Assess for possible contributors to thought disturbance, including medications, impaired vision or hearing, underlying metabolic abnormalities, dehydration, psychiatric diagnoses, and notify attending LIP  2. [place] high risk fall precautions, as indicated  3. Provide frequent short contacts to provide reality reorientation, refocusing and direction  4. Decrease environmental stimuli, including noise as appropriate  5. Monitor and intervene to maintain adequate nutrition, hydration, elimination, sleep and activity  6. If unable to ensure safety without constant attention obtain sitter and review sitter guidelines with assigned personnel  7. Initiate Psychosocial CNS and Spiritual Care consult, as indicated  Outcome: Progressing  Flowsheets (Taken 12/31/2024 1718)  Effect of thought disturbance (confusion, delirium, dementia, or psychosis) are managed with adequate functional status:   Assess for contributors to thought disturbance, including medications, impaired vision or hearing, underlying metabolic abnormalities, dehydration, psychiatric diagnoses, notify LIP   [place] high risk fall precautions, as indicated     Problem: ABCDS Injury Assessment  Goal: Absence of physical injury  Outcome: Progressing  Flowsheets (Taken 12/31/2024 1718)  Absence of Physical Injury: Implement safety measures based on patient assessment     Problem: Respiratory - Adult  Goal: Achieves optimal ventilation and oxygenation  Outcome: Progressing  Flowsheets (Taken 12/31/2024 1718)  Achieves optimal ventilation and

## 2024-12-31 NOTE — ED NOTES
Patient straight cathed at this time per verbal orders from inpatient physician. 400 ml urine drained from bladder. Patient provided with warm blanket.

## 2024-12-31 NOTE — H&P
History & Physical  Internal Medicine Resident         Patient: Leo Nevarez 84 y.o. male      : 1940  Date of Admission: 2024  Date of Service: Pt seen/examined on 24 and Admitted to Inpatient with expected LOS greater than two midnights due to medical therapy.       ASSESSMENT AND PLAN  Acute hypoxic and hypercapnic respiratory failure  Acute Metabolic Encephalopathy  -Multifactorial: Medication AE, volume overload  - Patient's home analgesia, sleep aids held  -2024 CT head without contrast showing no evidence of acute pathology  -Initial ABG pH 7.28, pCO2 56, pO2 130, bicarb 26; patient started on a trial of noninvasive ventilation with repeat gas showing improvement  -Continuation of noninvasive ventilation overnight and with daytime naps  -Titrate supplemental oxygen to maintain SpO2 92 to 96%  -Patient's home oral medications held until improvement in mentation noted  -Diuresis regimen initiated    Acute on chronic systolic heart failure with reduced ejection fraction  -Unclear etiology for exacerbation  -2024 echocardiogram EF 45-50% with mildly reduced left ventricular systolic function and mild global hypokinesis  -Home GDMT: Lisinopril 5 mg, metoprolol tartrate 25 mg twice daily; diuresis torsemide 40 mg daily  -Patient established with heart failure clinic for advance care recommendations; last seen and evaluated 2024  -Patient's home lisinopril and metoprolol to tartrate held and torsemide transition to IV bumetanide 1 mg daily  -Patient presenting with new onset lower abdominal pain with CT of the abdomen and pelvis showing diffuse total body wall edema as well as evidence of intra-abdominal ascites with no evidence of bowel wall obstruction or organized fluid collection per interpreting radiologist on 2024  -CXR as well as CT abdomen/pelvis showing evidence of bilateral pleural effusions with compressive atelectasis/consolidation  -Strict I's and O's,     Alcohol use: Not Currently    Drug use: Not Currently     Social Determinants of Health     Food Insecurity: No Food Insecurity (12/31/2024)    Hunger Vital Sign     Worried About Running Out of Food in the Last Year: Never true     Ran Out of Food in the Last Year: Never true   Transportation Needs: No Transportation Needs (12/31/2024)    PRAPARE - Transportation     Lack of Transportation (Medical): No     Lack of Transportation (Non-Medical): No    Received from [facility]   Housing Stability: Low Risk  (12/31/2024)    Housing Stability Vital Sign     Unable to Pay for Housing in the Last Year: No     Number of Times Moved in the Last Year: 0     Homeless in the Last Year: No        Code status: DNR-CC     Electronically signed by Reza Ramos MD on 12/31/2024 at 9:10 PM.   Case was discussed with the Attending, Dr. Guo.      This document was prepared at least partially through the use of voice recognition software. Although effort is taken to assure the accuracy of this document, it is possible that grammatical, syntax,  or spelling errors may occur.

## 2024-12-31 NOTE — ED NOTES
Pt transported to [facility] on cart in stable condition. Floor contacted before transport.

## 2024-12-31 NOTE — ED NOTES
Patient resting in bed with eyes closed. Respirations easy and unlabored. No distress noted. Call light within reach. Daughter remains at the bedside.

## 2024-12-31 NOTE — TELEPHONE ENCOUNTER
Called patients daughter to advise , however she has patient in ED now. Advised her to bring to there attention this encounter

## 2025-01-01 ENCOUNTER — APPOINTMENT (OUTPATIENT)
Dept: GENERAL RADIOLOGY | Age: 85
End: 2025-01-01
Payer: MEDICARE

## 2025-01-01 LAB
ANION GAP SERPL CALC-SCNC: 12 MEQ/L (ref 8–16)
BUN SERPL-MCNC: 33 MG/DL (ref 7–22)
CALCIUM SERPL-MCNC: 8.7 MG/DL (ref 8.5–10.5)
CHLORIDE SERPL-SCNC: 107 MEQ/L (ref 98–111)
CO2 SERPL-SCNC: 23 MEQ/L (ref 23–33)
CREAT SERPL-MCNC: 1.8 MG/DL (ref 0.4–1.2)
DEPRECATED RDW RBC AUTO: 56.1 FL (ref 35–45)
ERYTHROCYTE [DISTWIDTH] IN BLOOD BY AUTOMATED COUNT: 15.5 % (ref 11.5–14.5)
GFR SERPL CREATININE-BSD FRML MDRD: 37 ML/MIN/1.73M2
GLUCOSE BLD STRIP.AUTO-MCNC: 136 MG/DL (ref 70–108)
GLUCOSE BLD STRIP.AUTO-MCNC: 207 MG/DL (ref 70–108)
GLUCOSE SERPL-MCNC: 108 MG/DL (ref 70–108)
HCT VFR BLD AUTO: 31.9 % (ref 42–52)
HGB BLD-MCNC: 9.5 GM/DL (ref 14–18)
MAGNESIUM SERPL-MCNC: 2 MG/DL (ref 1.6–2.4)
MCH RBC QN AUTO: 29.6 PG (ref 26–33)
MCHC RBC AUTO-ENTMCNC: 29.8 GM/DL (ref 32.2–35.5)
MCV RBC AUTO: 99.4 FL (ref 80–94)
PHOSPHATE SERPL-MCNC: 4 MG/DL (ref 2.4–4.7)
PLATELET # BLD AUTO: 162 THOU/MM3 (ref 130–400)
PMV BLD AUTO: 11.8 FL (ref 9.4–12.4)
POTASSIUM SERPL-SCNC: 4.9 MEQ/L (ref 3.5–5.2)
RBC # BLD AUTO: 3.21 MILL/MM3 (ref 4.7–6.1)
SODIUM SERPL-SCNC: 142 MEQ/L (ref 135–145)
WBC # BLD AUTO: 4.8 THOU/MM3 (ref 4.8–10.8)

## 2025-01-01 PROCEDURE — 94761 N-INVAS EAR/PLS OXIMETRY MLT: CPT

## 2025-01-01 PROCEDURE — 99233 SBSQ HOSP IP/OBS HIGH 50: CPT | Performed by: NURSE PRACTITIONER

## 2025-01-01 PROCEDURE — 1200000003 HC TELEMETRY R&B

## 2025-01-01 PROCEDURE — 99232 SBSQ HOSP IP/OBS MODERATE 35: CPT | Performed by: NURSE PRACTITIONER

## 2025-01-01 PROCEDURE — 85027 COMPLETE CBC AUTOMATED: CPT

## 2025-01-01 PROCEDURE — 2700000000 HC OXYGEN THERAPY PER DAY

## 2025-01-01 PROCEDURE — 83735 ASSAY OF MAGNESIUM: CPT

## 2025-01-01 PROCEDURE — 36415 COLL VENOUS BLD VENIPUNCTURE: CPT

## 2025-01-01 PROCEDURE — 6360000002 HC RX W HCPCS

## 2025-01-01 PROCEDURE — 71045 X-RAY EXAM CHEST 1 VIEW: CPT

## 2025-01-01 PROCEDURE — 82948 REAGENT STRIP/BLOOD GLUCOSE: CPT

## 2025-01-01 PROCEDURE — 84100 ASSAY OF PHOSPHORUS: CPT

## 2025-01-01 PROCEDURE — 2500000003 HC RX 250 WO HCPCS

## 2025-01-01 PROCEDURE — 80048 BASIC METABOLIC PNL TOTAL CA: CPT

## 2025-01-01 PROCEDURE — 94660 CPAP INITIATION&MGMT: CPT

## 2025-01-01 PROCEDURE — 6370000000 HC RX 637 (ALT 250 FOR IP): Performed by: NURSE PRACTITIONER

## 2025-01-01 RX ORDER — INSULIN LISPRO 100 [IU]/ML
0-4 INJECTION, SOLUTION INTRAVENOUS; SUBCUTANEOUS
Status: DISCONTINUED | OUTPATIENT
Start: 2025-01-01 | End: 2025-01-04 | Stop reason: HOSPADM

## 2025-01-01 RX ADMIN — PRAVASTATIN SODIUM 80 MG: 80 TABLET ORAL at 21:01

## 2025-01-01 RX ADMIN — SODIUM CHLORIDE, PRESERVATIVE FREE 10 ML: 5 INJECTION INTRAVENOUS at 20:30

## 2025-01-01 RX ADMIN — INSULIN LISPRO 1 UNITS: 100 INJECTION, SOLUTION INTRAVENOUS; SUBCUTANEOUS at 20:28

## 2025-01-01 RX ADMIN — SODIUM CHLORIDE, PRESERVATIVE FREE 10 ML: 5 INJECTION INTRAVENOUS at 09:13

## 2025-01-01 RX ADMIN — METOPROLOL TARTRATE 25 MG: 25 TABLET, FILM COATED ORAL at 21:01

## 2025-01-01 RX ADMIN — ENOXAPARIN SODIUM 30 MG: 100 INJECTION SUBCUTANEOUS at 20:28

## 2025-01-01 RX ADMIN — BUMETANIDE 1 MG: 0.25 INJECTION INTRAMUSCULAR; INTRAVENOUS at 09:14

## 2025-01-01 RX ADMIN — ENOXAPARIN SODIUM 30 MG: 100 INJECTION SUBCUTANEOUS at 09:14

## 2025-01-01 RX ADMIN — DONEPEZIL HYDROCHLORIDE 10 MG: 10 TABLET, FILM COATED ORAL at 21:01

## 2025-01-01 NOTE — PLAN OF CARE
Problem: Chronic Conditions and Co-morbidities  Goal: Patient's chronic conditions and co-morbidity symptoms are monitored and maintained or improved  Outcome: Progressing  Flowsheets (Taken 1/1/2025 1526)  Care Plan - Patient's Chronic Conditions and Co-Morbidity Symptoms are Monitored and Maintained or Improved: Monitor and assess patient's chronic conditions and comorbid symptoms for stability, deterioration, or improvement     Problem: Discharge Planning  Goal: Discharge to home or other facility with appropriate resources  Outcome: Progressing  Flowsheets (Taken 1/1/2025 1526)  Discharge to home or other facility with appropriate resources: Identify barriers to discharge with patient and caregiver     Problem: Confusion  Goal: Confusion, delirium, dementia, or psychosis is improved or at baseline  Description: INTERVENTIONS:  1. Assess for possible contributors to thought disturbance, including medications, impaired vision or hearing, underlying metabolic abnormalities, dehydration, psychiatric diagnoses, and notify attending LIP  2. [place] high risk fall precautions, as indicated  3. Provide frequent short contacts to provide reality reorientation, refocusing and direction  4. Decrease environmental stimuli, including noise as appropriate  5. Monitor and intervene to maintain adequate nutrition, hydration, elimination, sleep and activity  6. If unable to ensure safety without constant attention obtain sitter and review sitter guidelines with assigned personnel  7. Initiate Psychosocial CNS and Spiritual Care consult, as indicated  Outcome: Progressing  Flowsheets (Taken 1/1/2025 1526)  Effect of thought disturbance (confusion, delirium, dementia, or psychosis) are managed with adequate functional status: Assess for contributors to thought disturbance, including medications, impaired vision or hearing, underlying metabolic abnormalities, dehydration, psychiatric diagnoses, notify LIP     Problem:  Skin/Tissue Integrity  Goal: Absence of new skin breakdown  Description: 1.  Monitor for areas of redness and/or skin breakdown  2.  Assess vascular access sites hourly  3.  Every 4-6 hours minimum:  Change oxygen saturation probe site  4.  Every 4-6 hours:  If on nasal continuous positive airway pressure, respiratory therapy assess nares and determine need for appliance change or resting period.  Outcome: Progressing     Problem: ABCDS Injury Assessment  Goal: Absence of physical injury  Outcome: Progressing  Flowsheets (Taken 12/31/2024 1718 by Sherrie Carl, RN)  Absence of Physical Injury: Implement safety measures based on patient assessment     Problem: Respiratory - Adult  Goal: Achieves optimal ventilation and oxygenation  1/1/2025 1741 by Celi White RN  Outcome: Progressing  Flowsheets (Taken 1/1/2025 1526)  Achieves optimal ventilation and oxygenation: Assess for changes in respiratory status  1/1/2025 0608 by Anayeli Sharif RCP  Outcome: Progressing     Problem: Safety - Adult  Goal: Free from fall injury  Outcome: Progressing  Flowsheets (Taken 12/31/2024 1718 by Sherrie Carl, RN)  Free From Fall Injury: Instruct family/caregiver on patient safety     Problem: Pain  Goal: Verbalizes/displays adequate comfort level or baseline comfort level  Outcome: Progressing  Flowsheets (Taken 1/1/2025 1741)  Verbalizes/displays adequate comfort level or baseline comfort level: Encourage patient to monitor pain and request assistance   Patient aware and updated on plan of care

## 2025-01-01 NOTE — PROGRESS NOTES
Report called to 6K nurse and updated on patients plan of care. Updated patient and family at bedside on patients transfer to different floor. Patients belongings and chart sent with transporter.

## 2025-01-01 NOTE — PLAN OF CARE
Problem: Respiratory - Adult  Goal: Achieves optimal ventilation and oxygenation  1/1/2025 0608 by Anayeli Sharif RCP  Outcome: Progressing

## 2025-01-01 NOTE — PROGRESS NOTES
ketones, specific gravity 1.020, trace blood, protein 30, negative nitrite, negative leukocyte esterase.  WBC 0-2, RBC 3-5, epithelial cells 0-2 with no bacteria seen.     CT head without contrast showed no evidence of acute pathology.  CT abdomen pelvis without contrast showed moderate diffuse total body wall edema suggestive of anasarca as well as moderate bilateral pleural effusions and bilateral lower lobe consolidation suspected to be secondary to changes in volume status.  There was additional evidence of intra-abdominal ascites.  There was additional concern for circumferential bladder wall thickening.     CXR showed evidence of increased pulmonary vascularity as well as abnormal density at the right lung base consistent with infiltrate and/or effusion.     The patient's case was discussed extensively at the bedside with the patient's daughter as well as the patient's wife.  A goals of care discussion was held and the patient's daughter stated clearly that the patient is a DO NOT RESUSCITATE comfort care.  After review of the patient's presenting pathology, she is agreeable to admission for treatment of current volume overload and request that the patient be kept on telemetry secondary to previous concerns that he has developed bradycardia and hypotension with diuresis.  She states clearly that if the patient were to experience cardiopulmonary arrest, she would not want a resuscitation effort undertaken.  This goals of care discussion was reiterated when the patient was transferred into the inpatient setting with the patient's daughter.     In the emergency department, the patient received 40 mg of furosemide, 1000 mg ceftriaxone, 500 mg azithromycin and was admitted for further evaluation and treatment.\"       Subjective: More alert. Intermittently confused per daughter at bedside. No n/v. Abd pain has resolved. Denies CP. Relates to SOB at rest.       Medications:  Reviewed    Infusion Medications    sodium  change from prior.               **This report has been created using voice recognition software. It may contain   minor errors which are inherent in voice recognition technology.**      Electronically signed by Dr. Kizzy Guerrero      CT ABDOMEN PELVIS WO CONTRAST Additional Contrast? None   Final Result   1. Moderate diffuse total body wall edema suggesting anasarca is present.   Moderate bilateral pleural effusions and bilateral lower lobe consolidation may   be secondary to the overall volume status. Correlate clinically for pneumonia. A   small amount of generalized intra-abdominal ascites is present. No bowel   obstruction or organized fluid collection is observed. Trace ascites is noted   within the fat-containing umbilical hernia which is otherwise unremarkable and   unchanged. No protruding small or large bowel loops are identified. The appendix   is normal.      2. Circumferential bladder wall thickening could indicate   infectious/inflammatory cystitis. Correlate with urinalysis and cystoscopy if   clinically indicated. No renal calculus or obstructive uropathy is visualized.      3. Chronic findings are discussed.            **This report has been created using voice recognition software.  It may contain   minor errors which are inherent in voice recognition technology.**      Electronically signed by Dr. Rashmi Hogan          Diet: ADULT DIET; Regular    DVT prophylaxis: [x] Lovenox                                 [] SCDs                                 [] SQ Heparin                                 [] Encourage ambulation           [] Already on Anticoagulation     Disposition:    [x] Home       [] TCU       [] Rehab       [] Psych       [] SNF       [] Long Term Care Facility       [] Other-    Code Status: DNR-CC    PT/OT Eval Status: Yes        Electronically signed by RELL Burnham CNP on 1/1/2025 at 3:55 PM

## 2025-01-01 NOTE — PLAN OF CARE
Problem: Chronic Conditions and Co-morbidities  Goal: Patient's chronic conditions and co-morbidity symptoms are monitored and maintained or improved  Outcome: Progressing     Problem: Discharge Planning  Goal: Discharge to home or other facility with appropriate resources  1/1/2025 0051 by Palomo Black RN  Outcome: Progressing  12/31/2024 1718 by Sherrie Carl, RN  Outcome: Progressing  Flowsheets (Taken 12/31/2024 1718)  Discharge to home or other facility with appropriate resources: Identify barriers to discharge with patient and caregiver     Problem: Confusion  Goal: Confusion, delirium, dementia, or psychosis is improved or at baseline  Description: INTERVENTIONS:  1. Assess for possible contributors to thought disturbance, including medications, impaired vision or hearing, underlying metabolic abnormalities, dehydration, psychiatric diagnoses, and notify attending LIP  2. [place] high risk fall precautions, as indicated  3. Provide frequent short contacts to provide reality reorientation, refocusing and direction  4. Decrease environmental stimuli, including noise as appropriate  5. Monitor and intervene to maintain adequate nutrition, hydration, elimination, sleep and activity  6. If unable to ensure safety without constant attention obtain sitter and review sitter guidelines with assigned personnel  7. Initiate Psychosocial CNS and Spiritual Care consult, as indicated  1/1/2025 0051 by Palomo Black RN  Outcome: Progressing  12/31/2024 1718 by Sherrie Carl RN  Outcome: Progressing  Flowsheets (Taken 12/31/2024 1718)  Effect of thought disturbance (confusion, delirium, dementia, or psychosis) are managed with adequate functional status:   Assess for contributors to thought disturbance, including medications, impaired vision or hearing, underlying metabolic abnormalities, dehydration, psychiatric diagnoses, notify LIP   [place] high risk fall precautions, as indicated      Problem: Skin/Tissue Integrity  Goal: Absence of new skin breakdown  Description: 1.  Monitor for areas of redness and/or skin breakdown  2.  Assess vascular access sites hourly  3.  Every 4-6 hours minimum:  Change oxygen saturation probe site  4.  Every 4-6 hours:  If on nasal continuous positive airway pressure, respiratory therapy assess nares and determine need for appliance change or resting period.  Outcome: Progressing     Problem: ABCDS Injury Assessment  Goal: Absence of physical injury  1/1/2025 0051 by Palomo Black RN  Outcome: Progressing  12/31/2024 1718 by Sherrie Carl RN  Outcome: Progressing  Flowsheets (Taken 12/31/2024 1718)  Absence of Physical Injury: Implement safety measures based on patient assessment     Problem: Respiratory - Adult  Goal: Achieves optimal ventilation and oxygenation  1/1/2025 0051 by Palomo Black RN  Outcome: Progressing  12/31/2024 1718 by Sherrie Carl RN  Outcome: Progressing  Flowsheets (Taken 12/31/2024 1718)  Achieves optimal ventilation and oxygenation:   Assess for changes in respiratory status   Assess for changes in mentation and behavior   Position to facilitate oxygenation and minimize respiratory effort   Oxygen supplementation based on oxygen saturation or arterial blood gases   Assess the need for suctioning and aspirate as needed     Problem: Safety - Adult  Goal: Free from fall injury  1/1/2025 0051 by Palomo Black RN  Outcome: Progressing  12/31/2024 1718 by Sherrie Carl RN  Outcome: Progressing  Flowsheets (Taken 12/31/2024 1718)  Free From Fall Injury: Instruct family/caregiver on patient safety     Problem: Pain  Goal: Verbalizes/displays adequate comfort level or baseline comfort level  Outcome: Progressing

## 2025-01-02 LAB
ANION GAP SERPL CALC-SCNC: 13 MEQ/L (ref 8–16)
BASOPHILS ABSOLUTE: 0 THOU/MM3 (ref 0–0.1)
BASOPHILS NFR BLD AUTO: 0.8 %
BUN SERPL-MCNC: 36 MG/DL (ref 7–22)
CALCIUM SERPL-MCNC: 8.7 MG/DL (ref 8.5–10.5)
CHLORIDE SERPL-SCNC: 107 MEQ/L (ref 98–111)
CO2 SERPL-SCNC: 23 MEQ/L (ref 23–33)
CREAT SERPL-MCNC: 1.9 MG/DL (ref 0.4–1.2)
DEPRECATED RDW RBC AUTO: 54.6 FL (ref 35–45)
EOSINOPHIL NFR BLD AUTO: 4.6 %
EOSINOPHILS ABSOLUTE: 0.2 THOU/MM3 (ref 0–0.4)
ERYTHROCYTE [DISTWIDTH] IN BLOOD BY AUTOMATED COUNT: 15.1 % (ref 11.5–14.5)
GFR SERPL CREATININE-BSD FRML MDRD: 34 ML/MIN/1.73M2
GLUCOSE BLD STRIP.AUTO-MCNC: 157 MG/DL (ref 70–108)
GLUCOSE BLD STRIP.AUTO-MCNC: 163 MG/DL (ref 70–108)
GLUCOSE BLD STRIP.AUTO-MCNC: 183 MG/DL (ref 70–108)
GLUCOSE BLD STRIP.AUTO-MCNC: 191 MG/DL (ref 70–108)
GLUCOSE SERPL-MCNC: 159 MG/DL (ref 70–108)
HCT VFR BLD AUTO: 31.4 % (ref 42–52)
HGB BLD-MCNC: 9.5 GM/DL (ref 14–18)
IMM GRANULOCYTES # BLD AUTO: 0.01 THOU/MM3 (ref 0–0.07)
IMM GRANULOCYTES NFR BLD AUTO: 0.2 %
LYMPHOCYTES ABSOLUTE: 2.1 THOU/MM3 (ref 1–4.8)
LYMPHOCYTES NFR BLD AUTO: 41.3 %
MCH RBC QN AUTO: 29.7 PG (ref 26–33)
MCHC RBC AUTO-ENTMCNC: 30.3 GM/DL (ref 32.2–35.5)
MCV RBC AUTO: 98.1 FL (ref 80–94)
MONOCYTES ABSOLUTE: 0.4 THOU/MM3 (ref 0.4–1.3)
MONOCYTES NFR BLD AUTO: 8.6 %
NEUTROPHILS ABSOLUTE: 2.2 THOU/MM3 (ref 1.8–7.7)
NEUTROPHILS NFR BLD AUTO: 44.5 %
NRBC BLD AUTO-RTO: 0 /100 WBC
PLATELET # BLD AUTO: 161 THOU/MM3 (ref 130–400)
PMV BLD AUTO: 12.1 FL (ref 9.4–12.4)
POTASSIUM SERPL-SCNC: 5.2 MEQ/L (ref 3.5–5.2)
RBC # BLD AUTO: 3.2 MILL/MM3 (ref 4.7–6.1)
SODIUM SERPL-SCNC: 143 MEQ/L (ref 135–145)
T4 FREE SERPL-MCNC: 0.9 NG/DL (ref 0.93–1.68)
TSH SERPL DL<=0.005 MIU/L-ACNC: 13.31 UIU/ML (ref 0.4–4.2)
WBC # BLD AUTO: 5 THOU/MM3 (ref 4.8–10.8)

## 2025-01-02 PROCEDURE — 36415 COLL VENOUS BLD VENIPUNCTURE: CPT

## 2025-01-02 PROCEDURE — 6370000000 HC RX 637 (ALT 250 FOR IP): Performed by: NURSE PRACTITIONER

## 2025-01-02 PROCEDURE — 85025 COMPLETE CBC W/AUTO DIFF WBC: CPT

## 2025-01-02 PROCEDURE — 2500000003 HC RX 250 WO HCPCS

## 2025-01-02 PROCEDURE — 6360000002 HC RX W HCPCS

## 2025-01-02 PROCEDURE — 2700000000 HC OXYGEN THERAPY PER DAY

## 2025-01-02 PROCEDURE — 82948 REAGENT STRIP/BLOOD GLUCOSE: CPT

## 2025-01-02 PROCEDURE — 99232 SBSQ HOSP IP/OBS MODERATE 35: CPT | Performed by: NURSE PRACTITIONER

## 2025-01-02 PROCEDURE — 80048 BASIC METABOLIC PNL TOTAL CA: CPT

## 2025-01-02 PROCEDURE — 84443 ASSAY THYROID STIM HORMONE: CPT

## 2025-01-02 PROCEDURE — 94761 N-INVAS EAR/PLS OXIMETRY MLT: CPT

## 2025-01-02 PROCEDURE — 1200000003 HC TELEMETRY R&B

## 2025-01-02 PROCEDURE — 94660 CPAP INITIATION&MGMT: CPT

## 2025-01-02 PROCEDURE — 84439 ASSAY OF FREE THYROXINE: CPT

## 2025-01-02 RX ORDER — INSULIN GLARGINE 100 [IU]/ML
10 INJECTION, SOLUTION SUBCUTANEOUS NIGHTLY
Status: DISCONTINUED | OUTPATIENT
Start: 2025-01-02 | End: 2025-01-04 | Stop reason: HOSPADM

## 2025-01-02 RX ADMIN — PRAVASTATIN SODIUM 80 MG: 80 TABLET ORAL at 22:22

## 2025-01-02 RX ADMIN — DONEPEZIL HYDROCHLORIDE 10 MG: 10 TABLET, FILM COATED ORAL at 22:21

## 2025-01-02 RX ADMIN — SODIUM CHLORIDE, PRESERVATIVE FREE 10 ML: 5 INJECTION INTRAVENOUS at 22:22

## 2025-01-02 RX ADMIN — INSULIN GLARGINE 10 UNITS: 100 INJECTION, SOLUTION SUBCUTANEOUS at 22:21

## 2025-01-02 RX ADMIN — METOPROLOL TARTRATE 25 MG: 25 TABLET, FILM COATED ORAL at 22:22

## 2025-01-02 RX ADMIN — LEVOTHYROXINE SODIUM 150 MCG: 0.15 TABLET ORAL at 06:18

## 2025-01-02 RX ADMIN — ENOXAPARIN SODIUM 30 MG: 100 INJECTION SUBCUTANEOUS at 22:21

## 2025-01-02 RX ADMIN — BUPROPION HYDROCHLORIDE 300 MG: 300 TABLET, EXTENDED RELEASE ORAL at 09:18

## 2025-01-02 RX ADMIN — ENOXAPARIN SODIUM 30 MG: 100 INJECTION SUBCUTANEOUS at 09:19

## 2025-01-02 RX ADMIN — SODIUM CHLORIDE, PRESERVATIVE FREE 10 ML: 5 INJECTION INTRAVENOUS at 09:18

## 2025-01-02 RX ADMIN — PANTOPRAZOLE SODIUM 40 MG: 40 TABLET, DELAYED RELEASE ORAL at 06:18

## 2025-01-02 RX ADMIN — CITALOPRAM 40 MG: 40 TABLET, FILM COATED ORAL at 09:18

## 2025-01-02 RX ADMIN — LISINOPRIL 5 MG: 5 TABLET ORAL at 09:18

## 2025-01-02 RX ADMIN — INSULIN LISPRO 1 UNITS: 100 INJECTION, SOLUTION INTRAVENOUS; SUBCUTANEOUS at 12:15

## 2025-01-02 RX ADMIN — INSULIN LISPRO 1 UNITS: 100 INJECTION, SOLUTION INTRAVENOUS; SUBCUTANEOUS at 22:21

## 2025-01-02 RX ADMIN — METOPROLOL TARTRATE 25 MG: 25 TABLET, FILM COATED ORAL at 09:18

## 2025-01-02 RX ADMIN — BUMETANIDE 1 MG: 0.25 INJECTION INTRAMUSCULAR; INTRAVENOUS at 09:18

## 2025-01-02 NOTE — PROGRESS NOTES
Patient received sacramental anointing by a . If you are in need of  support, please call [phone]. If you are in need of a  after 6:30pm, please call the house supervisor for the on-call .

## 2025-01-02 NOTE — PLAN OF CARE
intervene to maintain adequate nutrition, hydration, elimination, sleep and activity  6. If unable to ensure safety without constant attention obtain sitter and review sitter guidelines with assigned personnel  7. Initiate Psychosocial CNS and Spiritual Care consult, as indicated  1/2/2025 0011 by Yolanda Salinas RN  Outcome: Progressing  Flowsheets (Taken 1/1/2025 1526 by Celi White, RN)  Effect of thought disturbance (confusion, delirium, dementia, or psychosis) are managed with adequate functional status: Assess for contributors to thought disturbance, including medications, impaired vision or hearing, underlying metabolic abnormalities, dehydration, psychiatric diagnoses, notify LIP  1/1/2025 1741 by Celi White RN  Outcome: Progressing  Flowsheets (Taken 1/1/2025 1526)  Effect of thought disturbance (confusion, delirium, dementia, or psychosis) are managed with adequate functional status: Assess for contributors to thought disturbance, including medications, impaired vision or hearing, underlying metabolic abnormalities, dehydration, psychiatric diagnoses, notify LIP     Problem: Skin/Tissue Integrity  Goal: Absence of new skin breakdown  Description: 1.  Monitor for areas of redness and/or skin breakdown  2.  Assess vascular access sites hourly  3.  Every 4-6 hours minimum:  Change oxygen saturation probe site  4.  Every 4-6 hours:  If on nasal continuous positive airway pressure, respiratory therapy assess nares and determine need for appliance change or resting period.  1/2/2025 0011 by Yolanda Salinas, RN  Outcome: Progressing  1/1/2025 1741 by Celi White RN  Outcome: Progressing     Problem: ABCDS Injury Assessment  Goal: Absence of physical injury  1/2/2025 0011 by Yolanda Salinas, RN  Outcome: Progressing  Flowsheets (Taken 12/31/2024 1718 by Sherrie Carl, RN)  Absence of Physical Injury: Implement safety measures based on patient assessment  1/1/2025 1741 by Christopher  JADEN Alatorre  Outcome: Progressing  Flowsheets (Taken 12/31/2024 1718 by Sherrie Carl, RN)  Absence of Physical Injury: Implement safety measures based on patient assessment     Problem: Respiratory - Adult  Goal: Achieves optimal ventilation and oxygenation  1/2/2025 0011 by Yolanda Salinas RN  Outcome: Progressing  Flowsheets (Taken 1/1/2025 1526 by Celi White RN)  Achieves optimal ventilation and oxygenation: Assess for changes in respiratory status  1/1/2025 1741 by Celi White RN  Outcome: Progressing  Flowsheets (Taken 1/1/2025 1526)  Achieves optimal ventilation and oxygenation: Assess for changes in respiratory status     Problem: Safety - Adult  Goal: Free from fall injury  1/2/2025 0011 by Yolanda Salinas RN  Outcome: Progressing  Flowsheets (Taken 12/31/2024 1718 by Sherrie Carl, RN)  Free From Fall Injury: Instruct family/caregiver on patient safety  1/1/2025 1741 by Celi White RN  Outcome: Progressing  Flowsheets (Taken 12/31/2024 1718 by Sherrie Carl, RN)  Free From Fall Injury: Instruct family/caregiver on patient safety     Problem: Pain  Goal: Verbalizes/displays adequate comfort level or baseline comfort level  1/2/2025 0011 by Yolanda Salinas RN  Outcome: Progressing  Flowsheets (Taken 1/1/2025 1741 by Celi White RN)  Verbalizes/displays adequate comfort level or baseline comfort level: Encourage patient to monitor pain and request assistance  1/1/2025 1741 by Celi White RN  Outcome: Progressing  Flowsheets (Taken 1/1/2025 1741)  Verbalizes/displays adequate comfort level or baseline comfort level: Encourage patient to monitor pain and request assistance

## 2025-01-02 NOTE — PALLIATIVE CARE
Initial Evaluation        Patient:   Leo Nevarez  YOB: 1940  Age:  84 y.o.  Room:  [facility]06/006-  MRN:  [id number]   Acct: [id number]    Date of Admission:  12/31/2024  6:31 AM  Date of Service:  1/2/2025  Completed By:  Neva Cedeno RN        Reason for Palliative Care Evaluation:-   Palliative care patient     History    Into ED due to left sided abdominal pain 8/10. Family reports that patient has also had an 8lb weight gain. Admitted due to CHF. Family agreed to treatment for volume overload, did not want to undergo any further aggressive interventions. Family did confirm code status with admitting provider. Patient also follows with Dr Larson.      Goals of Care Discussions and Plan         Family/Patient Discussion:- No family currently at bedside. Per primary RN, no complaints from patient at this time. Patient is baseline confused.     Plan/Follow-Up:-   Will attempt to speak with family when they are at bedside.     Treatment Limitations: They would not want cardiopulmonary resuscitation in the event of cardiac arrest nor would they want to be intubated and mechanically ventilated under any circumstances. They would want their medical care to maximize comfort through symptom management and allow for a natural death. Oxygen, suction and manual treatment of airway obstruction should be used as needed for comfort. They would want to avoid non-invasive positive airway pressure, antibiotics and IV fluids unless consistent with comfort goal. They would want to transfer to a hospital only if comfort cannot be achieved in current setting. This is consistent with a code status of Do Not Resuscitate Comfort Care (DNR-CC).    Code Status:DNR-CC No additional code details    ACP documents on file:  -Power of  for Healthcare  -Living Will    Healthcare Power of /Healthcare Surrogate Decision Makers:  Advance Care Planning   Healthcare Decision Maker:    Primary Decision

## 2025-01-02 NOTE — PROGRESS NOTES
Hospitalist Progress Note    Patient:  Leo Nevarez      Unit/Bed:6K-06/006-A    YOB: 1940    MRN: [id number]       Acct: [id number]     PCP: Aileen Valadez, RELL - CNP    Date of Admission: 12/31/2024    Assessment/Plan:    Acute hypoxic and hypercapnic respiratory failure, secondary to CHF excerebration. Improving. Initial ABG pH 7.28, pCO2 56, pO2 130, bicarb 26. Required Bipap. Currently weaned down to 1L/NC. Encourage incentive spirometer.   Acute Metabolic Encephalopathy on dementia, resolving. Hypercapnia likely contributed. 12/31/2024 CT head without contrast showing no evidence of acute pathology.  Continue to hold Trazadone, Dilaudid and Ativan. Restart Aricept 1/1. Mentation appears to be appropriate today. .   Acute on chronic systolic heart failure with reduced ejection fraction, improved. CXR as well as CT abdomen/pelvis showing evidence of bilateral pleural effusions with compressive atelectasis/consolidation. 07/03/2024 echocardiogram EF 45-50% with mildly reduced left ventricular systolic function and mild global hypokinesis. Home GDMT: Lisinopril 5 mg, metoprolol tartrate 25 mg twice daily; diuresis torsemide 40 mg daily. -Patient established with heart failure clinic for advance care recommendations; last seen and evaluated 12/02/2024. Restarted BB/ACE 1/1. Diuresing with Bumex 1 mg daily. Strict I's and O's and daily weights, ordered, neither appear accurate on review. Diet to 2 gm NA and 2 liter fluid.  Repeat chest xray 1/1 with improvement in aeration bilaterally.   Troponin elevation. Likely demand ischemia with CHF. EKG stable. No CP. Troponin trend flat.  CAD, with  of the RCA with collaterals.   CKD stage IIIb. -Baseline creatinine 1.7-2. Creatinine 1.9 this AM. Patient following with Dr. Hicks outpatient. Secondary to nephrosclerosis in the context of essential hypertension and type 2 diabetes mellitus. Avoid nephrotoxic agents as able, renally dose  evaluation and treatment.\"       Subjective: Mentation at baseline. Less short of breath today. No abdominal pain, n/v. Denies CP. Overall reports feeling much better.       Medications:  Reviewed    Infusion Medications    sodium chloride      dextrose       Scheduled Medications    insulin lispro  0-4 Units SubCUTAneous 4x Daily AC & HS    sodium chloride flush  5-40 mL IntraVENous 2 times per day    enoxaparin  30 mg SubCUTAneous BID    buPROPion  300 mg Oral Daily    citalopram  40 mg Oral Daily    donepezil  10 mg Oral Nightly    levothyroxine  150 mcg Oral Daily    lisinopril  5 mg Oral Daily    metoprolol tartrate  25 mg Oral BID    pantoprazole  40 mg Oral QAM AC    pravastatin  80 mg Oral Nightly    [Held by provider] traZODone  25 mg Oral Nightly    bumetanide  1 mg IntraVENous Daily     PRN Meds: sodium chloride flush, sodium chloride, ondansetron **OR** ondansetron, polyethylene glycol, acetaminophen **OR** acetaminophen, glucose, dextrose bolus **OR** dextrose bolus, glucagon (rDNA), dextrose, [Held by provider] HYDROmorphone, [Held by provider] LORazepam      Intake/Output Summary (Last 24 hours) at 1/2/2025 1337  Last data filed at 1/2/2025 0828  Gross per 24 hour   Intake 360 ml   Output --   Net 360 ml       Diet:  ADULT DIET; Regular; 5 carb choices (75 gm/meal); Low Sodium (2 gm); 2000 ml    Exam:  /65   Pulse 66   Temp 97.8 °F (36.6 °C) (Oral)   Resp 16   Ht 1.676 m (5' 6\")   Wt 111.2 kg (245 lb 2.4 oz)   SpO2 90%   BMI 39.57 kg/m²     General appearance: No apparent distress, appears stated age and cooperative.  HEENT: Pupils equal, round, and reactive to light. Conjunctivae/corneas clear.  Neck: Supple, with full range of motion. No jugular venous distention. Trachea midline.  Respiratory:  Normal respiratory effort. Clear to auscultation, bilaterally without Rales/Wheezes/Rhonchi.  Cardiovascular: Regular rate and rhythm with normal S1/S2 without murmurs, rubs or gallops. 1+

## 2025-01-02 NOTE — CARE COORDINATION
Case Management Assessment Initial Evaluation    Date/Time of Evaluation: 2025 8:39 AM  Assessment Completed by: Barbara Nicole RN    If patient is discharged prior to next notation, then this note serves as note for discharge by case management.    Patient Name: Leo Nevarez                   YOB: 1940  Diagnosis: Anasarca [R60.1]  Volume overload [E87.70]  Acute on chronic combined systolic and diastolic CHF (congestive heart failure) ([facility]) [I50.43]  Pneumonia of lower lobe due to infectious organism, unspecified laterality [J18.9]                   Date / Time: 2024  6:31 AM  [facility]     Patient Admission Status: Inpatient   Readmission Risk Low 0-14, Mod 15-19), High > 20: Readmission Risk Score: 29.3    Current PCP: Aileen Valadez APRN - CNP  Health Care Decision Makers:   Primary Decision Maker: joel nevarezlashay - Healthcare Decision Maker - [phone]    Additional Case Management Notes: Patient presented to the ED with abdominal pain, but was admitted for ARF secondary to CHF exacerbation. Patient was lethargic in the ED and placed on BiPAP. Eventually weaned to 3L nasal cannula, and on 1L this morning with goals to wean. Baseline room air. Pt continues to exhibit AMS. Hospitalist adjusting medications.     Procedures: n/a    Imagin/31 CT Head:   Moderate diffuse total body wall edema suggesting anasarca is present. Moderate bilateral pleural effusions and bilateral lower lobe consolidation may be secondary to the overall volume status. Correlate clinically for PNA. A small amount of generalized intra-abdominal ascites is present. No bowel obstruction or organized fluid collection is observed. Trace ascites is noted within the fat-containing umbilical hernia which is otherwise unremarkable and unchanged. No protruding bowel loops are identified. appendix is normal.    CXR:   Improved aeration bilaterally. Residual interstitial thickening diffusely

## 2025-01-03 ENCOUNTER — APPOINTMENT (OUTPATIENT)
Dept: GENERAL RADIOLOGY | Age: 85
End: 2025-01-03
Payer: MEDICARE

## 2025-01-03 LAB
ANION GAP SERPL CALC-SCNC: 11 MEQ/L (ref 8–16)
BUN SERPL-MCNC: 36 MG/DL (ref 7–22)
CALCIUM SERPL-MCNC: 8.8 MG/DL (ref 8.5–10.5)
CHLORIDE SERPL-SCNC: 105 MEQ/L (ref 98–111)
CO2 SERPL-SCNC: 27 MEQ/L (ref 23–33)
CREAT SERPL-MCNC: 1.7 MG/DL (ref 0.4–1.2)
GFR SERPL CREATININE-BSD FRML MDRD: 39 ML/MIN/1.73M2
GLUCOSE BLD STRIP.AUTO-MCNC: 169 MG/DL (ref 70–108)
GLUCOSE BLD STRIP.AUTO-MCNC: 217 MG/DL (ref 70–108)
GLUCOSE BLD STRIP.AUTO-MCNC: 220 MG/DL (ref 70–108)
GLUCOSE BLD STRIP.AUTO-MCNC: 247 MG/DL (ref 70–108)
GLUCOSE SERPL-MCNC: 168 MG/DL (ref 70–108)
POTASSIUM SERPL-SCNC: 5.1 MEQ/L (ref 3.5–5.2)
SODIUM SERPL-SCNC: 143 MEQ/L (ref 135–145)

## 2025-01-03 PROCEDURE — 99232 SBSQ HOSP IP/OBS MODERATE 35: CPT | Performed by: NURSE PRACTITIONER

## 2025-01-03 PROCEDURE — 6370000000 HC RX 637 (ALT 250 FOR IP): Performed by: NURSE PRACTITIONER

## 2025-01-03 PROCEDURE — 2500000003 HC RX 250 WO HCPCS

## 2025-01-03 PROCEDURE — 80048 BASIC METABOLIC PNL TOTAL CA: CPT

## 2025-01-03 PROCEDURE — 82948 REAGENT STRIP/BLOOD GLUCOSE: CPT

## 2025-01-03 PROCEDURE — 1200000003 HC TELEMETRY R&B

## 2025-01-03 PROCEDURE — 74018 RADEX ABDOMEN 1 VIEW: CPT

## 2025-01-03 PROCEDURE — 36415 COLL VENOUS BLD VENIPUNCTURE: CPT

## 2025-01-03 PROCEDURE — 6360000002 HC RX W HCPCS

## 2025-01-03 RX ORDER — TORSEMIDE 20 MG/1
20 TABLET ORAL DAILY
Status: DISCONTINUED | OUTPATIENT
Start: 2025-01-03 | End: 2025-01-03

## 2025-01-03 RX ORDER — TORSEMIDE 20 MG/1
20 TABLET ORAL DAILY
Status: DISCONTINUED | OUTPATIENT
Start: 2025-01-04 | End: 2025-01-04 | Stop reason: HOSPADM

## 2025-01-03 RX ADMIN — BUPROPION HYDROCHLORIDE 300 MG: 300 TABLET, EXTENDED RELEASE ORAL at 09:22

## 2025-01-03 RX ADMIN — PRAVASTATIN SODIUM 80 MG: 80 TABLET ORAL at 23:08

## 2025-01-03 RX ADMIN — LISINOPRIL 5 MG: 5 TABLET ORAL at 09:22

## 2025-01-03 RX ADMIN — METOPROLOL TARTRATE 25 MG: 25 TABLET, FILM COATED ORAL at 09:22

## 2025-01-03 RX ADMIN — ENOXAPARIN SODIUM 30 MG: 100 INJECTION SUBCUTANEOUS at 09:21

## 2025-01-03 RX ADMIN — CITALOPRAM 40 MG: 40 TABLET, FILM COATED ORAL at 09:22

## 2025-01-03 RX ADMIN — ENOXAPARIN SODIUM 30 MG: 100 INJECTION SUBCUTANEOUS at 23:08

## 2025-01-03 RX ADMIN — SODIUM CHLORIDE, PRESERVATIVE FREE 10 ML: 5 INJECTION INTRAVENOUS at 09:20

## 2025-01-03 RX ADMIN — LEVOTHYROXINE SODIUM 150 MCG: 0.15 TABLET ORAL at 08:00

## 2025-01-03 RX ADMIN — INSULIN GLARGINE 10 UNITS: 100 INJECTION, SOLUTION SUBCUTANEOUS at 23:08

## 2025-01-03 RX ADMIN — SODIUM CHLORIDE, PRESERVATIVE FREE 10 ML: 5 INJECTION INTRAVENOUS at 23:09

## 2025-01-03 RX ADMIN — BUMETANIDE 1 MG: 0.25 INJECTION INTRAMUSCULAR; INTRAVENOUS at 09:21

## 2025-01-03 RX ADMIN — DONEPEZIL HYDROCHLORIDE 10 MG: 10 TABLET, FILM COATED ORAL at 23:08

## 2025-01-03 RX ADMIN — INSULIN LISPRO 1 UNITS: 100 INJECTION, SOLUTION INTRAVENOUS; SUBCUTANEOUS at 17:24

## 2025-01-03 RX ADMIN — PANTOPRAZOLE SODIUM 40 MG: 40 TABLET, DELAYED RELEASE ORAL at 07:46

## 2025-01-03 RX ADMIN — METOPROLOL TARTRATE 25 MG: 25 TABLET, FILM COATED ORAL at 23:08

## 2025-01-03 RX ADMIN — INSULIN LISPRO 1 UNITS: 100 INJECTION, SOLUTION INTRAVENOUS; SUBCUTANEOUS at 23:08

## 2025-01-03 NOTE — PLAN OF CARE
Problem: Chronic Conditions and Co-morbidities  Goal: Patient's chronic conditions and co-morbidity symptoms are monitored and maintained or improved  Outcome: Progressing  Flowsheets (Taken 1/1/2025 1526 by Celi White, RN)  Care Plan - Patient's Chronic Conditions and Co-Morbidity Symptoms are Monitored and Maintained or Improved: Monitor and assess patient's chronic conditions and comorbid symptoms for stability, deterioration, or improvement     Problem: Discharge Planning  Goal: Discharge to home or other facility with appropriate resources  Outcome: Progressing  Flowsheets (Taken 1/1/2025 1526 by Celi White, RN)  Discharge to home or other facility with appropriate resources: Identify barriers to discharge with patient and caregiver     Problem: Confusion  Goal: Confusion, delirium, dementia, or psychosis is improved or at baseline  Description: INTERVENTIONS:  1. Assess for possible contributors to thought disturbance, including medications, impaired vision or hearing, underlying metabolic abnormalities, dehydration, psychiatric diagnoses, and notify attending LIP  2. [place] high risk fall precautions, as indicated  3. Provide frequent short contacts to provide reality reorientation, refocusing and direction  4. Decrease environmental stimuli, including noise as appropriate  5. Monitor and intervene to maintain adequate nutrition, hydration, elimination, sleep and activity  6. If unable to ensure safety without constant attention obtain sitter and review sitter guidelines with assigned personnel  7. Initiate Psychosocial CNS and Spiritual Care consult, as indicated  Outcome: Progressing  Flowsheets (Taken 1/1/2025 1526 by Celi White, RN)  Effect of thought disturbance (confusion, delirium, dementia, or psychosis) are managed with adequate functional status: Assess for contributors to thought disturbance, including medications, impaired vision or hearing, underlying metabolic

## 2025-01-03 NOTE — PROGRESS NOTES
Hospitalist Progress Note    Patient:  Leo Nevarez      Unit/Bed:6K-06/006-A    YOB: 1940    MRN: [id number]       Acct: [id number]     PCP: Aileen Valadez APRN - CNP    Date of Admission: 12/31/2024    Assessment/Plan:    Vomiting episode. Check KUB. Happened when standing. Check orthostatics.   Acute hypoxic and hypercapnic respiratory failure, secondary to CHF excerebration. resolving. Initial ABG pH 7.28, pCO2 56, pO2 130, bicarb 26. Required Bipap. Currently weaned down room air. Encourage incentive spirometer.   Acute Metabolic Encephalopathy on dementia, resolved. Hypercapnia likely contributed. 12/31/2024 CT head without contrast showing no evidence of acute pathology.  Continue to hold Trazadone, Dilaudid and Ativan. Restart Aricept 1/1. Mentation appears to be appropriate today. .   Acute on chronic systolic heart failure with reduced ejection fraction, improved. CXR as well as CT abdomen/pelvis showing evidence of bilateral pleural effusions with compressive atelectasis/consolidation. 07/03/2024 echocardiogram EF 45-50% with mildly reduced left ventricular systolic function and mild global hypokinesis. Home GDMT: Lisinopril 5 mg, metoprolol tartrate 25 mg twice daily; diuresis torsemide 40 mg daily. -Patient established with heart failure clinic for advance care recommendations; last seen and evaluated 12/02/2024. Restarted BB/ACE 1/1. Diuresing with Bumex 1 mg daily. Ok to change to oral regimen in AM. Strict I's and O's and daily weights, ordered, neither appear accurate on review. Diet to 2 gm NA and 2 liter fluid.  Repeat chest xray 1/1 with improvement in aeration bilaterally.   Troponin elevation. Likely demand ischemia with CHF. EKG stable. No CP. Troponin trend flat.  CAD, with  of the RCA with collaterals.   CKD stage IIIb. -Baseline creatinine 1.7-2. Creatinine 1.7 this AM. Patient following with Dr. Hicks outpatient. Secondary to nephrosclerosis in the context of  signed by: Yahir Whatley DO on    01/01/2025 03:25 AM      XR CHEST PORTABLE   Final Result   1. Cardiomegaly. Increased pulmonary vascularity which may represent congestive   heart failure.   2. Infiltrate and effusion at the right lung base.   3. Thoracic spondylosis..               **This report has been created using voice recognition software. It may contain   minor errors which are inherent in voice recognition technology.**      Electronically signed by Dr. Fuad Hale      CT HEAD WO CONTRAST   Final Result   No evidence of an acute process. No change from prior.               **This report has been created using voice recognition software. It may contain   minor errors which are inherent in voice recognition technology.**      Electronically signed by Dr. Kizzy Guerrero      CT ABDOMEN PELVIS WO CONTRAST Additional Contrast? None   Final Result   1. Moderate diffuse total body wall edema suggesting anasarca is present.   Moderate bilateral pleural effusions and bilateral lower lobe consolidation may   be secondary to the overall volume status. Correlate clinically for pneumonia. A   small amount of generalized intra-abdominal ascites is present. No bowel   obstruction or organized fluid collection is observed. Trace ascites is noted   within the fat-containing umbilical hernia which is otherwise unremarkable and   unchanged. No protruding small or large bowel loops are identified. The appendix   is normal.      2. Circumferential bladder wall thickening could indicate   infectious/inflammatory cystitis. Correlate with urinalysis and cystoscopy if   clinically indicated. No renal calculus or obstructive uropathy is visualized.      3. Chronic findings are discussed.            **This report has been created using voice recognition software.  It may contain   minor errors which are inherent in voice recognition technology.**      Electronically signed by Dr. Rashmi Hogan      XR ABDOMEN (KUB) (SINGLE AP

## 2025-01-03 NOTE — PLAN OF CARE
Problem: Chronic Conditions and Co-morbidities  Goal: Patient's chronic conditions and co-morbidity symptoms are monitored and maintained or improved  1/3/2025 1519 by Sherrie Muñoz RN  Outcome: Progressing  Flowsheets (Taken 1/3/2025 1519)  Care Plan - Patient's Chronic Conditions and Co-Morbidity Symptoms are Monitored and Maintained or Improved:   Monitor and assess patient's chronic conditions and comorbid symptoms for stability, deterioration, or improvement   Collaborate with multidisciplinary team to address chronic and comorbid conditions and prevent exacerbation or deterioration     Problem: Discharge Planning  Goal: Discharge to home or other facility with appropriate resources  1/3/2025 1519 by Sherrie Muñoz, RN  Outcome: Progressing  Flowsheets (Taken 1/3/2025 1519)  Discharge to home or other facility with appropriate resources: Identify barriers to discharge with patient and caregiver     Problem: Skin/Tissue Integrity  Goal: Absence of new skin breakdown  Description: 1.  Monitor for areas of redness and/or skin breakdown  2.  Assess vascular access sites hourly  3.  Every 4-6 hours minimum:  Change oxygen saturation probe site  4.  Every 4-6 hours:  If on nasal continuous positive airway pressure, respiratory therapy assess nares and determine need for appliance change or resting period.  1/3/2025 1519 by Sherrie Muñoz, RN  Outcome: Progressing  Note: No new signs or symptoms of skin breakdown noted this shift, encouraging patient to turn and reposition self in bed q2h       Problem: ABCDS Injury Assessment  Goal: Absence of physical injury  1/3/2025 1519 by Sherrie Muñoz, RN  Outcome: Progressing  Flowsheets (Taken 1/3/2025 1519)  Absence of Physical Injury: Implement safety measures based on patient assessment     Problem: Safety - Adult  Goal: Free from fall injury  1/3/2025 1519 by Sherrie Muñoz, RN  Outcome: Progressing  Flowsheets (Taken 1/3/2025

## 2025-01-04 VITALS
TEMPERATURE: 98 F | HEIGHT: 66 IN | DIASTOLIC BLOOD PRESSURE: 73 MMHG | OXYGEN SATURATION: 99 % | SYSTOLIC BLOOD PRESSURE: 171 MMHG | WEIGHT: 236.77 LBS | RESPIRATION RATE: 18 BRPM | HEART RATE: 70 BPM | BODY MASS INDEX: 38.05 KG/M2

## 2025-01-04 LAB
GLUCOSE BLD STRIP.AUTO-MCNC: 179 MG/DL (ref 70–108)
GLUCOSE BLD STRIP.AUTO-MCNC: 233 MG/DL (ref 70–108)

## 2025-01-04 PROCEDURE — 99239 HOSP IP/OBS DSCHRG MGMT >30: CPT | Performed by: NURSE PRACTITIONER

## 2025-01-04 PROCEDURE — 2500000003 HC RX 250 WO HCPCS

## 2025-01-04 PROCEDURE — 6360000002 HC RX W HCPCS

## 2025-01-04 PROCEDURE — 82948 REAGENT STRIP/BLOOD GLUCOSE: CPT

## 2025-01-04 PROCEDURE — 6370000000 HC RX 637 (ALT 250 FOR IP)

## 2025-01-04 PROCEDURE — 6370000000 HC RX 637 (ALT 250 FOR IP): Performed by: NURSE PRACTITIONER

## 2025-01-04 RX ORDER — LEVOTHYROXINE SODIUM 175 UG/1
175 TABLET ORAL DAILY
Qty: 30 TABLET | Refills: 0 | Status: SHIPPED | OUTPATIENT
Start: 2025-01-05

## 2025-01-04 RX ORDER — PSEUDOEPHEDRINE HCL 30 MG
100 TABLET ORAL DAILY
Qty: 30 CAPSULE | Refills: 0 | Status: SHIPPED | OUTPATIENT
Start: 2025-01-05

## 2025-01-04 RX ORDER — BUPROPION HYDROCHLORIDE 150 MG/1
300 TABLET ORAL DAILY
Qty: 30 TABLET | Refills: 3
Start: 2025-01-04 | End: 2025-01-04 | Stop reason: HOSPADM

## 2025-01-04 RX ORDER — POLYETHYLENE GLYCOL 3350 17 G/17G
17 POWDER, FOR SOLUTION ORAL DAILY
Qty: 30 PACKET | Refills: 0 | Status: SHIPPED | OUTPATIENT
Start: 2025-01-05 | End: 2025-02-04

## 2025-01-04 RX ORDER — DOCUSATE SODIUM 100 MG/1
100 CAPSULE, LIQUID FILLED ORAL DAILY
Status: DISCONTINUED | OUTPATIENT
Start: 2025-01-04 | End: 2025-01-04 | Stop reason: HOSPADM

## 2025-01-04 RX ORDER — POLYETHYLENE GLYCOL 3350 17 G/17G
17 POWDER, FOR SOLUTION ORAL DAILY
Status: DISCONTINUED | OUTPATIENT
Start: 2025-01-04 | End: 2025-01-04 | Stop reason: HOSPADM

## 2025-01-04 RX ADMIN — INSULIN LISPRO 2 UNITS: 100 INJECTION, SOLUTION INTRAVENOUS; SUBCUTANEOUS at 12:15

## 2025-01-04 RX ADMIN — SODIUM CHLORIDE, PRESERVATIVE FREE 10 ML: 5 INJECTION INTRAVENOUS at 09:05

## 2025-01-04 RX ADMIN — ENOXAPARIN SODIUM 30 MG: 100 INJECTION SUBCUTANEOUS at 09:05

## 2025-01-04 RX ADMIN — DOCUSATE SODIUM 100 MG: 100 CAPSULE, LIQUID FILLED ORAL at 12:15

## 2025-01-04 RX ADMIN — LISINOPRIL 5 MG: 5 TABLET ORAL at 09:05

## 2025-01-04 RX ADMIN — PANTOPRAZOLE SODIUM 40 MG: 40 TABLET, DELAYED RELEASE ORAL at 05:37

## 2025-01-04 RX ADMIN — METOPROLOL TARTRATE 25 MG: 25 TABLET, FILM COATED ORAL at 09:05

## 2025-01-04 RX ADMIN — CITALOPRAM 40 MG: 40 TABLET, FILM COATED ORAL at 09:05

## 2025-01-04 RX ADMIN — TORSEMIDE 20 MG: 20 TABLET ORAL at 09:05

## 2025-01-04 RX ADMIN — BUPROPION HYDROCHLORIDE 300 MG: 300 TABLET, EXTENDED RELEASE ORAL at 09:05

## 2025-01-04 RX ADMIN — LEVOTHYROXINE SODIUM 175 MCG: 0.15 TABLET ORAL at 05:36

## 2025-01-04 RX ADMIN — POLYETHYLENE GLYCOL 3350 17 G: 17 POWDER, FOR SOLUTION ORAL at 12:15

## 2025-01-04 RX ADMIN — ACETAMINOPHEN 650 MG: 325 TABLET ORAL at 05:42

## 2025-01-04 ASSESSMENT — PAIN SCALES - GENERAL
PAINLEVEL_OUTOF10: 3
PAINLEVEL_OUTOF10: 6

## 2025-01-04 ASSESSMENT — PAIN DESCRIPTION - ORIENTATION: ORIENTATION: MID

## 2025-01-04 ASSESSMENT — PAIN - FUNCTIONAL ASSESSMENT: PAIN_FUNCTIONAL_ASSESSMENT: ACTIVITIES ARE NOT PREVENTED

## 2025-01-04 ASSESSMENT — PAIN DESCRIPTION - DESCRIPTORS: DESCRIPTORS: ACHING

## 2025-01-04 ASSESSMENT — PAIN DESCRIPTION - LOCATION: LOCATION: ABDOMEN

## 2025-01-04 NOTE — PLAN OF CARE
psychiatric diagnoses, and notify attending LIP  2. [place] high risk fall precautions, as indicated  3. Provide frequent short contacts to provide reality reorientation, refocusing and direction  4. Decrease environmental stimuli, including noise as appropriate  5. Monitor and intervene to maintain adequate nutrition, hydration, elimination, sleep and activity  6. If unable to ensure safety without constant attention obtain sitter and review sitter guidelines with assigned personnel  7. Initiate Psychosocial CNS and Spiritual Care consult, as indicated  1/4/2025 0326 by Duong Ochoa RN  Outcome: Progressing  Flowsheets (Taken 1/1/2025 1526 by Celi White, RN)  Effect of thought disturbance (confusion, delirium, dementia, or psychosis) are managed with adequate functional status: Assess for contributors to thought disturbance, including medications, impaired vision or hearing, underlying metabolic abnormalities, dehydration, psychiatric diagnoses, notify LIP  1/3/2025 1519 by Sherrie Muñoz RN  Outcome: Progressing     Problem: Skin/Tissue Integrity  Goal: Absence of new skin breakdown  Description: 1.  Monitor for areas of redness and/or skin breakdown  2.  Assess vascular access sites hourly  3.  Every 4-6 hours minimum:  Change oxygen saturation probe site  4.  Every 4-6 hours:  If on nasal continuous positive airway pressure, respiratory therapy assess nares and determine need for appliance change or resting period.  1/4/2025 0326 by Duong Ochoa RN  Outcome: Progressing  1/3/2025 1519 by Sherrie Muñoz, RN  Outcome: Progressing  Note: No new signs or symptoms of skin breakdown noted this shift, encouraging patient to turn and reposition self in bed q2h       Problem: ABCDS Injury Assessment  Goal: Absence of physical injury  1/4/2025 0326 by Duong Ochoa RN  Outcome: Progressing  Flowsheets (Taken 1/3/2025 1519 by Sherrie Muñoz, RN)  Absence of Physical Injury:  infection   Monitor all insertion sites i.e., indwelling lines, tubes and drains   Administer medications as ordered  1/3/2025 1519 by Sherrie Muñoz, RN  Outcome: Progressing     Problem: Metabolic/Fluid and Electrolytes - Adult  Goal: Electrolytes maintained within normal limits  Outcome: Progressing  Flowsheets (Taken 1/4/2025 0326)  Electrolytes maintained within normal limits:   Monitor labs and assess patient for signs and symptoms of electrolyte imbalances   Fluid restriction as ordered   Administer electrolyte replacement as ordered  Goal: Hemodynamic stability and optimal renal function maintained  1/4/2025 0326 by Duong Ochoa RN  Outcome: Progressing  Flowsheets (Taken 1/4/2025 0326)  Hemodynamic stability and optimal renal function maintained: Monitor labs and assess for signs and symptoms of volume excess or deficit  1/3/2025 1519 by Sherrie Muñoz, RN  Outcome: Progressing

## 2025-01-04 NOTE — DISCHARGE SUMMARY
Hospital Medicine Discharge Summary      Patient Identification:   Leo Nevarez   : 1940  MRN: [id number]   Account: [id number]      Patient's PCP: Aileen Valadez APRN - CNP    Admit Date: 2024     Discharge Date:   ***    Admitting Physician: Albaro Guo DO     Discharge Physician: RELL Burnham CNP     Discharge Diagnoses:    Active Hospital Problems    Diagnosis Date Noted    Volume overload [E87.70] 2024       The patient was seen and examined on day of discharge and this discharge summary is in conjunction with any daily progress note from day of discharge.    Hospital Course:   Leo Nevarez is a 84 y.o. male admitted to [facility] on 2024 for ***.        ***      Exam:     Vitals:  Vitals:    25 0415 25 0547 25 0800 25 1101   BP: (!) 148/72  (!) 167/70 (!) 171/73   Pulse: 65  63 70   Resp: 16  20 18   Temp: 98 °F (36.7 °C)  97.4 °F (36.3 °C) 98 °F (36.7 °C)   TempSrc: Oral  Oral Oral   SpO2: 96%  95% 99%   Weight:  107.4 kg (236 lb 12.4 oz)     Height:         Weight: Weight - Scale: 107.4 kg (236 lb 12.4 oz)     24 hour intake/output:  Intake/Output Summary (Last 24 hours) at 2025 1253  Last data filed at 2025 0940  Gross per 24 hour   Intake 250 ml   Output --   Net 250 ml         General appearance:  No apparent distress, appears stated age and cooperative.  HEENT:  Normal cephalic, atraumatic without obvious deformity. Pupils equal, round, and reactive to light.  Extra ocular muscles intact. Conjunctivae/corneas clear.  Neck: Supple, with full range of motion. No jugular venous distention. Trachea midline.  Respiratory:  Normal respiratory effort. Clear to auscultation, bilaterally without Rales/Wheezes/Rhonchi.  Cardiovascular:  Regular rate and rhythm with normal S1/S2 without murmurs, rubs or gallops.  Abdomen: Soft, non-tender, non-distended with normal bowel sounds.  Musculoskeletal:  No clubbing,

## 2025-01-05 LAB
BACTERIA BLD AEROBE CULT: NORMAL
BACTERIA BLD AEROBE CULT: NORMAL

## 2025-01-08 PROBLEM — I50.43 ACUTE ON CHRONIC COMBINED SYSTOLIC AND DIASTOLIC CHF (CONGESTIVE HEART FAILURE) (HCC): Status: ACTIVE | Noted: 2024-08-10

## 2025-01-08 PROBLEM — R60.1 ANASARCA: Status: ACTIVE | Noted: 2023-05-12

## 2025-01-19 NOTE — PROGRESS NOTES
[facility] PHYSICIANS [place] SPECIALTY  [facility] CARDIOLOGY  [address]  [address]  [facility] [address]  Dept: [phone]  Dept Fax: [phone]  Loc: [phone]    Visit Date: 1/20/2025    Mr. Nevarez is a 84 y.o. male  who presented for:  Chief Complaint   Patient presents with    1 Year Follow Up       HPI:   82 yo M c hx of DM, CKD, recent epididymoorchitis and non-obstructive CAD is here for a followup.  Echo showed EF 50-55%. Cath on 11/20/23 showed RCA  with L to R collaterals, otherwise non-obstructive CAD. He denies chest pains or major shortness of breath.          Current Outpatient Medications:     docusate sodium (COLACE, DULCOLAX) 100 MG CAPS, Take 100 mg by mouth daily, Disp: 30 capsule, Rfl: 0    polyethylene glycol (GLYCOLAX) 17 g packet, Take 1 packet by mouth daily, Disp: 30 packet, Rfl: 0    levothyroxine (SYNTHROID) 175 MCG tablet, Take 1 tablet by mouth daily, Disp: 30 tablet, Rfl: 0    buPROPion (WELLBUTRIN XL) 300 MG extended release tablet, Take 1 tablet by mouth every morning, Disp: , Rfl:     HYDROmorphone (DILAUDID) 2 MG tablet, Take 1 tablet by mouth every 4 hours as needed for Pain. Breakthrough pain or shortness of breath, Disp: , Rfl:     LORazepam (ATIVAN) 0.5 MG tablet, Take 1 tablet by mouth every 6 hours as needed for Anxiety. Breakthrough shortness of breath, anxiety, agitation, Disp: , Rfl:     traZODone (DESYREL) 50 MG tablet, Take 0.5 tablets by mouth nightly, Disp: 90 tablet, Rfl: 1    torsemide (DEMADEX) 20 MG tablet, Take 2 tablets by mouth daily, Disp: 180 tablet, Rfl: 3    sodium zirconium cyclosilicate (LOKELMA) 5 g PACK oral suspension, Take 1 packet by mouth daily, Disp: 90 packet, Rfl: 3    melatonin 3 MG TABS tablet, Take 1 tablet by mouth daily, Disp: , Rfl:     lisinopril (PRINIVIL;ZESTRIL) 10 MG tablet, Take 1 tablet by mouth daily, Disp: , Rfl:     metoprolol tartrate (LOPRESSOR) 25 MG tablet, Take 1 tablet by mouth 2 times daily,

## 2025-01-20 ENCOUNTER — OFFICE VISIT (OUTPATIENT)
Dept: CARDIOLOGY CLINIC | Age: 85
End: 2025-01-20

## 2025-01-20 VITALS
BODY MASS INDEX: 38.22 KG/M2 | DIASTOLIC BLOOD PRESSURE: 73 MMHG | SYSTOLIC BLOOD PRESSURE: 146 MMHG | HEART RATE: 73 BPM | HEIGHT: 66 IN

## 2025-01-20 DIAGNOSIS — I25.10 CORONARY ARTERY DISEASE DUE TO LIPID RICH PLAQUE: Primary | ICD-10-CM

## 2025-01-20 DIAGNOSIS — I25.83 CORONARY ARTERY DISEASE DUE TO LIPID RICH PLAQUE: Primary | ICD-10-CM

## 2025-02-28 DIAGNOSIS — R11.0 NAUSEA: Primary | ICD-10-CM

## 2025-02-28 DIAGNOSIS — R06.02 SHORTNESS OF BREATH: ICD-10-CM

## 2025-02-28 DIAGNOSIS — Z51.5 PALLIATIVE CARE PATIENT: ICD-10-CM

## 2025-02-28 DIAGNOSIS — I50.22 HEART FAILURE WITH MILDLY REDUCED EJECTION FRACTION (HFMREF) (HCC): ICD-10-CM

## 2025-02-28 RX ORDER — HYDROMORPHONE HYDROCHLORIDE 2 MG/1
2 TABLET ORAL EVERY 4 HOURS PRN
Qty: 180 TABLET | Refills: 0 | Status: SHIPPED | OUTPATIENT
Start: 2025-02-28 | End: 2025-03-30

## 2025-02-28 RX ORDER — PROCHLORPERAZINE MALEATE 10 MG
10 TABLET ORAL EVERY 6 HOURS PRN
Qty: 120 TABLET | Refills: 3 | Status: SHIPPED | OUTPATIENT
Start: 2025-02-28 | End: 2025-06-28

## 2025-02-28 NOTE — TELEPHONE ENCOUNTER
Pt's daughter Margareth called requesting medication refills:  Dilaudid and compazine.     Dionne states that the compazine was rx'd in the hospital. I do not see it on the medication list.     [facility] pharmacy verified.

## 2025-03-24 ENCOUNTER — HOSPITAL ENCOUNTER (OUTPATIENT)
Age: 85
Discharge: HOME | End: 2025-03-24
Payer: MEDICARE

## 2025-03-24 DIAGNOSIS — I10 ESSENTIAL HYPERTENSION: ICD-10-CM

## 2025-03-24 DIAGNOSIS — E87.79 OTHER FLUID OVERLOAD: ICD-10-CM

## 2025-03-24 DIAGNOSIS — N18.32 STAGE 3B CHRONIC KIDNEY DISEASE (HCC): ICD-10-CM

## 2025-03-24 DIAGNOSIS — E03.8 OTHER SPECIFIED HYPOTHYROIDISM: ICD-10-CM

## 2025-03-24 LAB
25(OH)D3 SERPL-MCNC: 30 NG/ML (ref 30–100)
ANION GAP SERPL CALC-SCNC: 14 MEQ/L (ref 8–16)
BUN SERPL-MCNC: 47 MG/DL (ref 8–23)
CALCIUM SERPL-MCNC: 8.8 MG/DL (ref 8.8–10.2)
CHLORIDE SERPL-SCNC: 102 MEQ/L (ref 98–111)
CO2 SERPL-SCNC: 24 MEQ/L (ref 22–29)
CREAT SERPL-MCNC: 2.7 MG/DL (ref 0.7–1.2)
DEPRECATED RDW RBC AUTO: 51.9 FL (ref 35–45)
ERYTHROCYTE [DISTWIDTH] IN BLOOD BY AUTOMATED COUNT: 14.9 % (ref 11.5–14.5)
GFR SERPL CREATININE-BSD FRML MDRD: 23 ML/MIN/1.73M2
GLUCOSE SERPL-MCNC: 274 MG/DL (ref 74–109)
HCT VFR BLD AUTO: 30.1 % (ref 42–52)
HGB BLD-MCNC: 9.1 GM/DL (ref 14–18)
MCH RBC QN AUTO: 29.1 PG (ref 26–33)
MCHC RBC AUTO-ENTMCNC: 30.2 GM/DL (ref 32.2–35.5)
MCV RBC AUTO: 96.2 FL (ref 80–94)
PHOSPHATE SERPL-MCNC: 4.3 MG/DL (ref 2.5–4.5)
PLATELET # BLD AUTO: 178 THOU/MM3 (ref 130–400)
PMV BLD AUTO: 12.1 FL (ref 9.4–12.4)
POTASSIUM SERPL-SCNC: 4.8 MEQ/L (ref 3.5–5.2)
PTH-INTACT SERPL-MCNC: 123 PG/ML (ref 15–65)
RBC # BLD AUTO: 3.13 MILL/MM3 (ref 4.7–6.1)
SODIUM SERPL-SCNC: 140 MEQ/L (ref 135–145)
T4 FREE SERPL-MCNC: 0.8 NG/DL (ref 0.92–1.68)
TSH SERPL DL<=0.05 MIU/L-ACNC: 26.9 UIU/ML (ref 0.27–4.2)
WBC # BLD AUTO: 6.4 THOU/MM3 (ref 4.8–10.8)

## 2025-03-24 PROCEDURE — 84439 ASSAY OF FREE THYROXINE: CPT

## 2025-03-24 PROCEDURE — 36415 COLL VENOUS BLD VENIPUNCTURE: CPT

## 2025-03-24 PROCEDURE — 84443 ASSAY THYROID STIM HORMONE: CPT

## 2025-03-24 PROCEDURE — 80048 BASIC METABOLIC PNL TOTAL CA: CPT

## 2025-03-24 PROCEDURE — 84100 ASSAY OF PHOSPHORUS: CPT

## 2025-03-24 PROCEDURE — 82306 VITAMIN D 25 HYDROXY: CPT

## 2025-03-24 PROCEDURE — 83970 ASSAY OF PARATHORMONE: CPT

## 2025-03-24 PROCEDURE — 85027 COMPLETE CBC AUTOMATED: CPT

## 2025-03-25 ENCOUNTER — OFFICE VISIT (OUTPATIENT)
Dept: NEPHROLOGY | Age: 85
End: 2025-03-25
Payer: MEDICARE

## 2025-03-25 ENCOUNTER — TELEPHONE (OUTPATIENT)
Dept: NEPHROLOGY | Age: 85
End: 2025-03-25

## 2025-03-25 VITALS
HEART RATE: 81 BPM | DIASTOLIC BLOOD PRESSURE: 60 MMHG | WEIGHT: 240 LBS | OXYGEN SATURATION: 90 % | SYSTOLIC BLOOD PRESSURE: 126 MMHG | BODY MASS INDEX: 38.74 KG/M2

## 2025-03-25 DIAGNOSIS — N17.9 ACUTE RENAL FAILURE, UNSPECIFIED ACUTE RENAL FAILURE TYPE: ICD-10-CM

## 2025-03-25 DIAGNOSIS — N18.32 STAGE 3B CHRONIC KIDNEY DISEASE (HCC): ICD-10-CM

## 2025-03-25 DIAGNOSIS — I10 ESSENTIAL HYPERTENSION: Primary | ICD-10-CM

## 2025-03-25 DIAGNOSIS — E87.79 OTHER FLUID OVERLOAD: ICD-10-CM

## 2025-03-25 PROCEDURE — G8417 CALC BMI ABV UP PARAM F/U: HCPCS | Performed by: INTERNAL MEDICINE

## 2025-03-25 PROCEDURE — 1159F MED LIST DOCD IN RCRD: CPT | Performed by: INTERNAL MEDICINE

## 2025-03-25 PROCEDURE — 1036F TOBACCO NON-USER: CPT | Performed by: INTERNAL MEDICINE

## 2025-03-25 PROCEDURE — 3078F DIAST BP <80 MM HG: CPT | Performed by: INTERNAL MEDICINE

## 2025-03-25 PROCEDURE — 3074F SYST BP LT 130 MM HG: CPT | Performed by: INTERNAL MEDICINE

## 2025-03-25 PROCEDURE — 99214 OFFICE O/P EST MOD 30 MIN: CPT | Performed by: INTERNAL MEDICINE

## 2025-03-25 PROCEDURE — G2211 COMPLEX E/M VISIT ADD ON: HCPCS | Performed by: INTERNAL MEDICINE

## 2025-03-25 PROCEDURE — 1123F ACP DISCUSS/DSCN MKR DOCD: CPT | Performed by: INTERNAL MEDICINE

## 2025-03-25 PROCEDURE — G8427 DOCREV CUR MEDS BY ELIG CLIN: HCPCS | Performed by: INTERNAL MEDICINE

## 2025-03-25 RX ORDER — EMPAGLIFLOZIN 25 MG/1
25 TABLET, FILM COATED ORAL DAILY
COMMUNITY
Start: 2025-03-14

## 2025-03-25 NOTE — PROGRESS NOTES
B/L edema  CNS - awake and alert    Labs, Radiology and Tests    Labs -    6/23 8/23 11/24 3/25        potassium 4.3 4.8 4.7 4.8        BUN 15 19 30 47        Creatinine 1.61 0.9 1.7 2.7        eGFR 42 >60 39 23                    UPCR            CR                          Renal Ultrasound Scan -- 6/27  Rt kidney 9.3 cm, left kidney 11.2 cm     Other : old old records have been reviewed patient has seen urology in [place], [facility], had leg swelling.     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  His creatinine has worsened.  -Does have some mild lower extremity edema advised to hold the torsemide for 2 days and subsequently start taking it once a day and additional doses of torsemide as needed  -Fluid restriction of 60 ounces patient apparently drinking a lot of liquid  Electrolytes appear to be within normal limits  Essential hypertension running well continue current medications  Diabetes mellitus with possible diabetic nephropathy decrease the metformin to 500 twice a day will notify PCP  Urinary incontinence on oxybutyrin  BPH S/P surgery  Medications reviewed discussed with the family in detail  Follow-up in 4 months    Tests and orders placed this Encounter     Orders Placed This Encounter   Procedures    Basic Metabolic Panel    Phosphorus    PTH, Intact    Vitamin D 25 Hydroxy    CBC    Basic Metabolic Panel       Morgan Hicks M.D  [facility].

## 2025-03-25 NOTE — TELEPHONE ENCOUNTER
Called patients PCP and had to leave a message to return  my call. At todays visit Dr Hicks decreased Metformin to 500 mg BID due to kidney function. Patient is going to get labs in 1 week to see if numbers are better, if not we will have to D/C the Metformin. MAR updated

## 2025-03-26 ENCOUNTER — OFFICE VISIT (OUTPATIENT)
Dept: PALLATIVE CARE | Age: 85
End: 2025-03-26
Payer: MEDICARE

## 2025-03-26 VITALS
SYSTOLIC BLOOD PRESSURE: 134 MMHG | BODY MASS INDEX: 38.57 KG/M2 | HEART RATE: 81 BPM | RESPIRATION RATE: 18 BRPM | OXYGEN SATURATION: 97 % | DIASTOLIC BLOOD PRESSURE: 70 MMHG | WEIGHT: 240 LBS | HEIGHT: 66 IN

## 2025-03-26 DIAGNOSIS — F03.90 DEMENTIA, UNSPECIFIED DEMENTIA SEVERITY, UNSPECIFIED DEMENTIA TYPE, UNSPECIFIED WHETHER BEHAVIORAL, PSYCHOTIC, OR MOOD DISTURBANCE OR ANXIETY (HCC): ICD-10-CM

## 2025-03-26 DIAGNOSIS — G44.209 TENSION HEADACHE: ICD-10-CM

## 2025-03-26 DIAGNOSIS — F41.9 ANXIETY DISORDER, UNSPECIFIED TYPE: ICD-10-CM

## 2025-03-26 DIAGNOSIS — Z51.5 PALLIATIVE CARE PATIENT: ICD-10-CM

## 2025-03-26 DIAGNOSIS — G47.09 OTHER INSOMNIA: ICD-10-CM

## 2025-03-26 DIAGNOSIS — I50.22 HEART FAILURE WITH MILDLY REDUCED EJECTION FRACTION (HFMREF) (HCC): Primary | ICD-10-CM

## 2025-03-26 DIAGNOSIS — R53.81 PHYSICAL DECONDITIONING: ICD-10-CM

## 2025-03-26 DIAGNOSIS — R06.02 SHORTNESS OF BREATH: ICD-10-CM

## 2025-03-26 PROCEDURE — 3078F DIAST BP <80 MM HG: CPT | Performed by: STUDENT IN AN ORGANIZED HEALTH CARE EDUCATION/TRAINING PROGRAM

## 2025-03-26 PROCEDURE — G8417 CALC BMI ABV UP PARAM F/U: HCPCS | Performed by: STUDENT IN AN ORGANIZED HEALTH CARE EDUCATION/TRAINING PROGRAM

## 2025-03-26 PROCEDURE — 99214 OFFICE O/P EST MOD 30 MIN: CPT | Performed by: STUDENT IN AN ORGANIZED HEALTH CARE EDUCATION/TRAINING PROGRAM

## 2025-03-26 PROCEDURE — 1123F ACP DISCUSS/DSCN MKR DOCD: CPT | Performed by: STUDENT IN AN ORGANIZED HEALTH CARE EDUCATION/TRAINING PROGRAM

## 2025-03-26 PROCEDURE — 1036F TOBACCO NON-USER: CPT | Performed by: STUDENT IN AN ORGANIZED HEALTH CARE EDUCATION/TRAINING PROGRAM

## 2025-03-26 PROCEDURE — 1159F MED LIST DOCD IN RCRD: CPT | Performed by: STUDENT IN AN ORGANIZED HEALTH CARE EDUCATION/TRAINING PROGRAM

## 2025-03-26 PROCEDURE — 3075F SYST BP GE 130 - 139MM HG: CPT | Performed by: STUDENT IN AN ORGANIZED HEALTH CARE EDUCATION/TRAINING PROGRAM

## 2025-03-26 PROCEDURE — G8427 DOCREV CUR MEDS BY ELIG CLIN: HCPCS | Performed by: STUDENT IN AN ORGANIZED HEALTH CARE EDUCATION/TRAINING PROGRAM

## 2025-03-26 RX ORDER — TRAZODONE HYDROCHLORIDE 50 MG/1
25 TABLET ORAL NIGHTLY
Qty: 90 TABLET | Refills: 1 | Status: SHIPPED | OUTPATIENT
Start: 2025-03-26

## 2025-03-26 RX ORDER — BUTALBITAL, ASPIRIN, AND CAFFEINE 325; 50; 40 MG/1; MG/1; MG/1
1 CAPSULE ORAL EVERY 8 HOURS PRN
Qty: 20 CAPSULE | Refills: 0 | Status: SHIPPED | OUTPATIENT
Start: 2025-03-26 | End: 2025-04-05

## 2025-03-26 RX ORDER — HYDROMORPHONE HYDROCHLORIDE 2 MG/1
2 TABLET ORAL EVERY 4 HOURS PRN
Qty: 180 TABLET | Refills: 0 | Status: SHIPPED | OUTPATIENT
Start: 2025-03-26 | End: 2025-04-25

## 2025-03-26 NOTE — PATIENT INSTRUCTIONS
Continue current plan of care for acute and chronic conditions per primary and specialist teams  Continue Hydromorphone IR 2 mg every 4 hours as needed for breakthrough pain and shortness of breath  Continue Ativan oral tablet 0.5 mg every 6 hours as needed for breakthrough shortness of breath, anxiety, and agitation  Continue trazodone 25 mg nightly for insomnia  Continue Fioricet as needed for headaches  PDMP reviewed  Please call the office if your symptoms worsen or if you were to develop new symptoms  Please call the palliative care office when you need refills

## 2025-03-26 NOTE — PROGRESS NOTES
Leo Nevarez is a 84 y.o. male who presents to the palliative care clinic today for:  Chief Complaint   Patient presents with    3 Month Follow-Up         HPI:   Leo Nevarez presents to the palliative care clinic today For follow-up of symptoms related to his chronic medical conditions.    Symptoms:  Pain:   They deny pain.  Current Treatments- They have No Medications for scheduled pain relief. From 8 AM yesterday to 8 AM today, they have used Hydromorphone IR 2 mg 2-3 times for as needed pain relief.  Effectiveness of Current Treatment- Their pain medications are working well to control their pain.  Shortness of breath: They report shortness of breath. They report shortness of breath on exertion. Their shortness of breath is worsening. They are currently taking Hydromorphone IR 2 mg to help with their shortness of breath. Their medication is  working well to help with their shortness of breath.  Sleep: Patient reports they are not having issues with sleep. They are currently taking Trazodone to help with their sleep. Their medication is  working well to help with their sleep.  Fatigue/Drowsiness: Patient denies fatigue.  Patient denies drowsiness.   Appetite: Patient denied issues with appetite. Patient reports their appetite is good.  Wt. Loss: Patient's weight is stable.   Dry Mouth: Patient denies dry mouth.  Nausea/Vomiting: Patient denies nausea and vomiting.  Constipation/Diarrhea: Patient denies constipation or diarrhea.  Depression: Patient denies depression or issues with mood. They are currently taking Bupropion, Celexa for their depression. It is  working well for them.  Anxiety: Patient reports anxiety. Family reports anxiety. They are currently taking Wellbutrin, Celexa, Ativan as needed for their anxiety. It is  working well for them.        Current Outpatient Medications   Medication Sig Dispense Refill    JARDIANCE 25 MG tablet Take 1 tablet by mouth daily      metFORMIN (GLUCOPHAGE) 500

## 2025-03-27 ENCOUNTER — TELEPHONE (OUTPATIENT)
Dept: PALLATIVE CARE | Age: 85
End: 2025-03-27

## 2025-03-27 ENCOUNTER — OFFICE VISIT (OUTPATIENT)
Dept: CARDIOLOGY CLINIC | Age: 85
End: 2025-03-27
Payer: MEDICARE

## 2025-03-27 VITALS
BODY MASS INDEX: 38.58 KG/M2 | WEIGHT: 239 LBS | DIASTOLIC BLOOD PRESSURE: 70 MMHG | OXYGEN SATURATION: 95 % | SYSTOLIC BLOOD PRESSURE: 144 MMHG | HEART RATE: 86 BPM

## 2025-03-27 DIAGNOSIS — I10 ESSENTIAL HYPERTENSION: ICD-10-CM

## 2025-03-27 DIAGNOSIS — R60.0 EDEMA OF BOTH LOWER LEGS: ICD-10-CM

## 2025-03-27 DIAGNOSIS — G44.209 TENSION HEADACHE: ICD-10-CM

## 2025-03-27 DIAGNOSIS — G43.909 MIGRAINE WITHOUT STATUS MIGRAINOSUS, NOT INTRACTABLE, UNSPECIFIED MIGRAINE TYPE: Primary | ICD-10-CM

## 2025-03-27 DIAGNOSIS — I50.32 CHRONIC DIASTOLIC CONGESTIVE HEART FAILURE, NYHA CLASS 3 (HCC): Primary | ICD-10-CM

## 2025-03-27 PROCEDURE — 1160F RVW MEDS BY RX/DR IN RCRD: CPT | Performed by: NURSE PRACTITIONER

## 2025-03-27 PROCEDURE — 99214 OFFICE O/P EST MOD 30 MIN: CPT | Performed by: NURSE PRACTITIONER

## 2025-03-27 PROCEDURE — 1159F MED LIST DOCD IN RCRD: CPT | Performed by: NURSE PRACTITIONER

## 2025-03-27 PROCEDURE — 1123F ACP DISCUSS/DSCN MKR DOCD: CPT | Performed by: NURSE PRACTITIONER

## 2025-03-27 PROCEDURE — G8427 DOCREV CUR MEDS BY ELIG CLIN: HCPCS | Performed by: NURSE PRACTITIONER

## 2025-03-27 PROCEDURE — 3078F DIAST BP <80 MM HG: CPT | Performed by: NURSE PRACTITIONER

## 2025-03-27 PROCEDURE — 1036F TOBACCO NON-USER: CPT | Performed by: NURSE PRACTITIONER

## 2025-03-27 PROCEDURE — 3077F SYST BP >= 140 MM HG: CPT | Performed by: NURSE PRACTITIONER

## 2025-03-27 PROCEDURE — G8417 CALC BMI ABV UP PARAM F/U: HCPCS | Performed by: NURSE PRACTITIONER

## 2025-03-27 ASSESSMENT — ENCOUNTER SYMPTOMS
VOMITING: 0
SHORTNESS OF BREATH: 1
ABDOMINAL DISTENTION: 0
NAUSEA: 0
COUGH: 0

## 2025-03-27 NOTE — PROGRESS NOTES
Patient here for check up  Nephrology decrease torsemide- holding for 3 days then resuming at 1 tablet daily starting tomorrow     
AM

## 2025-03-27 NOTE — PATIENT INSTRUCTIONS
You may receive a survey regarding the care you received during your visit.  Your input is valuable to us.  We encourage you to complete and return your survey.  We hope you will choose us in the future for your healthcare needs.    Your nurses today were Jae.  Office hours:   Mon-Thurs 8-4:30  Friday 8-12  Phone: [phone]    Continue:  Continue current medications  Daily weights and record  Fluid restriction of 2 Liters per day  Limit sodium in diet to around 0786-6934 mg/day  Monitor BP  Activity as tolerated     Call the Heart Failure Clinic for any of the following symptoms:   Weight gain of -3 pounds in 1 day or 5 pounds in 1 week  Increased shortness of breath  Shortness of breath while laying down  Cough  Chest pain  Swelling in feet, ankles or legs  Bloating in abdomen  Fatigue      Discuss stopping the Jardiance w/ nephrology     Recommend additional 20mg of Torsemide w/ worsening CHF.     Continue diet/fluid adherence  Continue daily wts.  F/U w/ Cardiology  F/u in 6 months

## 2025-03-31 RX ORDER — BUTALBITAL, ASPIRIN, AND CAFFEINE 325; 50; 40 MG/1; MG/1; MG/1
1 CAPSULE ORAL EVERY 8 HOURS PRN
Qty: 20 CAPSULE | Refills: 0 | Status: SHIPPED | OUTPATIENT
Start: 2025-03-31 | End: 2025-04-10

## 2025-04-29 ENCOUNTER — HOSPITAL ENCOUNTER (INPATIENT)
Age: 85
LOS: 5 days | Discharge: HOME | DRG: 682 | End: 2025-05-04
Attending: STUDENT IN AN ORGANIZED HEALTH CARE EDUCATION/TRAINING PROGRAM | Admitting: STUDENT IN AN ORGANIZED HEALTH CARE EDUCATION/TRAINING PROGRAM
Payer: MEDICARE

## 2025-04-29 ENCOUNTER — APPOINTMENT (OUTPATIENT)
Dept: GENERAL RADIOLOGY | Age: 85
DRG: 682 | End: 2025-04-29
Payer: MEDICARE

## 2025-04-29 ENCOUNTER — APPOINTMENT (OUTPATIENT)
Dept: CT IMAGING | Age: 85
DRG: 682 | End: 2025-04-29
Payer: MEDICARE

## 2025-04-29 DIAGNOSIS — R79.89 ELEVATED TROPONIN: ICD-10-CM

## 2025-04-29 DIAGNOSIS — N18.9 ACUTE RENAL FAILURE SUPERIMPOSED ON CHRONIC KIDNEY DISEASE, UNSPECIFIED ACUTE RENAL FAILURE TYPE, UNSPECIFIED CKD STAGE: ICD-10-CM

## 2025-04-29 DIAGNOSIS — M79.89 RIGHT LEG SWELLING: ICD-10-CM

## 2025-04-29 DIAGNOSIS — J18.9 PNEUMONIA DUE TO INFECTIOUS ORGANISM, UNSPECIFIED LATERALITY, UNSPECIFIED PART OF LUNG: Primary | ICD-10-CM

## 2025-04-29 DIAGNOSIS — E87.5 HYPERKALEMIA: ICD-10-CM

## 2025-04-29 DIAGNOSIS — N17.9 ACUTE RENAL FAILURE SUPERIMPOSED ON CHRONIC KIDNEY DISEASE, UNSPECIFIED ACUTE RENAL FAILURE TYPE, UNSPECIFIED CKD STAGE: ICD-10-CM

## 2025-04-29 DIAGNOSIS — I50.9 ACUTE ON CHRONIC CONGESTIVE HEART FAILURE, UNSPECIFIED HEART FAILURE TYPE (HCC): ICD-10-CM

## 2025-04-29 PROBLEM — R06.02 SHORTNESS OF BREATH: Status: ACTIVE | Noted: 2025-04-29

## 2025-04-29 LAB
ANION GAP SERPL CALC-SCNC: 13 MEQ/L (ref 8–16)
BACTERIA URNS QL MICRO: ABNORMAL /HPF
BASOPHILS ABSOLUTE: 0 THOU/MM3 (ref 0–0.1)
BASOPHILS NFR BLD AUTO: 0.7 %
BILIRUB UR QL STRIP.AUTO: NEGATIVE
BUN SERPL-MCNC: 60 MG/DL (ref 8–23)
CALCIUM SERPL-MCNC: 8.9 MG/DL (ref 8.8–10.2)
CASTS #/AREA URNS LPF: ABNORMAL /LPF
CASTS 2: ABNORMAL /LPF
CHARACTER UR: CLEAR
CHLORIDE SERPL-SCNC: 103 MEQ/L (ref 98–111)
CK SERPL-CCNC: 76 U/L (ref 39–308)
CO2 SERPL-SCNC: 25 MEQ/L (ref 22–29)
COLOR, UA: ABNORMAL
CREAT SERPL-MCNC: 2.9 MG/DL (ref 0.7–1.2)
CRYSTALS URNS MICRO: ABNORMAL
DEPRECATED RDW RBC AUTO: 55.1 FL (ref 35–45)
EOSINOPHIL NFR BLD AUTO: 3.1 %
EOSINOPHILS ABSOLUTE: 0.2 THOU/MM3 (ref 0–0.4)
EPITHELIAL CELLS, UA: ABNORMAL /HPF
ERYTHROCYTE [DISTWIDTH] IN BLOOD BY AUTOMATED COUNT: 15.9 % (ref 11.5–14.5)
FLUAV RNA RESP QL NAA+PROBE: NOT DETECTED
FLUBV RNA RESP QL NAA+PROBE: NOT DETECTED
GFR SERPL CREATININE-BSD FRML MDRD: 21 ML/MIN/1.73M2
GLUCOSE SERPL-MCNC: 189 MG/DL (ref 74–109)
GLUCOSE UR QL STRIP.AUTO: NEGATIVE MG/DL
HCT VFR BLD AUTO: 32.3 % (ref 42–52)
HGB BLD-MCNC: 9.7 GM/DL (ref 14–18)
HGB UR QL STRIP.AUTO: NEGATIVE
HGB UR QL STRIP.AUTO: NEGATIVE
IMM GRANULOCYTES # BLD AUTO: 0.02 THOU/MM3 (ref 0–0.07)
IMM GRANULOCYTES NFR BLD AUTO: 0.3 %
KETONES UR QL STRIP.AUTO: ABNORMAL
LACTATE SERPL-SCNC: 2 MMOL/L (ref 0.5–2)
LYMPHOCYTES ABSOLUTE: 2.6 THOU/MM3 (ref 1–4.8)
LYMPHOCYTES NFR BLD AUTO: 38.1 %
MCH RBC QN AUTO: 28.1 PG (ref 26–33)
MCHC RBC AUTO-ENTMCNC: 30 GM/DL (ref 32.2–35.5)
MCV RBC AUTO: 93.6 FL (ref 80–94)
MISCELLANEOUS 2: ABNORMAL
MONOCYTES ABSOLUTE: 0.6 THOU/MM3 (ref 0.4–1.3)
MONOCYTES NFR BLD AUTO: 8.7 %
NEUTROPHILS ABSOLUTE: 3.4 THOU/MM3 (ref 1.8–7.7)
NEUTROPHILS NFR BLD AUTO: 49.1 %
NITRITE UR QL STRIP: NEGATIVE
NRBC BLD AUTO-RTO: 0 /100 WBC
NT-PROBNP SERPL IA-MCNC: ABNORMAL PG/ML (ref 0–449)
OSMOLALITY SERPL CALC.SUM OF ELEC: 303.2 MOSMOL/KG (ref 275–300)
PH UR STRIP.AUTO: 5 [PH] (ref 5–9)
PLATELET # BLD AUTO: 209 THOU/MM3 (ref 130–400)
PMV BLD AUTO: 12.3 FL (ref 9.4–12.4)
POTASSIUM SERPL-SCNC: 5.8 MEQ/L (ref 3.5–5.2)
PROCALCITONIN SERPL IA-MCNC: 0.05 NG/ML (ref 0.01–0.09)
PROT UR STRIP.AUTO-MCNC: ABNORMAL MG/DL
RBC # BLD AUTO: 3.45 MILL/MM3 (ref 4.7–6.1)
RBC URINE: ABNORMAL /HPF
RENAL EPI CELLS #/AREA URNS HPF: ABNORMAL /[HPF]
SARS-COV-2 RNA RESP QL NAA+PROBE: NOT DETECTED
SODIUM SERPL-SCNC: 141 MEQ/L (ref 135–145)
SP GR UR REFRACT.AUTO: 1.02 (ref 1–1.03)
TROPONIN, HIGH SENSITIVITY: 92 NG/L (ref 0–12)
UROBILINOGEN, URINE: 0.2 EU/DL (ref 0–1)
WBC # BLD AUTO: 6.9 THOU/MM3 (ref 4.8–10.8)
WBC #/AREA URNS HPF: ABNORMAL /HPF
WBC #/AREA URNS HPF: NEGATIVE /[HPF]
YEAST LIKE FUNGI URNS QL MICRO: ABNORMAL

## 2025-04-29 PROCEDURE — 96375 TX/PRO/DX INJ NEW DRUG ADDON: CPT

## 2025-04-29 PROCEDURE — 87636 SARSCOV2 & INF A&B AMP PRB: CPT

## 2025-04-29 PROCEDURE — 71045 X-RAY EXAM CHEST 1 VIEW: CPT

## 2025-04-29 PROCEDURE — 83880 ASSAY OF NATRIURETIC PEPTIDE: CPT

## 2025-04-29 PROCEDURE — 2580000003 HC RX 258: Performed by: NURSE PRACTITIONER

## 2025-04-29 PROCEDURE — 83605 ASSAY OF LACTIC ACID: CPT

## 2025-04-29 PROCEDURE — 99285 EMERGENCY DEPT VISIT HI MDM: CPT

## 2025-04-29 PROCEDURE — 85025 COMPLETE CBC W/AUTO DIFF WBC: CPT

## 2025-04-29 PROCEDURE — 96365 THER/PROPH/DIAG IV INF INIT: CPT

## 2025-04-29 PROCEDURE — 84484 ASSAY OF TROPONIN QUANT: CPT

## 2025-04-29 PROCEDURE — 82550 ASSAY OF CK (CPK): CPT

## 2025-04-29 PROCEDURE — 2500000003 HC RX 250 WO HCPCS: Performed by: NURSE PRACTITIONER

## 2025-04-29 PROCEDURE — 36415 COLL VENOUS BLD VENIPUNCTURE: CPT

## 2025-04-29 PROCEDURE — 96361 HYDRATE IV INFUSION ADD-ON: CPT

## 2025-04-29 PROCEDURE — 87040 BLOOD CULTURE FOR BACTERIA: CPT

## 2025-04-29 PROCEDURE — 93005 ELECTROCARDIOGRAM TRACING: CPT | Performed by: NURSE PRACTITIONER

## 2025-04-29 PROCEDURE — 84145 PROCALCITONIN (PCT): CPT

## 2025-04-29 PROCEDURE — 6360000002 HC RX W HCPCS: Performed by: NURSE PRACTITIONER

## 2025-04-29 PROCEDURE — 83874 ASSAY OF MYOGLOBIN: CPT

## 2025-04-29 PROCEDURE — 81001 URINALYSIS AUTO W/SCOPE: CPT

## 2025-04-29 PROCEDURE — 82570 ASSAY OF URINE CREATININE: CPT

## 2025-04-29 PROCEDURE — 1200000003 HC TELEMETRY R&B

## 2025-04-29 PROCEDURE — 80048 BASIC METABOLIC PNL TOTAL CA: CPT

## 2025-04-29 PROCEDURE — 70450 CT HEAD/BRAIN W/O DYE: CPT

## 2025-04-29 PROCEDURE — 84540 ASSAY OF URINE/UREA-N: CPT

## 2025-04-29 RX ORDER — ALBUTEROL SULFATE 5 MG/ML
10 SOLUTION RESPIRATORY (INHALATION) ONCE
Status: COMPLETED | OUTPATIENT
Start: 2025-04-30 | End: 2025-04-30

## 2025-04-29 RX ORDER — CALCIUM GLUCONATE 20 MG/ML
1000 INJECTION, SOLUTION INTRAVENOUS ONCE
Status: COMPLETED | OUTPATIENT
Start: 2025-04-30 | End: 2025-04-30

## 2025-04-29 RX ORDER — 0.9 % SODIUM CHLORIDE 0.9 %
500 INTRAVENOUS SOLUTION INTRAVENOUS ONCE
Status: COMPLETED | OUTPATIENT
Start: 2025-04-29 | End: 2025-04-29

## 2025-04-29 RX ORDER — DEXTROSE MONOHYDRATE 100 MG/ML
INJECTION, SOLUTION INTRAVENOUS CONTINUOUS PRN
Status: DISCONTINUED | OUTPATIENT
Start: 2025-04-29 | End: 2025-05-04 | Stop reason: HOSPADM

## 2025-04-29 RX ORDER — BUMETANIDE 0.25 MG/ML
1 INJECTION, SOLUTION INTRAMUSCULAR; INTRAVENOUS ONCE
Status: COMPLETED | OUTPATIENT
Start: 2025-04-29 | End: 2025-04-29

## 2025-04-29 RX ORDER — GLUCAGON 1 MG/ML
1 KIT INJECTION PRN
Status: DISCONTINUED | OUTPATIENT
Start: 2025-04-29 | End: 2025-05-04 | Stop reason: HOSPADM

## 2025-04-29 RX ADMIN — BUMETANIDE 1 MG: 0.25 INJECTION INTRAMUSCULAR; INTRAVENOUS at 21:16

## 2025-04-29 RX ADMIN — SODIUM CHLORIDE 500 ML: 9 INJECTION, SOLUTION INTRAVENOUS at 20:21

## 2025-04-29 RX ADMIN — CEFTRIAXONE SODIUM 2000 MG: 2 INJECTION, POWDER, FOR SOLUTION INTRAMUSCULAR; INTRAVENOUS at 22:35

## 2025-04-29 RX ADMIN — AZITHROMYCIN MONOHYDRATE 500 MG: 500 INJECTION, POWDER, LYOPHILIZED, FOR SOLUTION INTRAVENOUS at 21:18

## 2025-04-29 ASSESSMENT — PAIN - FUNCTIONAL ASSESSMENT
PAIN_FUNCTIONAL_ASSESSMENT: NONE - DENIES PAIN

## 2025-04-29 NOTE — ED PROVIDER NOTES
[facility]      EMERGENCY MEDICINE     Pt Name: Leo Nevarez  MRN: [id number]  Birthdate 1940  Date of evaluation: 4/29/2025  Provider: RELL Riggs CNP    CHIEF COMPLAINT       Chief Complaint   Patient presents with    Fatigue    Vomiting    Shortness of Breath     HISTORY OF PRESENT ILLNESS   Leo Nevarez is a pleasant 84 y.o. male who presents to the emergency department from home with c/o fatigue, nausea, vomiting and diarrhea.  Patient has dementia at baseline.  Family notes increased sleepiness, very dark (cola colored) urine, headache, N/V/D, leg swelling and c/o a global headache.  Family states it has gotten progressively worse.  No fever that they have noted.        History is obtained from:  family member - daughter  PASTMEDICAL HISTORY     Past Medical History:   Diagnosis Date    Chronic kidney disease     Diabetes mellitus (HCC)     Hyperlipidemia     Hypertension        Patient Active Problem List   Diagnosis Code    Anxiety disorder F41.9    Chronic kidney disease N18.9    Dementia ([facility]) F03.90    Anasarca R60.1    Frequency of urination R35.0    Hypothyroidism E03.9    Hyperlipidemia E78.5    Pneumonia J18.9    Recurrent major depressive disorder F33.9    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 ([facility]) E11.9, Z79.4    Urinary incontinence R32    Vitamin D deficiency E55.9    Upper abdominal pain R10.10    Chest pain R07.9    Acute pulmonary edema ([facility]) J81.0    Dyspnea R06.00    Lightheaded R42    Elevated brain natriuretic peptide (BNP) level R79.89    Chronic anemia D64.9    Hyponatremia E87.1    Essential hypertension I10    Morbid obesity ([facility]) E66.01    NAVNEET (acute kidney injury) N17.9    Fever R50.9    Wheezing R06.2    Hypotension I95.9    Angina of effort I20.89    Abnormal stress test R94.39    Urinary tract infection associated with indwelling urethral catheter T83.511A, N39.0    Retention of urine due to occlusion of Fung catheter T83.198A, R33.8

## 2025-04-30 ENCOUNTER — APPOINTMENT (OUTPATIENT)
Dept: INTERVENTIONAL RADIOLOGY/VASCULAR | Age: 85
DRG: 682 | End: 2025-04-30
Payer: MEDICARE

## 2025-04-30 ENCOUNTER — APPOINTMENT (OUTPATIENT)
Dept: ULTRASOUND IMAGING | Age: 85
DRG: 682 | End: 2025-04-30
Payer: MEDICARE

## 2025-04-30 PROBLEM — M79.89 RIGHT LEG SWELLING: Status: ACTIVE | Noted: 2025-04-30

## 2025-04-30 PROBLEM — N18.9 ACUTE RENAL FAILURE SUPERIMPOSED ON CHRONIC KIDNEY DISEASE: Status: ACTIVE | Noted: 2023-11-12

## 2025-04-30 LAB
ALBUMIN SERPL BCG-MCNC: 3.3 G/DL (ref 3.4–4.9)
ALP SERPL-CCNC: 121 U/L (ref 40–129)
ALT SERPL W/O P-5'-P-CCNC: 25 U/L (ref 10–50)
AMMONIA PLAS-MCNC: 44 UMOL/L (ref 16–60)
ANION GAP SERPL CALC-SCNC: 10 MEQ/L (ref 8–16)
AST SERPL-CCNC: 27 U/L (ref 10–50)
BASE EXCESS BLDA CALC-SCNC: 2.5 MMOL/L (ref -2–3)
BILIRUB CONJ SERPL-MCNC: 0.2 MG/DL (ref 0–0.2)
BILIRUB SERPL-MCNC: 0.3 MG/DL (ref 0.3–1.2)
BUN SERPL-MCNC: 58 MG/DL (ref 8–23)
CALCIUM SERPL-MCNC: 8.6 MG/DL (ref 8.8–10.2)
CHLORIDE SERPL-SCNC: 107 MEQ/L (ref 98–111)
CO2 SERPL-SCNC: 25 MEQ/L (ref 22–29)
COLLECTED BY:: ABNORMAL
CREAT SERPL-MCNC: 2.8 MG/DL (ref 0.7–1.2)
CREAT UR-MCNC: 126 MG/DL
DEPRECATED RDW RBC AUTO: 54.6 FL (ref 35–45)
DEVICE: ABNORMAL
ECHO BSA: 2.31 M2
EKG Q-T INTERVAL: 434 MS
EKG Q-T INTERVAL: 454 MS
EKG QRS DURATION: 130 MS
EKG QRS DURATION: 132 MS
EKG QTC CALCULATION (BAZETT): 461 MS
EKG QTC CALCULATION (BAZETT): 486 MS
EKG R AXIS: 111 DEGREES
EKG R AXIS: 113 DEGREES
EKG T AXIS: 24 DEGREES
EKG T AXIS: 34 DEGREES
EKG VENTRICULAR RATE: 68 BPM
EKG VENTRICULAR RATE: 69 BPM
ERYTHROCYTE [DISTWIDTH] IN BLOOD BY AUTOMATED COUNT: 16.2 % (ref 11.5–14.5)
FIO2 ON VENT O2 ANALYZER: 21 %
GFR SERPL CREATININE-BSD FRML MDRD: 22 ML/MIN/1.73M2
GLUCOSE BLD STRIP.AUTO-MCNC: 156 MG/DL (ref 70–108)
GLUCOSE BLD STRIP.AUTO-MCNC: 211 MG/DL (ref 70–108)
GLUCOSE BLD STRIP.AUTO-MCNC: 82 MG/DL (ref 70–108)
GLUCOSE BLD STRIP.AUTO-MCNC: 89 MG/DL (ref 70–108)
GLUCOSE BLD-MCNC: 146 MG/DL (ref 70–108)
GLUCOSE SERPL-MCNC: 120 MG/DL (ref 74–109)
HCO3 BLDA-SCNC: 29 MMOL/L (ref 23–28)
HCT VFR BLD AUTO: 27 % (ref 42–52)
HGB BLD-MCNC: 8.2 GM/DL (ref 14–18)
MCH RBC QN AUTO: 28.1 PG (ref 26–33)
MCHC RBC AUTO-ENTMCNC: 30.4 GM/DL (ref 32.2–35.5)
MCV RBC AUTO: 92.5 FL (ref 80–94)
PCO2 TEMP ADJ BLDMV: 52 MMHG (ref 41–51)
PH BLDMV: 7.35 [PH] (ref 7.31–7.41)
PLATELET # BLD AUTO: 162 THOU/MM3 (ref 130–400)
PMV BLD AUTO: 12.1 FL (ref 9.4–12.4)
PO2 BLDMV: 27 MMHG (ref 25–40)
POTASSIUM SERPL-SCNC: 5.4 MEQ/L (ref 3.5–5.2)
POTASSIUM SERPL-SCNC: 5.4 MEQ/L (ref 3.5–5.2)
POTASSIUM SERPL-SCNC: 5.5 MEQ/L (ref 3.5–5.2)
PROT SERPL-MCNC: 6.3 G/DL (ref 6.4–8.3)
RBC # BLD AUTO: 2.92 MILL/MM3 (ref 4.7–6.1)
SAO2 % BLDMV: 46 %
SITE: ABNORMAL
SODIUM SERPL-SCNC: 142 MEQ/L (ref 135–145)
TROPONIN, HIGH SENSITIVITY: 83 NG/L (ref 0–12)
TROPONIN, HIGH SENSITIVITY: 85 NG/L (ref 0–12)
TROPONIN, HIGH SENSITIVITY: 87 NG/L (ref 0–12)
TROPONIN, HIGH SENSITIVITY: 88 NG/L (ref 0–12)
TROPONIN, HIGH SENSITIVITY: 89 NG/L (ref 0–12)
TROPONIN, HIGH SENSITIVITY: 89 NG/L (ref 0–12)
TROPONIN, HIGH SENSITIVITY: 91 NG/L (ref 0–12)
TROPONIN, HIGH SENSITIVITY: 93 NG/L (ref 0–12)
TROPONIN, HIGH SENSITIVITY: 95 NG/L (ref 0–12)
TROPONIN, HIGH SENSITIVITY: 95 NG/L (ref 0–12)
UUN 24H UR-MCNC: 698 MG/DL
VENTILATION MODE VENT: ABNORMAL
WBC # BLD AUTO: 5.8 THOU/MM3 (ref 4.8–10.8)

## 2025-04-30 PROCEDURE — 36415 COLL VENOUS BLD VENIPUNCTURE: CPT

## 2025-04-30 PROCEDURE — 2500000003 HC RX 250 WO HCPCS

## 2025-04-30 PROCEDURE — 82947 ASSAY GLUCOSE BLOOD QUANT: CPT

## 2025-04-30 PROCEDURE — 76770 US EXAM ABDO BACK WALL COMP: CPT

## 2025-04-30 PROCEDURE — 6370000000 HC RX 637 (ALT 250 FOR IP)

## 2025-04-30 PROCEDURE — 93010 ELECTROCARDIOGRAM REPORT: CPT | Performed by: NUCLEAR MEDICINE

## 2025-04-30 PROCEDURE — 93971 EXTREMITY STUDY: CPT

## 2025-04-30 PROCEDURE — 6360000002 HC RX W HCPCS

## 2025-04-30 PROCEDURE — 99223 1ST HOSP IP/OBS HIGH 75: CPT | Performed by: STUDENT IN AN ORGANIZED HEALTH CARE EDUCATION/TRAINING PROGRAM

## 2025-04-30 PROCEDURE — 99222 1ST HOSP IP/OBS MODERATE 55: CPT | Performed by: INTERNAL MEDICINE

## 2025-04-30 PROCEDURE — 5A09357 ASSISTANCE WITH RESPIRATORY VENTILATION, LESS THAN 24 CONSECUTIVE HOURS, CONTINUOUS POSITIVE AIRWAY PRESSURE: ICD-10-PCS | Performed by: STUDENT IN AN ORGANIZED HEALTH CARE EDUCATION/TRAINING PROGRAM

## 2025-04-30 PROCEDURE — 82948 REAGENT STRIP/BLOOD GLUCOSE: CPT

## 2025-04-30 PROCEDURE — 2580000003 HC RX 258

## 2025-04-30 PROCEDURE — 82803 BLOOD GASES ANY COMBINATION: CPT

## 2025-04-30 PROCEDURE — 84484 ASSAY OF TROPONIN QUANT: CPT

## 2025-04-30 PROCEDURE — 84132 ASSAY OF SERUM POTASSIUM: CPT

## 2025-04-30 PROCEDURE — 2580000003 HC RX 258: Performed by: INTERNAL MEDICINE

## 2025-04-30 PROCEDURE — 94660 CPAP INITIATION&MGMT: CPT

## 2025-04-30 PROCEDURE — 85027 COMPLETE CBC AUTOMATED: CPT

## 2025-04-30 PROCEDURE — 94761 N-INVAS EAR/PLS OXIMETRY MLT: CPT

## 2025-04-30 PROCEDURE — 82248 BILIRUBIN DIRECT: CPT

## 2025-04-30 PROCEDURE — 80053 COMPREHEN METABOLIC PANEL: CPT

## 2025-04-30 PROCEDURE — 94640 AIRWAY INHALATION TREATMENT: CPT

## 2025-04-30 PROCEDURE — 1200000000 HC SEMI PRIVATE

## 2025-04-30 PROCEDURE — 82140 ASSAY OF AMMONIA: CPT

## 2025-04-30 RX ORDER — INSULIN LISPRO 100 [IU]/ML
0-4 INJECTION, SOLUTION INTRAVENOUS; SUBCUTANEOUS
Status: DISCONTINUED | OUTPATIENT
Start: 2025-04-30 | End: 2025-05-04 | Stop reason: HOSPADM

## 2025-04-30 RX ORDER — DONEPEZIL HYDROCHLORIDE 10 MG/1
10 TABLET, FILM COATED ORAL NIGHTLY
Status: DISCONTINUED | OUTPATIENT
Start: 2025-04-30 | End: 2025-05-04 | Stop reason: HOSPADM

## 2025-04-30 RX ORDER — ENOXAPARIN SODIUM 100 MG/ML
30 INJECTION SUBCUTANEOUS DAILY
Status: DISCONTINUED | OUTPATIENT
Start: 2025-04-30 | End: 2025-05-04 | Stop reason: HOSPADM

## 2025-04-30 RX ORDER — HYDROMORPHONE HYDROCHLORIDE 2 MG/1
2 TABLET ORAL EVERY 4 HOURS PRN
Status: DISCONTINUED | OUTPATIENT
Start: 2025-04-30 | End: 2025-05-04 | Stop reason: HOSPADM

## 2025-04-30 RX ORDER — PANTOPRAZOLE SODIUM 40 MG/1
40 TABLET, DELAYED RELEASE ORAL
Status: DISCONTINUED | OUTPATIENT
Start: 2025-04-30 | End: 2025-05-04 | Stop reason: HOSPADM

## 2025-04-30 RX ORDER — CITALOPRAM HYDROBROMIDE 40 MG/1
40 TABLET ORAL DAILY
Status: DISCONTINUED | OUTPATIENT
Start: 2025-04-30 | End: 2025-05-04 | Stop reason: HOSPADM

## 2025-04-30 RX ORDER — METOPROLOL TARTRATE 25 MG/1
25 TABLET, FILM COATED ORAL 2 TIMES DAILY
Status: DISCONTINUED | OUTPATIENT
Start: 2025-04-30 | End: 2025-05-04 | Stop reason: HOSPADM

## 2025-04-30 RX ORDER — SODIUM CHLORIDE 0.9 % (FLUSH) 0.9 %
5-40 SYRINGE (ML) INJECTION EVERY 12 HOURS SCHEDULED
Status: DISCONTINUED | OUTPATIENT
Start: 2025-04-30 | End: 2025-05-04 | Stop reason: HOSPADM

## 2025-04-30 RX ORDER — SODIUM CHLORIDE 9 MG/ML
INJECTION, SOLUTION INTRAVENOUS CONTINUOUS
Status: DISCONTINUED | OUTPATIENT
Start: 2025-04-30 | End: 2025-04-30

## 2025-04-30 RX ORDER — ONDANSETRON 2 MG/ML
4 INJECTION INTRAMUSCULAR; INTRAVENOUS EVERY 6 HOURS PRN
Status: DISCONTINUED | OUTPATIENT
Start: 2025-04-30 | End: 2025-05-04 | Stop reason: HOSPADM

## 2025-04-30 RX ORDER — PRAVASTATIN SODIUM 80 MG/1
80 TABLET ORAL NIGHTLY
Status: DISCONTINUED | OUTPATIENT
Start: 2025-04-30 | End: 2025-05-04 | Stop reason: HOSPADM

## 2025-04-30 RX ORDER — LORAZEPAM 0.5 MG/1
0.5 TABLET ORAL EVERY 6 HOURS PRN
Status: DISCONTINUED | OUTPATIENT
Start: 2025-04-30 | End: 2025-05-04 | Stop reason: HOSPADM

## 2025-04-30 RX ORDER — ONDANSETRON 4 MG/1
4 TABLET, ORALLY DISINTEGRATING ORAL EVERY 8 HOURS PRN
Status: DISCONTINUED | OUTPATIENT
Start: 2025-04-30 | End: 2025-05-04 | Stop reason: HOSPADM

## 2025-04-30 RX ORDER — ACETAMINOPHEN 325 MG/1
650 TABLET ORAL EVERY 6 HOURS PRN
Status: DISCONTINUED | OUTPATIENT
Start: 2025-04-30 | End: 2025-05-04 | Stop reason: HOSPADM

## 2025-04-30 RX ORDER — BUPROPION HYDROCHLORIDE 300 MG/1
300 TABLET ORAL EVERY MORNING
Status: DISCONTINUED | OUTPATIENT
Start: 2025-04-30 | End: 2025-05-04 | Stop reason: HOSPADM

## 2025-04-30 RX ORDER — SODIUM CHLORIDE 0.9 % (FLUSH) 0.9 %
5-40 SYRINGE (ML) INJECTION PRN
Status: DISCONTINUED | OUTPATIENT
Start: 2025-04-30 | End: 2025-05-04 | Stop reason: HOSPADM

## 2025-04-30 RX ORDER — POLYETHYLENE GLYCOL 3350 17 G/17G
17 POWDER, FOR SOLUTION ORAL DAILY PRN
Status: DISCONTINUED | OUTPATIENT
Start: 2025-04-30 | End: 2025-05-04 | Stop reason: HOSPADM

## 2025-04-30 RX ORDER — LISINOPRIL 10 MG/1
10 TABLET ORAL DAILY
Status: DISCONTINUED | OUTPATIENT
Start: 2025-04-30 | End: 2025-05-04 | Stop reason: HOSPADM

## 2025-04-30 RX ORDER — ACETAMINOPHEN 650 MG/1
650 SUPPOSITORY RECTAL EVERY 6 HOURS PRN
Status: DISCONTINUED | OUTPATIENT
Start: 2025-04-30 | End: 2025-05-04 | Stop reason: HOSPADM

## 2025-04-30 RX ORDER — SODIUM CHLORIDE 9 MG/ML
INJECTION, SOLUTION INTRAVENOUS CONTINUOUS
Status: DISCONTINUED | OUTPATIENT
Start: 2025-04-30 | End: 2025-05-03

## 2025-04-30 RX ORDER — TRAZODONE HYDROCHLORIDE 50 MG/1
25 TABLET ORAL NIGHTLY
Status: DISCONTINUED | OUTPATIENT
Start: 2025-04-30 | End: 2025-05-04 | Stop reason: HOSPADM

## 2025-04-30 RX ORDER — SODIUM CHLORIDE 9 MG/ML
INJECTION, SOLUTION INTRAVENOUS PRN
Status: DISCONTINUED | OUTPATIENT
Start: 2025-04-30 | End: 2025-05-04 | Stop reason: HOSPADM

## 2025-04-30 RX ADMIN — ENOXAPARIN SODIUM 30 MG: 100 INJECTION SUBCUTANEOUS at 09:55

## 2025-04-30 RX ADMIN — SODIUM CHLORIDE, PRESERVATIVE FREE 10 ML: 5 INJECTION INTRAVENOUS at 09:55

## 2025-04-30 RX ADMIN — METOPROLOL TARTRATE 25 MG: 25 TABLET, FILM COATED ORAL at 02:27

## 2025-04-30 RX ADMIN — DONEPEZIL HYDROCHLORIDE 10 MG: 10 TABLET, FILM COATED ORAL at 02:27

## 2025-04-30 RX ADMIN — DONEPEZIL HYDROCHLORIDE 10 MG: 10 TABLET, FILM COATED ORAL at 21:31

## 2025-04-30 RX ADMIN — PRAVASTATIN SODIUM 80 MG: 80 TABLET ORAL at 02:27

## 2025-04-30 RX ADMIN — CITALOPRAM 40 MG: 40 TABLET, FILM COATED ORAL at 09:56

## 2025-04-30 RX ADMIN — SODIUM CHLORIDE: 0.9 INJECTION, SOLUTION INTRAVENOUS at 02:01

## 2025-04-30 RX ADMIN — METOPROLOL TARTRATE 25 MG: 25 TABLET, FILM COATED ORAL at 21:31

## 2025-04-30 RX ADMIN — PRAVASTATIN SODIUM 80 MG: 80 TABLET ORAL at 21:31

## 2025-04-30 RX ADMIN — SODIUM CHLORIDE: 0.9 INJECTION, SOLUTION INTRAVENOUS at 17:20

## 2025-04-30 RX ADMIN — INSULIN LISPRO 1 UNITS: 100 INJECTION, SOLUTION INTRAVENOUS; SUBCUTANEOUS at 21:31

## 2025-04-30 RX ADMIN — ALBUTEROL SULFATE 10 MG: 2.5 SOLUTION RESPIRATORY (INHALATION) at 01:10

## 2025-04-30 RX ADMIN — DEXTROSE 250 ML: 10 SOLUTION INTRAVENOUS at 02:19

## 2025-04-30 RX ADMIN — BUPROPION HYDROCHLORIDE 300 MG: 300 TABLET, EXTENDED RELEASE ORAL at 09:56

## 2025-04-30 RX ADMIN — INSULIN HUMAN 10 UNITS: 100 INJECTION, SOLUTION PARENTERAL at 02:21

## 2025-04-30 RX ADMIN — SODIUM ZIRCONIUM CYCLOSILICATE 10 G: 10 POWDER, FOR SUSPENSION ORAL at 02:11

## 2025-04-30 RX ADMIN — METOPROLOL TARTRATE 25 MG: 25 TABLET, FILM COATED ORAL at 09:56

## 2025-04-30 RX ADMIN — PANTOPRAZOLE SODIUM 40 MG: 40 TABLET, DELAYED RELEASE ORAL at 06:47

## 2025-04-30 RX ADMIN — CALCIUM GLUCONATE 1000 MG: 20 INJECTION, SOLUTION INTRAVENOUS at 02:05

## 2025-04-30 RX ADMIN — LEVOTHYROXINE SODIUM 175 MCG: 0.15 TABLET ORAL at 09:56

## 2025-04-30 RX ADMIN — SODIUM CHLORIDE, PRESERVATIVE FREE 10 ML: 5 INJECTION INTRAVENOUS at 21:31

## 2025-04-30 NOTE — CONSULTS
[facility]          [address]        [address]        [place], [place] [id number] [phone]           Inpatient Initial consult note         4/30/2025 4:29 PM      Patient Name:   Leo Nevarez  YOB: 1940  Primary Care Physician:  Aileen Valadez APRN - CNP   Admit Date:    4/29/2025  7:15 PM    Consultation requested by : Sma Zhu MD    Reason for Consult : Nephrology following for acute kidney injury/CKD.    History of presenting illness :Leo Nevarez is a 84 y.o.   male with Past Medical History as stated below presented with a chief complaint of Fatigue, Vomiting, and Shortness of Breath   on 4/29/2025 .    Patient presented with chief complaints of fatigue associated with poor oral intake this has been ongoing at least for the last 2 to 3 days he has not been eating or drinking much and apparently has been sleeping more he did have a couple of episodes of vomiting he has been refusing his medications for the last several days as well.  Patient also right now appears to be somewhat confused.    He denies any chest pain does complain of some shortness of breath no dizziness or lightheadedness.    Past History:  Past Medical History:   Diagnosis Date    Chronic kidney disease     Diabetes mellitus (HCC)     Hyperlipidemia     Hypertension      Past Surgical History:   Procedure Laterality Date    BLADDER SCAN  11/11/2023    BLADDER SCAN  11/13/2023    CARDIAC PROCEDURE Left 11/20/2023    Left heart cath / coronary angiography performed by Javier Brothers MD at [facility] CARDIAC CATH LAB    HC BLADDER SCAN  11/11/2023    SHOULDER ARTHROPLASTY Left     TURP N/A 2/5/2024    Cystoscopy Greenlight Photovaporization of Prostate performed by Abhay Levine Jr., MD at [facility] OR     Social History     Socioeconomic History    Marital status:      Spouse name: Not on file    Number of children: Not on file    Years of education: Not on file    Highest

## 2025-04-30 NOTE — H&P
History & Physical  Internal Medicine Resident         Patient: Leo Nevarez 84 y.o. male      : 1940  Date of Admission: 2025  Date of Service: Pt seen/examined on 25 and Admitted to Inpatient with expected LOS greater than two midnights due to medical therapy.       ASSESSMENT AND PLAN  NAVNEET on CKD: Creatinine 2.9, baseline appears to be around 1.7-2.2.  Most recent creatinine on 3/20/2025 was 2.7.  Likely prerenal in the setting of poor p.o. intake.  Check urine studies, renal ultrasound.  Will start on gentle IV fluids.  Acute metabolic encephalopathy, underlying dementia: Daughter reports patient is slightly more confused however she states that when patient does not take his donepezil, he becomes more confused.  Has not taken his home medications in 2 to 3 days. Reports he is more confused from his baseline.  Patient stated he is at clinic, the year is . Suspect that there is some component of hypercapnia.  VBG showed [id number].  During prior hospitalizations, patient has required BiPAP.  Ammonia WNL.  UA negative for infection.  CXR without consolidations.  Patient afebrile, denying infectious symptoms.  Elevated troponin: Troponin 92, 83, 89.  Chronically elevated around 35-60. Patient denies chest pain.    Chronic systolic heart failure, acute exacerbation: Last echo 2024 EF 45 to 50%, mild global hypokinesis, indeterminate diastolic function.  Patient is on Jardiance, torsemide, Lopressor.  Has bilateral lower extremity 3+ pitting edema.  Daughter reports that patient has been noncompliant with his medications for the past 2 to 3 days.  Heart failure exacerbation likely in the setting of medication noncompliance.  Will defer repeat echo at this time.  Received IV Bumex 1 mg in the ED.    Given that patient has had poor p.o. intake, has dry mucous membranes, no crackles on exam, CXR without evidence of pulmonary edema and overt NAVNEET - will hold diuretics at this time.  Will place

## 2025-04-30 NOTE — PLAN OF CARE
Problem: Respiratory - Adult    Goal: Achieves optimal ventilation and oxygenation  Achieves optimal ventilation and oxygenation:   Assess for changes in respiratory status   Position to facilitate oxygenation and minimize respiratory effort   Respiratory therapy support as indicated   Assess for changes in mentation and behavior   Oxygen supplementation based on oxygen saturation or arterial blood gases   Assess and instruct to report shortness of breath or any respiratory difficulty    Outcome: Progressing

## 2025-04-30 NOTE — PALLIATIVE CARE
Initial Evaluation        Patient:   Leo Nevarez  YOB: 1940  Age:  84 y.o.  Room:  [facility]03/003-A  MRN:  [id number]   Acct: [id number]    Date of Admission:  4/29/2025  7:15 PM  Date of Service:  4/30/2025  Completed By:  Neva Cedeno RN        Reason for Palliative Care Evaluation:-   Outpatient palliative care.     Palliative Performance Scale   60%  Ambulation reduced; Significant disease; Can't do hobbies/housework; intake normal or reduced; occasional assist; LOC full/confusion     History    Into ED with complaints of neck pain, N/V, fatigue, SOB, and chest pain. Per daughter, patient has had a decreased appetite over the last two weeks, has been sleeping more. Patient has not taken his home medications in 2-3 days. PMH includes dementia, CHF, CKD, HTN, HLD, DM, GERD. Follows palliative outpatient.      Goals of Care Discussions and Plan         Family/Patient Discussion:- Patient resting in bed with eyes closed. No family present at bedside. Spoke with primary RN, family plan to be in later this afternoon.    Plan/Follow-Up:-   Will attempt to follow up with patient/family when family is at bedside.    Treatment Limitations: They would not want cardiopulmonary resuscitation in the event of cardiac arrest nor would they want to be intubated and mechanically ventilated under any circumstances. They would want their medical care to maximize comfort through symptom management and allow for a natural death. Oxygen, suction and manual treatment of airway obstruction should be used as needed for comfort. They would want to avoid non-invasive positive airway pressure, antibiotics and IV fluids unless consistent with comfort goal. They would want to transfer to a hospital only if comfort cannot be achieved in current setting. This is consistent with a code status of Do Not Resuscitate Comfort Care (DNR-CC).    Code Status:DNR-CC No additional code details    ACP documents on file:

## 2025-04-30 NOTE — PALLIATIVE CARE
Follow Up / Progress Note        Patient:   Leo Nevarez  YOB: 1940  Age:  84 y.o.  Room:  [facility]03/003-A  MRN:  [id number]           No family at bedside.     Family/Patient Discussion:  Call placed to [facility]. Margareth states she and her family just left the hospital. Margareth shares how difficult it has been with both of her parents in the hospital, support given. Informed Margareth that Dr Larson was planning to see patient/family tomorrow. Margareth stated she and her family would be here. Margareth denied further questions at this time.       Plan/Follow-Up:  Family not currently at bedside. Agreed to follow up with Dr Larson tomorrow.          Electronically signed by Neva Cedeno RN on 4/30/2025 at 1:29 PM             Palliative Care Office: [phone]

## 2025-04-30 NOTE — ED NOTES
Called [facility] on 6k ok to transport to  3 in stable condition.  
Patient presents to ED with daughter with c/o neck pain, vomiting, fatigue, SOB, and chest pain. Patients daughter states he has had a decrease in appetite in the last two days,  and 2 episodes of emesis. Patients daughter states he has been sleeping more than normal. Daughter states patient is noncompliant with all meds.  Patient alert to self. EKG obtained.   
Patient resting comfortably in cot, breathing even and unlabored. Patient denies any needs at this time. Call light within reach, and bedrail x2.    
consciousness): No  Age > 70: Yes  Altered Mental Status, Intoxication with alcohol or substance confusion (Disorientation, impaired judgment, poor safety awaremess, or inability to follow instructions): Yes  Impaired Mobility: Ambulates or transfers with assistive devices or assistance; Unable to ambulate or transer.: Yes  Nursing Judgement: Yes  Standard Fall Risk Interventions : [place] the patient to the environment, especially the location of the bathroom, Provide a safe environment by clearing a pathway free of plugs, IV poles, and other obstructing items, Call light and frequently used items within patient reach, Intentional Rounding, Bed in lowest position and wheels locked, as applicable to the type of bed, when a patient is resting in bed, raise bed to a comfortable height when the patient is transferring out of bed, as appropriate    Swallow Screening        Preferred Language:   English      ALLERGIES     Morphine    SURGICAL HISTORY       Past Surgical History:   Procedure Laterality Date    BLADDER SCAN  11/11/2023    BLADDER SCAN  11/13/2023    CARDIAC PROCEDURE Left 11/20/2023    Left heart cath / coronary angiography performed by Javier Brothers MD at [facility] CARDIAC CATH LAB    HC BLADDER SCAN  11/11/2023    SHOULDER ARTHROPLASTY Left     TURP N/A 2/5/2024    Cystoscopy Greenlight Photovaporization of Prostate performed by Abhay Levine Jr., MD at [facility] OR       PAST MEDICAL HISTORY       Past Medical History:   Diagnosis Date    Chronic kidney disease     Diabetes mellitus (HCC)     Hyperlipidemia     Hypertension            Electronically signed by VIRGIE PADILLA RN on 4/29/2025 at 11:28 PM

## 2025-04-30 NOTE — CARE COORDINATION
Case Management Assessment Initial Evaluation    Date/Time of Evaluation: 2025 7:45 AM  Assessment Completed by: Landry Aldana RN    If patient is discharged prior to next notation, then this note serves as note for discharge by case management.    Patient Name: Leo Nevarez                   YOB: 1940  Diagnosis: Shortness of breath [R06.02]  Elevated troponin [R79.89]  Pneumonia due to infectious organism, unspecified laterality, unspecified part of lung [J18.9]  Acute on chronic congestive heart failure, unspecified heart failure type (HCC) [I50.9]                   Date / Time: 2025  7:15 PM  Location: [facility]     Patient Admission Status: Inpatient   Readmission Risk Low 0-14, Mod 15-19), High > 20: Readmission Risk Score: 25    Current PCP: Aileen Valadez APRN - CNP  Health Care Decision Makers:   Primary Decision Maker: [email or web address] - Healthcare Decision Maker - [phone]    Additional Case Management Notes: K+5.5, creat 2.8, trops elev, Hgb 8.2. IVF. Palliative care eval.     Procedures: none    Imagin/30 renal US pending   cxray: Left lung atelectasis/infiltrate.     Patient Goals/Plan/Treatment Preferences: Met with Leo, his daughter, and son. Daughter shares that Leo and his wife (currently a patient on 4K) live with her. Family is with Leo . DME as below. They are interested in  services at discharge, and prefer [facility] as they provide services for his wife. Referral made and accepted.        25 1152   Service Assessment   Patient Orientation Person;Place   Cognition Dementia / Early Alzheimer's   History Provided By Child/Family  (daughter and son-in-law)   Primary Caregiver Family   Support Systems Spouse/Significant Other;Children;Family Members   Patient's Healthcare Decision Maker is: Named in Scanned ACP Document   PCP Verified by CM Yes   Prior Functional Level Assistance with the

## 2025-04-30 NOTE — PLAN OF CARE
Problem: Chronic Conditions and Co-morbidities  Goal: Patient's chronic conditions and co-morbidity symptoms are monitored and maintained or improved  Outcome: Progressing     Problem: Discharge Planning  Goal: Discharge to home or other facility with appropriate resources  Outcome: Progressing     Problem: Skin/Tissue Integrity  Goal: Skin integrity remains intact  Description: 1.  Monitor for areas of redness and/or skin breakdown2.  Assess vascular access sites hourly3.  Every 4-6 hours minimum:  Change oxygen saturation probe site4.  Every 4-6 hours:  If on nasal continuous positive airway pressure, respiratory therapy assess nares and determine need for appliance change or resting period  Outcome: Progressing     Problem: Safety - Adult  Goal: Free from fall injury  Outcome: Progressing     Problem: Respiratory - Adult  Goal: Achieves optimal ventilation and oxygenation  4/30/2025 1422 by Graham Weems RN  Outcome: Progressing  Problem: Pain  Goal: Verbalizes/displays adequate comfort level or baseline comfort level  Outcome: Progressing   Care plan reviewed with patient.  Patient verbalizes understanding of the plan of care and contributes to goal setting.

## 2025-05-01 LAB
ANION GAP SERPL CALC-SCNC: 11 MEQ/L (ref 8–16)
BUN SERPL-MCNC: 53 MG/DL (ref 8–23)
CALCIUM SERPL-MCNC: 8.6 MG/DL (ref 8.8–10.2)
CHLORIDE SERPL-SCNC: 107 MEQ/L (ref 98–111)
CO2 SERPL-SCNC: 23 MEQ/L (ref 22–29)
CREAT SERPL-MCNC: 2.6 MG/DL (ref 0.7–1.2)
DEPRECATED RDW RBC AUTO: 57.3 FL (ref 35–45)
ERYTHROCYTE [DISTWIDTH] IN BLOOD BY AUTOMATED COUNT: 16.4 % (ref 11.5–14.5)
FERRITIN SERPL IA-MCNC: 59 NG/ML (ref 30–400)
GFR SERPL CREATININE-BSD FRML MDRD: 24 ML/MIN/1.73M2
GLUCOSE BLD STRIP.AUTO-MCNC: 169 MG/DL (ref 70–108)
GLUCOSE BLD STRIP.AUTO-MCNC: 201 MG/DL (ref 70–108)
GLUCOSE BLD STRIP.AUTO-MCNC: 219 MG/DL (ref 70–108)
GLUCOSE BLD STRIP.AUTO-MCNC: 275 MG/DL (ref 70–108)
GLUCOSE BLD STRIP.AUTO-MCNC: 339 MG/DL (ref 70–108)
GLUCOSE BLD STRIP.AUTO-MCNC: 93 MG/DL (ref 70–108)
GLUCOSE SERPL-MCNC: 200 MG/DL (ref 74–109)
HCT VFR BLD AUTO: 31.7 % (ref 42–52)
HGB BLD-MCNC: 9.4 GM/DL (ref 14–18)
IRON SERPL-MCNC: 29 UG/DL (ref 61–157)
MCH RBC QN AUTO: 28.2 PG (ref 26–33)
MCHC RBC AUTO-ENTMCNC: 29.7 GM/DL (ref 32.2–35.5)
MCV RBC AUTO: 95.2 FL (ref 80–94)
PLATELET # BLD AUTO: 191 THOU/MM3 (ref 130–400)
PMV BLD AUTO: 11.8 FL (ref 9.4–12.4)
POTASSIUM SERPL-SCNC: 5.7 MEQ/L (ref 3.5–5.2)
POTASSIUM SERPL-SCNC: 5.8 MEQ/L (ref 3.5–5.2)
POTASSIUM SERPL-SCNC: 5.8 MEQ/L (ref 3.5–5.2)
RBC # BLD AUTO: 3.33 MILL/MM3 (ref 4.7–6.1)
REASON FOR REJECTION: NORMAL
REJECTED TEST: NORMAL
SODIUM SERPL-SCNC: 141 MEQ/L (ref 135–145)
TROPONIN, HIGH SENSITIVITY: 84 NG/L (ref 0–12)
TROPONIN, HIGH SENSITIVITY: 86 NG/L (ref 0–12)
TROPONIN, HIGH SENSITIVITY: 89 NG/L (ref 0–12)
WBC # BLD AUTO: 6.1 THOU/MM3 (ref 4.8–10.8)

## 2025-05-01 PROCEDURE — 2500000003 HC RX 250 WO HCPCS

## 2025-05-01 PROCEDURE — 2580000003 HC RX 258

## 2025-05-01 PROCEDURE — 83540 ASSAY OF IRON: CPT

## 2025-05-01 PROCEDURE — 80048 BASIC METABOLIC PNL TOTAL CA: CPT

## 2025-05-01 PROCEDURE — 84132 ASSAY OF SERUM POTASSIUM: CPT

## 2025-05-01 PROCEDURE — 6360000002 HC RX W HCPCS: Performed by: INTERNAL MEDICINE

## 2025-05-01 PROCEDURE — 6370000000 HC RX 637 (ALT 250 FOR IP)

## 2025-05-01 PROCEDURE — 94660 CPAP INITIATION&MGMT: CPT

## 2025-05-01 PROCEDURE — 99232 SBSQ HOSP IP/OBS MODERATE 35: CPT | Performed by: STUDENT IN AN ORGANIZED HEALTH CARE EDUCATION/TRAINING PROGRAM

## 2025-05-01 PROCEDURE — 82728 ASSAY OF FERRITIN: CPT

## 2025-05-01 PROCEDURE — 97162 PT EVAL MOD COMPLEX 30 MIN: CPT

## 2025-05-01 PROCEDURE — 97116 GAIT TRAINING THERAPY: CPT

## 2025-05-01 PROCEDURE — 2580000003 HC RX 258: Performed by: INTERNAL MEDICINE

## 2025-05-01 PROCEDURE — 97535 SELF CARE MNGMENT TRAINING: CPT

## 2025-05-01 PROCEDURE — 84484 ASSAY OF TROPONIN QUANT: CPT

## 2025-05-01 PROCEDURE — 99223 1ST HOSP IP/OBS HIGH 75: CPT | Performed by: STUDENT IN AN ORGANIZED HEALTH CARE EDUCATION/TRAINING PROGRAM

## 2025-05-01 PROCEDURE — 97166 OT EVAL MOD COMPLEX 45 MIN: CPT

## 2025-05-01 PROCEDURE — 94761 N-INVAS EAR/PLS OXIMETRY MLT: CPT

## 2025-05-01 PROCEDURE — 85027 COMPLETE CBC AUTOMATED: CPT

## 2025-05-01 PROCEDURE — 36415 COLL VENOUS BLD VENIPUNCTURE: CPT

## 2025-05-01 PROCEDURE — 1200000000 HC SEMI PRIVATE

## 2025-05-01 PROCEDURE — 99232 SBSQ HOSP IP/OBS MODERATE 35: CPT | Performed by: INTERNAL MEDICINE

## 2025-05-01 PROCEDURE — 82948 REAGENT STRIP/BLOOD GLUCOSE: CPT

## 2025-05-01 RX ORDER — GLUCAGON 1 MG/ML
1 KIT INJECTION PRN
Status: DISCONTINUED | OUTPATIENT
Start: 2025-05-01 | End: 2025-05-01 | Stop reason: SDUPTHER

## 2025-05-01 RX ORDER — DEXTROSE MONOHYDRATE 100 MG/ML
INJECTION, SOLUTION INTRAVENOUS CONTINUOUS PRN
Status: DISCONTINUED | OUTPATIENT
Start: 2025-05-01 | End: 2025-05-01 | Stop reason: SDUPTHER

## 2025-05-01 RX ORDER — BUMETANIDE 0.25 MG/ML
1 INJECTION, SOLUTION INTRAMUSCULAR; INTRAVENOUS ONCE
Status: COMPLETED | OUTPATIENT
Start: 2025-05-01 | End: 2025-05-01

## 2025-05-01 RX ADMIN — SODIUM CHLORIDE: 0.9 INJECTION, SOLUTION INTRAVENOUS at 17:49

## 2025-05-01 RX ADMIN — SODIUM ZIRCONIUM CYCLOSILICATE 10 G: 10 POWDER, FOR SUSPENSION ORAL at 09:32

## 2025-05-01 RX ADMIN — INSULIN LISPRO 3 UNITS: 100 INJECTION, SOLUTION INTRAVENOUS; SUBCUTANEOUS at 21:59

## 2025-05-01 RX ADMIN — BUMETANIDE 1 MG: 0.25 INJECTION INTRAMUSCULAR; INTRAVENOUS at 17:49

## 2025-05-01 RX ADMIN — BUPROPION HYDROCHLORIDE 300 MG: 300 TABLET, EXTENDED RELEASE ORAL at 09:32

## 2025-05-01 RX ADMIN — METOPROLOL TARTRATE 25 MG: 25 TABLET, FILM COATED ORAL at 21:52

## 2025-05-01 RX ADMIN — METOPROLOL TARTRATE 25 MG: 25 TABLET, FILM COATED ORAL at 09:32

## 2025-05-01 RX ADMIN — CITALOPRAM 40 MG: 40 TABLET, FILM COATED ORAL at 09:32

## 2025-05-01 RX ADMIN — SODIUM ZIRCONIUM CYCLOSILICATE 10 G: 10 POWDER, FOR SUSPENSION ORAL at 21:51

## 2025-05-01 RX ADMIN — DONEPEZIL HYDROCHLORIDE 10 MG: 10 TABLET, FILM COATED ORAL at 21:51

## 2025-05-01 RX ADMIN — INSULIN LISPRO 1 UNITS: 100 INJECTION, SOLUTION INTRAVENOUS; SUBCUTANEOUS at 06:31

## 2025-05-01 RX ADMIN — DEXTROSE 250 ML: 10 SOLUTION INTRAVENOUS at 09:37

## 2025-05-01 RX ADMIN — PRAVASTATIN SODIUM 80 MG: 80 TABLET ORAL at 21:52

## 2025-05-01 RX ADMIN — SODIUM CHLORIDE, PRESERVATIVE FREE 10 ML: 5 INJECTION INTRAVENOUS at 21:51

## 2025-05-01 RX ADMIN — PANTOPRAZOLE SODIUM 40 MG: 40 TABLET, DELAYED RELEASE ORAL at 06:32

## 2025-05-01 RX ADMIN — INSULIN HUMAN 10 UNITS: 100 INJECTION, SOLUTION PARENTERAL at 09:32

## 2025-05-01 RX ADMIN — LEVOTHYROXINE SODIUM 175 MCG: 0.15 TABLET ORAL at 09:32

## 2025-05-01 RX ADMIN — SODIUM CHLORIDE: 0.9 INJECTION, SOLUTION INTRAVENOUS at 01:27

## 2025-05-01 NOTE — CONSULTS
Provider Consult Note        Patient:   Leo Nevarez  YOB: 1940  Age:  84 y.o.  Room:  [facility]03/003-A  MRN:  [id number]   Acct: [id number]  PCP: Aileen Valadez APRN - CNP    Date of Admission:  4/29/2025  7:15 PM  Date of Service:  5/1/2025    Reason for Consult: Goals of Care and Continuity of Care             Subjective   Chief Complaint:-    Chief Complaint   Patient presents with    Fatigue    Vomiting    Shortness of Breath        History Obtained From:-  Patient, Family member(s) - daughter, Electronic Medical Record, Patient's primary nurse, and Palliative Care nurse    History of Present Illness:-            Leo Nevarez is a 84 y.o. male who  has a past medical history of Chronic kidney disease, Diabetes mellitus (HCC), Hyperlipidemia, and Hypertension. They present to the hospital with Fatigue, Vomiting, and Shortness of Breath and are admitted for Shortness of breath. Please refer to previous palliative care notes for history of chronic illness.  Briefly, patient presented to the [facility] emergency department on April 29, 2025 for the evaluation of fatigue, nausea, vomiting and diarrhea.  His family noticed that he had been more sleepy with very dark urine.  He is also complaining of a headache, nausea vomiting and diarrhea.  His leg swelling also gotten progressively worse.  Workup in the emergency department was significant for CT head with no acute findings, chest x-ray demonstrating left midlung atelectasis/infiltrate, elevated creatinine at 2.9, elevated proBNP.  He was admitted to [facility] for further management of acute on chronic heart failure exacerbation and worsening renal function.  Nephrology was consulted for his worsening kidney function. Palliative care was consulted for Goals of Care and Continuity of Care. Please refer to Palliative Care RN note and/or previous palliative care notes for initial goals

## 2025-05-01 NOTE — PLAN OF CARE
Problem: Respiratory - Adult    Goal: Achieves optimal ventilation and oxygenation  Achieves optimal ventilation and oxygenation:   Assess for changes in respiratory status   Respiratory therapy support as indicated   Assess for changes in mentation and behavior   Encourage broncho-pulmonary hygiene including cough, deep breathe, incentive spirometry   Assess and instruct to report shortness of breath or any respiratory difficulty    Outcome: Progressing

## 2025-05-01 NOTE — CARE COORDINATION
5/1/25, 11:20 AM EDT    DISCHARGE ON GOING EVALUATION    [facility] day: 2  Location: -03/003-A Reason for admit: Shortness of breath [R06.02]  Elevated troponin [R79.89]  Pneumonia due to infectious organism, unspecified laterality, unspecified part of lung [J18.9]  Acute on chronic congestive heart failure, unspecified heart failure type (HCC) [I50.9]     Procedures: none    Imaging since last note:   4/30 US Renal: Negative   4/30 RLE Venous DUP: Negative    Barriers to Discharge: Hospitalist and Nephrology following. PT/OT. K 5.7. Creat 2.6. IVF@60. Start Lokelma BID. IV insulin/D10% x1. DM management.     PCP: Aileen Valadez APRN - CNP  Readmission Risk Score: 25.1    Patient Goals/Plan/Treatment Preferences: Home w/ wife and 24/7 family support. Has DME. [facility]- need orders.

## 2025-05-02 LAB
ANION GAP SERPL CALC-SCNC: 11 MEQ/L (ref 8–16)
BUN SERPL-MCNC: 48 MG/DL (ref 8–23)
CALCIUM SERPL-MCNC: 9.1 MG/DL (ref 8.8–10.2)
CHLORIDE SERPL-SCNC: 107 MEQ/L (ref 98–111)
CO2 SERPL-SCNC: 25 MEQ/L (ref 22–29)
CREAT SERPL-MCNC: 2.6 MG/DL (ref 0.7–1.2)
DEPRECATED RDW RBC AUTO: 55.5 FL (ref 35–45)
ERYTHROCYTE [DISTWIDTH] IN BLOOD BY AUTOMATED COUNT: 16.2 % (ref 11.5–14.5)
GFR SERPL CREATININE-BSD FRML MDRD: 24 ML/MIN/1.73M2
GLUCOSE BLD STRIP.AUTO-MCNC: 104 MG/DL (ref 70–108)
GLUCOSE BLD STRIP.AUTO-MCNC: 122 MG/DL (ref 70–108)
GLUCOSE BLD STRIP.AUTO-MCNC: 136 MG/DL (ref 70–108)
GLUCOSE BLD STRIP.AUTO-MCNC: 160 MG/DL (ref 70–108)
GLUCOSE BLD STRIP.AUTO-MCNC: 192 MG/DL (ref 70–108)
GLUCOSE BLD STRIP.AUTO-MCNC: 241 MG/DL (ref 70–108)
GLUCOSE BLD STRIP.AUTO-MCNC: 273 MG/DL (ref 70–108)
GLUCOSE SERPL-MCNC: 251 MG/DL (ref 74–109)
HCT VFR BLD AUTO: 33 % (ref 42–52)
HGB BLD-MCNC: 9.8 GM/DL (ref 14–18)
MAGNESIUM SERPL-MCNC: 2.1 MG/DL (ref 1.6–2.6)
MCH RBC QN AUTO: 27.9 PG (ref 26–33)
MCHC RBC AUTO-ENTMCNC: 29.7 GM/DL (ref 32.2–35.5)
MCV RBC AUTO: 94 FL (ref 80–94)
PLATELET # BLD AUTO: 208 THOU/MM3 (ref 130–400)
PMV BLD AUTO: 11.8 FL (ref 9.4–12.4)
POTASSIUM SERPL-SCNC: 5.1 MEQ/L (ref 3.5–5.2)
POTASSIUM SERPL-SCNC: 5.1 MEQ/L (ref 3.5–5.2)
POTASSIUM SERPL-SCNC: 5.2 MEQ/L (ref 3.5–5.2)
POTASSIUM SERPL-SCNC: 5.6 MEQ/L (ref 3.5–5.2)
POTASSIUM SERPL-SCNC: 5.8 MEQ/L (ref 3.5–5.2)
POTASSIUM SERPL-SCNC: 6.1 MEQ/L (ref 3.5–5.2)
RBC # BLD AUTO: 3.51 MILL/MM3 (ref 4.7–6.1)
SODIUM SERPL-SCNC: 143 MEQ/L (ref 135–145)
WBC # BLD AUTO: 6.7 THOU/MM3 (ref 4.8–10.8)

## 2025-05-02 PROCEDURE — 80048 BASIC METABOLIC PNL TOTAL CA: CPT

## 2025-05-02 PROCEDURE — 99232 SBSQ HOSP IP/OBS MODERATE 35: CPT | Performed by: INTERNAL MEDICINE

## 2025-05-02 PROCEDURE — 99232 SBSQ HOSP IP/OBS MODERATE 35: CPT | Performed by: STUDENT IN AN ORGANIZED HEALTH CARE EDUCATION/TRAINING PROGRAM

## 2025-05-02 PROCEDURE — 82948 REAGENT STRIP/BLOOD GLUCOSE: CPT

## 2025-05-02 PROCEDURE — 2580000003 HC RX 258: Performed by: INTERNAL MEDICINE

## 2025-05-02 PROCEDURE — 36415 COLL VENOUS BLD VENIPUNCTURE: CPT

## 2025-05-02 PROCEDURE — 97110 THERAPEUTIC EXERCISES: CPT

## 2025-05-02 PROCEDURE — 6370000000 HC RX 637 (ALT 250 FOR IP)

## 2025-05-02 PROCEDURE — 97535 SELF CARE MNGMENT TRAINING: CPT

## 2025-05-02 PROCEDURE — 6360000002 HC RX W HCPCS

## 2025-05-02 PROCEDURE — 83735 ASSAY OF MAGNESIUM: CPT

## 2025-05-02 PROCEDURE — 1200000000 HC SEMI PRIVATE

## 2025-05-02 PROCEDURE — 84132 ASSAY OF SERUM POTASSIUM: CPT

## 2025-05-02 PROCEDURE — 94761 N-INVAS EAR/PLS OXIMETRY MLT: CPT

## 2025-05-02 PROCEDURE — 2580000003 HC RX 258

## 2025-05-02 PROCEDURE — 94660 CPAP INITIATION&MGMT: CPT

## 2025-05-02 PROCEDURE — 85027 COMPLETE CBC AUTOMATED: CPT

## 2025-05-02 PROCEDURE — 6360000002 HC RX W HCPCS: Performed by: INTERNAL MEDICINE

## 2025-05-02 RX ORDER — BUMETANIDE 0.25 MG/ML
1 INJECTION, SOLUTION INTRAMUSCULAR; INTRAVENOUS ONCE
Status: COMPLETED | OUTPATIENT
Start: 2025-05-02 | End: 2025-05-02

## 2025-05-02 RX ORDER — DEXTROSE MONOHYDRATE 100 MG/ML
INJECTION, SOLUTION INTRAVENOUS CONTINUOUS PRN
Status: DISCONTINUED | OUTPATIENT
Start: 2025-05-02 | End: 2025-05-04 | Stop reason: HOSPADM

## 2025-05-02 RX ORDER — GLUCAGON 1 MG/ML
1 KIT INJECTION PRN
Status: DISCONTINUED | OUTPATIENT
Start: 2025-05-02 | End: 2025-05-04 | Stop reason: HOSPADM

## 2025-05-02 RX ADMIN — SODIUM CHLORIDE: 0.9 INJECTION, SOLUTION INTRAVENOUS at 05:21

## 2025-05-02 RX ADMIN — DONEPEZIL HYDROCHLORIDE 10 MG: 10 TABLET, FILM COATED ORAL at 19:49

## 2025-05-02 RX ADMIN — DEXTROSE 250 ML: 10 SOLUTION INTRAVENOUS at 14:55

## 2025-05-02 RX ADMIN — SODIUM CHLORIDE: 0.9 INJECTION, SOLUTION INTRAVENOUS at 18:28

## 2025-05-02 RX ADMIN — PRAVASTATIN SODIUM 80 MG: 80 TABLET ORAL at 19:49

## 2025-05-02 RX ADMIN — LEVOTHYROXINE SODIUM 175 MCG: 0.15 TABLET ORAL at 09:55

## 2025-05-02 RX ADMIN — METOPROLOL TARTRATE 25 MG: 25 TABLET, FILM COATED ORAL at 09:55

## 2025-05-02 RX ADMIN — INSULIN LISPRO 2 UNITS: 100 INJECTION, SOLUTION INTRAVENOUS; SUBCUTANEOUS at 12:13

## 2025-05-02 RX ADMIN — ONDANSETRON 4 MG: 2 INJECTION, SOLUTION INTRAMUSCULAR; INTRAVENOUS at 05:22

## 2025-05-02 RX ADMIN — INSULIN LISPRO 1 UNITS: 100 INJECTION, SOLUTION INTRAVENOUS; SUBCUTANEOUS at 07:04

## 2025-05-02 RX ADMIN — ENOXAPARIN SODIUM 30 MG: 100 INJECTION SUBCUTANEOUS at 09:55

## 2025-05-02 RX ADMIN — CITALOPRAM 40 MG: 40 TABLET, FILM COATED ORAL at 09:55

## 2025-05-02 RX ADMIN — METOPROLOL TARTRATE 25 MG: 25 TABLET, FILM COATED ORAL at 19:51

## 2025-05-02 RX ADMIN — BUMETANIDE 1 MG: 0.25 INJECTION INTRAMUSCULAR; INTRAVENOUS at 21:06

## 2025-05-02 RX ADMIN — SODIUM ZIRCONIUM CYCLOSILICATE 10 G: 10 POWDER, FOR SUSPENSION ORAL at 19:49

## 2025-05-02 RX ADMIN — BUPROPION HYDROCHLORIDE 300 MG: 300 TABLET, EXTENDED RELEASE ORAL at 09:55

## 2025-05-02 RX ADMIN — INSULIN HUMAN 10 UNITS: 100 INJECTION, SOLUTION PARENTERAL at 14:52

## 2025-05-02 RX ADMIN — PANTOPRAZOLE SODIUM 40 MG: 40 TABLET, DELAYED RELEASE ORAL at 05:22

## 2025-05-02 RX ADMIN — SODIUM ZIRCONIUM CYCLOSILICATE 10 G: 10 POWDER, FOR SUSPENSION ORAL at 09:55

## 2025-05-02 NOTE — PLAN OF CARE
Problem: Respiratory - Adult  Goal: Achieves optimal ventilation and oxygenation  5/2/2025 0031 by Raven Huerta, RCP  Outcome: Progressing   Wearing BiPAP HS to support breathing while sleeping. Pt tolerated well.    Patient mutually agreed on goals.

## 2025-05-03 LAB
ANION GAP SERPL CALC-SCNC: 11 MEQ/L (ref 8–16)
BUN SERPL-MCNC: 47 MG/DL (ref 8–23)
CALCIUM SERPL-MCNC: 8.5 MG/DL (ref 8.8–10.2)
CHLORIDE SERPL-SCNC: 104 MEQ/L (ref 98–111)
CO2 SERPL-SCNC: 24 MEQ/L (ref 22–29)
CREAT SERPL-MCNC: 2.4 MG/DL (ref 0.7–1.2)
DEPRECATED RDW RBC AUTO: 54.5 FL (ref 35–45)
ERYTHROCYTE [DISTWIDTH] IN BLOOD BY AUTOMATED COUNT: 16.1 % (ref 11.5–14.5)
GFR SERPL CREATININE-BSD FRML MDRD: 26 ML/MIN/1.73M2
GLUCOSE BLD STRIP.AUTO-MCNC: 233 MG/DL (ref 70–108)
GLUCOSE BLD STRIP.AUTO-MCNC: 293 MG/DL (ref 70–108)
GLUCOSE BLD STRIP.AUTO-MCNC: 316 MG/DL (ref 70–108)
GLUCOSE BLD STRIP.AUTO-MCNC: 345 MG/DL (ref 70–108)
GLUCOSE SERPL-MCNC: 232 MG/DL (ref 74–109)
HCT VFR BLD AUTO: 32 % (ref 42–52)
HGB BLD-MCNC: 9.5 GM/DL (ref 14–18)
MCH RBC QN AUTO: 27.6 PG (ref 26–33)
MCHC RBC AUTO-ENTMCNC: 29.7 GM/DL (ref 32.2–35.5)
MCV RBC AUTO: 93 FL (ref 80–94)
PLATELET # BLD AUTO: 190 THOU/MM3 (ref 130–400)
PMV BLD AUTO: 11.5 FL (ref 9.4–12.4)
POTASSIUM SERPL-SCNC: 5 MEQ/L (ref 3.5–5.2)
POTASSIUM SERPL-SCNC: 5.5 MEQ/L (ref 3.5–5.2)
POTASSIUM SERPL-SCNC: 5.7 MEQ/L (ref 3.5–5.2)
POTASSIUM SERPL-SCNC: 5.9 MEQ/L (ref 3.5–5.2)
RBC # BLD AUTO: 3.44 MILL/MM3 (ref 4.7–6.1)
SODIUM SERPL-SCNC: 139 MEQ/L (ref 135–145)
WBC # BLD AUTO: 6.6 THOU/MM3 (ref 4.8–10.8)

## 2025-05-03 PROCEDURE — 84132 ASSAY OF SERUM POTASSIUM: CPT

## 2025-05-03 PROCEDURE — 6360000002 HC RX W HCPCS: Performed by: INTERNAL MEDICINE

## 2025-05-03 PROCEDURE — 6370000000 HC RX 637 (ALT 250 FOR IP)

## 2025-05-03 PROCEDURE — 36415 COLL VENOUS BLD VENIPUNCTURE: CPT

## 2025-05-03 PROCEDURE — 82948 REAGENT STRIP/BLOOD GLUCOSE: CPT

## 2025-05-03 PROCEDURE — 6360000002 HC RX W HCPCS

## 2025-05-03 PROCEDURE — 80048 BASIC METABOLIC PNL TOTAL CA: CPT

## 2025-05-03 PROCEDURE — 1200000000 HC SEMI PRIVATE

## 2025-05-03 PROCEDURE — 2580000003 HC RX 258: Performed by: INTERNAL MEDICINE

## 2025-05-03 PROCEDURE — 99232 SBSQ HOSP IP/OBS MODERATE 35: CPT | Performed by: INTERNAL MEDICINE

## 2025-05-03 PROCEDURE — 2500000003 HC RX 250 WO HCPCS

## 2025-05-03 PROCEDURE — 94761 N-INVAS EAR/PLS OXIMETRY MLT: CPT

## 2025-05-03 PROCEDURE — 6370000000 HC RX 637 (ALT 250 FOR IP): Performed by: INTERNAL MEDICINE

## 2025-05-03 PROCEDURE — 94660 CPAP INITIATION&MGMT: CPT

## 2025-05-03 PROCEDURE — 85027 COMPLETE CBC AUTOMATED: CPT

## 2025-05-03 PROCEDURE — 99232 SBSQ HOSP IP/OBS MODERATE 35: CPT | Performed by: STUDENT IN AN ORGANIZED HEALTH CARE EDUCATION/TRAINING PROGRAM

## 2025-05-03 RX ORDER — BUMETANIDE 0.25 MG/ML
1 INJECTION, SOLUTION INTRAMUSCULAR; INTRAVENOUS ONCE
Status: COMPLETED | OUTPATIENT
Start: 2025-05-03 | End: 2025-05-03

## 2025-05-03 RX ADMIN — SODIUM CHLORIDE: 0.9 INJECTION, SOLUTION INTRAVENOUS at 07:53

## 2025-05-03 RX ADMIN — BUMETANIDE 1 MG: 0.25 INJECTION INTRAMUSCULAR; INTRAVENOUS at 12:00

## 2025-05-03 RX ADMIN — HYDROMORPHONE HYDROCHLORIDE 2 MG: 2 TABLET ORAL at 14:48

## 2025-05-03 RX ADMIN — INSULIN LISPRO 3 UNITS: 100 INJECTION, SOLUTION INTRAVENOUS; SUBCUTANEOUS at 17:14

## 2025-05-03 RX ADMIN — INSULIN LISPRO 1 UNITS: 100 INJECTION, SOLUTION INTRAVENOUS; SUBCUTANEOUS at 08:08

## 2025-05-03 RX ADMIN — INSULIN LISPRO 2 UNITS: 100 INJECTION, SOLUTION INTRAVENOUS; SUBCUTANEOUS at 20:34

## 2025-05-03 RX ADMIN — METOPROLOL TARTRATE 25 MG: 25 TABLET, FILM COATED ORAL at 19:46

## 2025-05-03 RX ADMIN — ENOXAPARIN SODIUM 30 MG: 100 INJECTION SUBCUTANEOUS at 08:08

## 2025-05-03 RX ADMIN — SODIUM CHLORIDE, PRESERVATIVE FREE 10 ML: 5 INJECTION INTRAVENOUS at 19:45

## 2025-05-03 RX ADMIN — DONEPEZIL HYDROCHLORIDE 10 MG: 10 TABLET, FILM COATED ORAL at 19:46

## 2025-05-03 RX ADMIN — METOPROLOL TARTRATE 25 MG: 25 TABLET, FILM COATED ORAL at 08:08

## 2025-05-03 RX ADMIN — BUPROPION HYDROCHLORIDE 300 MG: 300 TABLET, EXTENDED RELEASE ORAL at 08:07

## 2025-05-03 RX ADMIN — CITALOPRAM 40 MG: 40 TABLET, FILM COATED ORAL at 08:07

## 2025-05-03 RX ADMIN — INSULIN LISPRO 3 UNITS: 100 INJECTION, SOLUTION INTRAVENOUS; SUBCUTANEOUS at 12:00

## 2025-05-03 RX ADMIN — PRAVASTATIN SODIUM 80 MG: 80 TABLET ORAL at 19:46

## 2025-05-03 RX ADMIN — LEVOTHYROXINE SODIUM 175 MCG: 0.15 TABLET ORAL at 08:08

## 2025-05-03 RX ADMIN — PANTOPRAZOLE SODIUM 40 MG: 40 TABLET, DELAYED RELEASE ORAL at 05:08

## 2025-05-03 RX ADMIN — SODIUM POLYSTYRENE SULFONATE 30 G: 15 SUSPENSION ORAL; RECTAL at 14:14

## 2025-05-03 RX ADMIN — SODIUM CHLORIDE, PRESERVATIVE FREE 10 ML: 5 INJECTION INTRAVENOUS at 08:08

## 2025-05-03 ASSESSMENT — PAIN DESCRIPTION - DESCRIPTORS: DESCRIPTORS: ACHING

## 2025-05-03 ASSESSMENT — PAIN DESCRIPTION - LOCATION: LOCATION: HEAD

## 2025-05-03 NOTE — PLAN OF CARE
Problem: Chronic Conditions and Co-morbidities  Goal: Patient's chronic conditions and co-morbidity symptoms are monitored and maintained or improved  Outcome: Progressing  Flowsheets (Taken 5/3/2025 0645)  Care Plan - Patient's Chronic Conditions and Co-Morbidity Symptoms are Monitored and Maintained or Improved:   Monitor and assess patient's chronic conditions and comorbid symptoms for stability, deterioration, or improvement   Collaborate with multidisciplinary team to address chronic and comorbid conditions and prevent exacerbation or deterioration   Update acute care plan with appropriate goals if chronic or comorbid symptoms are exacerbated and prevent overall improvement and discharge       Problem: Metabolic/Fluid and Electrolytes - Adult  Goal: Glucose maintained within prescribed range  Outcome: Progressing  Flowsheets (Taken 5/3/2025 0646)  Glucose maintained within prescribed range:   Monitor blood glucose as ordered   Assess for signs and symptoms of hyperglycemia and hypoglycemia   Administer ordered medications to maintain glucose within target range       Problem: Metabolic/Fluid and Electrolytes - Adult  Goal: Electrolytes maintained within normal limits  Outcome: Progressing  Flowsheets (Taken 5/3/2025 0646)  Electrolytes maintained within normal limits:   Monitor labs and assess patient for signs and symptoms of electrolyte imbalances   Administer electrolyte replacement as ordered   Monitor response to electrolyte replacements, including repeat lab results as appropriate   Instruct patient on fluid and nutrition restrictions as appropriate       Problem: Pain  Goal: Verbalizes/displays adequate comfort level or baseline comfort level  Outcome: Progressing  Flowsheets (Taken 5/3/2025 0645)  Verbalizes/displays adequate comfort level or baseline comfort level:   Encourage patient to monitor pain and request assistance   Assess pain using appropriate pain scale   Implement non-pharmacological

## 2025-05-04 VITALS
HEART RATE: 68 BPM | DIASTOLIC BLOOD PRESSURE: 69 MMHG | OXYGEN SATURATION: 97 % | BODY MASS INDEX: 41.2 KG/M2 | WEIGHT: 262.5 LBS | SYSTOLIC BLOOD PRESSURE: 135 MMHG | HEIGHT: 67 IN | RESPIRATION RATE: 16 BRPM | TEMPERATURE: 97.6 F

## 2025-05-04 LAB
ANION GAP SERPL CALC-SCNC: 12 MEQ/L (ref 8–16)
BACTERIA BLD AEROBE CULT: NORMAL
BACTERIA BLD AEROBE CULT: NORMAL
BUN SERPL-MCNC: 46 MG/DL (ref 8–23)
CALCIUM SERPL-MCNC: 8.4 MG/DL (ref 8.8–10.2)
CHLORIDE SERPL-SCNC: 105 MEQ/L (ref 98–111)
CO2 SERPL-SCNC: 23 MEQ/L (ref 22–29)
CREAT SERPL-MCNC: 2.5 MG/DL (ref 0.7–1.2)
DEPRECATED RDW RBC AUTO: 54.4 FL (ref 35–45)
ERYTHROCYTE [DISTWIDTH] IN BLOOD BY AUTOMATED COUNT: 16 % (ref 11.5–14.5)
GFR SERPL CREATININE-BSD FRML MDRD: 25 ML/MIN/1.73M2
GLUCOSE BLD STRIP.AUTO-MCNC: 175 MG/DL (ref 70–108)
GLUCOSE BLD STRIP.AUTO-MCNC: 227 MG/DL (ref 70–108)
GLUCOSE SERPL-MCNC: 187 MG/DL (ref 74–109)
HCT VFR BLD AUTO: 31 % (ref 42–52)
HGB BLD-MCNC: 9.3 GM/DL (ref 14–18)
MCH RBC QN AUTO: 27.9 PG (ref 26–33)
MCHC RBC AUTO-ENTMCNC: 30 GM/DL (ref 32.2–35.5)
MCV RBC AUTO: 93.1 FL (ref 80–94)
PLATELET # BLD AUTO: 200 THOU/MM3 (ref 130–400)
PMV BLD AUTO: 11.7 FL (ref 9.4–12.4)
POTASSIUM SERPL-SCNC: 4.8 MEQ/L (ref 3.5–5.2)
RBC # BLD AUTO: 3.33 MILL/MM3 (ref 4.7–6.1)
SODIUM SERPL-SCNC: 140 MEQ/L (ref 135–145)
WBC # BLD AUTO: 6.6 THOU/MM3 (ref 4.8–10.8)

## 2025-05-04 PROCEDURE — 82948 REAGENT STRIP/BLOOD GLUCOSE: CPT

## 2025-05-04 PROCEDURE — 6370000000 HC RX 637 (ALT 250 FOR IP)

## 2025-05-04 PROCEDURE — 36415 COLL VENOUS BLD VENIPUNCTURE: CPT

## 2025-05-04 PROCEDURE — 80048 BASIC METABOLIC PNL TOTAL CA: CPT

## 2025-05-04 PROCEDURE — 6360000002 HC RX W HCPCS

## 2025-05-04 PROCEDURE — 99232 SBSQ HOSP IP/OBS MODERATE 35: CPT | Performed by: INTERNAL MEDICINE

## 2025-05-04 PROCEDURE — 85027 COMPLETE CBC AUTOMATED: CPT

## 2025-05-04 PROCEDURE — 2500000003 HC RX 250 WO HCPCS

## 2025-05-04 RX ORDER — BUMETANIDE 1 MG/1
1 TABLET ORAL DAILY
Qty: 60 TABLET | Refills: 1 | Status: ON HOLD | OUTPATIENT
Start: 2025-05-04

## 2025-05-04 RX ADMIN — INSULIN LISPRO 1 UNITS: 100 INJECTION, SOLUTION INTRAVENOUS; SUBCUTANEOUS at 11:46

## 2025-05-04 RX ADMIN — ENOXAPARIN SODIUM 30 MG: 100 INJECTION SUBCUTANEOUS at 08:31

## 2025-05-04 RX ADMIN — PANTOPRAZOLE SODIUM 40 MG: 40 TABLET, DELAYED RELEASE ORAL at 05:16

## 2025-05-04 RX ADMIN — METOPROLOL TARTRATE 25 MG: 25 TABLET, FILM COATED ORAL at 08:31

## 2025-05-04 RX ADMIN — CITALOPRAM 40 MG: 40 TABLET, FILM COATED ORAL at 08:31

## 2025-05-04 RX ADMIN — LEVOTHYROXINE SODIUM 175 MCG: 0.15 TABLET ORAL at 08:31

## 2025-05-04 RX ADMIN — BUPROPION HYDROCHLORIDE 300 MG: 300 TABLET, EXTENDED RELEASE ORAL at 08:31

## 2025-05-04 RX ADMIN — SODIUM CHLORIDE, PRESERVATIVE FREE 10 ML: 5 INJECTION INTRAVENOUS at 08:30

## 2025-05-04 NOTE — PLAN OF CARE
Problem: Chronic Conditions and Co-morbidities  Goal: Patient's chronic conditions and co-morbidity symptoms are monitored and maintained or improved  5/4/2025 1347 by Sharon Johnson RN  Outcome: Adequate for Discharge  5/4/2025 0352 by Ericka Choi RN  Outcome: Progressing  Flowsheets (Taken 5/4/2025 0352)  Care Plan - Patient's Chronic Conditions and Co-Morbidity Symptoms are Monitored and Maintained or Improved:   Monitor and assess patient's chronic conditions and comorbid symptoms for stability, deterioration, or improvement   Collaborate with multidisciplinary team to address chronic and comorbid conditions and prevent exacerbation or deterioration   Update acute care plan with appropriate goals if chronic or comorbid symptoms are exacerbated and prevent overall improvement and discharge     Problem: Discharge Planning  Goal: Discharge to home or other facility with appropriate resources  5/4/2025 1347 by Sharon Johnson RN  Outcome: Adequate for Discharge  5/4/2025 0352 by Ericka Choi RN  Outcome: Progressing  Flowsheets (Taken 5/4/2025 0352)  Discharge to home or other facility with appropriate resources:   Identify barriers to discharge with patient and caregiver   Identify discharge learning needs (meds, wound care, etc)   Arrange for needed discharge resources and transportation as appropriate     Problem: Skin/Tissue Integrity  Goal: Skin integrity remains intact  Description: 1.  Monitor for areas of redness and/or skin breakdown2.  Assess vascular access sites hourly3.  Every 4-6 hours minimum:  Change oxygen saturation probe site4.  Every 4-6 hours:  If on nasal continuous positive airway pressure, respiratory therapy assess nares and determine need for appliance change or resting period  5/4/2025 1347 by Sharon Johnson RN  Outcome: Adequate for Discharge  5/4/2025 0352 by Ericka Choi RN  Outcome: Progressing  Flowsheets (Taken 5/4/2025 0352)  Skin Integrity Remains Intact:   Monitor for areas

## 2025-05-04 NOTE — PROGRESS NOTES
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  Per family patient has not been eating as well and has not been having as much urine output and urine has been darker.  Patient does follow-up with Dr. 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 Cr improved to 2.6 on 5/2  Nephrology consulted appreciate recommendations   Renal ultrasound normal   Continue to hold lisinopril in setting of NAVNEET   Avoid nephrotoxic medication, renally dose medications   Pt did receive IVF on admission   Acute metabolic encephalopathy with underlying dementia: Per family patient has not been taking his medications and usually when he does not take his medications he gets confused he is on donepezil.  Ammonia within normal limits UA negative for infection chest x-ray without consolidations patient is afebrile and denies any infectious symptoms.  Continue donepezil 10 mg nightly  Elevated troponin likely demand ischemia troponin 92-->83-->89. Pt denies chest pain and no ischemic changes on EKG.  Acute on chronic systolic HF: Last echo 7/24 EF showed 45 to 50% mild global hypokinesis with indeterminate diastolic function.  Patient is on Jardiance, Night, Lopressor He Has Bilateral Lower Extremity 3+ Pitting Edema on Admission Daughter Reports That Patient Has Not Really Been Compliant with Medications in the past 2 to 3 Days Patient Did Receive IV Bumex 1 Mg in the ED. 1 mg IV BUMEX given on 5/1 per nephrology. Appreciate nephrology recommendations.  Patient did receive gentle IV fluids on admission.  Patient had dry mucous membranes no 
  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  Per family patient has not been eating as well and has not been having as much urine output and urine has been darker.  Patient does follow-up with Dr. 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 Cr improved to 2.4 5/3   Nephrology consulted appreciate recommendations   Renal ultrasound normal   Continue to hold lisinopril in setting of NAVNEET   Avoid nephrotoxic medication, renally dose medications   Pt did receive IVF on admission   Acute metabolic encephalopathy with underlying dementia: Per family patient has not been taking his medications and usually when he does not take his medications he gets confused he is on donepezil.  Ammonia within normal limits UA negative for infection chest x-ray without consolidations patient is afebrile and denies any infectious symptoms.  Continue donepezil 10 mg nightly  Elevated troponin likely demand ischemia troponin 92-->83-->89. Pt denies chest pain and no ischemic changes on EKG.  Acute on chronic systolic HF: Last echo 7/24 EF showed 45 to 50% mild global hypokinesis with indeterminate diastolic function.  Patient is on Jardiance, Night, Lopressor He Has Bilateral Lower Extremity 3+ Pitting Edema on Admission Daughter Reports That Patient Has Not Really Been Compliant with Medications in the past 2 to 3 Days Patient Did Receive IV Bumex 1 Mg in the ED. 1 mg IV BUMEX given on 5/1 per nephrology. Appreciate nephrology recommendations.  Patient did receive gentle IV fluids on admission.  Patient had dry mucous membranes no 
  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  Per family patient has not been eating as well and has not been having as much urine output and urine has been darker.  Patient does follow-up with Dr. 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   Nephrology consulted appreciate recommendations   Renal ultrasound normal   Continue to hold lisinopril in setting of NAVNEET   Avoid nephrotoxic medication, renally dose medications   Pt did receive IVF on admission   Acute metabolic encephalopathy with underlying dementia: Per family patient has not been taking his medications and usually when he does not take his medications he gets confused he is on donepezil.  Ammonia within normal limits UA negative for infection chest x-ray without consolidations patient is afebrile and denies any infectious symptoms.  Continue donepezil 10 mg nightly  Elevated troponin likely ischemic troponin 92-->83-->89. Pt denies chest pain and no ischemic changes on EKG.  Acute on chronic systolic HF: Last echo 7/24 EF showed 45 to 50% mild global hypokinesis with indeterminate diastolic function.  Patient is on Jardiance, Night, Lopressor He Has Bilateral Lower Extremity 3+ Pitting Edema on Admission Daughter Reports That Patient Has Not Really Been Compliant with Medications in the past 2 to 3 Days Patient Did Receive IV Bumex 1 Mg in the ED. given patient's NAVNEET we will hold diuretics at this time.  Appreciate nephrology recommendations.  Patient did receive gentle IV fluids on admission.  Patient had dry mucous membranes no crackles on 
  Physician Progress Note      PATIENT:               VIKAS HANSEN  CSN #:                  [id number]  :                       1940  ADMIT DATE:       2025 7:15 PM  DISCH DATE:  RESPONDING  PROVIDER #:        Mckenna Trent DO          QUERY TEXT:    Based on your medical judgment, please clarify these findings and document if   any of the following are being evaluated and/or treated:    The clinical indicators include:  83yo, chf, htn, hld, tomasz on ckd     IM \"Elevated troponin likely ischemic troponin 92-->83-->89. Pt denies   chest pain and no ischemic changes on EKG\" \"ADDENDUM Troponins were mildly   elevated (92?83?89), without chest pain, consistent with chronic or   demand-related elevation\"  Labs troponin 92, 83, 89, 91, 95, 87, 95, 93, 88, 89, 85, 86, 84, 89  EKG afib  EKG NSR    labs, imaging, EKG  Options provided:  -- Demand ischemia, no MI  -- Elevated troponin not clinically significant  -- Other - I will add my own diagnosis  -- Disagree - Not applicable / Not valid  -- Disagree - Clinically unable to determine / Unknown  -- Refer to Clinical Documentation Reviewer    PROVIDER RESPONSE TEXT:    This patient has Demand ischemia, no MI.    Query created by: Tamanna Delgado on 2025 1:44 PM      Electronically signed by:  Mckenna Trent DO 2025 3:31 PM          
 [facility]  INPATIENT PHYSICAL THERAPY  DAILY NOTE  STRZ RENAL TELEMETRY 6K - 6K-03/003-A      Discharge Recommendations: Short Subacute/Skilled Nursing Facility Stay or possibly Home with Home Health PT and 24 hour supervision  Equipment Recommendations: No             Time In: 1023  Time Out: 1046  Timed Code Treatment Minutes: 23 Minutes  Minutes: 23          Date: 2025  Patient Name: Leo Nevarez,  Gender:  male        MRN: [id number]  : 1940  (84 y.o.)     Referring Practitioner: Mckenna Trent DO  Diagnosis: Shortness of breath  Additional Pertinent Hx: Per EMR \"Leo Nevarez is a 84 y.o. male with PMHx of CKD, diabetes, HTN, HLD, dementia who presents to [facility] with fatigue, poor p.o. intake.  Patient's daughter reports over the past 2 to 3 days, patient has been sleeping more, not eating and drinking as much.  Reports 2 episodes of emesis as well.  Patient's daughter states that he does not like taking his insulin, has not been taking any of his medications for the past 3 days.  Patient reports he is at clinic.  Patient's daughter reports that he has some confusion at baseline, this gets worse when he does not take his Aricept.\"     Prior Level of Function:  Lives With: Family, Daughter, Spouse (grandchildren)  Type of Home: House  Home Equipment: Walker - Rolling        Prior Level of Assist for ADLs: Needs assistance  Prior Level of Assist for Homemaking: Needs assistance  Prior Level of Assist for Transfers: Independent  Active : No  Additional Comments: Pt is a poor historian. No family present at [place] [facility]  Prior Level of Assist for Ambulation: Independent household ambulator, with or without device  Has the patient had two or more falls in the past year or any fall with injury in the past year?: Unknown    Restrictions/Precautions:  Restrictions/Precautions: Fall Risk, Contact Precautions  Position Activity Restriction  Other Position/Activity 
AVS reviewed with patient. Patient and family educated on new prescriptions and follow up appointments. Patient and family denies any questions at this time. Patient discharged home with family help.   
[facility]   Nephrology progress note  5/2/2025, 12:43 PM      Pt Name:    Leo Nevarez  MRN:     [id number]     YOB: 1940  Admit Date:    4/29/2025  7:15 PM    Chief Complaint: Nephrology following for acute kidney injury and hyperkalemia.    Subjective:  Patient seen and examined  No chest pain or shortness of breath  Feels okay eating and drinking better    Objective:  24HR INTAKE/OUTPUT:    Intake/Output Summary (Last 24 hours) at 5/2/2025 1243  Last data filed at 5/2/2025 0349  Gross per 24 hour   Intake 190 ml   Output 0 ml   Net 190 ml      Admission weight: 108.9 kg (240 lb)  Wt Readings from Last 3 Encounters:   05/02/25 116.5 kg (256 lb 14.4 oz)   03/27/25 108.4 kg (239 lb)   03/26/25 108.9 kg (240 lb)        Vitals :   Vitals:    05/02/25 0349 05/02/25 0355 05/02/25 0800 05/02/25 1126   BP: 136/63  (!) 142/64 128/68   Pulse: 70  70 65   Resp: 18  20 18   Temp: 97.8 °F (36.6 °C)  98 °F (36.7 °C) 97.5 °F (36.4 °C)   TempSrc: Oral  Oral Oral   SpO2: 96%   97%   Weight:  116.5 kg (256 lb 14.4 oz)     Height:           Physical examination  General Appearance:  Well developed. No distress  Mouth/Throat:  Oral mucosa moist  Neck:  Supple, no JVD  Lungs:  Breath sounds: clear  Heart::  S1,S2 heard  Abdomen:  Soft, non - tender  Musculoskeletal:  Edema -mild edema noted    Medications:  Infusion:    sodium chloride      sodium chloride 80 mL/hr at 05/02/25 0521    dextrose       Meds:    sodium zirconium cyclosilicate  10 g Oral BID    buPROPion  300 mg Oral QAM    [Held by provider] traZODone  25 mg Oral Nightly    pravastatin  80 mg Oral Nightly    pantoprazole  40 mg Oral QAM AC    metoprolol tartrate  25 mg Oral BID    [Held by provider] lisinopril  10 mg Oral Daily    levothyroxine  175 mcg Oral Daily    donepezil  10 mg Oral Nightly    citalopram  40 mg Oral Daily    sodium chloride flush  5-40 mL IntraVENous 2 times per day    enoxaparin  30 mg SubCUTAneous Daily    
[facility]   Nephrology progress note  5/3/2025, 11:08 AM      Pt Name:    Leo Nevarez  MRN:     [id number]     YOB: 1940  Admit Date:    4/29/2025  7:15 PM    Chief Complaint: Nephrology following for acute kidney injury and hyperkalemia.    Subjective:  Patient seen and examined  No chest pain or shortness of breath  Clearly confused    Objective:  24HR INTAKE/OUTPUT:    Intake/Output Summary (Last 24 hours) at 5/3/2025 1108  Last data filed at 5/3/2025 0512  Gross per 24 hour   Intake 120 ml   Output 1000 ml   Net -880 ml      Admission weight: 108.9 kg (240 lb)  Wt Readings from Last 3 Encounters:   05/03/25 119.3 kg (263 lb 1.6 oz)   03/27/25 108.4 kg (239 lb)   03/26/25 108.9 kg (240 lb)        Vitals :   Vitals:    05/02/25 2331 05/03/25 0022 05/03/25 0300 05/03/25 0745   BP: (!) 154/70  (!) 154/77 (!) 161/69   Pulse: 68  76 69   Resp: 18 16 16 16   Temp: 97.4 °F (36.3 °C)  97.8 °F (36.6 °C) 97.9 °F (36.6 °C)   TempSrc: Oral  Oral Oral   SpO2: 93% 95% 92% 97%   Weight:   119.3 kg (263 lb 1.6 oz)    Height:           Physical examination  General Appearance:  Well developed. No distress  Mouth/Throat:  Oral mucosa moist  Neck:  Supple, no JVD  Lungs:  Breath sounds: clear  Heart::  S1,S2 heard  Abdomen:  Soft, non - tender  Musculoskeletal:  Edema -mild edema noted    Medications:  Infusion:    dextrose      sodium chloride      sodium chloride 80 mL/hr at 05/03/25 0753    dextrose       Meds:    buPROPion  300 mg Oral QAM    [Held by provider] traZODone  25 mg Oral Nightly    pravastatin  80 mg Oral Nightly    pantoprazole  40 mg Oral QAM AC    metoprolol tartrate  25 mg Oral BID    [Held by provider] lisinopril  10 mg Oral Daily    levothyroxine  175 mcg Oral Daily    donepezil  10 mg Oral Nightly    citalopram  40 mg Oral Daily    sodium chloride flush  5-40 mL IntraVENous 2 times per day    enoxaparin  30 mg SubCUTAneous Daily    insulin lispro  0-4 Units SubCUTAneous 
[facility]   Nephrology progress note  5/4/2025, 11:09 AM      Pt Name:    Leo Nevarez  MRN:     [id number]     YOB: 1940  Admit Date:    4/29/2025  7:15 PM    Chief Complaint: Nephrology following for acute kidney injury and hyperkalemia.    Subjective:  Patient seen and examined  No chest pain or shortness of breath  Clearly confused still    Objective:  24HR INTAKE/OUTPUT:    Intake/Output Summary (Last 24 hours) at 5/4/2025 1109  Last data filed at 5/4/2025 1009  Gross per 24 hour   Intake 100 ml   Output 825 ml   Net -725 ml      Admission weight: 108.9 kg (240 lb)  Wt Readings from Last 3 Encounters:   05/04/25 119.1 kg (262 lb 8 oz)   03/27/25 108.4 kg (239 lb)   03/26/25 108.9 kg (240 lb)        Vitals :   Vitals:    05/03/25 2155 05/03/25 2317 05/04/25 0313 05/04/25 0815   BP:  134/69 (!) 152/71 139/64   Pulse:  69 68 69   Resp: 26 20 20 18   Temp:  97.6 °F (36.4 °C) 97.5 °F (36.4 °C) 97.6 °F (36.4 °C)   TempSrc:  Oral Oral Oral   SpO2:  94% 94% 95%   Weight:   119.1 kg (262 lb 8 oz)    Height:           Physical examination  General Appearance:  Well developed. No distress  Mouth/Throat:  Oral mucosa moist  Neck:  Supple, no JVD  Lungs:  Breath sounds: clear  Heart::  S1,S2 heard  Abdomen:  Soft, non - tender  Musculoskeletal:  Edema -mild edema noted    Medications:  Infusion:    dextrose      sodium chloride      dextrose       Meds:    buPROPion  300 mg Oral QAM    [Held by provider] traZODone  25 mg Oral Nightly    pravastatin  80 mg Oral Nightly    pantoprazole  40 mg Oral QAM AC    metoprolol tartrate  25 mg Oral BID    [Held by provider] lisinopril  10 mg Oral Daily    levothyroxine  175 mcg Oral Daily    donepezil  10 mg Oral Nightly    citalopram  40 mg Oral Daily    sodium chloride flush  5-40 mL IntraVENous 2 times per day    enoxaparin  30 mg SubCUTAneous Daily    insulin lispro  0-4 Units SubCUTAneous 4x Daily AC & HS       Lab Data :  CBC:   Recent Labs 
Patient keeps pulling BIPAP mask off. Not wanting to leave it on. Machine put in standby at this time and mask removed.     Electronically signed by Ericka Choi RN on 5/3/2025 at 10:51 PM   
Patient received sacramental anointing by a . If you are in need of  support, please call [phone]. If you are in need of a  after 6:30pm, please call the house supervisor for the on-call .     04/30/25 1520   Encounter Summary   Encounter Overview/Reason  Encounter   Service Provided For Patient   Referral/Consult From [facility]   Support System Family members   Last Encounter  04/30/25  (Anointed)   Complexity of Encounter Low   Begin Time 1130   End Time  1137   Total Time Calculated 7 min   Spiritual/Emotional needs   Type Spiritual Support   Rituals, Rites and Sacraments   Type Anointing   Assessment/Intervention/Outcome   Assessment Hopeful   Intervention Empowerment       
[facility]  INPATIENT PHYSICAL THERAPY  EVALUATION  STRZ RENAL TELEMETRY 6K - 6K-03/003-A    Discharge Recommendations: Continue to assess pending progress, 24 hour supervision or assist with HH services short SNF stay  Equipment Recommendations: No             Time In: 1029  Time Out: 1047  Timed Code Treatment Minutes: 10 Minutes  Minutes: 18          Date: 2025  Patient Name: Leo Nevarez,  Gender:  male        MRN: [id number]  : 1940  (84 y.o.)      Referring Practitioner: Mckenna Trent DO  Diagnosis: Shortness of breath  Additional Pertinent Hx: Per EMR \"Leo Nevarez is a 84 y.o. male with PMHx of CKD, diabetes, HTN, HLD, dementia who presents to [facility] with fatigue, poor p.o. intake.  Patient's daughter reports over the past 2 to 3 days, patient has been sleeping more, not eating and drinking as much.  Reports 2 episodes of emesis as well.  Patient's daughter states that he does not like taking his insulin, has not been taking any of his medications for the past 3 days.  Patient reports he is at clinic.  Patient's daughter reports that he has some confusion at baseline, this gets worse when he does not take his Aricept.\"     Restrictions/Precautions:  Restrictions/Precautions: Fall Risk, Contact Precautions       Other Position/Activity Restrictions: [place], hx of dementia    Required Braces or Orthoses?: No      Subjective:  Chart Reviewed: Yes  Patient assessed for rehabilitation services?: Yes  Family/Caregiver Present: No  Subjective: OK to see pt per nursing. Pt in bedside chair when PT arrived, agreeable to PT session, slight confusion noted. Telesitter present.    General:  Overall Orientation Status: Impaired  Orientation Level: Oriented to place, Oriented to person, Disoriented to time  Vision: Within Functional Limits  Hearing: Exceptions to WFL  Hearing Exceptions: Hard of hearing/hearing concerns       Pain: reports L LE pain, not able to 
[facility]  STRZ RENAL TELEMETRY 6K  Occupational Therapy  Daily Note    Discharge Recommendations: Subacute/skilled nursing facility  **If returns straight home, would require 24/7 hands on assist for ADLs, transfers, and mobility in addition to HH therapies    Equipment Recommendations: No      Time In: 1327  Time Out: 1359  Timed Code Treatment Minutes: 32 Minutes  Minutes: 32          Date: 2025  Patient Name: Leo Nevarez,   Gender: male      Room: [facility]  MRN: [id number]  : 1940  (84 y.o.)  Referring Practitioner: Mckenna Trent DO  Diagnosis: Shortness of breath  Additional Pertinent Hx: 84-year-old male with CKD, dementia, diabetes, and chronic systolic heart failure presented with fatigue, poor oral intake, and increased confusion after 2-3 days of medication noncompliance. He was found to have NAVNEET on CKD likely prerenal from volume depletion.  He also exhibited acute metabolic encephalopathy on top of baseline dementia, with disorientation and hypercapnia (PCO2 52 on VBG), consistent with prior admissions requiring BiPAP. Troponins were mildly elevated, without chest pain, consistent with chronic or demand-related elevation. He showed signs of heart failure exacerbation (3+ LE edema), likely due to medication nonadherence.    Restrictions/Precautions:  Restrictions/Precautions: Fall Risk, Contact Precautions  Position Activity Restriction  Other Position/Activity Restrictions: [place], hx of dementia     Social/Functional History:  Lives With: Family, Daughter, Spouse (grandchildren)  Type of Home: House  Home Equipment: Walker - Rolling           Prior Level of Assist for ADLs: Needs assistance  Prior Level of Assist for Homemaking: Needs assistance  Prior Level of Assist for Transfers: Independent  Prior Level of Assist for Ambulation: Independent household ambulator, with or without device  Has the patient had two or more falls in the past year or any fall with injury in 
MOTION:  Bilateral Upper Extremity:  WFL    STRENGTH:  Bilateral Upper Extremity:  Impaired -      Hand Dominance: Right    SENSATION:   WFL    ADL:   Feeding: Stand By Assistance.  Cues to focus on task. Pt requires increased time.   Grooming: Contact Guard Assistance.  To wash hands standing sink side. Cues to locate items in view. Pt washes face while sitting in chair.   Lower Extremity Dressing: Maximum Assistance.  To thread BLEs into brief  Footwear Management: Dependent.  To don socks  Toileting: Contact Guard Assistance.  Cues to sit on toilet to urinate, pt remains standing.  .    BALANCE:  Sitting Balance:  Stand By Assistance.    Standing Balance: Contact Guard Assistance.      BED MOBILITY:  Supine to Sit: Contact Guard Assistance, with head of bed raised, with rail, with verbal cues , with increased time for completion      TRANSFERS:  Sit to Stand:  Minimal Assistance. From EOB and chair  Stand to Sit: Minimal Assistance. Pt requires assist with safe walker mgmt when backing up to chair.     FUNCTIONAL MOBILITY:  Assistive Device: Rolling Walker  Assist Level:  Minimal Assistance.   Distance: To and from bathroom and Within room  Min A for walker mgmt. Pt often stepping outside of walking.     Activity Tolerance:  Patient tolerance of  treatment: Fair treatment tolerance, Limited by fatigue, and Reduced activity pace      Functional Outcome Measures:   AM-PAC Inpatient Daily Activity Raw Score: 16     Modified [place]:  Premorbid Functional Status: Not Applicable  Current Functional Status:  Not Applicable    Education:  Learners: Patient  Plan of Care, Role of OT,ADL's, Home Safety, Importance of Increasing Activity, Fall Prevention, and Assistive Device Safety    Assessment:  Assessment: 83 yo admitted d/t SOB. Pt presents to OT with decreased cognition, strength, balance, and activity tolerance impacting safety and IND with ADLs. Pt would benefit from further skilled OT to address barriers in order 
SubCUTAneous 4x Daily AC & HS       Lab Data :  CBC:   Recent Labs     04/29/25 2002 04/30/25 0317 05/01/25  1154   WBC 6.9 5.8 6.1   HGB 9.7* 8.2* 9.4*   HCT 32.3* 27.0* 31.7*    162 191     CMP:  Recent Labs     04/29/25 2002 04/30/25 0317 04/30/25  0539 04/30/25  0955 05/01/25  0305    142  --   --  141   K 5.8* 5.4* 5.5* 5.4* 5.7*    107  --   --  107   CO2 25 25  --   --  23   BUN 60* 58*  --   --  53*   CREATININE 2.9* 2.8*  --   --  2.6*   GLUCOSE 189* 120*  --   --  200*   CALCIUM 8.9 8.6*  --   --  8.6*     Hepatic:   Recent Labs     04/30/25  0054   AST 27   ALT 25   BILITOT 0.3   ALKPHOS 121       Assessment and Plan:  Renal -acute kidney injury on CKD baseline creatinine slightly under 2.0.  This is most likely secondary to poor oral intake combined with the use of diuretics.    Diuretics on hold and clinically looks dry so started on IV fluids creatinine improving slowly  Continue IV fluids for now increase to 80 mL per hour  Electrolytes -hyperkalemia again has a history of hyperkalemia supposed to be on Lokelma patient has not been taking it for a while.  Will give Lokelma 10 g twice daily for 2 days and low potassium diet  Acid-base status appears to be stable  Essential hypertension  History of CHF does have some edema but this is clinically much better than prior already received some Bumex not requiring any oxygen hold the diuretics can give if necessary gentle IV fluids  Confusion most likely due to underlying dementia clinically looking better  Chronic anemia iron studies reasonable  Diabetes mellitus  Meds reviewed and discussed with patient     Morgan Hicks MD  [facility]    This report has been created using voice recognition software. It may contain minor errors which are inherent in voice recognition technology  
membranes no crackles on exam chest x-ray does not show any pulmonary edema.   Hperkalemia: Potassium on presentation 5.8 given albuterol, calcium gluconate, insulin, Lokelma improved to 5.4 on 4/30. K 5.7 on 5/1 treated with lokelma 10 mg BID and insulin injection. Continue to monitor with daily BMP appreciate nephrology recommendations  HTN: Hold home lisinopril in setting of NAVNEET, continue Lopressor 25 mg twice daily  HLD: Continue pravastatin 80 mg nightly  IDDM2: Low sliding scale insulin while inpatient will hold home antidiabetic medication.  Hypoglycemia protocol  Hypothyroidism : Continue Synthroid 175 mcg daily  Anxiety, depression: Continue Celexa 40 mg, continue Wellbutrin 300 mg, continue Ativan 0.5 mg every 6 hours as needed for anxiety  GERD: Continue Protonix 40 mg daily  Chronic normocytic anemia: Patient has a history of iron deficiency.  Hemoglobin on admission 9.7 that dropped to 8.2 on 4/30 will continue to monitor with daily CBC.  Iron supplementation on discharge                        Chief Complaint:  Fatigue, poor p.o. intake     Hospital Course: per HPI from H&P: Leo Nevarez is a 84 y.o. male with PMHx of CKD, diabetes, HTN, HLD, dementia who presents to [facility] with fatigue, poor p.o. intake.  Patient's daughter reports over the past 2 to 3 days, patient has been sleeping more, not eating and drinking as much.  Reports 2 episodes of emesis as well.  Patient's daughter states that he does not like taking his insulin, has not been taking any of his medications for the past 3 days.  Patient reports he is at clinic.  Patient's daughter reports that he has some confusion at baseline, this gets worse when he does not take his Aricept.     ED course: Initial vital signs /66, HR 66, RR 16, SpO2 92% on room air, temperature 98.7.  BMP significant for potassium 5.8, BUN 60, creatinine 2.9, GFR 21.  Procalcitonin 0.05.  Lactic 2.  CK 76.  Troponin 92, proBNP 58280.  CBC

## 2025-05-04 NOTE — PLAN OF CARE
Problem: Chronic Conditions and Co-morbidities  Goal: Patient's chronic conditions and co-morbidity symptoms are monitored and maintained or improved  Outcome: Progressing  Flowsheets (Taken 5/4/2025 0352)  Care Plan - Patient's Chronic Conditions and Co-Morbidity Symptoms are Monitored and Maintained or Improved:   Monitor and assess patient's chronic conditions and comorbid symptoms for stability, deterioration, or improvement   Collaborate with multidisciplinary team to address chronic and comorbid conditions and prevent exacerbation or deterioration   Update acute care plan with appropriate goals if chronic or comorbid symptoms are exacerbated and prevent overall improvement and discharge       Problem: Confusion  Goal: Confusion, delirium, dementia, or psychosis is improved or at baseline  Description: INTERVENTIONS:1. Assess for possible contributors to thought disturbance, including medications, impaired vision or hearing, underlying metabolic abnormalities, dehydration, psychiatric diagnoses, and notify attending LIP2. Prue high risk fall precautions, as indicated3. Provide frequent short contacts to provide reality reorientation, refocusing and direction4. Decrease environmental stimuli, including noise as appropriate5. Monitor and intervene to maintain adequate nutrition, hydration, elimination, sleep and activity6. If unable to ensure safety without constant attention obtain sitter and review sitter guidelines with assigned personnel7. Initiate Psychosocial CNS and Spiritual Care consult, as indicated  Outcome: Progressing  Flowsheets (Taken 5/4/2025 0354)  Effect of thought disturbance (confusion, delirium, dementia, or psychosis) are managed with adequate functional status:   Assess for contributors to thought disturbance, including medications, impaired vision or hearing, underlying metabolic abnormalities, dehydration, psychiatric diagnoses, notify LIP   Prue high risk fall precautions, as

## 2025-05-05 ENCOUNTER — TELEPHONE (OUTPATIENT)
Dept: NEPHROLOGY | Age: 85
End: 2025-05-05

## 2025-05-05 RX ORDER — PATIROMER 8.4 G/1
8.4 POWDER, FOR SUSPENSION ORAL DAILY
Qty: 30 EACH | Refills: 1 | Status: ON HOLD | OUTPATIENT
Start: 2025-05-05

## 2025-05-05 NOTE — TELEPHONE ENCOUNTER
Margareth daughter states pt was put on lokelma in hosp yesterday by another doctor but they can't afford it at pharmacy - [phone] margareth

## 2025-05-06 NOTE — TELEPHONE ENCOUNTER
Patients daughter called back and veltassa is over a thousand dollars and they can not afford either of these medications. Please advise

## 2025-05-07 ENCOUNTER — APPOINTMENT (OUTPATIENT)
Dept: GENERAL RADIOLOGY | Age: 85
DRG: 291 | End: 2025-05-07
Payer: MEDICARE

## 2025-05-07 ENCOUNTER — HOSPITAL ENCOUNTER (INPATIENT)
Age: 85
LOS: 7 days | Discharge: HOME HEALTH | DRG: 291 | End: 2025-05-14
Attending: EMERGENCY MEDICINE | Admitting: FAMILY MEDICINE
Payer: MEDICARE

## 2025-05-07 DIAGNOSIS — I50.33 ACUTE ON CHRONIC DIASTOLIC CONGESTIVE HEART FAILURE (HCC): ICD-10-CM

## 2025-05-07 DIAGNOSIS — J18.9 PNEUMONIA OF LEFT LOWER LOBE DUE TO INFECTIOUS ORGANISM: Primary | ICD-10-CM

## 2025-05-07 DIAGNOSIS — N18.4 CKD (CHRONIC KIDNEY DISEASE) STAGE 4, GFR 15-29 ML/MIN (HCC): ICD-10-CM

## 2025-05-07 DIAGNOSIS — R53.1 GENERAL WEAKNESS: ICD-10-CM

## 2025-05-07 DIAGNOSIS — R06.00 DYSPNEA, UNSPECIFIED TYPE: ICD-10-CM

## 2025-05-07 DIAGNOSIS — I50.22 CHRONIC SYSTOLIC CONGESTIVE HEART FAILURE (HCC): ICD-10-CM

## 2025-05-07 DIAGNOSIS — R09.02 HYPOXIA: ICD-10-CM

## 2025-05-07 DIAGNOSIS — E11.65 UNCONTROLLED TYPE 2 DIABETES MELLITUS WITH HYPERGLYCEMIA (HCC): ICD-10-CM

## 2025-05-07 PROBLEM — I50.9 ACUTE EXACERBATION OF CHRONIC HEART FAILURE (HCC): Status: ACTIVE | Noted: 2025-05-07

## 2025-05-07 LAB
ANION GAP SERPL CALC-SCNC: 11 MEQ/L (ref 8–16)
B-OH-BUTYR SERPL-MSCNC: 3.11 MG/DL (ref 0.2–2.81)
BASOPHILS ABSOLUTE: 0.1 THOU/MM3 (ref 0–0.1)
BASOPHILS NFR BLD AUTO: 1 %
BUN SERPL-MCNC: 39 MG/DL (ref 8–23)
CALCIUM SERPL-MCNC: 8.6 MG/DL (ref 8.8–10.2)
CHLORIDE SERPL-SCNC: 106 MEQ/L (ref 98–111)
CO2 SERPL-SCNC: 27 MEQ/L (ref 22–29)
CREAT SERPL-MCNC: 2.2 MG/DL (ref 0.7–1.2)
DEPRECATED MEAN GLUCOSE BLD GHB EST-ACNC: 213 MG/DL (ref 70–126)
DEPRECATED RDW RBC AUTO: 56.4 FL (ref 35–45)
EOSINOPHIL NFR BLD AUTO: 5.7 %
EOSINOPHILS ABSOLUTE: 0.4 THOU/MM3 (ref 0–0.4)
ERYTHROCYTE [DISTWIDTH] IN BLOOD BY AUTOMATED COUNT: 16.7 % (ref 11.5–14.5)
GFR SERPL CREATININE-BSD FRML MDRD: 29 ML/MIN/1.73M2
GLUCOSE BLD STRIP.AUTO-MCNC: 202 MG/DL (ref 70–108)
GLUCOSE SERPL-MCNC: 222 MG/DL (ref 74–109)
HBA1C MFR BLD HPLC: 9.1 % (ref 4–6)
HCT VFR BLD AUTO: 34.7 % (ref 42–52)
HGB BLD-MCNC: 10.3 GM/DL (ref 14–18)
IMM GRANULOCYTES # BLD AUTO: 0.02 THOU/MM3 (ref 0–0.07)
IMM GRANULOCYTES NFR BLD AUTO: 0.3 %
LACTATE SERPL-SCNC: 1.1 MMOL/L (ref 0.5–2)
LYMPHOCYTES ABSOLUTE: 1.7 THOU/MM3 (ref 1–4.8)
LYMPHOCYTES NFR BLD AUTO: 26.9 %
MAGNESIUM SERPL-MCNC: 2 MG/DL (ref 1.6–2.6)
MCH RBC QN AUTO: 28.4 PG (ref 26–33)
MCHC RBC AUTO-ENTMCNC: 29.7 GM/DL (ref 32.2–35.5)
MCV RBC AUTO: 95.6 FL (ref 80–94)
MONOCYTES ABSOLUTE: 0.6 THOU/MM3 (ref 0.4–1.3)
MONOCYTES NFR BLD AUTO: 9.4 %
NEUTROPHILS ABSOLUTE: 3.6 THOU/MM3 (ref 1.8–7.7)
NEUTROPHILS NFR BLD AUTO: 56.7 %
NRBC BLD AUTO-RTO: 0 /100 WBC
NT-PROBNP SERPL IA-MCNC: ABNORMAL PG/ML (ref 0–449)
OSMOLALITY SERPL CALC.SUM OF ELEC: 303.1 MOSMOL/KG (ref 275–300)
PLATELET # BLD AUTO: 189 THOU/MM3 (ref 130–400)
PMV BLD AUTO: 12.3 FL (ref 9.4–12.4)
POTASSIUM SERPL-SCNC: 4.6 MEQ/L (ref 3.5–5.2)
PROCALCITONIN SERPL IA-MCNC: 0.08 NG/ML (ref 0.01–0.09)
RBC # BLD AUTO: 3.63 MILL/MM3 (ref 4.7–6.1)
SODIUM SERPL-SCNC: 144 MEQ/L (ref 135–145)
TROPONIN, HIGH SENSITIVITY: 76 NG/L (ref 0–12)
TROPONIN, HIGH SENSITIVITY: 78 NG/L (ref 0–12)
WBC # BLD AUTO: 6.3 THOU/MM3 (ref 4.8–10.8)

## 2025-05-07 PROCEDURE — 83605 ASSAY OF LACTIC ACID: CPT

## 2025-05-07 PROCEDURE — 2580000003 HC RX 258

## 2025-05-07 PROCEDURE — 2500000003 HC RX 250 WO HCPCS

## 2025-05-07 PROCEDURE — 5A0945A ASSISTANCE WITH RESPIRATORY VENTILATION, 24-96 CONSECUTIVE HOURS, HIGH NASAL FLOW/VELOCITY: ICD-10-PCS

## 2025-05-07 PROCEDURE — 84145 PROCALCITONIN (PCT): CPT

## 2025-05-07 PROCEDURE — 6360000002 HC RX W HCPCS: Performed by: EMERGENCY MEDICINE

## 2025-05-07 PROCEDURE — 83036 HEMOGLOBIN GLYCOSYLATED A1C: CPT

## 2025-05-07 PROCEDURE — 83735 ASSAY OF MAGNESIUM: CPT

## 2025-05-07 PROCEDURE — 85025 COMPLETE CBC W/AUTO DIFF WBC: CPT

## 2025-05-07 PROCEDURE — 83880 ASSAY OF NATRIURETIC PEPTIDE: CPT

## 2025-05-07 PROCEDURE — 96374 THER/PROPH/DIAG INJ IV PUSH: CPT

## 2025-05-07 PROCEDURE — 6360000002 HC RX W HCPCS

## 2025-05-07 PROCEDURE — 2060000000 HC ICU INTERMEDIATE R&B

## 2025-05-07 PROCEDURE — 87040 BLOOD CULTURE FOR BACTERIA: CPT

## 2025-05-07 PROCEDURE — 80048 BASIC METABOLIC PNL TOTAL CA: CPT

## 2025-05-07 PROCEDURE — 71045 X-RAY EXAM CHEST 1 VIEW: CPT

## 2025-05-07 PROCEDURE — 36415 COLL VENOUS BLD VENIPUNCTURE: CPT

## 2025-05-07 PROCEDURE — 96375 TX/PRO/DX INJ NEW DRUG ADDON: CPT

## 2025-05-07 PROCEDURE — 84484 ASSAY OF TROPONIN QUANT: CPT

## 2025-05-07 PROCEDURE — 82948 REAGENT STRIP/BLOOD GLUCOSE: CPT

## 2025-05-07 PROCEDURE — 99285 EMERGENCY DEPT VISIT HI MDM: CPT

## 2025-05-07 PROCEDURE — 82010 KETONE BODYS QUAN: CPT

## 2025-05-07 PROCEDURE — 93005 ELECTROCARDIOGRAM TRACING: CPT | Performed by: EMERGENCY MEDICINE

## 2025-05-07 RX ORDER — ACETAMINOPHEN 325 MG/1
650 TABLET ORAL EVERY 6 HOURS PRN
Status: DISCONTINUED | OUTPATIENT
Start: 2025-05-07 | End: 2025-05-14 | Stop reason: HOSPADM

## 2025-05-07 RX ORDER — SODIUM CHLORIDE 9 MG/ML
INJECTION, SOLUTION INTRAVENOUS PRN
Status: DISCONTINUED | OUTPATIENT
Start: 2025-05-07 | End: 2025-05-14 | Stop reason: HOSPADM

## 2025-05-07 RX ORDER — BUPROPION HYDROCHLORIDE 300 MG/1
300 TABLET ORAL EVERY MORNING
Status: DISCONTINUED | OUTPATIENT
Start: 2025-05-08 | End: 2025-05-14 | Stop reason: HOSPADM

## 2025-05-07 RX ORDER — SODIUM CHLORIDE 0.9 % (FLUSH) 0.9 %
5-40 SYRINGE (ML) INJECTION PRN
Status: DISCONTINUED | OUTPATIENT
Start: 2025-05-07 | End: 2025-05-14 | Stop reason: HOSPADM

## 2025-05-07 RX ORDER — TRAZODONE HYDROCHLORIDE 50 MG/1
25 TABLET ORAL NIGHTLY
Status: DISCONTINUED | OUTPATIENT
Start: 2025-05-08 | End: 2025-05-14 | Stop reason: HOSPADM

## 2025-05-07 RX ORDER — PANTOPRAZOLE SODIUM 40 MG/1
40 TABLET, DELAYED RELEASE ORAL
Status: DISCONTINUED | OUTPATIENT
Start: 2025-05-08 | End: 2025-05-14 | Stop reason: HOSPADM

## 2025-05-07 RX ORDER — MAGNESIUM SULFATE IN WATER 40 MG/ML
2000 INJECTION, SOLUTION INTRAVENOUS PRN
Status: DISCONTINUED | OUTPATIENT
Start: 2025-05-07 | End: 2025-05-14 | Stop reason: HOSPADM

## 2025-05-07 RX ORDER — BUMETANIDE 0.25 MG/ML
1 INJECTION, SOLUTION INTRAMUSCULAR; INTRAVENOUS ONCE
Status: COMPLETED | OUTPATIENT
Start: 2025-05-07 | End: 2025-05-07

## 2025-05-07 RX ORDER — POTASSIUM CHLORIDE 1500 MG/1
40 TABLET, EXTENDED RELEASE ORAL PRN
Status: DISCONTINUED | OUTPATIENT
Start: 2025-05-07 | End: 2025-05-14 | Stop reason: HOSPADM

## 2025-05-07 RX ORDER — POLYETHYLENE GLYCOL 3350 17 G/17G
17 POWDER, FOR SOLUTION ORAL DAILY PRN
Status: DISCONTINUED | OUTPATIENT
Start: 2025-05-07 | End: 2025-05-14 | Stop reason: HOSPADM

## 2025-05-07 RX ORDER — HEPARIN SODIUM 5000 [USP'U]/ML
5000 INJECTION, SOLUTION INTRAVENOUS; SUBCUTANEOUS EVERY 8 HOURS
Status: DISCONTINUED | OUTPATIENT
Start: 2025-05-08 | End: 2025-05-14 | Stop reason: HOSPADM

## 2025-05-07 RX ORDER — POTASSIUM CHLORIDE 7.45 MG/ML
10 INJECTION INTRAVENOUS PRN
Status: DISCONTINUED | OUTPATIENT
Start: 2025-05-07 | End: 2025-05-14 | Stop reason: HOSPADM

## 2025-05-07 RX ORDER — INSULIN GLARGINE 100 [IU]/ML
20 INJECTION, SOLUTION SUBCUTANEOUS NIGHTLY
Status: DISCONTINUED | OUTPATIENT
Start: 2025-05-08 | End: 2025-05-09

## 2025-05-07 RX ORDER — ONDANSETRON 4 MG/1
4 TABLET, ORALLY DISINTEGRATING ORAL EVERY 8 HOURS PRN
Status: DISCONTINUED | OUTPATIENT
Start: 2025-05-07 | End: 2025-05-14 | Stop reason: HOSPADM

## 2025-05-07 RX ORDER — GLUCAGON 1 MG/ML
1 KIT INJECTION PRN
Status: DISCONTINUED | OUTPATIENT
Start: 2025-05-07 | End: 2025-05-14 | Stop reason: HOSPADM

## 2025-05-07 RX ORDER — SODIUM CHLORIDE 0.9 % (FLUSH) 0.9 %
5-40 SYRINGE (ML) INJECTION EVERY 12 HOURS SCHEDULED
Status: DISCONTINUED | OUTPATIENT
Start: 2025-05-07 | End: 2025-05-14 | Stop reason: HOSPADM

## 2025-05-07 RX ORDER — DONEPEZIL HYDROCHLORIDE 10 MG/1
10 TABLET, FILM COATED ORAL NIGHTLY
Status: DISCONTINUED | OUTPATIENT
Start: 2025-05-08 | End: 2025-05-14 | Stop reason: HOSPADM

## 2025-05-07 RX ORDER — HYDROMORPHONE HYDROCHLORIDE 2 MG/1
2 TABLET ORAL EVERY 4 HOURS PRN
Refills: 0 | Status: DISCONTINUED | OUTPATIENT
Start: 2025-05-07 | End: 2025-05-14 | Stop reason: HOSPADM

## 2025-05-07 RX ORDER — DEXTROSE MONOHYDRATE 100 MG/ML
INJECTION, SOLUTION INTRAVENOUS CONTINUOUS PRN
Status: DISCONTINUED | OUTPATIENT
Start: 2025-05-07 | End: 2025-05-14 | Stop reason: HOSPADM

## 2025-05-07 RX ORDER — METOPROLOL TARTRATE 25 MG/1
25 TABLET, FILM COATED ORAL 2 TIMES DAILY
Status: DISCONTINUED | OUTPATIENT
Start: 2025-05-08 | End: 2025-05-14 | Stop reason: HOSPADM

## 2025-05-07 RX ORDER — CITALOPRAM HYDROBROMIDE 40 MG/1
40 TABLET ORAL DAILY
Status: DISCONTINUED | OUTPATIENT
Start: 2025-05-08 | End: 2025-05-14 | Stop reason: HOSPADM

## 2025-05-07 RX ORDER — ACETAMINOPHEN 650 MG/1
650 SUPPOSITORY RECTAL EVERY 6 HOURS PRN
Status: DISCONTINUED | OUTPATIENT
Start: 2025-05-07 | End: 2025-05-14 | Stop reason: HOSPADM

## 2025-05-07 RX ORDER — INSULIN LISPRO 100 [IU]/ML
0-8 INJECTION, SOLUTION INTRAVENOUS; SUBCUTANEOUS
Status: DISCONTINUED | OUTPATIENT
Start: 2025-05-07 | End: 2025-05-14 | Stop reason: HOSPADM

## 2025-05-07 RX ORDER — PRAVASTATIN SODIUM 80 MG/1
80 TABLET ORAL NIGHTLY
Status: DISCONTINUED | OUTPATIENT
Start: 2025-05-08 | End: 2025-05-14 | Stop reason: HOSPADM

## 2025-05-07 RX ORDER — PROCHLORPERAZINE MALEATE 10 MG
10 TABLET ORAL EVERY 6 HOURS PRN
Status: DISCONTINUED | OUTPATIENT
Start: 2025-05-07 | End: 2025-05-14 | Stop reason: HOSPADM

## 2025-05-07 RX ORDER — ONDANSETRON 2 MG/ML
4 INJECTION INTRAMUSCULAR; INTRAVENOUS EVERY 6 HOURS PRN
Status: DISCONTINUED | OUTPATIENT
Start: 2025-05-07 | End: 2025-05-14 | Stop reason: HOSPADM

## 2025-05-07 RX ORDER — DOCUSATE SODIUM 100 MG/1
100 CAPSULE, LIQUID FILLED ORAL DAILY
Status: DISCONTINUED | OUTPATIENT
Start: 2025-05-08 | End: 2025-05-14 | Stop reason: HOSPADM

## 2025-05-07 RX ADMIN — WATER 2000 MG: 1 INJECTION INTRAMUSCULAR; INTRAVENOUS; SUBCUTANEOUS at 22:47

## 2025-05-07 RX ADMIN — AZITHROMYCIN MONOHYDRATE 500 MG: 500 INJECTION, POWDER, LYOPHILIZED, FOR SOLUTION INTRAVENOUS at 21:42

## 2025-05-07 RX ADMIN — BUMETANIDE 1 MG: 0.25 INJECTION INTRAMUSCULAR; INTRAVENOUS at 21:35

## 2025-05-07 ASSESSMENT — PAIN - FUNCTIONAL ASSESSMENT
PAIN_FUNCTIONAL_ASSESSMENT: NONE - DENIES PAIN
PAIN_FUNCTIONAL_ASSESSMENT: NONE - DENIES PAIN

## 2025-05-07 ASSESSMENT — PAIN SCALES - GENERAL: PAINLEVEL_OUTOF10: 0

## 2025-05-07 NOTE — DISCHARGE SUMMARY
Hospitalist Discharge Summary        Patient: Leo Nevarez  YOB: 1940  MRN: [id number]   Acct: [id number]    Primary Care Physician: Aileen Valadez APRN - CNP    Admit date  4/29/2025    Discharge date:  5/4/2025 9:28 AM    Chief Complaint on presentation :-    Hyperkalemia    Discharge Assessment and Plan:-     NAVNEET on CKD3b: Creatinine on admission 2.9 baseline appears to be around 1.7-2.2.  Per family patient has not been eating as well and has not been having as much urine output and urine has been darker.  Patient does follow-up with Dr. 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 Cr improved to 2.4 5/3   Nephrology consulted appreciate recommendations   Renal ultrasound normal   Continue to hold lisinopril in setting of NAVNEET   Avoid nephrotoxic medication, renally dose medications   Pt did receive IVF on admission   Acute metabolic encephalopathy with underlying dementia: Per family patient has not been taking his medications and usually when he does not take his medications he gets confused he is on donepezil.  Ammonia within normal limits UA negative for infection chest x-ray without consolidations patient is afebrile and denies any infectious symptoms.  Continue donepezil 10 mg nightly  Elevated troponin likely demand ischemia troponin 92-->83-->89. Pt denies chest pain and no ischemic changes on EKG.  Acute on chronic systolic HF: Last echo 7/24 EF showed 45 to 50% mild global hypokinesis with indeterminate diastolic function.  Patient is on Jardiance, Night, Lopressor He Has Bilateral Lower Extremity 3+ Pitting Edema on Admission Daughter Reports That Patient Has Not Really Been Compliant with Medications in the past 2 to 3 Days Patient Did Receive IV Bumex 1 Mg in the ED. 1 mg IV BUMEX given on 5/1 per nephrology. Appreciate nephrology recommendations.  Patient did receive gentle IV fluids on admission.  Patient

## 2025-05-07 NOTE — TELEPHONE ENCOUNTER
There is patient assistance through [place] for [facility]. I have left a message for patients daughter to please stop in the office and fill there portion out and once that is done I will submit all paperwork and see if they meet requirements

## 2025-05-07 NOTE — ED NOTES
Patient resting comfortably in cot, breathing even and unlabored. Patient denies any needs at this time. Call light within reach, and bedrail x2.  Family at bedside.

## 2025-05-07 NOTE — ED TRIAGE NOTES
Pt presents to ED for eval of swelling, fatigue and shortness of breath. Pt was evaluated at PCP and noted to be hypoxic in the 80s. Pt noted to be 77% on RA upon arrival to this ED. Pt placed on 2L via NC. Pt tolerating well.

## 2025-05-08 ENCOUNTER — APPOINTMENT (OUTPATIENT)
Age: 85
DRG: 291 | End: 2025-05-08
Attending: STUDENT IN AN ORGANIZED HEALTH CARE EDUCATION/TRAINING PROGRAM
Payer: MEDICARE

## 2025-05-08 ENCOUNTER — APPOINTMENT (OUTPATIENT)
Dept: ULTRASOUND IMAGING | Age: 85
DRG: 291 | End: 2025-05-08
Payer: MEDICARE

## 2025-05-08 PROBLEM — J96.01 ACUTE RESPIRATORY FAILURE WITH HYPOXIA (HCC): Status: ACTIVE | Noted: 2025-05-08

## 2025-05-08 PROBLEM — I50.23 ACUTE ON CHRONIC HFREF (HEART FAILURE WITH REDUCED EJECTION FRACTION) (HCC): Status: ACTIVE | Noted: 2025-05-07

## 2025-05-08 PROBLEM — G93.41 METABOLIC ENCEPHALOPATHY: Status: ACTIVE | Noted: 2025-05-08

## 2025-05-08 PROBLEM — E66.9 OBESITY (BMI 30-39.9): Status: ACTIVE | Noted: 2025-05-08

## 2025-05-08 PROBLEM — R53.81 PHYSICAL DECONDITIONING: Status: ACTIVE | Noted: 2025-05-08

## 2025-05-08 LAB
ANION GAP SERPL CALC-SCNC: 8 MEQ/L (ref 8–16)
B-OH-BUTYR SERPL-MSCNC: 0.94 MG/DL (ref 0.2–2.81)
BASOPHILS ABSOLUTE: 0 THOU/MM3 (ref 0–0.1)
BASOPHILS NFR BLD AUTO: 0.8 %
BUN SERPL-MCNC: 38 MG/DL (ref 8–23)
CALCIUM SERPL-MCNC: 8.6 MG/DL (ref 8.8–10.2)
CHLORIDE SERPL-SCNC: 108 MEQ/L (ref 98–111)
CHOLEST SERPL-MCNC: 90 MG/DL (ref 100–199)
CO2 SERPL-SCNC: 29 MEQ/L (ref 22–29)
CREAT SERPL-MCNC: 2.2 MG/DL (ref 0.7–1.2)
DEPRECATED RDW RBC AUTO: 55.9 FL (ref 35–45)
ECHO AO ASC DIAM: 3.1 CM
ECHO AO ASCENDING AORTA INDEX: 1.37 CM/M2
ECHO AV CUSP MM: 2.1 CM
ECHO AV MEAN GRADIENT: 2 MMHG
ECHO AV MEAN VELOCITY: 0.6 M/S
ECHO AV PEAK GRADIENT: 4 MMHG
ECHO AV PEAK VELOCITY: 1 M/S
ECHO AV VELOCITY RATIO: 0.7
ECHO AV VTI: 24.4 CM
ECHO BSA: 2.35 M2
ECHO EST RA PRESSURE: 8 MMHG
ECHO LA AREA 2C: 24.4 CM2
ECHO LA AREA 4C: 24.4 CM2
ECHO LA DIAMETER INDEX: 1.85 CM/M2
ECHO LA DIAMETER: 4.2 CM
ECHO LA MAJOR AXIS: 6 CM
ECHO LA MINOR AXIS: 5.8 CM
ECHO LA VOL BP: 83 ML (ref 18–58)
ECHO LA VOL MOD A2C: 84 ML (ref 18–58)
ECHO LA VOL MOD A4C: 79 ML (ref 18–58)
ECHO LA VOL/BSA BIPLANE: 37 ML/M2 (ref 16–34)
ECHO LA VOLUME INDEX MOD A2C: 37 ML/M2 (ref 16–34)
ECHO LA VOLUME INDEX MOD A4C: 35 ML/M2 (ref 16–34)
ECHO LV E' LATERAL VELOCITY: 6 CM/S
ECHO LV E' SEPTAL VELOCITY: 4.2 CM/S
ECHO LV EDV A2C: 158 ML
ECHO LV EDV A4C: 146 ML
ECHO LV EDV INDEX A4C: 64 ML/M2
ECHO LV EDV NDEX A2C: 70 ML/M2
ECHO LV EF PHYSICIAN: 45 %
ECHO LV EJECTION FRACTION A2C: 49 %
ECHO LV EJECTION FRACTION A4C: 44 %
ECHO LV EJECTION FRACTION BIPLANE: 48 % (ref 55–100)
ECHO LV ESV A2C: 80 ML
ECHO LV ESV A4C: 81 ML
ECHO LV ESV INDEX A2C: 35 ML/M2
ECHO LV ESV INDEX A4C: 36 ML/M2
ECHO LV FRACTIONAL SHORTENING: 19 % (ref 28–44)
ECHO LV INTERNAL DIMENSION DIASTOLE INDEX: 2.07 CM/M2
ECHO LV INTERNAL DIMENSION DIASTOLIC: 4.7 CM (ref 4.2–5.9)
ECHO LV INTERNAL DIMENSION SYSTOLIC INDEX: 1.67 CM/M2
ECHO LV INTERNAL DIMENSION SYSTOLIC: 3.8 CM
ECHO LV ISOVOLUMETRIC RELAXATION TIME (IVRT): 81 MS
ECHO LV IVSD: 1.1 CM (ref 0.6–1)
ECHO LV MASS 2D: 187.5 G (ref 88–224)
ECHO LV MASS INDEX 2D: 82.6 G/M2 (ref 49–115)
ECHO LV POSTERIOR WALL DIASTOLIC: 1.1 CM (ref 0.6–1)
ECHO LV RELATIVE WALL THICKNESS RATIO: 0.47
ECHO LVOT AV VTI INDEX: 0.72
ECHO LVOT MEAN GRADIENT: 1 MMHG
ECHO LVOT PEAK GRADIENT: 2 MMHG
ECHO LVOT PEAK VELOCITY: 0.7 M/S
ECHO LVOT VTI: 17.6 CM
ECHO MV A VELOCITY: 0.57 M/S
ECHO MV E DECELERATION TIME (DT): 143 MS
ECHO MV E VELOCITY: 0.87 M/S
ECHO MV E/A RATIO: 1.53
ECHO MV E/E' LATERAL: 14.5
ECHO MV E/E' RATIO (AVERAGED): 17.61
ECHO MV E/E' SEPTAL: 20.71
ECHO MV REGURGITANT PEAK GRADIENT: 49 MMHG
ECHO MV REGURGITANT PEAK VELOCITY: 3.5 M/S
ECHO PULMONARY ARTERY END DIASTOLIC PRESSURE: 7 MMHG
ECHO PV MAX VELOCITY: 0.6 M/S
ECHO PV PEAK GRADIENT: 1 MMHG
ECHO PV REGURGITANT MAX VELOCITY: 1.3 M/S
ECHO RIGHT VENTRICULAR SYSTOLIC PRESSURE (RVSP): 46 MMHG
ECHO RV INTERNAL DIMENSION: 3.1 CM
ECHO RV TAPSE: 1.3 CM (ref 1.7–?)
ECHO TV E WAVE: 0.8 M/S
ECHO TV REGURGITANT MAX VELOCITY: 3.1 M/S
ECHO TV REGURGITANT PEAK GRADIENT: 38 MMHG
EKG ATRIAL RATE: 81 BPM
EKG P AXIS: 24 DEGREES
EKG P-R INTERVAL: 248 MS
EKG Q-T INTERVAL: 416 MS
EKG QRS DURATION: 132 MS
EKG QTC CALCULATION (BAZETT): 483 MS
EKG R AXIS: 126 DEGREES
EKG T AXIS: 4 DEGREES
EKG VENTRICULAR RATE: 81 BPM
EOSINOPHIL NFR BLD AUTO: 6.8 %
EOSINOPHILS ABSOLUTE: 0.3 THOU/MM3 (ref 0–0.4)
ERYTHROCYTE [DISTWIDTH] IN BLOOD BY AUTOMATED COUNT: 16.8 % (ref 11.5–14.5)
FLUAV RNA RESP QL NAA+PROBE: NOT DETECTED
FLUBV RNA RESP QL NAA+PROBE: NOT DETECTED
GFR SERPL CREATININE-BSD FRML MDRD: 29 ML/MIN/1.73M2
GLUCOSE BLD STRIP.AUTO-MCNC: 133 MG/DL (ref 70–108)
GLUCOSE BLD STRIP.AUTO-MCNC: 139 MG/DL (ref 70–108)
GLUCOSE BLD STRIP.AUTO-MCNC: 168 MG/DL (ref 70–108)
GLUCOSE BLD STRIP.AUTO-MCNC: 182 MG/DL (ref 70–108)
GLUCOSE BLD STRIP.AUTO-MCNC: 251 MG/DL (ref 70–108)
GLUCOSE SERPL-MCNC: 152 MG/DL (ref 74–109)
HCT VFR BLD AUTO: 30.2 % (ref 42–52)
HDLC SERPL-MCNC: 45 MG/DL
HGB BLD-MCNC: 8.9 GM/DL (ref 14–18)
IMM GRANULOCYTES # BLD AUTO: 0.01 THOU/MM3 (ref 0–0.07)
IMM GRANULOCYTES NFR BLD AUTO: 0.2 %
LDLC SERPL CALC-MCNC: 35 MG/DL
LYMPHOCYTES ABSOLUTE: 1.2 THOU/MM3 (ref 1–4.8)
LYMPHOCYTES NFR BLD AUTO: 23.9 %
MAGNESIUM SERPL-MCNC: 1.9 MG/DL (ref 1.6–2.6)
MCH RBC QN AUTO: 28 PG (ref 26–33)
MCHC RBC AUTO-ENTMCNC: 29.5 GM/DL (ref 32.2–35.5)
MCV RBC AUTO: 95 FL (ref 80–94)
MONOCYTES ABSOLUTE: 0.6 THOU/MM3 (ref 0.4–1.3)
MONOCYTES NFR BLD AUTO: 11.7 %
NEUTROPHILS ABSOLUTE: 2.8 THOU/MM3 (ref 1.8–7.7)
NEUTROPHILS NFR BLD AUTO: 56.6 %
NRBC BLD AUTO-RTO: 0 /100 WBC
NT-PROBNP SERPL IA-MCNC: ABNORMAL PG/ML (ref 0–449)
OSMOLALITY SERPL CALC.SUM OF ELEC: 300.7 MOSMOL/KG (ref 275–300)
PLATELET # BLD AUTO: 155 THOU/MM3 (ref 130–400)
PMV BLD AUTO: 11.6 FL (ref 9.4–12.4)
POTASSIUM SERPL-SCNC: 4 MEQ/L (ref 3.5–5.2)
RBC # BLD AUTO: 3.18 MILL/MM3 (ref 4.7–6.1)
SARS-COV-2 RNA RESP QL NAA+PROBE: NOT DETECTED
SODIUM SERPL-SCNC: 145 MEQ/L (ref 135–145)
TRIGL SERPL-MCNC: 52 MG/DL (ref 0–199)
WBC # BLD AUTO: 4.9 THOU/MM3 (ref 4.8–10.8)

## 2025-05-08 PROCEDURE — 93010 ELECTROCARDIOGRAM REPORT: CPT | Performed by: INTERNAL MEDICINE

## 2025-05-08 PROCEDURE — 76770 US EXAM ABDO BACK WALL COMP: CPT

## 2025-05-08 PROCEDURE — 97162 PT EVAL MOD COMPLEX 30 MIN: CPT

## 2025-05-08 PROCEDURE — 82010 KETONE BODYS QUAN: CPT

## 2025-05-08 PROCEDURE — 97535 SELF CARE MNGMENT TRAINING: CPT

## 2025-05-08 PROCEDURE — 6370000000 HC RX 637 (ALT 250 FOR IP): Performed by: STUDENT IN AN ORGANIZED HEALTH CARE EDUCATION/TRAINING PROGRAM

## 2025-05-08 PROCEDURE — 2500000003 HC RX 250 WO HCPCS: Performed by: STUDENT IN AN ORGANIZED HEALTH CARE EDUCATION/TRAINING PROGRAM

## 2025-05-08 PROCEDURE — 6360000002 HC RX W HCPCS: Performed by: STUDENT IN AN ORGANIZED HEALTH CARE EDUCATION/TRAINING PROGRAM

## 2025-05-08 PROCEDURE — 83880 ASSAY OF NATRIURETIC PEPTIDE: CPT

## 2025-05-08 PROCEDURE — 80061 LIPID PANEL: CPT

## 2025-05-08 PROCEDURE — 97110 THERAPEUTIC EXERCISES: CPT

## 2025-05-08 PROCEDURE — 99223 1ST HOSP IP/OBS HIGH 75: CPT | Performed by: FAMILY MEDICINE

## 2025-05-08 PROCEDURE — 87636 SARSCOV2 & INF A&B AMP PRB: CPT

## 2025-05-08 PROCEDURE — 93306 TTE W/DOPPLER COMPLETE: CPT | Performed by: INTERNAL MEDICINE

## 2025-05-08 PROCEDURE — 2060000000 HC ICU INTERMEDIATE R&B

## 2025-05-08 PROCEDURE — 93306 TTE W/DOPPLER COMPLETE: CPT

## 2025-05-08 PROCEDURE — 83735 ASSAY OF MAGNESIUM: CPT

## 2025-05-08 PROCEDURE — 82948 REAGENT STRIP/BLOOD GLUCOSE: CPT

## 2025-05-08 PROCEDURE — 97530 THERAPEUTIC ACTIVITIES: CPT

## 2025-05-08 PROCEDURE — 80048 BASIC METABOLIC PNL TOTAL CA: CPT

## 2025-05-08 PROCEDURE — 85025 COMPLETE CBC W/AUTO DIFF WBC: CPT

## 2025-05-08 PROCEDURE — 97166 OT EVAL MOD COMPLEX 45 MIN: CPT

## 2025-05-08 PROCEDURE — 36415 COLL VENOUS BLD VENIPUNCTURE: CPT

## 2025-05-08 RX ORDER — BUMETANIDE 0.25 MG/ML
1 INJECTION, SOLUTION INTRAMUSCULAR; INTRAVENOUS 2 TIMES DAILY
Status: DISCONTINUED | OUTPATIENT
Start: 2025-05-08 | End: 2025-05-12

## 2025-05-08 RX ORDER — LEVOTHYROXINE SODIUM 150 UG/1
150 TABLET ORAL DAILY
Status: DISCONTINUED | OUTPATIENT
Start: 2025-05-08 | End: 2025-05-14 | Stop reason: HOSPADM

## 2025-05-08 RX ADMIN — INSULIN LISPRO 2 UNITS: 100 INJECTION, SOLUTION INTRAVENOUS; SUBCUTANEOUS at 17:42

## 2025-05-08 RX ADMIN — INSULIN LISPRO 4 UNITS: 100 INJECTION, SOLUTION INTRAVENOUS; SUBCUTANEOUS at 20:36

## 2025-05-08 RX ADMIN — BUMETANIDE 1 MG: 0.25 INJECTION INTRAMUSCULAR; INTRAVENOUS at 09:24

## 2025-05-08 RX ADMIN — SODIUM CHLORIDE, PRESERVATIVE FREE 10 ML: 5 INJECTION INTRAVENOUS at 00:11

## 2025-05-08 RX ADMIN — BUPROPION HYDROCHLORIDE 300 MG: 300 TABLET, EXTENDED RELEASE ORAL at 10:28

## 2025-05-08 RX ADMIN — METOPROLOL TARTRATE 25 MG: 25 TABLET, FILM COATED ORAL at 10:28

## 2025-05-08 RX ADMIN — BUMETANIDE 1 MG: 0.25 INJECTION INTRAMUSCULAR; INTRAVENOUS at 01:48

## 2025-05-08 RX ADMIN — METOPROLOL TARTRATE 25 MG: 25 TABLET, FILM COATED ORAL at 20:37

## 2025-05-08 RX ADMIN — METOPROLOL TARTRATE 25 MG: 25 TABLET, FILM COATED ORAL at 01:48

## 2025-05-08 RX ADMIN — BUMETANIDE 1 MG: 0.25 INJECTION INTRAMUSCULAR; INTRAVENOUS at 17:36

## 2025-05-08 RX ADMIN — INSULIN GLARGINE 20 UNITS: 100 INJECTION, SOLUTION SUBCUTANEOUS at 20:36

## 2025-05-08 RX ADMIN — SODIUM CHLORIDE, PRESERVATIVE FREE 10 ML: 5 INJECTION INTRAVENOUS at 09:29

## 2025-05-08 RX ADMIN — SODIUM CHLORIDE, PRESERVATIVE FREE 10 ML: 5 INJECTION INTRAVENOUS at 20:37

## 2025-05-08 RX ADMIN — HEPARIN SODIUM 5000 UNITS: 5000 INJECTION INTRAVENOUS; SUBCUTANEOUS at 00:11

## 2025-05-08 RX ADMIN — INSULIN LISPRO 2 UNITS: 100 INJECTION, SOLUTION INTRAVENOUS; SUBCUTANEOUS at 00:10

## 2025-05-08 RX ADMIN — Medication 3 MG: at 01:48

## 2025-05-08 RX ADMIN — DONEPEZIL HYDROCHLORIDE 10 MG: 10 TABLET, FILM COATED ORAL at 20:36

## 2025-05-08 RX ADMIN — TRAZODONE HYDROCHLORIDE 25 MG: 50 TABLET ORAL at 20:37

## 2025-05-08 RX ADMIN — HEPARIN SODIUM 5000 UNITS: 5000 INJECTION INTRAVENOUS; SUBCUTANEOUS at 09:21

## 2025-05-08 RX ADMIN — INSULIN GLARGINE 20 UNITS: 100 INJECTION, SOLUTION SUBCUTANEOUS at 00:10

## 2025-05-08 RX ADMIN — DOCUSATE SODIUM 100 MG: 100 CAPSULE, LIQUID FILLED ORAL at 09:21

## 2025-05-08 RX ADMIN — TRAZODONE HYDROCHLORIDE 25 MG: 50 TABLET ORAL at 01:48

## 2025-05-08 RX ADMIN — PANTOPRAZOLE SODIUM 40 MG: 40 TABLET, DELAYED RELEASE ORAL at 05:48

## 2025-05-08 RX ADMIN — HEPARIN SODIUM 5000 UNITS: 5000 INJECTION INTRAVENOUS; SUBCUTANEOUS at 17:34

## 2025-05-08 RX ADMIN — HEPARIN SODIUM 5000 UNITS: 5000 INJECTION INTRAVENOUS; SUBCUTANEOUS at 23:23

## 2025-05-08 RX ADMIN — PRAVASTATIN SODIUM 80 MG: 80 TABLET ORAL at 20:37

## 2025-05-08 RX ADMIN — Medication 3 MG: at 20:37

## 2025-05-08 RX ADMIN — CITALOPRAM 40 MG: 40 TABLET, FILM COATED ORAL at 10:28

## 2025-05-08 ASSESSMENT — PAIN SCALES - GENERAL: PAINLEVEL_OUTOF10: 0

## 2025-05-08 NOTE — CARE COORDINATION
Case Management Assessment Initial Evaluation    Date/Time of Evaluation: 2025 9:19 AM  Assessment Completed by: Shelley Salcedo RN    If patient is discharged prior to next notation, then this note serves as note for discharge by case management.    Patient Name: Leo Nevarez                   YOB: 1940  Diagnosis: Hypoxia [R09.02]  General weakness [R53.1]  CKD (chronic kidney disease) stage 4, GFR 15-29 ml/min (HCC) [N18.4]  Chronic systolic congestive heart failure (HCC) [I50.22]  Acute on chronic diastolic congestive heart failure (HCC) [I50.33]  Pneumonia of left lower lobe due to infectious organism [J18.9]  Uncontrolled type 2 diabetes mellitus with hyperglycemia (HCC) [E11.65]  Dyspnea, unspecified type [R06.00]  Acute exacerbation of chronic heart failure (HCC) [I50.9]                   Date / Time: 2025  6:17 PM  [facility]     Patient Admission Status: Inpatient   Readmission Risk Low 0-14, Mod 15-19), High > 20: Readmission Risk Score: 28.4    Current PCP: Aileen Valadez APRN - CNP  Health Care Decision Makers:   Primary Decision Maker: jose nevarez - Healthcare Decision Maker - [phone]    Additional Case Management Notes: Presented to ED with c/o SOB. Pts home health nurse had been out and heard crackles in lungs, called doctor and patient went to see PCP. Sats in the 70's at PCP office and placed on oxygen. Pt sent to ED, sats 78%, placed on NC O2, given 1 mg IV bumex x1, IV rocephin x1, and zithromax x1. NAVNEET on CKD. Admitted to . Echo ordered.     Sats 100% on 2L O2. Afebrile. NSR. Ox2. Follows commands. PT/OT. Telemetry. IV bumex bid, jardiance, sq heparin, lantus, SSI, Electrolyte replacement protocols. BUN 39 - now 38, Creat 2.2, trop 76, NT Pro-bnp 29476 - then 68030, beta-hydroxybutyrate 3.11 - then 0.94, hgbA1C 9.1, hgb 10.3 -now 8.9. Blood cultures sent. Rapid flu & COVID 19 were negative.     Procedures: none    Imagin/7 CXR: 1.

## 2025-05-08 NOTE — ED NOTES
Pt to be transferred to [facility]. Pt in stable condition at this time. Spoke with  staff prior to transport.

## 2025-05-08 NOTE — CARE COORDINATION
05/08/25 1229   Readmission Assessment   Number of Days since last admission? 1-7 days   Previous Disposition Home with Home Health  ([facility] )   Who is being Interviewed Caregiver  (Daughter Margareth)   What was the patient's/caregiver's perception as to why they think they needed to return back to the hospital? Other (Comment)  (Shortness of breath, crackles in lungs, low oxygen level)   Did you visit your Primary Care Physician after you left the hospital, before you returned this time? Yes   Did you see a specialist, such as Cardiac, Pulmonary, Orthopedic Physician, etc. after you left the hospital? No   Who advised the patient to return to the hospital? Physician   Does the patient report anything that got in the way of taking their medications? No   In our efforts to provide the best possible care to you and others like you, can you think of anything that we could have done to help you after you left the hospital the first time, so that you might not have needed to return so soon? Other (Comment)  (\"Should have done more of the tests they are doing now - XR and ECHO before discharging him.\")

## 2025-05-08 NOTE — ED PROVIDER NOTES
[facility]  EMERGENCY DEPARTMENT ENCOUNTER          Pt Name: Leo Nevarez  MRN: [id number]  Birthdate 1940  Date of evaluation: 5/7/2025  Treating Resident Physician: Abhay Ceballos MD  Supervising Physician: Leo Tripathi MD    History obtained from patient and family.    CHIEF COMPLAINT       Chief Complaint   Patient presents with    Shortness of Breath    Fatigue           HISTORY OF PRESENT ILLNESS    HPI  Leo Nevarez is a 84 y.o. male with PMH T2DM, CKD stage IIIb, HTN, HLD, dementia who presents to the emergency department for evaluation of hypoxia, fatigue, shortness of breath.    Patient is alert to voice but at best makes responses of phrases consisting of a few words.  Most of history collected from daughter at bedside.  She states that his mentation has been worse than baseline since returning home from hospital.    Patient has home health nurse who recommended presenting to out-patient clinic. At that appointment, patient found to be hypoxic with Spo2 around 80% on room air.  They recommended presenting to ED, found to be 77% on arrival to ED.     No reported recent weight changes.   His last dose Bumex was yesterday.     He was recently admitted to [facility] from 4/29 through 5/4, where he was treated for NAVNEET on CKD and exacerbation of CHF as well as acute metabolic encephalopathy.  He received 1 dose of Rocephin in the ED but that was not continued inpatient.      REVIEW OF SYSTEMS   Review of Systems   Constitutional:  Positive for appetite change. Negative for chills and fever.   Respiratory:  Positive for shortness of breath. Negative for cough.    Cardiovascular:  Negative for chest pain.   Genitourinary:  Negative for dysuria.   Neurological:  Positive for weakness and headaches.   Psychiatric/Behavioral:  Positive for confusion and hallucinations.          PAST MEDICAL AND SURGICAL HISTORY     Past Medical History:   Diagnosis Date    Chronic kidney 
Abnormal; Notable for the following components:    Beta-Hydroxybutyrate 3.11 (*)     All other components within normal limits   HEMOGLOBIN A1C - Abnormal; Notable for the following components:    Hemoglobin A1C 9.1 (*)     Estimated Avg Glucose 213 (*)     All other components within normal limits   OSMOLALITY - Abnormal; Notable for the following components:    Osmolality Calc 303.1 (*)     All other components within normal limits   GLOMERULAR FILTRATION RATE, ESTIMATED - Abnormal; Notable for the following components:    Est, Glom Filt Rate 29 (*)     All other components within normal limits   CULTURE, BLOOD 1   CULTURE, BLOOD 2   COVID-19 & INFLUENZA COMBO   MAGNESIUM   LACTIC ACID   PROCALCITONIN   ANION GAP         EMERGENCY DEPARTMENT COURSE:     Vitals:    Vitals:    05/07/25 1828 05/07/25 1918 05/07/25 1926 05/07/25 2137   BP: (!) 156/69  (!) 165/74 (!) 152/62   Pulse:  76 76 77   Resp:  19 21 22   Temp:       SpO2: 95% 100% 99% 96%       Medications   cefTRIAXone (ROCEPHIN) 2,000 mg in sterile water 20 mL IV syringe (has no administration in time range)   azithromycin (ZITHROMAX) 500 mg in sodium chloride 0.9 % 250 mL IVPB ([id number]) (500 mg IntraVENous New Bag 5/7/25 2142)   bumetanide (BUMEX) injection 1 mg (1 mg IntraVENous Given 5/7/25 2135)       The pt was seen and evaluated by me. Within the department, I observed the pt's vital signs to be within acceptable range except for the desaturation with improved with oxygen. Laboratory and Radiological studies were performed, results were reviewed with the patient. Within the department, the pt was treated with Rocephin and Zithromax Bumex. I observed the pt's condition to be hemodynamically stable during the duration of their stay.  Patient will be referred to the hospitalist for admission.      Medical Decision-Making:       FINAL IMPRESSION       1. Pneumonia of left lower lobe due to infectious organism    2. Dyspnea, unspecified type    3. Hypoxia

## 2025-05-08 NOTE — H&P
History & Physical  Internal Medicine Resident         Patient: Leo Nevarez 84 y.o. male      : 1940  Date of Admission: 2025  Date of Service: Pt seen/examined on 25 and Admitted to Inpatient with expected LOS greater than two midnights due to medical therapy.       ASSESSMENT AND PLAN  Acute Hypoxemic respiratory Failure: Patient presented from home with acute worsening shortness of breath, saw the PCP who recommended going to the ED for evaluation, patient was found to be SpO2 78% in ED, probably secondary to acute on chronic heart failure with mildly reduced EF  Patient was given Bumex 1 mg IV once along with Rocephin 2 mg IV and azithromycin 500 mg IV.  Patient is afebrile, WBCs 6.3, lactic acid 1.1, no sputum production, low suspicion for pneumonia.  Will not continue antibiotics.  Blood culture sent in the ED, will follow-up.  Shortness of breath is probably acute worsening of congestive heart failure.  Patient has bilateral rales at least up to mid lungs. BNP 16,439,  Will start patient on Bumex 1 mg IV twice daily, strict I/Os, daily weights.  Will titrate diuresis accordingly.  Acute on Chronic heart failure with mildly reduced Ejection Fraction: Last ECHO done 7/3/24 with EF 45-50% with mild global hypokinesia and mildly dilated left atrium.   Repeat echo, treatment of heart failure as documented #1  Continue Jardiance 25 mg PO QD   chronic kidney Injury 3b: Patient presented with creatinine of 2.2, creatinine in January was 1.7, since then has been fluctuating between 2.2-2.9, unsure if new baseline.  Could be related to cardiorenal syndrome type I, patient need diuresis at this point, serum osmolality is 303.1.  Will send in UA, urine sodium, urine potassium, urine chloride, urine urea.  Need to calculate FEUrea instead of FeNa due to diuretic use.  Renal ultrasound to rule out post renal etiology.  Acute metabolic encephalopathy on baseline Dementia: Per daughter, patient has

## 2025-05-08 NOTE — PALLIATIVE CARE
Initial Evaluation        Patient:   Leo Nevarez  YOB: 1940  Age:  84 y.o.  Room:  [facility]008-A  MRN:  [id number]   Acct: [id number]    Date of Admission:  5/7/2025  6:17 PM  Date of Service:  5/8/2025  Completed By:  Narinder Haider RN        Reason for Palliative Care Evaluation:-   Goals of Care     Current Concerns   Patient sleeping did not assess symptoms at this time     Palliative Performance Scale   50%  Mainly sit/lie; Extensive disease; Can't do any work; Considerable assist; intake normal or reduced; LOC full/confusion     History    Patient admitted 5/7 from home with acute hypoxemic respiratory failure, HF, NAVNEET on CKD, dementia, DM and hypertension.      Goals of Care Discussions and Plan         Family/Patient Discussion:- Attempted to see son Shane at bedside, not available. Call placed to Shoshana (medical POA). Introduced palliative care and role on medical team and that this RN works with Dr. Larson. Shoshana stated she is doing her best to handle all the illness that is happening in her family right now. She stated she understands that with her dad's heart failure and concerns with his kidneys he will continue to decline and there isn't much else that can be done. Discussed code status. Shoshana stated that DNRCC aligns with her goals of care at this time. Shoshana stated she was ready to discuss hospice. She will be available at bedside tomorrow at 9am to further discuss goals of care and possible hospice. Emotional support offered throughout visit.     Met with Shane at bedside. Introduced palliative care and role on medical team. Discussed with Shane the conversation had with Shoshana and that we will meet tomorrow at 9am.     Plan/Follow-Up:-   Will meet with Shoshana (POA) and other family as she sees fit tomorrow at 9am.     Treatment Limitations: They would not want cardiopulmonary resuscitation in the event of cardiac arrest nor would they want to be

## 2025-05-08 NOTE — CARE COORDINATION
5/8/25, 2:29 PM EDT    DISCHARGE PLANNING EVALUATION       Patient is current with [facility].

## 2025-05-08 NOTE — ED NOTES
ED to inpatient nurses report      Chief Complaint:  Chief Complaint   Patient presents with    Shortness of Breath    Fatigue     Present to ED from: home    MOA:     LOC: alert and orientated to name, place, date  Mobility: Requires assistance * 2  Oxygen Baseline: roomair    Current needs required: none     Code Status:   Limited      Mental Status:  Level of Consciousness: Alert (0)    Psych Assessment:        Vitals:  Patient Vitals for the past 24 hrs:   BP Temp Pulse Resp SpO2   05/07/25 2137 (!) 152/62 -- 77 22 96 %   05/07/25 1926 (!) 165/74 -- 76 21 99 %   05/07/25 1918 -- -- 76 19 100 %   05/07/25 1828 (!) 156/69 -- -- -- 95 %   05/07/25 1827 -- 97.6 °F (36.4 °C) 77 -- 95 %   05/07/25 1825 -- -- -- -- (!) 78 %        LDAs:   Peripheral IV 05/07/25 Right Antecubital (Active)   Site Assessment Clean, dry & intact 05/07/25 1919   Phlebitis Assessment No symptoms 05/07/25 1919   Infiltration Assessment 0 05/07/25 1919       Ambulatory Status:  No data recorded    Diagnosis:  DISPOSITION Admitted 05/07/2025 10:28:59 PM   Final diagnoses:   Dyspnea, unspecified type   Hypoxia   General weakness   Pneumonia of left lower lobe due to infectious organism   Chronic systolic congestive heart failure ([facility])   Uncontrolled type 2 diabetes mellitus with hyperglycemia ([facility])   CKD (chronic kidney disease) stage 4, GFR 15-29 ml/min ([facility])   Acute on chronic diastolic congestive heart failure ([facility])        Consults:  None     Pain Score:  Pain Assessment  Pain Assessment: None - Denies Pain    C-SSRS:   Risk of Suicide: No Risk    Sepsis Screening:       Ray Fall Risk:       Swallow Screening        Preferred Language:   English      ALLERGIES     Morphine    SURGICAL HISTORY       Past Surgical History:   Procedure Laterality Date    BLADDER SCAN  11/11/2023    BLADDER SCAN  11/13/2023    CARDIAC PROCEDURE Left 11/20/2023    Left heart cath / coronary angiography performed by Javier Brothers MD at [facility] CARDIAC CATH LAB

## 2025-05-09 LAB
ANION GAP SERPL CALC-SCNC: 11 MEQ/L (ref 8–16)
BASOPHILS ABSOLUTE: 0.1 THOU/MM3 (ref 0–0.1)
BASOPHILS NFR BLD AUTO: 1 %
BUN SERPL-MCNC: 39 MG/DL (ref 8–23)
CALCIUM SERPL-MCNC: 8.6 MG/DL (ref 8.8–10.2)
CHLORIDE SERPL-SCNC: 107 MEQ/L (ref 98–111)
CO2 SERPL-SCNC: 29 MEQ/L (ref 22–29)
CREAT SERPL-MCNC: 2.3 MG/DL (ref 0.7–1.2)
DEPRECATED RDW RBC AUTO: 58.4 FL (ref 35–45)
EOSINOPHIL NFR BLD AUTO: 5.6 %
EOSINOPHILS ABSOLUTE: 0.3 THOU/MM3 (ref 0–0.4)
ERYTHROCYTE [DISTWIDTH] IN BLOOD BY AUTOMATED COUNT: 16.9 % (ref 11.5–14.5)
GFR SERPL CREATININE-BSD FRML MDRD: 27 ML/MIN/1.73M2
GLUCOSE BLD STRIP.AUTO-MCNC: 118 MG/DL (ref 70–108)
GLUCOSE BLD STRIP.AUTO-MCNC: 132 MG/DL (ref 70–108)
GLUCOSE BLD STRIP.AUTO-MCNC: 166 MG/DL (ref 70–108)
GLUCOSE BLD STRIP.AUTO-MCNC: 78 MG/DL (ref 70–108)
GLUCOSE SERPL-MCNC: 82 MG/DL (ref 74–109)
HCT VFR BLD AUTO: 30.4 % (ref 42–52)
HGB BLD-MCNC: 8.8 GM/DL (ref 14–18)
HYPOCHROMIA BLD QL SMEAR: PRESENT
IMM GRANULOCYTES # BLD AUTO: 0.01 THOU/MM3 (ref 0–0.07)
IMM GRANULOCYTES NFR BLD AUTO: 0.2 %
LYMPHOCYTES ABSOLUTE: 1.8 THOU/MM3 (ref 1–4.8)
LYMPHOCYTES NFR BLD AUTO: 35 %
MAGNESIUM SERPL-MCNC: 1.9 MG/DL (ref 1.6–2.6)
MCH RBC QN AUTO: 27.8 PG (ref 26–33)
MCHC RBC AUTO-ENTMCNC: 28.9 GM/DL (ref 32.2–35.5)
MCV RBC AUTO: 96.2 FL (ref 80–94)
MONOCYTES ABSOLUTE: 0.5 THOU/MM3 (ref 0.4–1.3)
MONOCYTES NFR BLD AUTO: 10.4 %
NEUTROPHILS ABSOLUTE: 2.5 THOU/MM3 (ref 1.8–7.7)
NEUTROPHILS NFR BLD AUTO: 47.8 %
NRBC BLD AUTO-RTO: 0 /100 WBC
PLATELET # BLD AUTO: 158 THOU/MM3 (ref 130–400)
PMV BLD AUTO: 11.9 FL (ref 9.4–12.4)
POTASSIUM SERPL-SCNC: 4.3 MEQ/L (ref 3.5–5.2)
RBC # BLD AUTO: 3.16 MILL/MM3 (ref 4.7–6.1)
SODIUM SERPL-SCNC: 147 MEQ/L (ref 135–145)
WBC # BLD AUTO: 5.2 THOU/MM3 (ref 4.8–10.8)

## 2025-05-09 PROCEDURE — 6360000002 HC RX W HCPCS: Performed by: STUDENT IN AN ORGANIZED HEALTH CARE EDUCATION/TRAINING PROGRAM

## 2025-05-09 PROCEDURE — 6370000000 HC RX 637 (ALT 250 FOR IP)

## 2025-05-09 PROCEDURE — 82948 REAGENT STRIP/BLOOD GLUCOSE: CPT

## 2025-05-09 PROCEDURE — 2500000003 HC RX 250 WO HCPCS: Performed by: STUDENT IN AN ORGANIZED HEALTH CARE EDUCATION/TRAINING PROGRAM

## 2025-05-09 PROCEDURE — 80048 BASIC METABOLIC PNL TOTAL CA: CPT

## 2025-05-09 PROCEDURE — 83735 ASSAY OF MAGNESIUM: CPT

## 2025-05-09 PROCEDURE — 6370000000 HC RX 637 (ALT 250 FOR IP): Performed by: STUDENT IN AN ORGANIZED HEALTH CARE EDUCATION/TRAINING PROGRAM

## 2025-05-09 PROCEDURE — 36415 COLL VENOUS BLD VENIPUNCTURE: CPT

## 2025-05-09 PROCEDURE — 85025 COMPLETE CBC W/AUTO DIFF WBC: CPT

## 2025-05-09 PROCEDURE — 2060000000 HC ICU INTERMEDIATE R&B

## 2025-05-09 RX ORDER — INSULIN GLARGINE 100 [IU]/ML
15 INJECTION, SOLUTION SUBCUTANEOUS NIGHTLY
Status: DISCONTINUED | OUTPATIENT
Start: 2025-05-09 | End: 2025-05-11

## 2025-05-09 RX ADMIN — METOPROLOL TARTRATE 25 MG: 25 TABLET, FILM COATED ORAL at 20:25

## 2025-05-09 RX ADMIN — Medication 3 MG: at 20:25

## 2025-05-09 RX ADMIN — PANTOPRAZOLE SODIUM 40 MG: 40 TABLET, DELAYED RELEASE ORAL at 05:48

## 2025-05-09 RX ADMIN — SODIUM CHLORIDE, PRESERVATIVE FREE 10 ML: 5 INJECTION INTRAVENOUS at 09:40

## 2025-05-09 RX ADMIN — HEPARIN SODIUM 5000 UNITS: 5000 INJECTION INTRAVENOUS; SUBCUTANEOUS at 18:00

## 2025-05-09 RX ADMIN — TRAZODONE HYDROCHLORIDE 25 MG: 50 TABLET ORAL at 20:26

## 2025-05-09 RX ADMIN — SODIUM CHLORIDE, PRESERVATIVE FREE 10 ML: 5 INJECTION INTRAVENOUS at 20:26

## 2025-05-09 RX ADMIN — HEPARIN SODIUM 5000 UNITS: 5000 INJECTION INTRAVENOUS; SUBCUTANEOUS at 09:39

## 2025-05-09 RX ADMIN — METOPROLOL TARTRATE 25 MG: 25 TABLET, FILM COATED ORAL at 09:40

## 2025-05-09 RX ADMIN — DOCUSATE SODIUM 100 MG: 100 CAPSULE, LIQUID FILLED ORAL at 09:40

## 2025-05-09 RX ADMIN — BUMETANIDE 1 MG: 0.25 INJECTION INTRAMUSCULAR; INTRAVENOUS at 09:40

## 2025-05-09 RX ADMIN — LEVOTHYROXINE SODIUM 150 MCG: 0.15 TABLET ORAL at 05:48

## 2025-05-09 RX ADMIN — BUMETANIDE 1 MG: 0.25 INJECTION INTRAMUSCULAR; INTRAVENOUS at 18:00

## 2025-05-09 RX ADMIN — PRAVASTATIN SODIUM 80 MG: 80 TABLET ORAL at 20:26

## 2025-05-09 RX ADMIN — INSULIN GLARGINE 15 UNITS: 100 INJECTION, SOLUTION SUBCUTANEOUS at 20:25

## 2025-05-09 RX ADMIN — DONEPEZIL HYDROCHLORIDE 10 MG: 10 TABLET, FILM COATED ORAL at 20:25

## 2025-05-09 RX ADMIN — CITALOPRAM 40 MG: 40 TABLET, FILM COATED ORAL at 09:40

## 2025-05-09 RX ADMIN — BUPROPION HYDROCHLORIDE 300 MG: 300 TABLET, EXTENDED RELEASE ORAL at 09:40

## 2025-05-09 RX ADMIN — ACETAMINOPHEN 650 MG: 325 TABLET ORAL at 10:02

## 2025-05-09 ASSESSMENT — PAIN DESCRIPTION - LOCATION
LOCATION: HEAD
LOCATION: HEAD

## 2025-05-09 ASSESSMENT — PAIN SCALES - GENERAL
PAINLEVEL_OUTOF10: 0
PAINLEVEL_OUTOF10: 8

## 2025-05-09 ASSESSMENT — PAIN DESCRIPTION - DESCRIPTORS
DESCRIPTORS: ACHING
DESCRIPTORS: ACHING

## 2025-05-09 ASSESSMENT — PAIN - FUNCTIONAL ASSESSMENT: PAIN_FUNCTIONAL_ASSESSMENT: ACTIVITIES ARE NOT PREVENTED

## 2025-05-09 NOTE — CARE COORDINATION
05/09/25 3:05 PM    TARA called SR E and spoke with Lani at 0917. Reported order and note in for hospital bed. Lani states wrong order in for hospital bed and note/order will need to be placed by same Dr that saw patient that day (progress note). Lani states she will watch for the new order/note today. Lani notified that daughter Shoshana will be the  for delivery of bed. Dr Trent notified and will place new order/note.

## 2025-05-09 NOTE — PALLIATIVE CARE
Follow Up / Progress Note        Patient:   Leo Nevarez  YOB: 1940  Age:  84 y.o.  Room:  [facility]08/008-A  MRN:  [id number]         Family/Patient Discussion:  Family meeting in room with patient, Shoshana (daughter POA), Shane (son), CD via phone (son), Dr. Trent, and primary nurse Shira. Discussed goals of care. Shsohana had an experience with hospice before and wished for her brothers to be updated on what hospice care is. Discussed hospice philosophy and goals for treating patient symptoms at end of life to maintain comfort. Discussed focus is not curative but symptom treatment for however long the patient has left. Discussed terminal illness and certification for less than 6 months. Dr. Trent discussed patient's disease processes and difficulties thus far with balancing his heart and kidney failure. Education provided on levels of hospice care including home, ECF, and inpatient care for very end of life or severe symptom management. Family was appreciative of the conversation and wished to further discuss amongst themselves about goals of care for the patient.  Palliative care contact information provided to Shoshana.       Plan/Follow-Up:  Palliative care will remain available as needed, please call for additional needs.          Electronically signed by Narinder Haider RN on 5/9/2025 at 9:26 AM             Palliative Care Office: [phone]

## 2025-05-10 ENCOUNTER — APPOINTMENT (OUTPATIENT)
Dept: GENERAL RADIOLOGY | Age: 85
DRG: 291 | End: 2025-05-10
Payer: MEDICARE

## 2025-05-10 LAB
ANION GAP SERPL CALC-SCNC: 10 MEQ/L (ref 8–16)
BASOPHILS ABSOLUTE: 0.1 THOU/MM3 (ref 0–0.1)
BASOPHILS NFR BLD AUTO: 1.2 %
BUN SERPL-MCNC: 40 MG/DL (ref 8–23)
CALCIUM SERPL-MCNC: 8.5 MG/DL (ref 8.8–10.2)
CHLORIDE SERPL-SCNC: 105 MEQ/L (ref 98–111)
CO2 SERPL-SCNC: 32 MEQ/L (ref 22–29)
CREAT SERPL-MCNC: 2.5 MG/DL (ref 0.7–1.2)
DEPRECATED RDW RBC AUTO: 59.5 FL (ref 35–45)
EOSINOPHIL NFR BLD AUTO: 5.1 %
EOSINOPHILS ABSOLUTE: 0.3 THOU/MM3 (ref 0–0.4)
ERYTHROCYTE [DISTWIDTH] IN BLOOD BY AUTOMATED COUNT: 17 % (ref 11.5–14.5)
GFR SERPL CREATININE-BSD FRML MDRD: 25 ML/MIN/1.73M2
GLUCOSE BLD STRIP.AUTO-MCNC: 126 MG/DL (ref 70–108)
GLUCOSE BLD STRIP.AUTO-MCNC: 140 MG/DL (ref 70–108)
GLUCOSE BLD STRIP.AUTO-MCNC: 187 MG/DL (ref 70–108)
GLUCOSE BLD STRIP.AUTO-MCNC: 68 MG/DL (ref 70–108)
GLUCOSE BLD STRIP.AUTO-MCNC: 72 MG/DL (ref 70–108)
GLUCOSE BLD STRIP.AUTO-MCNC: 73 MG/DL (ref 70–108)
GLUCOSE SERPL-MCNC: 85 MG/DL (ref 74–109)
HCT VFR BLD AUTO: 33.1 % (ref 42–52)
HGB BLD-MCNC: 9.5 GM/DL (ref 14–18)
HYPOCHROMIA BLD QL SMEAR: PRESENT
IMM GRANULOCYTES # BLD AUTO: 0.01 THOU/MM3 (ref 0–0.07)
IMM GRANULOCYTES NFR BLD AUTO: 0.2 %
LYMPHOCYTES ABSOLUTE: 2.2 THOU/MM3 (ref 1–4.8)
LYMPHOCYTES NFR BLD AUTO: 38 %
MAGNESIUM SERPL-MCNC: 2 MG/DL (ref 1.6–2.6)
MCH RBC QN AUTO: 28.2 PG (ref 26–33)
MCHC RBC AUTO-ENTMCNC: 28.7 GM/DL (ref 32.2–35.5)
MCV RBC AUTO: 98.2 FL (ref 80–94)
MONOCYTES ABSOLUTE: 0.6 THOU/MM3 (ref 0.4–1.3)
MONOCYTES NFR BLD AUTO: 10.2 %
NEUTROPHILS ABSOLUTE: 2.6 THOU/MM3 (ref 1.8–7.7)
NEUTROPHILS NFR BLD AUTO: 45.3 %
NRBC BLD AUTO-RTO: 0 /100 WBC
PLATELET # BLD AUTO: 164 THOU/MM3 (ref 130–400)
PLATELET BLD QL SMEAR: ADEQUATE
PMV BLD AUTO: 11.8 FL (ref 9.4–12.4)
POIKILOCYTES: ABNORMAL
POLYCHROMASIA BLD QL SMEAR: ABNORMAL
POTASSIUM SERPL-SCNC: 4.2 MEQ/L (ref 3.5–5.2)
RBC # BLD AUTO: 3.37 MILL/MM3 (ref 4.7–6.1)
SCAN OF BLOOD SMEAR: NORMAL
SODIUM SERPL-SCNC: 147 MEQ/L (ref 135–145)
STOMATOCYTES: ABNORMAL
WBC # BLD AUTO: 5.7 THOU/MM3 (ref 4.8–10.8)

## 2025-05-10 PROCEDURE — 82948 REAGENT STRIP/BLOOD GLUCOSE: CPT

## 2025-05-10 PROCEDURE — 71046 X-RAY EXAM CHEST 2 VIEWS: CPT

## 2025-05-10 PROCEDURE — 6360000002 HC RX W HCPCS: Performed by: STUDENT IN AN ORGANIZED HEALTH CARE EDUCATION/TRAINING PROGRAM

## 2025-05-10 PROCEDURE — 2500000003 HC RX 250 WO HCPCS: Performed by: STUDENT IN AN ORGANIZED HEALTH CARE EDUCATION/TRAINING PROGRAM

## 2025-05-10 PROCEDURE — 6370000000 HC RX 637 (ALT 250 FOR IP)

## 2025-05-10 PROCEDURE — 1200000003 HC TELEMETRY R&B

## 2025-05-10 PROCEDURE — 36415 COLL VENOUS BLD VENIPUNCTURE: CPT

## 2025-05-10 PROCEDURE — 85025 COMPLETE CBC W/AUTO DIFF WBC: CPT

## 2025-05-10 PROCEDURE — 83735 ASSAY OF MAGNESIUM: CPT

## 2025-05-10 PROCEDURE — 6370000000 HC RX 637 (ALT 250 FOR IP): Performed by: STUDENT IN AN ORGANIZED HEALTH CARE EDUCATION/TRAINING PROGRAM

## 2025-05-10 PROCEDURE — 80048 BASIC METABOLIC PNL TOTAL CA: CPT

## 2025-05-10 PROCEDURE — 2700000000 HC OXYGEN THERAPY PER DAY

## 2025-05-10 RX ORDER — METOLAZONE 5 MG/1
2.5 TABLET ORAL DAILY
Status: DISCONTINUED | OUTPATIENT
Start: 2025-05-10 | End: 2025-05-11

## 2025-05-10 RX ADMIN — Medication 3 MG: at 21:04

## 2025-05-10 RX ADMIN — LEVOTHYROXINE SODIUM 150 MCG: 0.15 TABLET ORAL at 05:26

## 2025-05-10 RX ADMIN — HEPARIN SODIUM 5000 UNITS: 5000 INJECTION INTRAVENOUS; SUBCUTANEOUS at 00:13

## 2025-05-10 RX ADMIN — BUMETANIDE 1 MG: 0.25 INJECTION INTRAMUSCULAR; INTRAVENOUS at 09:48

## 2025-05-10 RX ADMIN — CITALOPRAM 40 MG: 40 TABLET, FILM COATED ORAL at 09:48

## 2025-05-10 RX ADMIN — HEPARIN SODIUM 5000 UNITS: 5000 INJECTION INTRAVENOUS; SUBCUTANEOUS at 16:21

## 2025-05-10 RX ADMIN — SODIUM CHLORIDE, PRESERVATIVE FREE 10 ML: 5 INJECTION INTRAVENOUS at 09:47

## 2025-05-10 RX ADMIN — HEPARIN SODIUM 5000 UNITS: 5000 INJECTION INTRAVENOUS; SUBCUTANEOUS at 09:47

## 2025-05-10 RX ADMIN — PANTOPRAZOLE SODIUM 40 MG: 40 TABLET, DELAYED RELEASE ORAL at 05:26

## 2025-05-10 RX ADMIN — METOLAZONE 2.5 MG: 5 TABLET ORAL at 13:31

## 2025-05-10 RX ADMIN — BUPROPION HYDROCHLORIDE 300 MG: 300 TABLET, EXTENDED RELEASE ORAL at 09:48

## 2025-05-10 RX ADMIN — DONEPEZIL HYDROCHLORIDE 10 MG: 10 TABLET, FILM COATED ORAL at 21:41

## 2025-05-10 RX ADMIN — SODIUM CHLORIDE, PRESERVATIVE FREE 10 ML: 5 INJECTION INTRAVENOUS at 09:58

## 2025-05-10 RX ADMIN — METOPROLOL TARTRATE 25 MG: 25 TABLET, FILM COATED ORAL at 21:04

## 2025-05-10 RX ADMIN — DOCUSATE SODIUM 100 MG: 100 CAPSULE, LIQUID FILLED ORAL at 09:56

## 2025-05-10 RX ADMIN — TRAZODONE HYDROCHLORIDE 25 MG: 50 TABLET ORAL at 21:04

## 2025-05-10 RX ADMIN — BUMETANIDE 1 MG: 0.25 INJECTION INTRAMUSCULAR; INTRAVENOUS at 16:21

## 2025-05-10 RX ADMIN — SODIUM CHLORIDE, PRESERVATIVE FREE 10 ML: 5 INJECTION INTRAVENOUS at 21:05

## 2025-05-10 RX ADMIN — INSULIN GLARGINE 15 UNITS: 100 INJECTION, SOLUTION SUBCUTANEOUS at 21:04

## 2025-05-10 RX ADMIN — PRAVASTATIN SODIUM 80 MG: 80 TABLET ORAL at 21:41

## 2025-05-10 RX ADMIN — METOPROLOL TARTRATE 25 MG: 25 TABLET, FILM COATED ORAL at 09:48

## 2025-05-10 ASSESSMENT — PAIN SCALES - WONG BAKER
WONGBAKER_NUMERICALRESPONSE: NO HURT
WONGBAKER_NUMERICALRESPONSE: NO HURT

## 2025-05-10 ASSESSMENT — PAIN SCALES - GENERAL
PAINLEVEL_OUTOF10: 0
PAINLEVEL_OUTOF10: 0

## 2025-05-11 LAB
ANION GAP SERPL CALC-SCNC: 11 MEQ/L (ref 8–16)
BASOPHILS ABSOLUTE: 0.1 THOU/MM3 (ref 0–0.1)
BASOPHILS NFR BLD AUTO: 1.4 %
BUN SERPL-MCNC: 40 MG/DL (ref 8–23)
CALCIUM SERPL-MCNC: 8.7 MG/DL (ref 8.8–10.2)
CHLORIDE SERPL-SCNC: 104 MEQ/L (ref 98–111)
CO2 SERPL-SCNC: 33 MEQ/L (ref 22–29)
CREAT SERPL-MCNC: 2.2 MG/DL (ref 0.7–1.2)
DEPRECATED RDW RBC AUTO: 57.5 FL (ref 35–45)
EOSINOPHIL NFR BLD AUTO: 4.9 %
EOSINOPHILS ABSOLUTE: 0.2 THOU/MM3 (ref 0–0.4)
ERYTHROCYTE [DISTWIDTH] IN BLOOD BY AUTOMATED COUNT: 16.8 % (ref 11.5–14.5)
GFR SERPL CREATININE-BSD FRML MDRD: 29 ML/MIN/1.73M2
GLUCOSE BLD STRIP.AUTO-MCNC: 128 MG/DL (ref 70–108)
GLUCOSE BLD STRIP.AUTO-MCNC: 133 MG/DL (ref 70–108)
GLUCOSE BLD STRIP.AUTO-MCNC: 202 MG/DL (ref 70–108)
GLUCOSE BLD STRIP.AUTO-MCNC: 63 MG/DL (ref 70–108)
GLUCOSE SERPL-MCNC: 95 MG/DL (ref 74–109)
HCT VFR BLD AUTO: 30.4 % (ref 42–52)
HGB BLD-MCNC: 8.9 GM/DL (ref 14–18)
IMM GRANULOCYTES # BLD AUTO: 0.01 THOU/MM3 (ref 0–0.07)
IMM GRANULOCYTES NFR BLD AUTO: 0.3 %
LYMPHOCYTES ABSOLUTE: 1 THOU/MM3 (ref 1–4.8)
LYMPHOCYTES NFR BLD AUTO: 26.4 %
MCH RBC QN AUTO: 27.9 PG (ref 26–33)
MCHC RBC AUTO-ENTMCNC: 29.3 GM/DL (ref 32.2–35.5)
MCV RBC AUTO: 95.3 FL (ref 80–94)
MONOCYTES ABSOLUTE: 0.4 THOU/MM3 (ref 0.4–1.3)
MONOCYTES NFR BLD AUTO: 11.3 %
NEUTROPHILS ABSOLUTE: 2 THOU/MM3 (ref 1.8–7.7)
NEUTROPHILS NFR BLD AUTO: 55.7 %
NRBC BLD AUTO-RTO: 0 /100 WBC
PLATELET # BLD AUTO: 148 THOU/MM3 (ref 130–400)
PMV BLD AUTO: 12.1 FL (ref 9.4–12.4)
POTASSIUM SERPL-SCNC: 3.9 MEQ/L (ref 3.5–5.2)
RBC # BLD AUTO: 3.19 MILL/MM3 (ref 4.7–6.1)
SODIUM SERPL-SCNC: 148 MEQ/L (ref 135–145)
WBC # BLD AUTO: 3.6 THOU/MM3 (ref 4.8–10.8)

## 2025-05-11 PROCEDURE — 2500000003 HC RX 250 WO HCPCS: Performed by: STUDENT IN AN ORGANIZED HEALTH CARE EDUCATION/TRAINING PROGRAM

## 2025-05-11 PROCEDURE — 6370000000 HC RX 637 (ALT 250 FOR IP)

## 2025-05-11 PROCEDURE — 6360000002 HC RX W HCPCS: Performed by: STUDENT IN AN ORGANIZED HEALTH CARE EDUCATION/TRAINING PROGRAM

## 2025-05-11 PROCEDURE — 36415 COLL VENOUS BLD VENIPUNCTURE: CPT

## 2025-05-11 PROCEDURE — 82948 REAGENT STRIP/BLOOD GLUCOSE: CPT

## 2025-05-11 PROCEDURE — 80048 BASIC METABOLIC PNL TOTAL CA: CPT

## 2025-05-11 PROCEDURE — 6370000000 HC RX 637 (ALT 250 FOR IP): Performed by: STUDENT IN AN ORGANIZED HEALTH CARE EDUCATION/TRAINING PROGRAM

## 2025-05-11 PROCEDURE — 85025 COMPLETE CBC W/AUTO DIFF WBC: CPT

## 2025-05-11 PROCEDURE — 1200000003 HC TELEMETRY R&B

## 2025-05-11 RX ORDER — INSULIN GLARGINE 100 [IU]/ML
10 INJECTION, SOLUTION SUBCUTANEOUS NIGHTLY
Status: DISCONTINUED | OUTPATIENT
Start: 2025-05-11 | End: 2025-05-14 | Stop reason: HOSPADM

## 2025-05-11 RX ADMIN — DONEPEZIL HYDROCHLORIDE 10 MG: 10 TABLET, FILM COATED ORAL at 20:56

## 2025-05-11 RX ADMIN — BUMETANIDE 1 MG: 0.25 INJECTION INTRAMUSCULAR; INTRAVENOUS at 20:58

## 2025-05-11 RX ADMIN — PANTOPRAZOLE SODIUM 40 MG: 40 TABLET, DELAYED RELEASE ORAL at 06:46

## 2025-05-11 RX ADMIN — HEPARIN SODIUM 5000 UNITS: 5000 INJECTION INTRAVENOUS; SUBCUTANEOUS at 16:33

## 2025-05-11 RX ADMIN — PRAVASTATIN SODIUM 80 MG: 80 TABLET ORAL at 20:56

## 2025-05-11 RX ADMIN — DOCUSATE SODIUM 100 MG: 100 CAPSULE, LIQUID FILLED ORAL at 07:56

## 2025-05-11 RX ADMIN — METOPROLOL TARTRATE 25 MG: 25 TABLET, FILM COATED ORAL at 07:56

## 2025-05-11 RX ADMIN — SODIUM CHLORIDE, PRESERVATIVE FREE 10 ML: 5 INJECTION INTRAVENOUS at 20:58

## 2025-05-11 RX ADMIN — Medication 3 MG: at 20:55

## 2025-05-11 RX ADMIN — METOLAZONE 2.5 MG: 5 TABLET ORAL at 07:55

## 2025-05-11 RX ADMIN — HEPARIN SODIUM 5000 UNITS: 5000 INJECTION INTRAVENOUS; SUBCUTANEOUS at 23:15

## 2025-05-11 RX ADMIN — INSULIN GLARGINE 10 UNITS: 100 INJECTION, SOLUTION SUBCUTANEOUS at 20:51

## 2025-05-11 RX ADMIN — LEVOTHYROXINE SODIUM 150 MCG: 0.15 TABLET ORAL at 06:47

## 2025-05-11 RX ADMIN — TRAZODONE HYDROCHLORIDE 25 MG: 50 TABLET ORAL at 20:55

## 2025-05-11 RX ADMIN — INSULIN LISPRO 2 UNITS: 100 INJECTION, SOLUTION INTRAVENOUS; SUBCUTANEOUS at 20:51

## 2025-05-11 RX ADMIN — BUMETANIDE 1 MG: 0.25 INJECTION INTRAMUSCULAR; INTRAVENOUS at 07:57

## 2025-05-11 RX ADMIN — CITALOPRAM 40 MG: 40 TABLET, FILM COATED ORAL at 07:56

## 2025-05-11 RX ADMIN — METOPROLOL TARTRATE 25 MG: 25 TABLET, FILM COATED ORAL at 20:55

## 2025-05-11 RX ADMIN — BUPROPION HYDROCHLORIDE 300 MG: 300 TABLET, EXTENDED RELEASE ORAL at 07:56

## 2025-05-11 RX ADMIN — HEPARIN SODIUM 5000 UNITS: 5000 INJECTION INTRAVENOUS; SUBCUTANEOUS at 06:47

## 2025-05-12 LAB
ANION GAP SERPL CALC-SCNC: 10 MEQ/L (ref 8–16)
BACTERIA BLD AEROBE CULT: NORMAL
BACTERIA BLD AEROBE CULT: NORMAL
BASOPHILS ABSOLUTE: 0 THOU/MM3 (ref 0–0.1)
BASOPHILS NFR BLD AUTO: 0.6 %
BUN SERPL-MCNC: 41 MG/DL (ref 8–23)
CALCIUM SERPL-MCNC: 8.5 MG/DL (ref 8.8–10.2)
CHLORIDE SERPL-SCNC: 100 MEQ/L (ref 98–111)
CO2 SERPL-SCNC: 34 MEQ/L (ref 22–29)
CREAT SERPL-MCNC: 2.4 MG/DL (ref 0.7–1.2)
DEPRECATED RDW RBC AUTO: 56.4 FL (ref 35–45)
EOSINOPHIL NFR BLD AUTO: 3.4 %
EOSINOPHILS ABSOLUTE: 0.2 THOU/MM3 (ref 0–0.4)
ERYTHROCYTE [DISTWIDTH] IN BLOOD BY AUTOMATED COUNT: 16.6 % (ref 11.5–14.5)
GFR SERPL CREATININE-BSD FRML MDRD: 26 ML/MIN/1.73M2
GLUCOSE BLD STRIP.AUTO-MCNC: 119 MG/DL (ref 70–108)
GLUCOSE BLD STRIP.AUTO-MCNC: 201 MG/DL (ref 70–108)
GLUCOSE BLD STRIP.AUTO-MCNC: 218 MG/DL (ref 70–108)
GLUCOSE BLD STRIP.AUTO-MCNC: 219 MG/DL (ref 70–108)
GLUCOSE SERPL-MCNC: 120 MG/DL (ref 74–109)
HCT VFR BLD AUTO: 30.5 % (ref 42–52)
HGB BLD-MCNC: 9.1 GM/DL (ref 14–18)
IMM GRANULOCYTES # BLD AUTO: 0 THOU/MM3 (ref 0–0.07)
IMM GRANULOCYTES NFR BLD AUTO: 0 %
LYMPHOCYTES ABSOLUTE: 2 THOU/MM3 (ref 1–4.8)
LYMPHOCYTES NFR BLD AUTO: 41.5 %
MCH RBC QN AUTO: 27.8 PG (ref 26–33)
MCHC RBC AUTO-ENTMCNC: 29.8 GM/DL (ref 32.2–35.5)
MCV RBC AUTO: 93.3 FL (ref 80–94)
MONOCYTES ABSOLUTE: 0.6 THOU/MM3 (ref 0.4–1.3)
MONOCYTES NFR BLD AUTO: 12.8 %
NEUTROPHILS ABSOLUTE: 2 THOU/MM3 (ref 1.8–7.7)
NEUTROPHILS NFR BLD AUTO: 41.7 %
NRBC BLD AUTO-RTO: 0 /100 WBC
PLATELET # BLD AUTO: 142 THOU/MM3 (ref 130–400)
PMV BLD AUTO: 11.8 FL (ref 9.4–12.4)
POTASSIUM SERPL-SCNC: 4.1 MEQ/L (ref 3.5–5.2)
RBC # BLD AUTO: 3.27 MILL/MM3 (ref 4.7–6.1)
SODIUM SERPL-SCNC: 144 MEQ/L (ref 135–145)
WBC # BLD AUTO: 4.7 THOU/MM3 (ref 4.8–10.8)

## 2025-05-12 PROCEDURE — 6370000000 HC RX 637 (ALT 250 FOR IP)

## 2025-05-12 PROCEDURE — 2500000003 HC RX 250 WO HCPCS: Performed by: STUDENT IN AN ORGANIZED HEALTH CARE EDUCATION/TRAINING PROGRAM

## 2025-05-12 PROCEDURE — 85025 COMPLETE CBC W/AUTO DIFF WBC: CPT

## 2025-05-12 PROCEDURE — 82948 REAGENT STRIP/BLOOD GLUCOSE: CPT

## 2025-05-12 PROCEDURE — 6370000000 HC RX 637 (ALT 250 FOR IP): Performed by: STUDENT IN AN ORGANIZED HEALTH CARE EDUCATION/TRAINING PROGRAM

## 2025-05-12 PROCEDURE — 97535 SELF CARE MNGMENT TRAINING: CPT

## 2025-05-12 PROCEDURE — 97530 THERAPEUTIC ACTIVITIES: CPT

## 2025-05-12 PROCEDURE — 80048 BASIC METABOLIC PNL TOTAL CA: CPT

## 2025-05-12 PROCEDURE — 6360000002 HC RX W HCPCS: Performed by: STUDENT IN AN ORGANIZED HEALTH CARE EDUCATION/TRAINING PROGRAM

## 2025-05-12 PROCEDURE — 36415 COLL VENOUS BLD VENIPUNCTURE: CPT

## 2025-05-12 PROCEDURE — 1200000003 HC TELEMETRY R&B

## 2025-05-12 RX ORDER — BUMETANIDE 1 MG/1
1 TABLET ORAL 2 TIMES DAILY
Status: DISCONTINUED | OUTPATIENT
Start: 2025-05-12 | End: 2025-05-14 | Stop reason: HOSPADM

## 2025-05-12 RX ADMIN — HEPARIN SODIUM 5000 UNITS: 5000 INJECTION INTRAVENOUS; SUBCUTANEOUS at 22:30

## 2025-05-12 RX ADMIN — Medication 3 MG: at 20:18

## 2025-05-12 RX ADMIN — INSULIN LISPRO 2 UNITS: 100 INJECTION, SOLUTION INTRAVENOUS; SUBCUTANEOUS at 16:56

## 2025-05-12 RX ADMIN — METOPROLOL TARTRATE 25 MG: 25 TABLET, FILM COATED ORAL at 10:29

## 2025-05-12 RX ADMIN — BUMETANIDE 1 MG: 1 TABLET ORAL at 18:49

## 2025-05-12 RX ADMIN — SODIUM CHLORIDE, PRESERVATIVE FREE 10 ML: 5 INJECTION INTRAVENOUS at 20:17

## 2025-05-12 RX ADMIN — HEPARIN SODIUM 5000 UNITS: 5000 INJECTION INTRAVENOUS; SUBCUTANEOUS at 06:14

## 2025-05-12 RX ADMIN — PRAVASTATIN SODIUM 80 MG: 80 TABLET ORAL at 20:18

## 2025-05-12 RX ADMIN — PANTOPRAZOLE SODIUM 40 MG: 40 TABLET, DELAYED RELEASE ORAL at 06:12

## 2025-05-12 RX ADMIN — HEPARIN SODIUM 5000 UNITS: 5000 INJECTION INTRAVENOUS; SUBCUTANEOUS at 16:57

## 2025-05-12 RX ADMIN — INSULIN GLARGINE 10 UNITS: 100 INJECTION, SOLUTION SUBCUTANEOUS at 20:18

## 2025-05-12 RX ADMIN — BUMETANIDE 1 MG: 0.25 INJECTION INTRAMUSCULAR; INTRAVENOUS at 10:27

## 2025-05-12 RX ADMIN — CITALOPRAM 40 MG: 40 TABLET, FILM COATED ORAL at 10:28

## 2025-05-12 RX ADMIN — DOCUSATE SODIUM 100 MG: 100 CAPSULE, LIQUID FILLED ORAL at 10:29

## 2025-05-12 RX ADMIN — SODIUM CHLORIDE, PRESERVATIVE FREE 10 ML: 5 INJECTION INTRAVENOUS at 10:28

## 2025-05-12 RX ADMIN — BUPROPION HYDROCHLORIDE 300 MG: 300 TABLET, EXTENDED RELEASE ORAL at 10:29

## 2025-05-12 RX ADMIN — TRAZODONE HYDROCHLORIDE 25 MG: 50 TABLET ORAL at 20:18

## 2025-05-12 RX ADMIN — DONEPEZIL HYDROCHLORIDE 10 MG: 10 TABLET, FILM COATED ORAL at 20:18

## 2025-05-12 RX ADMIN — INSULIN LISPRO 2 UNITS: 100 INJECTION, SOLUTION INTRAVENOUS; SUBCUTANEOUS at 20:18

## 2025-05-12 RX ADMIN — LEVOTHYROXINE SODIUM 150 MCG: 0.15 TABLET ORAL at 06:12

## 2025-05-12 RX ADMIN — INSULIN LISPRO 2 UNITS: 100 INJECTION, SOLUTION INTRAVENOUS; SUBCUTANEOUS at 12:14

## 2025-05-12 RX ADMIN — METOPROLOL TARTRATE 25 MG: 25 TABLET, FILM COATED ORAL at 20:18

## 2025-05-12 ASSESSMENT — PAIN SCALES - GENERAL
PAINLEVEL_OUTOF10: 0
PAINLEVEL_OUTOF10: 0

## 2025-05-12 NOTE — CARE COORDINATION
5/12/25, 7:06 AM EDT    DISCHARGE BARRIERS        Patient transferred to . Report given to unit Hailey MARTINEZ, regarding discharge plan for this patient.    Continue current warfarin dose of 2.5 mg Sunday and 5 mg Mon, Tue, Wed, Thurs, Fri, Sat (32.5 mg/week).  Recheck INR in 4 weeks.

## 2025-05-12 NOTE — PALLIATIVE CARE
Follow Up / Progress Note        Patient:   Leo Nevarez  YOB: 1940  Age:  84 y.o.  Room:  [facility]03/[facility]-A  MRN:  [id number]         Family/Patient Discussion:  Met with patient, spouse, Shoshana (daughter), Shane (son) with Dr. Trent at bedside. Family has decided to take patient home with hospice. Patient and family eventually want to move patient to [place] to live, however are agreeable to take patient to [place] home on hospice to get things set up with hospice in [place] as well as care arranged there. Shoshana would prefer [facility]. Primary RN and SW updated on discharge plan.       Plan/Follow-Up:  Hospice consult called over and updated on plan to eventually move patient to [place]. Palliative care will continue to follow, please call for additional needs.          Electronically signed by Narinder Haider RN on 5/12/2025 at 12:24 PM             Palliative Care Office: [phone]

## 2025-05-13 LAB
GLUCOSE BLD STRIP.AUTO-MCNC: 127 MG/DL (ref 70–108)
GLUCOSE BLD STRIP.AUTO-MCNC: 215 MG/DL (ref 70–108)
GLUCOSE BLD STRIP.AUTO-MCNC: 220 MG/DL (ref 70–108)
GLUCOSE BLD STRIP.AUTO-MCNC: 227 MG/DL (ref 70–108)

## 2025-05-13 PROCEDURE — 1200000003 HC TELEMETRY R&B

## 2025-05-13 PROCEDURE — 6370000000 HC RX 637 (ALT 250 FOR IP)

## 2025-05-13 PROCEDURE — 99232 SBSQ HOSP IP/OBS MODERATE 35: CPT | Performed by: FAMILY MEDICINE

## 2025-05-13 PROCEDURE — 6360000002 HC RX W HCPCS: Performed by: STUDENT IN AN ORGANIZED HEALTH CARE EDUCATION/TRAINING PROGRAM

## 2025-05-13 PROCEDURE — 6370000000 HC RX 637 (ALT 250 FOR IP): Performed by: STUDENT IN AN ORGANIZED HEALTH CARE EDUCATION/TRAINING PROGRAM

## 2025-05-13 PROCEDURE — 2500000003 HC RX 250 WO HCPCS: Performed by: STUDENT IN AN ORGANIZED HEALTH CARE EDUCATION/TRAINING PROGRAM

## 2025-05-13 PROCEDURE — 82948 REAGENT STRIP/BLOOD GLUCOSE: CPT

## 2025-05-13 RX ADMIN — Medication 3 MG: at 23:06

## 2025-05-13 RX ADMIN — SODIUM CHLORIDE, PRESERVATIVE FREE 10 ML: 5 INJECTION INTRAVENOUS at 08:09

## 2025-05-13 RX ADMIN — SODIUM CHLORIDE, PRESERVATIVE FREE 10 ML: 5 INJECTION INTRAVENOUS at 23:07

## 2025-05-13 RX ADMIN — DOCUSATE SODIUM 100 MG: 100 CAPSULE, LIQUID FILLED ORAL at 08:09

## 2025-05-13 RX ADMIN — HEPARIN SODIUM 5000 UNITS: 5000 INJECTION INTRAVENOUS; SUBCUTANEOUS at 06:13

## 2025-05-13 RX ADMIN — BUMETANIDE 1 MG: 1 TABLET ORAL at 08:09

## 2025-05-13 RX ADMIN — PANTOPRAZOLE SODIUM 40 MG: 40 TABLET, DELAYED RELEASE ORAL at 05:05

## 2025-05-13 RX ADMIN — BUMETANIDE 1 MG: 1 TABLET ORAL at 16:37

## 2025-05-13 RX ADMIN — INSULIN LISPRO 2 UNITS: 100 INJECTION, SOLUTION INTRAVENOUS; SUBCUTANEOUS at 23:07

## 2025-05-13 RX ADMIN — BUPROPION HYDROCHLORIDE 300 MG: 300 TABLET, EXTENDED RELEASE ORAL at 08:09

## 2025-05-13 RX ADMIN — DONEPEZIL HYDROCHLORIDE 10 MG: 10 TABLET, FILM COATED ORAL at 23:07

## 2025-05-13 RX ADMIN — METOPROLOL TARTRATE 25 MG: 25 TABLET, FILM COATED ORAL at 23:07

## 2025-05-13 RX ADMIN — HEPARIN SODIUM 5000 UNITS: 5000 INJECTION INTRAVENOUS; SUBCUTANEOUS at 15:45

## 2025-05-13 RX ADMIN — INSULIN LISPRO 2 UNITS: 100 INJECTION, SOLUTION INTRAVENOUS; SUBCUTANEOUS at 16:37

## 2025-05-13 RX ADMIN — LEVOTHYROXINE SODIUM 150 MCG: 0.15 TABLET ORAL at 05:05

## 2025-05-13 RX ADMIN — INSULIN LISPRO 2 UNITS: 100 INJECTION, SOLUTION INTRAVENOUS; SUBCUTANEOUS at 12:04

## 2025-05-13 RX ADMIN — CITALOPRAM 40 MG: 40 TABLET, FILM COATED ORAL at 08:09

## 2025-05-13 RX ADMIN — HEPARIN SODIUM 5000 UNITS: 5000 INJECTION INTRAVENOUS; SUBCUTANEOUS at 23:07

## 2025-05-13 RX ADMIN — TRAZODONE HYDROCHLORIDE 25 MG: 50 TABLET ORAL at 23:06

## 2025-05-13 RX ADMIN — INSULIN GLARGINE 10 UNITS: 100 INJECTION, SOLUTION SUBCUTANEOUS at 23:07

## 2025-05-13 RX ADMIN — PRAVASTATIN SODIUM 80 MG: 80 TABLET ORAL at 23:06

## 2025-05-13 ASSESSMENT — PAIN SCALES - GENERAL
PAINLEVEL_OUTOF10: 0
PAINLEVEL_OUTOF10: 0

## 2025-05-13 NOTE — CARE COORDINATION
5/13/25, 11:29 AM EDT    DISCHARGE ON GOING EVALUATION    [facility] day: 6  Location: -03/003-A Reason for admit: Hypoxia [R09.02]  General weakness [R53.1]  CKD (chronic kidney disease) stage 4, GFR 15-29 ml/min (HCC) [N18.4]  Chronic systolic congestive heart failure (HCC) [I50.22]  Acute on chronic diastolic congestive heart failure (HCC) [I50.33]  Pneumonia of left lower lobe due to infectious organism [J18.9]  Uncontrolled type 2 diabetes mellitus with hyperglycemia (HCC) [E11.65]  Dyspnea, unspecified type [R06.00]  Acute exacerbation of chronic heart failure (HCC) [I50.9]     Procedures: none    Imaging since last note: n/a     Barriers to Discharge: Hospice planning to meet with family today. Anticipate discharge once arrangements are complete.     PCP: Aileen Valadez, RELL - CNP  Readmission Risk Score: 28.2    Patient Goals/Plan/Treatment Preferences: From home with daughter and family. Tentatively planning hospice.

## 2025-05-14 VITALS
HEART RATE: 60 BPM | BODY MASS INDEX: 36.53 KG/M2 | HEIGHT: 68 IN | RESPIRATION RATE: 16 BRPM | TEMPERATURE: 98 F | SYSTOLIC BLOOD PRESSURE: 134 MMHG | DIASTOLIC BLOOD PRESSURE: 59 MMHG | OXYGEN SATURATION: 94 % | WEIGHT: 241 LBS

## 2025-05-14 PROBLEM — F32.A DEPRESSION: Status: ACTIVE | Noted: 2025-05-14

## 2025-05-14 LAB
GLUCOSE BLD STRIP.AUTO-MCNC: 113 MG/DL (ref 70–108)
GLUCOSE BLD STRIP.AUTO-MCNC: 195 MG/DL (ref 70–108)

## 2025-05-14 PROCEDURE — 2500000003 HC RX 250 WO HCPCS: Performed by: STUDENT IN AN ORGANIZED HEALTH CARE EDUCATION/TRAINING PROGRAM

## 2025-05-14 PROCEDURE — 82948 REAGENT STRIP/BLOOD GLUCOSE: CPT

## 2025-05-14 PROCEDURE — 99239 HOSP IP/OBS DSCHRG MGMT >30: CPT | Performed by: STUDENT IN AN ORGANIZED HEALTH CARE EDUCATION/TRAINING PROGRAM

## 2025-05-14 PROCEDURE — 6370000000 HC RX 637 (ALT 250 FOR IP): Performed by: STUDENT IN AN ORGANIZED HEALTH CARE EDUCATION/TRAINING PROGRAM

## 2025-05-14 PROCEDURE — 6370000000 HC RX 637 (ALT 250 FOR IP)

## 2025-05-14 PROCEDURE — 6360000002 HC RX W HCPCS: Performed by: STUDENT IN AN ORGANIZED HEALTH CARE EDUCATION/TRAINING PROGRAM

## 2025-05-14 RX ORDER — INSULIN GLARGINE 100 [IU]/ML
10 INJECTION, SOLUTION SUBCUTANEOUS NIGHTLY
Qty: 10 ML | Refills: 3 | Status: SHIPPED | OUTPATIENT
Start: 2025-05-14

## 2025-05-14 RX ORDER — HYOSCYAMINE SULFATE 0.12 MG/1
0.12 TABLET SUBLINGUAL EVERY 4 HOURS PRN
Qty: 90 TABLET | Refills: 3 | Status: SHIPPED | OUTPATIENT
Start: 2025-05-14

## 2025-05-14 RX ORDER — LEVOTHYROXINE SODIUM 150 UG/1
150 TABLET ORAL DAILY
Qty: 30 TABLET | Refills: 3 | Status: SHIPPED | OUTPATIENT
Start: 2025-05-15

## 2025-05-14 RX ORDER — BUMETANIDE 1 MG/1
1 TABLET ORAL 2 TIMES DAILY
Qty: 30 TABLET | Refills: 3 | Status: SHIPPED | OUTPATIENT
Start: 2025-05-14

## 2025-05-14 RX ADMIN — BUMETANIDE 1 MG: 1 TABLET ORAL at 08:49

## 2025-05-14 RX ADMIN — SODIUM CHLORIDE, PRESERVATIVE FREE 10 ML: 5 INJECTION INTRAVENOUS at 08:49

## 2025-05-14 RX ADMIN — INSULIN LISPRO 2 UNITS: 100 INJECTION, SOLUTION INTRAVENOUS; SUBCUTANEOUS at 13:11

## 2025-05-14 RX ADMIN — PROCHLORPERAZINE MALEATE 10 MG: 10 TABLET ORAL at 08:49

## 2025-05-14 RX ADMIN — BUPROPION HYDROCHLORIDE 300 MG: 300 TABLET, EXTENDED RELEASE ORAL at 08:48

## 2025-05-14 RX ADMIN — LEVOTHYROXINE SODIUM 150 MCG: 0.15 TABLET ORAL at 07:19

## 2025-05-14 RX ADMIN — CITALOPRAM 40 MG: 40 TABLET, FILM COATED ORAL at 08:49

## 2025-05-14 RX ADMIN — HEPARIN SODIUM 5000 UNITS: 5000 INJECTION INTRAVENOUS; SUBCUTANEOUS at 07:18

## 2025-05-14 RX ADMIN — METOPROLOL TARTRATE 25 MG: 25 TABLET, FILM COATED ORAL at 08:48

## 2025-05-14 RX ADMIN — DOCUSATE SODIUM 100 MG: 100 CAPSULE, LIQUID FILLED ORAL at 08:50

## 2025-05-14 RX ADMIN — PANTOPRAZOLE SODIUM 40 MG: 40 TABLET, DELAYED RELEASE ORAL at 07:19

## 2025-05-14 NOTE — PLAN OF CARE
PROGRESS NOTE      Patient:  Leo Nevarez  Unit/Bed:4B-08/008-A  YOB: 1940  MRN: [id number]   Acct: [id number]    PCP: Aileen Valadez APRN - CNP    Date of Admission: 5/7/2025 LOS: 1    Date of Evaluation:  5/8/2025    Anticipated Discharge: Pending clinical course    Assessment/Plan:    Acute hypoxic respiratory failure secondary to heart failure exacerbation: Patient presented from home due to worsening shortness of breath he saw his PCP who advised him to go to the ED found to have SpO2 of 78%.  In the ED patient was given Bumex 1 mg IV once along with Rocephin 2 mg IV and azithromycin 500 mg IV.  Patient is afebrile, white blood cell count 4.9, lactic acid 1.1, Pro-Nick 0.08 low suspicion for pneumonia antibiotics were discontinued on admission.  Blood cultures collected in the ED pending.  Shortness of breath likely due to worsening CHF on exam patient has 1+ pitting edema bilateral lower extremity and bilateral rales in mid lungs.  Patient started on Bumex 1 mg IV twice daily 5/8, strict I's and O's, daily weights  Pro BNP on discharge 4/29 83732 increased to 52686 on 5/8.  Pending repeat echo   On 2 L nasal cannula not on oxygen at baseline continue to wean to room air as tolerated  Acute on chronic heart failure with mildly reduced EF: Echo 7/3 showed mildly reduced left ventricular systolic function with a EF 45 to 50% left ventricle size is normal with normal wall thickness mild global hypokinesis.   Continue Bumex 1 mg IV twice daily  Strict I's and O's Daily weights  Pending repeat echo  Continue jardiance 25 mg PO QD  CKD stage IIIb: Creatinine 2.2 on 5/8.  Baseline of 1.7-2.2. Patient does follow-up with Dr. 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  Renal ultrasound normal.  Continue to monitor kidney function with daily BMP.   Acute metabolic encephalopathy with baseline dementia: H&P noted that daughter said 
  Problem: Chronic Conditions and Co-morbidities  Goal: Patient's chronic conditions and co-morbidity symptoms are monitored and maintained or improved  5/10/2025 1925 by Yolanda Salinas RN  Outcome: Progressing  Flowsheets (Taken 5/10/2025 0800 by Yimi Ahumada RN)  Care Plan - Patient's Chronic Conditions and Co-Morbidity Symptoms are Monitored and Maintained or Improved: Monitor and assess patient's chronic conditions and comorbid symptoms for stability, deterioration, or improvement  5/10/2025 1308 by Lance Merida RN  Outcome: Progressing  Flowsheets (Taken 5/10/2025 0800 by Yimi Ahumada RN)  Care Plan - Patient's Chronic Conditions and Co-Morbidity Symptoms are Monitored and Maintained or Improved: Monitor and assess patient's chronic conditions and comorbid symptoms for stability, deterioration, or improvement     Problem: Discharge Planning  Goal: Discharge to home or other facility with appropriate resources  5/10/2025 1925 by Yolanda Salinas RN  Outcome: Progressing  Flowsheets (Taken 5/10/2025 0800 by Yimi Ahumada RN)  Discharge to home or other facility with appropriate resources: Identify barriers to discharge with patient and caregiver  5/10/2025 1308 by Lance Merida RN  Outcome: Progressing  Flowsheets (Taken 5/10/2025 0800 by Yimi Ahumada RN)  Discharge to home or other facility with appropriate resources: Identify barriers to discharge with patient and caregiver     Problem: Skin/Tissue Integrity  Goal: Skin integrity remains intact  Description: 1.  Monitor for areas of redness and/or skin breakdown2.  Assess vascular access sites hourly3.  Every 4-6 hours minimum:  Change oxygen saturation probe site4.  Every 4-6 hours:  If on nasal continuous positive airway pressure, respiratory therapy assess nares and determine need for appliance change or resting period  5/10/2025 1925 by Yolanda Salinas RN  Outcome: Progressing  Flowsheets (Taken 5/10/2025 0800 by Yimi Ahumada RN)  Skin 
  Problem: Chronic Conditions and Co-morbidities  Goal: Patient's chronic conditions and co-morbidity symptoms are monitored and maintained or improved  5/12/2025 2114 by Clemencia Krishna RN  Outcome: Progressing  5/12/2025 1351 by Aileen Olguin RN  Outcome: Progressing  5/12/2025 1348 by Aileen Olguin RN  Outcome: Progressing     Problem: Discharge Planning  Goal: Discharge to home or other facility with appropriate resources  5/12/2025 2114 by Clemencia Krishna, RN  Outcome: Progressing  5/12/2025 1351 by Aileen Olguin RN  Outcome: Progressing  5/12/2025 1348 by Aileen Olguin RN  Outcome: Progressing     Problem: Skin/Tissue Integrity  Goal: Skin integrity remains intact  Description: 1.  Monitor for areas of redness and/or skin breakdown2.  Assess vascular access sites hourly3.  Every 4-6 hours minimum:  Change oxygen saturation probe site4.  Every 4-6 hours:  If on nasal continuous positive airway pressure, respiratory therapy assess nares and determine need for appliance change or resting period  5/12/2025 1351 by Aileen Olguin RN  Outcome: Progressing  5/12/2025 1348 by Aileen Olguin RN  Outcome: Progressing     Problem: Safety - Adult  Goal: Free from fall injury  5/12/2025 1351 by Aileen Olguin RN  Outcome: Progressing  5/12/2025 1348 by Aileen Olguin RN  Outcome: Progressing     Problem: Pain  Goal: Verbalizes/displays adequate comfort level or baseline comfort level  5/12/2025 1351 by Aileen Olguin RN  Outcome: Progressing  5/12/2025 1348 by Aileen Olguin RN  Outcome: Progressing     Problem: Neurosensory - Adult  Goal: Achieves stable or improved neurological status  5/12/2025 1351 by Aileen Olguin RN  Outcome: Progressing  5/12/2025 1348 by Aileen Olguin RN  Outcome: Progressing     Problem: Respiratory - Adult  Goal: Achieves optimal ventilation and oxygenation  5/12/2025 1351 by Aileen Olguin RN  Outcome: Progressing  5/12/2025 1348 by Aileen Olguin RN  Outcome: Progressing   
  Problem: Chronic Conditions and Co-morbidities  Goal: Patient's chronic conditions and co-morbidity symptoms are monitored and maintained or improved  5/8/2025 1047 by Mitali Youssef RN  Outcome: Progressing     Problem: Discharge Planning  Goal: Discharge to home or other facility with appropriate resources  5/8/2025 1047 by Mitali Youssef, RN  Outcome: Progressing     Problem: Skin/Tissue Integrity  Goal: Skin integrity remains intact  Description: 1.  Monitor for areas of redness and/or skin breakdown2.  Assess vascular access sites hourly3.  Every 4-6 hours minimum:  Change oxygen saturation probe site4.  Every 4-6 hours:  If on nasal continuous positive airway pressure, respiratory therapy assess nares and determine need for appliance change or resting period  5/8/2025 1047 by Mitali Youssef RN  Outcome: Progressing     Problem: Safety - Adult  Goal: Free from fall injury  5/8/2025 1047 by Mitali Youssef, RN  Outcome: Progressing     Care plan reviewed with patient.  Patient verbalizes understanding of the plan of care and contributes to goal setting.       
  Problem: Chronic Conditions and Co-morbidities  Goal: Patient's chronic conditions and co-morbidity symptoms are monitored and maintained or improved  Outcome: Progressing     Problem: Discharge Planning  Goal: Discharge to home or other facility with appropriate resources  Outcome: Progressing     Problem: Skin/Tissue Integrity  Goal: Skin integrity remains intact  Description: 1.  Monitor for areas of redness and/or skin breakdown2.  Assess vascular access sites hourly3.  Every 4-6 hours minimum:  Change oxygen saturation probe site4.  Every 4-6 hours:  If on nasal continuous positive airway pressure, respiratory therapy assess nares and determine need for appliance change or resting period  Outcome: Progressing     Problem: Safety - Adult  Goal: Free from fall injury  Outcome: Progressing     Problem: Pain  Goal: Verbalizes/displays adequate comfort level or baseline comfort level  Outcome: Progressing     Problem: Neurosensory - Adult  Goal: Achieves stable or improved neurological status  Outcome: Progressing     Problem: Respiratory - Adult  Goal: Achieves optimal ventilation and oxygenation  Outcome: Progressing     Problem: Skin/Tissue Integrity - Adult  Goal: Skin integrity remains intact  Description: 1.  Monitor for areas of redness and/or skin breakdown2.  Assess vascular access sites hourly3.  Every 4-6 hours minimum:  Change oxygen saturation probe site4.  Every 4-6 hours:  If on nasal continuous positive airway pressure, respiratory therapy assess nares and determine need for appliance change or resting period  Outcome: Progressing  Goal: Incisions, wounds, or drain sites healing without S/S of infection  Outcome: Progressing  Goal: Oral mucous membranes remain intact  Outcome: Progressing     Problem: Musculoskeletal - Adult  Goal: Return mobility to safest level of function  Outcome: Progressing  Goal: Return ADL status to a safe level of function  Outcome: Progressing     Problem: 
  Problem: Chronic Conditions and Co-morbidities  Goal: Patient's chronic conditions and co-morbidity symptoms are monitored and maintained or improved  Outcome: Progressing  Flowsheets (Taken 5/10/2025 0800 by Yimi Ahumada, RN)  Care Plan - Patient's Chronic Conditions and Co-Morbidity Symptoms are Monitored and Maintained or Improved: Monitor and assess patient's chronic conditions and comorbid symptoms for stability, deterioration, or improvement     Problem: Discharge Planning  Goal: Discharge to home or other facility with appropriate resources  Outcome: Progressing  Flowsheets (Taken 5/10/2025 0800 by Yimi Ahumada, RN)  Discharge to home or other facility with appropriate resources: Identify barriers to discharge with patient and caregiver     Problem: Skin/Tissue Integrity  Goal: Skin integrity remains intact  Description: 1.  Monitor for areas of redness and/or skin breakdown2.  Assess vascular access sites hourly3.  Every 4-6 hours minimum:  Change oxygen saturation probe site4.  Every 4-6 hours:  If on nasal continuous positive airway pressure, respiratory therapy assess nares and determine need for appliance change or resting period  Outcome: Progressing  Flowsheets (Taken 5/10/2025 0800 by Yimi Ahumada, RN)  Skin Integrity Remains Intact:   Monitor for areas of redness and/or skin breakdown   Assess vascular access sites hourly     Problem: Safety - Adult  Goal: Free from fall injury  Outcome: Progressing  Flowsheets (Taken 5/7/2025 0055 by Teri Rausch RN)  Free From Fall Injury:   Instruct family/caregiver on patient safety   Based on caregiver fall risk screen, instruct family/caregiver to ask for assistance with transferring infant if caregiver noted to have fall risk factors     Problem: Pain  Goal: Verbalizes/displays adequate comfort level or baseline comfort level  Outcome: Progressing  Flowsheets (Taken 5/10/2025 0800 by Yimi Ahumada, JADEN)  Verbalizes/displays adequate comfort level or 
  Problem: Chronic Conditions and Co-morbidities  Goal: Patient's chronic conditions and co-morbidity symptoms are monitored and maintained or improved  Outcome: Progressing  Flowsheets (Taken 5/11/2025 2155 by Lucila Weathers RN)  Care Plan - Patient's Chronic Conditions and Co-Morbidity Symptoms are Monitored and Maintained or Improved:   Monitor and assess patient's chronic conditions and comorbid symptoms for stability, deterioration, or improvement   Collaborate with multidisciplinary team to address chronic and comorbid conditions and prevent exacerbation or deterioration   Update acute care plan with appropriate goals if chronic or comorbid symptoms are exacerbated and prevent overall improvement and discharge  Note: History of CKD, DM, and Dementia     Problem: Discharge Planning  Goal: Discharge to home or other facility with appropriate resources  Outcome: Progressing  Flowsheets (Taken 5/13/2025 1603)  Discharge to home or other facility with appropriate resources:   Identify barriers to discharge with patient and caregiver   Arrange for needed discharge resources and transportation as appropriate   Identify discharge learning needs (meds, wound care, etc)  Note: Patient plans to discharge home with hospice and family support.      Problem: Skin/Tissue Integrity  Goal: Skin integrity remains intact  Description: 1.  Monitor for areas of redness and/or skin breakdown2.  Assess vascular access sites hourly3.  Every 4-6 hours minimum:  Change oxygen saturation probe site4.  Every 4-6 hours:  If on nasal continuous positive airway pressure, respiratory therapy assess nares and determine need for appliance change or resting period  Outcome: Progressing  Flowsheets (Taken 5/13/2025 1603)  Skin Integrity Remains Intact:   Monitor for areas of redness and/or skin breakdown   Assess vascular access sites hourly   Turn and reposition as indicated  Note: No new skin break down noted at this time. Encouraged patient 
  Problem: Chronic Conditions and Co-morbidities  Goal: Patient's chronic conditions and co-morbidity symptoms are monitored and maintained or improved  Outcome: Progressing  Flowsheets (Taken 5/11/2025 2155)  Care Plan - Patient's Chronic Conditions and Co-Morbidity Symptoms are Monitored and Maintained or Improved:   Monitor and assess patient's chronic conditions and comorbid symptoms for stability, deterioration, or improvement   Collaborate with multidisciplinary team to address chronic and comorbid conditions and prevent exacerbation or deterioration   Update acute care plan with appropriate goals if chronic or comorbid symptoms are exacerbated and prevent overall improvement and discharge     Problem: Discharge Planning  Goal: Discharge to home or other facility with appropriate resources  Outcome: Progressing  Flowsheets (Taken 5/11/2025 2155)  Discharge to home or other facility with appropriate resources:   Identify barriers to discharge with patient and caregiver   Arrange for needed discharge resources and transportation as appropriate   Identify discharge learning needs (meds, wound care, etc)     Problem: Skin/Tissue Integrity  Goal: Skin integrity remains intact  Description: 1.  Monitor for areas of redness and/or skin breakdown2.  Assess vascular access sites hourly3.  Every 4-6 hours minimum:  Change oxygen saturation probe site4.  Every 4-6 hours:  If on nasal continuous positive airway pressure, respiratory therapy assess nares and determine need for appliance change or resting period  Outcome: Progressing  Flowsheets (Taken 5/11/2025 2155)  Skin Integrity Remains Intact:   Monitor for areas of redness and/or skin breakdown   Assess vascular access sites hourly     Problem: Safety - Adult  Goal: Free from fall injury  Outcome: Progressing  Flowsheets (Taken 5/11/2025 2155)  Free From Fall Injury:   Instruct family/caregiver on patient safety   Based on caregiver fall risk screen, instruct 
  Problem: Chronic Conditions and Co-morbidities  Goal: Patient's chronic conditions and co-morbidity symptoms are monitored and maintained or improved  Outcome: Progressing  Flowsheets (Taken 5/7/2025 2357)  Care Plan - Patient's Chronic Conditions and Co-Morbidity Symptoms are Monitored and Maintained or Improved:   Monitor and assess patient's chronic conditions and comorbid symptoms for stability, deterioration, or improvement   Collaborate with multidisciplinary team to address chronic and comorbid conditions and prevent exacerbation or deterioration   Update acute care plan with appropriate goals if chronic or comorbid symptoms are exacerbated and prevent overall improvement and discharge     Problem: Discharge Planning  Goal: Discharge to home or other facility with appropriate resources  Outcome: Progressing  Flowsheets (Taken 5/7/2025 2357)  Discharge to home or other facility with appropriate resources:   Identify barriers to discharge with patient and caregiver   Arrange for needed discharge resources and transportation as appropriate   Identify discharge learning needs (meds, wound care, etc)   Arrange for interpreters to assist at discharge as needed   Refer to discharge planning if patient needs post-hospital services based on physician order or complex needs related to functional status, cognitive ability or social support system     Problem: Skin/Tissue Integrity  Goal: Skin integrity remains intact  Description: 1.  Monitor for areas of redness and/or skin breakdown2.  Assess vascular access sites hourly3.  Every 4-6 hours minimum:  Change oxygen saturation probe site4.  Every 4-6 hours:  If on nasal continuous positive airway pressure, respiratory therapy assess nares and determine need for appliance change or resting period  Outcome: Progressing  Flowsheets (Taken 5/7/2025 2357)  Skin Integrity Remains Intact:   Monitor for areas of redness and/or skin breakdown   Every 4-6 hours minimum:  Change 
  Problem: Chronic Conditions and Co-morbidities  Goal: Patient's chronic conditions and co-morbidity symptoms are monitored and maintained or improved  Outcome: Progressing  Flowsheets (Taken 5/9/2025 0930)  Care Plan - Patient's Chronic Conditions and Co-Morbidity Symptoms are Monitored and Maintained or Improved:   Monitor and assess patient's chronic conditions and comorbid symptoms for stability, deterioration, or improvement   Collaborate with multidisciplinary team to address chronic and comorbid conditions and prevent exacerbation or deterioration   Update acute care plan with appropriate goals if chronic or comorbid symptoms are exacerbated and prevent overall improvement and discharge     Problem: Discharge Planning  Goal: Discharge to home or other facility with appropriate resources  Outcome: Progressing  Flowsheets (Taken 5/9/2025 0930)  Discharge to home or other facility with appropriate resources:   Identify barriers to discharge with patient and caregiver   Refer to discharge planning if patient needs post-hospital services based on physician order or complex needs related to functional status, cognitive ability or social support system     Problem: Skin/Tissue Integrity  Goal: Skin integrity remains intact  Description: 1.  Monitor for areas of redness and/or skin breakdown2.  Assess vascular access sites hourly3.  Every 4-6 hours minimum:  Change oxygen saturation probe site4.  Every 4-6 hours:  If on nasal continuous positive airway pressure, respiratory therapy assess nares and determine need for appliance change or resting period  Outcome: Progressing  Flowsheets (Taken 5/9/2025 0930)  Skin Integrity Remains Intact: Monitor for areas of redness and/or skin breakdown     Problem: Safety - Adult  Goal: Free from fall injury  Outcome: Progressing     Problem: Pain  Goal: Verbalizes/displays adequate comfort level or baseline comfort level  Outcome: Progressing    Plan of care reviewed with patient 
Leo Nevarez was evaluated today and a DME order was entered for variable height hospital bed because he requires assistance for positioning needs not possible in an ordinary bed, requirement of elevation of head of bed more than 30 degrees for the diagnosis of heart failure .  Patient needs variability of bed height to perform patient transfers and for eating, bathing, toileting, personal cares, ambulating, grooming, hygiene, dressing upper body, dressing lower body, meal preparation, and taking own medications.  Current body Weight - Scale: 115 kg (253 lb 8.5 oz).  The need for this equipment was discussed with the patient and he understands and is in agreement.      Electronically signed by Heidi Muñoz MD on 5/9/2025 at 9:06 AM   
Leo Nevarez was evaluated today and a DME order was entered for variable height hospital bed because he requires assistance for positioning needs not possible in an ordinary bed, requirement of elevation of head of bed more than 30 degrees for the diagnosis of heart failure .  Patient needs variability of bed height to perform patient transfers and for eating, bathing, toileting, personal cares, ambulating, grooming, hygiene, dressing upper body, dressing lower body, meal preparation, and taking own medications.  Current body Weight - Scale: 115 kg (253 lb 8.5 oz).  The need for this equipment was discussed with the patient and he understands and is in agreement.     Electronically signed by Mckenna Trent DO on 5/9/2025 at 12:57 PM   
hyperglycemia and hypoglycemia   Administer ordered medications to maintain glucose within target range  Note: Blood sugar checks ACHS. Insulin per sliding scale and PRN hypoglycemic protocol.

## 2025-05-14 NOTE — DISCHARGE SUMMARY
gm/meal); Low Sodium (2 gm)      Follow-up visits:   [facility] [place]  [address] [address] [phone]             Discharge Medications:        Medication List        START taking these medications      hyoscyamine 0.125 MG sublingual tablet  Commonly known as: Levsin/SL  Place 1 tablet under the tongue every 4 hours as needed for Secretions            CHANGE how you take these medications      bumetanide 1 MG tablet  Commonly known as: BUMEX  Take 1 tablet by mouth in the morning and 1 tablet in the evening.  What changed: when to take this     insulin glargine 100 UNIT/ML injection vial  Commonly known as: LANTUS  Inject 10 Units into the skin nightly  What changed:   how much to take  additional instructions     levothyroxine 150 MCG tablet  Commonly known as: SYNTHROID  Take 1 tablet by mouth daily  Start taking on: May 15, 2025  What changed:   medication strength  how much to take            CONTINUE taking these medications      buPROPion 300 MG extended release tablet  Commonly known as: WELLBUTRIN XL     butalbital-aspirin-caffeine -40 MG capsule  Commonly known as: Fiorinal  Take 1 capsule by mouth every 8 hours as needed for Headaches for up to 10 days. Max Daily Amount: 3 capsules     citalopram 40 MG tablet  Commonly known as: CELEXA     docusate 100 MG Caps  Commonly known as: COLACE, DULCOLAX  Take 100 mg by mouth daily     donepezil 10 MG tablet  Commonly known as: ARICEPT     glipiZIDE 10 MG extended release tablet  Commonly known as: GLUCOTROL XL     HYDROmorphone 2 MG tablet  Commonly known as: DILAUDID     LORazepam 0.5 MG tablet  Commonly known as: ATIVAN     melatonin 3 MG Tabs tablet     metFORMIN 500 MG tablet  Commonly known as: GLUCOPHAGE  Take 1 tablet by mouth 2 times daily     metoprolol tartrate 25 MG tablet  Commonly known as: LOPRESSOR  Take 1 tablet by mouth 2 times daily     MULTIVITAMIN ADULT PO     OCUVITE PO

## 2025-05-14 NOTE — CARE COORDINATION
5/14/25, 10:02 AM EDT    DISCHARGE PLANNING EVALUATION    MICHELLE spoke with pts daughter Margareth.  She is wanting pt discharged to her home today with [facility].    MICHELLE notified hospice RN Nancy.  Hospice to get equipment delivered.  Nancy requested transport time scheduled for 4 pm.    [facility]ACP for  at 4 pm.  Attending updated.  JADEN Cortez aware.  Daughter notified.

## 2025-05-14 NOTE — CARE COORDINATION
5/14/25, 1:49 PM EDT    Patient goals/plan/ treatment preferences discussed by  and .  Patient goals/plan/ treatment preferences reviewed with patient/ family.  Patient/ family verbalize understanding of discharge plan and are in agreement with goal/plan/treatment preferences.  Understanding was demonstrated using the teach back method.  AVS provided by RN at time of discharge, which includes all necessary medical information pertaining to the patients current course of illness, treatment, post-discharge goals of care, and treatment preferences.     Services At/After Discharge: Aide services, Hospice, In ambulance, and Nursing service    Pt is planning discharge home today, with daughter and with [facility].    LACP for  at 4 pm.    MICHELLE spoke with Isidra with [facility] ([phone]), updated her that pt will go home to daughters house with [facility] until he moves to his sons home in [place].  Isidra was under the impression that pt was DC straight to [place].    [employer] on chart.  RN aware

## 2025-05-17 PROBLEM — E87.0 HYPERNATREMIA: Status: ACTIVE | Noted: 2025-05-17

## 2025-05-17 PROBLEM — E87.3 METABOLIC ALKALOSIS: Status: ACTIVE | Noted: 2025-05-17

## 2025-05-26 ENCOUNTER — HOSPITAL ENCOUNTER (INPATIENT)
Age: 85
LOS: 2 days | Discharge: HOSPICE | DRG: 951 | End: 2025-05-28
Attending: STUDENT IN AN ORGANIZED HEALTH CARE EDUCATION/TRAINING PROGRAM | Admitting: STUDENT IN AN ORGANIZED HEALTH CARE EDUCATION/TRAINING PROGRAM
Payer: MEDICARE

## 2025-05-26 DIAGNOSIS — Z51.5 HOSPICE CARE PATIENT: Primary | ICD-10-CM

## 2025-05-26 PROBLEM — I50.9 HEART FAILURE (HCC): Status: ACTIVE | Noted: 2025-05-26

## 2025-05-26 PROBLEM — E16.2 HYPOGLYCEMIA: Status: ACTIVE | Noted: 2025-05-26

## 2025-05-26 LAB
ANION GAP SERPL CALC-SCNC: 16 MEQ/L (ref 8–16)
BUN SERPL-MCNC: 39 MG/DL (ref 8–23)
CALCIUM SERPL-MCNC: 9.2 MG/DL (ref 8.8–10.2)
CHLORIDE SERPL-SCNC: 98 MEQ/L (ref 98–111)
CO2 SERPL-SCNC: 32 MEQ/L (ref 22–29)
CREAT SERPL-MCNC: 2 MG/DL (ref 0.7–1.2)
GFR SERPL CREATININE-BSD FRML MDRD: 32 ML/MIN/1.73M2
GLUCOSE BLD STRIP.AUTO-MCNC: 109 MG/DL (ref 70–108)
GLUCOSE BLD STRIP.AUTO-MCNC: 42 MG/DL (ref 70–108)
GLUCOSE SERPL-MCNC: 36 MG/DL (ref 74–109)
POTASSIUM SERPL-SCNC: 3.8 MEQ/L (ref 3.5–5.2)
SODIUM SERPL-SCNC: 146 MEQ/L (ref 135–145)

## 2025-05-26 PROCEDURE — 36415 COLL VENOUS BLD VENIPUNCTURE: CPT

## 2025-05-26 PROCEDURE — 82948 REAGENT STRIP/BLOOD GLUCOSE: CPT

## 2025-05-26 PROCEDURE — 80048 BASIC METABOLIC PNL TOTAL CA: CPT

## 2025-05-26 PROCEDURE — 2580000003 HC RX 258: Performed by: INTERNAL MEDICINE

## 2025-05-26 PROCEDURE — 6560000002 HC HOSPICE GENERAL INPATIENT

## 2025-05-26 PROCEDURE — 6370000000 HC RX 637 (ALT 250 FOR IP): Performed by: INTERNAL MEDICINE

## 2025-05-26 RX ORDER — ONDANSETRON 2 MG/ML
4 INJECTION INTRAMUSCULAR; INTRAVENOUS EVERY 6 HOURS PRN
Status: DISCONTINUED | OUTPATIENT
Start: 2025-05-26 | End: 2025-05-28 | Stop reason: HOSPADM

## 2025-05-26 RX ORDER — BISACODYL 5 MG/1
5 TABLET, DELAYED RELEASE ORAL DAILY PRN
Status: DISCONTINUED | OUTPATIENT
Start: 2025-05-26 | End: 2025-05-28 | Stop reason: HOSPADM

## 2025-05-26 RX ORDER — CITALOPRAM HYDROBROMIDE 20 MG/1
10 TABLET ORAL DAILY
Status: DISCONTINUED | OUTPATIENT
Start: 2025-05-27 | End: 2025-05-28 | Stop reason: HOSPADM

## 2025-05-26 RX ORDER — LEVOTHYROXINE SODIUM 150 UG/1
150 TABLET ORAL DAILY
Status: DISCONTINUED | OUTPATIENT
Start: 2025-05-27 | End: 2025-05-28 | Stop reason: HOSPADM

## 2025-05-26 RX ORDER — OXYCODONE HCL 20 MG/ML
5 CONCENTRATE, ORAL ORAL
Refills: 0 | Status: DISCONTINUED | OUTPATIENT
Start: 2025-05-26 | End: 2025-05-28 | Stop reason: HOSPADM

## 2025-05-26 RX ORDER — SENNA AND DOCUSATE SODIUM 50; 8.6 MG/1; MG/1
1 TABLET, FILM COATED ORAL 2 TIMES DAILY
Status: DISCONTINUED | OUTPATIENT
Start: 2025-05-26 | End: 2025-05-26

## 2025-05-26 RX ORDER — DEXTROSE MONOHYDRATE 50 MG/ML
INJECTION, SOLUTION INTRAVENOUS CONTINUOUS
Status: DISCONTINUED | OUTPATIENT
Start: 2025-05-26 | End: 2025-05-27

## 2025-05-26 RX ORDER — SENNOSIDES 8.6 MG/1
1 TABLET ORAL DAILY PRN
Status: DISCONTINUED | OUTPATIENT
Start: 2025-05-26 | End: 2025-05-28 | Stop reason: HOSPADM

## 2025-05-26 RX ORDER — HALOPERIDOL 5 MG/ML
2 INJECTION INTRAMUSCULAR
Status: DISCONTINUED | OUTPATIENT
Start: 2025-05-26 | End: 2025-05-28 | Stop reason: HOSPADM

## 2025-05-26 RX ORDER — BUPROPION HYDROCHLORIDE 150 MG/1
150 TABLET ORAL DAILY
Status: DISCONTINUED | OUTPATIENT
Start: 2025-05-27 | End: 2025-05-28 | Stop reason: HOSPADM

## 2025-05-26 RX ORDER — DONEPEZIL HYDROCHLORIDE 5 MG/1
5 TABLET, FILM COATED ORAL NIGHTLY
Status: DISCONTINUED | OUTPATIENT
Start: 2025-05-26 | End: 2025-05-28 | Stop reason: HOSPADM

## 2025-05-26 RX ORDER — ACETAMINOPHEN 160 MG/5ML
650 LIQUID ORAL EVERY 4 HOURS PRN
Status: DISCONTINUED | OUTPATIENT
Start: 2025-05-26 | End: 2025-05-26

## 2025-05-26 RX ORDER — GLYCOPYRROLATE 0.2 MG/ML
0.2 INJECTION INTRAMUSCULAR; INTRAVENOUS EVERY 4 HOURS PRN
Status: DISCONTINUED | OUTPATIENT
Start: 2025-05-26 | End: 2025-05-28 | Stop reason: HOSPADM

## 2025-05-26 RX ORDER — LORAZEPAM 2 MG/ML
0.5 CONCENTRATE ORAL
Status: DISCONTINUED | OUTPATIENT
Start: 2025-05-26 | End: 2025-05-28 | Stop reason: HOSPADM

## 2025-05-26 RX ADMIN — BISACODYL 5 MG: 5 TABLET, COATED ORAL at 21:53

## 2025-05-26 RX ADMIN — DONEPEZIL HYDROCHLORIDE 5 MG: 5 TABLET, FILM COATED ORAL at 21:53

## 2025-05-26 RX ADMIN — SODIUM POLYSTYRENE SULFONATE 7.5 G: 15 SUSPENSION ORAL; RECTAL at 21:57

## 2025-05-26 RX ADMIN — DEXTROSE: 5 SOLUTION INTRAVENOUS at 20:36

## 2025-05-26 NOTE — PROGRESS NOTES
Reached out to home hospice to get background on patient's reason for admission since patient was brought to  via EMS. Amie with hospice stated she didn't have the patient but got report that the patient had low blood sugar due to not eating breakfast and taking his insulin. Amie reports that the daughter was educated on the hypoglycemic event but was adamant that the patient go to the emergency room. Dr Cuellar was contacted and planned for patient to go to

## 2025-05-27 LAB
GLUCOSE BLD STRIP.AUTO-MCNC: 137 MG/DL (ref 70–108)
GLUCOSE BLD STRIP.AUTO-MCNC: 176 MG/DL (ref 70–108)
GLUCOSE BLD STRIP.AUTO-MCNC: 257 MG/DL (ref 70–108)
GLUCOSE BLD STRIP.AUTO-MCNC: 337 MG/DL (ref 70–108)
GLUCOSE BLD STRIP.AUTO-MCNC: 75 MG/DL (ref 70–108)
GLUCOSE BLD STRIP.AUTO-MCNC: 88 MG/DL (ref 70–108)
GLUCOSE BLD STRIP.AUTO-MCNC: 89 MG/DL (ref 70–108)
GLUCOSE BLD STRIP.AUTO-MCNC: 93 MG/DL (ref 70–108)
GLUCOSE BLD STRIP.AUTO-MCNC: 93 MG/DL (ref 70–108)

## 2025-05-27 PROCEDURE — 99222 1ST HOSP IP/OBS MODERATE 55: CPT | Performed by: STUDENT IN AN ORGANIZED HEALTH CARE EDUCATION/TRAINING PROGRAM

## 2025-05-27 PROCEDURE — 82948 REAGENT STRIP/BLOOD GLUCOSE: CPT

## 2025-05-27 PROCEDURE — 6560000002 HC HOSPICE GENERAL INPATIENT

## 2025-05-27 PROCEDURE — 6370000000 HC RX 637 (ALT 250 FOR IP): Performed by: INTERNAL MEDICINE

## 2025-05-27 RX ADMIN — LEVOTHYROXINE SODIUM 150 MCG: 0.15 TABLET ORAL at 06:39

## 2025-05-27 RX ADMIN — Medication 5 MG: at 22:03

## 2025-05-27 RX ADMIN — SODIUM POLYSTYRENE SULFONATE 7.5 G: 15 SUSPENSION ORAL; RECTAL at 20:28

## 2025-05-27 RX ADMIN — BUPROPION HYDROCHLORIDE 150 MG: 150 TABLET, EXTENDED RELEASE ORAL at 09:14

## 2025-05-27 RX ADMIN — DONEPEZIL HYDROCHLORIDE 5 MG: 5 TABLET, FILM COATED ORAL at 20:27

## 2025-05-27 RX ADMIN — CITALOPRAM HYDROBROMIDE 10 MG: 20 TABLET ORAL at 09:14

## 2025-05-27 ASSESSMENT — PAIN SCALES - GENERAL
PAINLEVEL_OUTOF10: 9
PAINLEVEL_OUTOF10: 0

## 2025-05-27 ASSESSMENT — PAIN SCALES - WONG BAKER: WONGBAKER_NUMERICALRESPONSE: NO HURT

## 2025-05-27 ASSESSMENT — PAIN DESCRIPTION - DESCRIPTORS: DESCRIPTORS: ACHING

## 2025-05-27 ASSESSMENT — PAIN DESCRIPTION - LOCATION: LOCATION: HEAD

## 2025-05-27 NOTE — H&P
[facility] General Inpatient Admission Note        Patient:   Leo Nevarez  YOB: 1940  Age:  84 y.o.  Room:  [address] [place] [address]  MRN:  [id number]   Acct: [id number]  PCP: Aileen Valadez APRN - CNP    Date of Admission:  5/26/2025  5:59 PM  Date of Service:  5/27/2025    Subjective   Chief Complaint: Heart failure (HCC)     History Obtained From:- Electronic Medical Record and Hospice nurse    History of Present Illness: Leo Nevarez is a 84 y.o. male who  has a past medical history of Chronic kidney disease, Diabetes mellitus (HCC), Hyperlipidemia, and Hypertension. They are admitted to General Inpatient Hospice at [facility] with a hospice terminal diagnosis of heart failure and for acute management of  hypoglycemia . The patient is not minimally responsive. There was family present at the bedside.  He was seen by the outpatient hospice team.  He was noted to have increased crackles and pitting edema above the knees last week.  His Bumex was increased.  He started having more fatigue and had stopped eating.  His blood sugars were found to be in the 40s on the day of presentation to the hospital.  They increased to the 70s a few hours later.  The decision was then made to admit him to inpatient hospital for stabilization of his blood glucose levels in the setting of uncontrolled type 2 diabetes.    Advanced Directives:  Patient does not have capacity to make medical decisions.  Patient does have a Health Care Power of .  His daughter is his healthcare power of .  Patient does have a living will.  Patient does not have a POLST form completed. POLST form completion deferred today.  Code Status is DNR-CC No additional code details     Past Medical History:    Past Medical History:   Diagnosis Date    Chronic kidney disease     Diabetes mellitus (HCC)     Hyperlipidemia     Hypertension        Past Surgical History:   has a past surgical history that  no abrasion/no weakness/no deformity/no back pain/no difficulty bearing weight/no tingling/no bruising/no numbness/no fever

## 2025-05-27 NOTE — PROGRESS NOTES
[facility]ospice Progress Note        Patient:   Leo Nevarez  YOB: 1940  Age:  84 y.o.  Room:  [address] [place] [address]  MRN:  [id number]   Acct: [id number]  PCP: Aileen Valadez APRN - CNP    Date of Admission:  5/26/2025  5:59 PM  Date of Service:  5/27/2025    Subjective   Chief Complaint: Heart failure (HCC)     History Obtained From:-  Patient, Family member(s) - Son, Electronic Medical Record, Patient's primary nurse, and Hospice nurse    History of Present Illness: Leo Nevarez is a 84 y.o. male who is admitted to General Inpatient Hospice at [facility] with a hospice terminal diagnosis of Heart failure/renal failure. They are GIP day number 1.  Patient was seen in their room today by the hospice team.  There was family present at the bedside.  The patient is minimally responsive.  Patient denies pain, shortness of breath, nausea, vomiting, constipation, and diarrhea. Their comfort medications are working well to control their symptoms.  They did get a good night sleep last night. The patient is eating or receiving tube feeds. They have not had a bowel movement in the last 24 hours documented.    Scheduled Medications:   levothyroxine  150 mcg Oral Daily    citalopram  10 mg Oral Daily    buPROPion  150 mg Oral Daily    sodium polystyrene  7.5 g Oral BID    donepezil  5 mg Oral Nightly       Infusions:   dextrose 30 mL/hr at 05/27/25 1617       PRN Medications:  senna, bisacodyl, oxyCODONE, glycopyrrolate, ondansetron, LORazepam, haloperidol lactate    Objective   Vitals:-    BP (!) 121/47   Pulse 79   Temp 98.4 °F (36.9 °C) (Oral)   Resp 16   SpO2 95%     Pt. Weight:  Wt Readings from Last 3 Encounters:   05/13/25 109.3 kg (241 lb)   05/04/25 119.1 kg (262 lb 8 oz)   03/27/25 108.4 kg (239 lb)       Physical Exam  Vitals and nursing note reviewed.   Constitutional:       General: He is awake. He is not in acute distress.     Appearance: He is ill-appearing.

## 2025-05-27 NOTE — PROGRESS NOTES
Patient received sacramental anointing by a . If you are in need of  support, please call [phone]. If you are in need of a  after 6:30pm, please call the house supervisor for the on-call .      Ericka Agarwal  [facility] Health Coordinator  [phone]

## 2025-05-27 NOTE — PROGRESS NOTES
Spiritual Health History and Assessment/Progress Note  [facility]     Encounter,  ,  ,      Name: Leo Nevarez MRN: [id number]    Age: 84 y.o.     Sex: male   Language: English   Scientologist: Bahai   Heart failure (HCC)     Date: 5/27/2025            Total Time Calculated: (P) 5 min              Spiritual Assessment continued in [facility] ON[facility] MED 5K        Referral/Consult From: Rounding   Encounter Overview/Reason:  Encounter  Service Provided For: Patient    Carolin, Belief, Meaning:   Patient unable to assess at this time  Family/Friends No family/friends present      Importance and Influence:  Patient unable to assess at this time  Family/Friends No family/friends present    Community:  Patient feels well-supported. Support system includes: [place] [facility]  Family/Friends feel well-supported. Support system includes: [facility]    Assessment and Plan of Care:     Patient Interventions include: Other: Prayer.   Family/Friends Interventions include: No family/friends present    Patient Plan of Care: Spiritual Care available upon further referral  Family/Friends Plan of Care: Spiritual Care available upon further referral    Electronically signed by LIANA Salgado on 5/27/2025 at 2:23 PM

## 2025-05-27 NOTE — PLAN OF CARE
Problem: Chronic Conditions and Co-morbidities  Goal: Patient's chronic conditions and co-morbidity symptoms are monitored and maintained or improved  Outcome: Progressing     Problem: Skin/Tissue Integrity  Goal: Skin integrity remains intact  Description: 1.  Monitor for areas of redness and/or skin breakdown2.  Assess vascular access sites hourly3.  Every 4-6 hours minimum:  Change oxygen saturation probe site4.  Every 4-6 hours:  If on nasal continuous positive airway pressure, respiratory therapy assess nares and determine need for appliance change or resting period  Outcome: Progressing     Problem: Safety - Adult  Goal: Free from fall injury  Outcome: Progressing

## 2025-05-28 VITALS
SYSTOLIC BLOOD PRESSURE: 140 MMHG | TEMPERATURE: 98 F | OXYGEN SATURATION: 93 % | HEART RATE: 84 BPM | DIASTOLIC BLOOD PRESSURE: 65 MMHG | RESPIRATION RATE: 16 BRPM

## 2025-05-28 PROBLEM — Z51.5 HOSPICE CARE PATIENT: Status: ACTIVE | Noted: 2025-05-28

## 2025-05-28 LAB
GLUCOSE BLD STRIP.AUTO-MCNC: 246 MG/DL (ref 70–108)
GLUCOSE BLD STRIP.AUTO-MCNC: 314 MG/DL (ref 70–108)

## 2025-05-28 PROCEDURE — 99238 HOSP IP/OBS DSCHRG MGMT 30/<: CPT | Performed by: STUDENT IN AN ORGANIZED HEALTH CARE EDUCATION/TRAINING PROGRAM

## 2025-05-28 PROCEDURE — 6370000000 HC RX 637 (ALT 250 FOR IP): Performed by: INTERNAL MEDICINE

## 2025-05-28 PROCEDURE — 82948 REAGENT STRIP/BLOOD GLUCOSE: CPT

## 2025-05-28 RX ORDER — OXYCODONE HYDROCHLORIDE 5 MG/1
5 TABLET ORAL EVERY 4 HOURS PRN
Qty: 90 TABLET | Refills: 0 | Status: SHIPPED | OUTPATIENT
Start: 2025-05-28 | End: 2025-06-12

## 2025-05-28 RX ORDER — SENNOSIDES 8.6 MG/1
1 TABLET ORAL DAILY PRN
Qty: 30 TABLET | Refills: 0 | Status: SHIPPED | OUTPATIENT
Start: 2025-05-28 | End: 2025-06-27

## 2025-05-28 RX ADMIN — BUPROPION HYDROCHLORIDE 150 MG: 150 TABLET, EXTENDED RELEASE ORAL at 08:25

## 2025-05-28 RX ADMIN — LEVOTHYROXINE SODIUM 150 MCG: 0.15 TABLET ORAL at 05:35

## 2025-05-28 RX ADMIN — Medication 5 MG: at 05:36

## 2025-05-28 RX ADMIN — Medication 5 MG: at 08:28

## 2025-05-28 RX ADMIN — CITALOPRAM HYDROBROMIDE 10 MG: 20 TABLET ORAL at 08:25

## 2025-05-28 RX ADMIN — SODIUM POLYSTYRENE SULFONATE 7.5 G: 15 SUSPENSION ORAL; RECTAL at 08:25

## 2025-05-28 ASSESSMENT — PAIN DESCRIPTION - LOCATION
LOCATION: HEAD
LOCATION: HEAD

## 2025-05-28 ASSESSMENT — PAIN DESCRIPTION - ORIENTATION
ORIENTATION: INNER
ORIENTATION: INNER;MID

## 2025-05-28 ASSESSMENT — PAIN SCALES - GENERAL
PAINLEVEL_OUTOF10: 5
PAINLEVEL_OUTOF10: 8

## 2025-05-28 ASSESSMENT — PAIN DESCRIPTION - DESCRIPTORS
DESCRIPTORS: ACHING
DESCRIPTORS: ACHING;SORE

## 2025-05-28 NOTE — PROGRESS NOTES
Spiritual Health History and Assessment/Progress Note  [facility]    Follow-up, Attempted Encounter,  ,  ,      Name: Leo Nevarez MRN: [id number]    Age: 84 y.o.     Sex: male   Language: English   Jain: Presybeterian   Heart failure (HCC)     Date: 5/28/2025            Total Time Calculated: (P) 5 min              Spiritual Assessment continued in STR ONC MED 5K        Referral/Consult From: Rounding   Encounter Overview/Reason: Follow-up, Attempted Encounter  Service Provided For: Patient    Carolin, Belief, Meaning:   Patient unable to assess at this time  Family/Friends No family/friends present      Importance and Influence:  Patient unable to assess at this time  Family/Friends No family/friends present    Community:  Patient feels well-supported. Support system includes: Extended family  Family/Friends feel well-supported. Support system includes: Extended family    Assessment and Plan of Care:   At the time I made contact with the Hospice patient there were no family members present. The patient was non-responsive.  I provided a prayer of comfort as said the “Lord's Prayer.”      When the family arrives, Spiritual support will be provided to assist with emotional and spiritual needs.      Patient Interventions include: Other: prayer  Family/Friends Interventions include: No family/friends present    Patient Plan of Care: Spiritual Care available upon further referral  Family/Friends Plan of Care: Spiritual Care available upon further referral    Electronically signed by LIANA Salgado on 5/28/2025 at 2:01 PM

## 2025-05-28 NOTE — DISCHARGE SUMMARY
[facility] Discharge Summary        Patient:   Leo Nevarez  YOB: 1940  Age:  84 y.o.  Room:  [address] [place] [address]  MRN:  [id number]   Acct: [id number]  PCP: Aileen Valadez APRN - CNP    Admit Date: 5/26/2025     Discharge Date:   5/28/2025    Admitting Physician: Lance Larson MD     Discharge Physician: Lance Larson MD     Discharge Diagnoses:    Active Hospital Problems    Diagnosis Date Noted    Heart failure (HCC) [I50.9] 05/26/2025    Hypoglycemia [E16.2] 05/26/2025       The patient was seen and examined on day of discharge and this discharge summary is in conjunction with any daily progress note from day of discharge.     Plan   Continue current plan of care  Continue Wellbutrin 150 mg daily for depression  Continue citalopram 10 mg daily for depression  Continue Aricept 5 mg nightly for dementia  Continue Synthroid 150 mcg daily for hypothyroid  Continue Kayexalate 7.5 g twice a day scheduled  Continue bisacodyl 5 mg every day as needed for constipation  Continue Robinul 0.2 mg every 4 hours as needed for secretions  Continue Haldol 2 mg IV every 2 hours as needed for agitation restlessness  Continue lorazepam oral concentrate solution 0.5 mg every 2 hours as needed for breakthrough anxiety, agitation, dyspnea  Continue Zofran 4 mg every 6 hours as needed for nausea and vomiting  Continue oxycodone immediate release 5 mg every 4 hours needed for breakthrough shortness of breath and pain  Continue senna 8.6 mg every day as needed for constipation  Continue other home medications at the direction of outpatient hospice team, consider discontinuing glipizide     Hospital Course   Leo Nevarez is a 84 y.o. male admitted to [facility] on 5/26/2025 for acute management of  management of fluctuating glucose levels .        He was started on a D5 infusion on arrival to [facility].  His sugars were initially in the 30s, however they

## 2025-05-28 NOTE — PLAN OF CARE
Problem: Chronic Conditions and Co-morbidities  Goal: Patient's chronic conditions and co-morbidity symptoms are monitored and maintained or improved  Outcome: Progressing  Flowsheets (Taken 5/28/2025 1148)  Care Plan - Patient's Chronic Conditions and Co-Morbidity Symptoms are Monitored and Maintained or Improved:   Monitor and assess patient's chronic conditions and comorbid symptoms for stability, deterioration, or improvement   Collaborate with multidisciplinary team to address chronic and comorbid conditions and prevent exacerbation or deterioration   Update acute care plan with appropriate goals if chronic or comorbid symptoms are exacerbated and prevent overall improvement and discharge     Problem: Safety - Adult  Goal: Free from fall injury  Outcome: Progressing  Flowsheets (Taken 5/28/2025 1148)  Free From Fall Injury:   Based on caregiver fall risk screen, instruct family/caregiver to ask for assistance with transferring infant if caregiver noted to have fall risk factors   Instruct family/caregiver on patient safety     Problem: Pain  Goal: Verbalizes/displays adequate comfort level or baseline comfort level  Outcome: Progressing  Flowsheets (Taken 5/28/2025 1148)  Verbalizes/displays adequate comfort level or baseline comfort level:   Assess pain using appropriate pain scale   Encourage patient to monitor pain and request assistance   Care plan reviewed with patient and family.  Patient and family verbalize understanding of the plan of care and contribute to goal setting.

## 2025-05-28 NOTE — PROGRESS NOTES
Patient discharged at this time. All IV's removed. Discharge instructions, medication changes and follow up appointments explained at this time with son. All questions answered at this time. AVS given to son and reviewed with this RN. All patient belongings returned. Chart broken down and placed in yellow bin. Patient brought to discharge doors with patient tech.

## 2025-05-30 PROBLEM — R79.89 ELEVATED TROPONIN: Status: RESOLVED | Noted: 2023-11-11 | Resolved: 2025-05-30

## 2025-08-06 ENCOUNTER — TELEPHONE (OUTPATIENT)
Dept: PALLATIVE CARE | Age: 85
End: 2025-08-06

## (undated) DEVICE — CYSTO: Brand: MEDLINE INDUSTRIES, INC.

## (undated) DEVICE — DRAINBAG,ANTI-REFLUX TOWER,L/F,2000ML,LL: Brand: MEDLINE

## (undated) DEVICE — LASER SURG W22XH58IN D36IN 475LB SLD STATE FREQ DOUBLED

## (undated) DEVICE — CATHETER,FOLEY,3WAY,SILI-ELAST,20FR,30ML: Brand: MEDLINE

## (undated) DEVICE — SOLUTION IRRIG 3000ML 0.9% SOD CHL USP UROMATIC PLAS CONT